# Patient Record
Sex: FEMALE | Race: WHITE | NOT HISPANIC OR LATINO | Employment: OTHER | URBAN - METROPOLITAN AREA
[De-identification: names, ages, dates, MRNs, and addresses within clinical notes are randomized per-mention and may not be internally consistent; named-entity substitution may affect disease eponyms.]

---

## 2017-01-17 ENCOUNTER — ALLSCRIPTS OFFICE VISIT (OUTPATIENT)
Dept: OTHER | Facility: OTHER | Age: 57
End: 2017-01-17

## 2017-01-17 ENCOUNTER — LAB REQUISITION (OUTPATIENT)
Dept: LAB | Facility: HOSPITAL | Age: 57
End: 2017-01-17
Payer: COMMERCIAL

## 2017-01-17 DIAGNOSIS — Z01.419 ENCOUNTER FOR GYNECOLOGICAL EXAMINATION WITHOUT ABNORMAL FINDING: ICD-10-CM

## 2017-01-17 DIAGNOSIS — Z12.31 ENCOUNTER FOR SCREENING MAMMOGRAM FOR MALIGNANT NEOPLASM OF BREAST: ICD-10-CM

## 2017-01-17 PROCEDURE — G0145 SCR C/V CYTO,THINLAYER,RESCR: HCPCS | Performed by: PHYSICIAN ASSISTANT

## 2017-01-17 PROCEDURE — 87624 HPV HI-RISK TYP POOLED RSLT: CPT | Performed by: PHYSICIAN ASSISTANT

## 2017-01-19 ENCOUNTER — GENERIC CONVERSION - ENCOUNTER (OUTPATIENT)
Dept: OTHER | Facility: OTHER | Age: 57
End: 2017-01-19

## 2017-01-20 LAB — HPV RRNA GENITAL QL NAA+PROBE: NORMAL

## 2017-01-23 LAB
LAB AP GYN PRIMARY INTERPRETATION: NORMAL
Lab: NORMAL

## 2017-01-25 ENCOUNTER — APPOINTMENT (OUTPATIENT)
Dept: LAB | Facility: MEDICAL CENTER | Age: 57
End: 2017-01-25
Payer: COMMERCIAL

## 2017-01-25 ENCOUNTER — GENERIC CONVERSION - ENCOUNTER (OUTPATIENT)
Dept: OTHER | Facility: OTHER | Age: 57
End: 2017-01-25

## 2017-01-25 DIAGNOSIS — I10 ESSENTIAL (PRIMARY) HYPERTENSION: ICD-10-CM

## 2017-01-25 DIAGNOSIS — M81.0 AGE-RELATED OSTEOPOROSIS WITHOUT CURRENT PATHOLOGICAL FRACTURE: ICD-10-CM

## 2017-01-25 DIAGNOSIS — G40.409 OTHER GENERALIZED EPILEPSY AND EPILEPTIC SYNDROMES, NOT INTRACTABLE, WITHOUT STATUS EPILEPTICUS (HCC): ICD-10-CM

## 2017-01-25 LAB
25(OH)D3 SERPL-MCNC: 37.6 NG/ML (ref 30–100)
ALBUMIN SERPL BCP-MCNC: 3.4 G/DL (ref 3.5–5)
ALP SERPL-CCNC: 101 U/L (ref 46–116)
ALT SERPL W P-5'-P-CCNC: 23 U/L (ref 12–78)
ANION GAP SERPL CALCULATED.3IONS-SCNC: 6 MMOL/L (ref 4–13)
AST SERPL W P-5'-P-CCNC: 16 U/L (ref 5–45)
BASOPHILS # BLD AUTO: 0.02 THOUSANDS/ΜL (ref 0–0.1)
BASOPHILS NFR BLD AUTO: 0 % (ref 0–1)
BILIRUB SERPL-MCNC: 0.22 MG/DL (ref 0.2–1)
BUN SERPL-MCNC: 18 MG/DL (ref 5–25)
CALCIUM SERPL-MCNC: 8.5 MG/DL (ref 8.3–10.1)
CHLORIDE SERPL-SCNC: 103 MMOL/L (ref 100–108)
CHOLEST SERPL-MCNC: 142 MG/DL (ref 50–200)
CO2 SERPL-SCNC: 30 MMOL/L (ref 21–32)
CREAT SERPL-MCNC: 0.54 MG/DL (ref 0.6–1.3)
EOSINOPHIL # BLD AUTO: 0.3 THOUSAND/ΜL (ref 0–0.61)
EOSINOPHIL NFR BLD AUTO: 4 % (ref 0–6)
ERYTHROCYTE [DISTWIDTH] IN BLOOD BY AUTOMATED COUNT: 13.4 % (ref 11.6–15.1)
GFR SERPL CREATININE-BSD FRML MDRD: >60 ML/MIN/1.73SQ M
GLUCOSE SERPL-MCNC: 87 MG/DL (ref 65–140)
HCT VFR BLD AUTO: 42.7 % (ref 34.8–46.1)
HDLC SERPL-MCNC: 61 MG/DL (ref 40–60)
HGB BLD-MCNC: 13.8 G/DL (ref 11.5–15.4)
LDLC SERPL CALC-MCNC: 70 MG/DL (ref 0–100)
LYMPHOCYTES # BLD AUTO: 1.72 THOUSANDS/ΜL (ref 0.6–4.47)
LYMPHOCYTES NFR BLD AUTO: 20 % (ref 14–44)
MCH RBC QN AUTO: 31.8 PG (ref 26.8–34.3)
MCHC RBC AUTO-ENTMCNC: 32.3 G/DL (ref 31.4–37.4)
MCV RBC AUTO: 98 FL (ref 82–98)
MONOCYTES # BLD AUTO: 0.73 THOUSAND/ΜL (ref 0.17–1.22)
MONOCYTES NFR BLD AUTO: 9 % (ref 4–12)
NEUTROPHILS # BLD AUTO: 5.76 THOUSANDS/ΜL (ref 1.85–7.62)
NEUTS SEG NFR BLD AUTO: 67 % (ref 43–75)
NRBC BLD AUTO-RTO: 0 /100 WBCS
PHENOBARB SERPL-MCNC: 23.5 UG/ML (ref 15–40)
PLATELET # BLD AUTO: 234 THOUSANDS/UL (ref 149–390)
PMV BLD AUTO: 10.9 FL (ref 8.9–12.7)
POTASSIUM SERPL-SCNC: 4.2 MMOL/L (ref 3.5–5.3)
PROT SERPL-MCNC: 7.5 G/DL (ref 6.4–8.2)
RBC # BLD AUTO: 4.34 MILLION/UL (ref 3.81–5.12)
SODIUM SERPL-SCNC: 139 MMOL/L (ref 136–145)
TRIGL SERPL-MCNC: 54 MG/DL
TSH SERPL DL<=0.05 MIU/L-ACNC: 2 UIU/ML (ref 0.36–3.74)
WBC # BLD AUTO: 8.55 THOUSAND/UL (ref 4.31–10.16)

## 2017-01-25 PROCEDURE — 80061 LIPID PANEL: CPT

## 2017-01-25 PROCEDURE — 82306 VITAMIN D 25 HYDROXY: CPT

## 2017-01-25 PROCEDURE — 84443 ASSAY THYROID STIM HORMONE: CPT

## 2017-01-25 PROCEDURE — 80053 COMPREHEN METABOLIC PANEL: CPT

## 2017-01-25 PROCEDURE — 80184 ASSAY OF PHENOBARBITAL: CPT

## 2017-01-25 PROCEDURE — 85025 COMPLETE CBC W/AUTO DIFF WBC: CPT

## 2017-01-25 PROCEDURE — 36415 COLL VENOUS BLD VENIPUNCTURE: CPT

## 2017-02-07 ENCOUNTER — HOSPITAL ENCOUNTER (OUTPATIENT)
Dept: RADIOLOGY | Age: 57
Discharge: HOME | End: 2017-02-07
Payer: COMMERCIAL

## 2017-02-07 DIAGNOSIS — M81.0 AGE-RELATED OSTEOPOROSIS WITHOUT CURRENT PATHOLOGICAL FRACTURE: ICD-10-CM

## 2017-02-07 DIAGNOSIS — Z12.31 ENCOUNTER FOR SCREENING MAMMOGRAM FOR MALIGNANT NEOPLASM OF BREAST: ICD-10-CM

## 2017-02-07 PROCEDURE — G0202 SCR MAMMO BI INCL CAD: HCPCS

## 2017-02-16 ENCOUNTER — ALLSCRIPTS OFFICE VISIT (OUTPATIENT)
Dept: OTHER | Facility: OTHER | Age: 57
End: 2017-02-16

## 2017-04-12 ENCOUNTER — ALLSCRIPTS OFFICE VISIT (OUTPATIENT)
Dept: OTHER | Facility: OTHER | Age: 57
End: 2017-04-12

## 2017-04-12 DIAGNOSIS — I10 ESSENTIAL (PRIMARY) HYPERTENSION: ICD-10-CM

## 2017-04-14 ENCOUNTER — APPOINTMENT (OUTPATIENT)
Dept: LAB | Facility: MEDICAL CENTER | Age: 57
End: 2017-04-14
Payer: COMMERCIAL

## 2017-04-14 DIAGNOSIS — I10 ESSENTIAL (PRIMARY) HYPERTENSION: ICD-10-CM

## 2017-04-14 LAB
ALBUMIN SERPL BCP-MCNC: 3.5 G/DL (ref 3.5–5)
ALP SERPL-CCNC: 98 U/L (ref 46–116)
ALT SERPL W P-5'-P-CCNC: 18 U/L (ref 12–78)
ANION GAP SERPL CALCULATED.3IONS-SCNC: 7 MMOL/L (ref 4–13)
AST SERPL W P-5'-P-CCNC: 12 U/L (ref 5–45)
BASOPHILS # BLD AUTO: 0.05 THOUSANDS/ΜL (ref 0–0.1)
BASOPHILS NFR BLD AUTO: 1 % (ref 0–1)
BILIRUB SERPL-MCNC: 0.32 MG/DL (ref 0.2–1)
BUN SERPL-MCNC: 16 MG/DL (ref 5–25)
CALCIUM SERPL-MCNC: 9.1 MG/DL (ref 8.3–10.1)
CHLORIDE SERPL-SCNC: 102 MMOL/L (ref 100–108)
CO2 SERPL-SCNC: 31 MMOL/L (ref 21–32)
CREAT SERPL-MCNC: 0.54 MG/DL (ref 0.6–1.3)
EOSINOPHIL # BLD AUTO: 0.18 THOUSAND/ΜL (ref 0–0.61)
EOSINOPHIL NFR BLD AUTO: 2 % (ref 0–6)
ERYTHROCYTE [DISTWIDTH] IN BLOOD BY AUTOMATED COUNT: 13.4 % (ref 11.6–15.1)
GFR SERPL CREATININE-BSD FRML MDRD: >60 ML/MIN/1.73SQ M
GLUCOSE P FAST SERPL-MCNC: 84 MG/DL (ref 65–99)
HCT VFR BLD AUTO: 43.2 % (ref 34.8–46.1)
HGB BLD-MCNC: 14.2 G/DL (ref 11.5–15.4)
LYMPHOCYTES # BLD AUTO: 1.95 THOUSANDS/ΜL (ref 0.6–4.47)
LYMPHOCYTES NFR BLD AUTO: 19 % (ref 14–44)
MCH RBC QN AUTO: 31.9 PG (ref 26.8–34.3)
MCHC RBC AUTO-ENTMCNC: 32.9 G/DL (ref 31.4–37.4)
MCV RBC AUTO: 97 FL (ref 82–98)
MONOCYTES # BLD AUTO: 0.86 THOUSAND/ΜL (ref 0.17–1.22)
MONOCYTES NFR BLD AUTO: 9 % (ref 4–12)
NEUTROPHILS # BLD AUTO: 7.04 THOUSANDS/ΜL (ref 1.85–7.62)
NEUTS SEG NFR BLD AUTO: 69 % (ref 43–75)
NRBC BLD AUTO-RTO: 0 /100 WBCS
PLATELET # BLD AUTO: 250 THOUSANDS/UL (ref 149–390)
PMV BLD AUTO: 10.6 FL (ref 8.9–12.7)
POTASSIUM SERPL-SCNC: 4.1 MMOL/L (ref 3.5–5.3)
PROT SERPL-MCNC: 7.5 G/DL (ref 6.4–8.2)
RBC # BLD AUTO: 4.45 MILLION/UL (ref 3.81–5.12)
SODIUM SERPL-SCNC: 140 MMOL/L (ref 136–145)
WBC # BLD AUTO: 10.1 THOUSAND/UL (ref 4.31–10.16)

## 2017-04-14 PROCEDURE — 36415 COLL VENOUS BLD VENIPUNCTURE: CPT

## 2017-04-14 PROCEDURE — 85025 COMPLETE CBC W/AUTO DIFF WBC: CPT

## 2017-04-14 PROCEDURE — 80053 COMPREHEN METABOLIC PANEL: CPT

## 2017-05-04 ENCOUNTER — ALLSCRIPTS OFFICE VISIT (OUTPATIENT)
Dept: OTHER | Facility: OTHER | Age: 57
End: 2017-05-04

## 2017-06-26 ENCOUNTER — ALLSCRIPTS OFFICE VISIT (OUTPATIENT)
Dept: OTHER | Facility: OTHER | Age: 57
End: 2017-06-26

## 2017-06-26 ENCOUNTER — APPOINTMENT (OUTPATIENT)
Dept: LAB | Facility: CLINIC | Age: 57
End: 2017-06-26
Payer: COMMERCIAL

## 2017-06-26 ENCOUNTER — HOSPITAL ENCOUNTER (OUTPATIENT)
Dept: RADIOLOGY | Facility: HOSPITAL | Age: 57
Discharge: HOME | End: 2017-06-26
Payer: COMMERCIAL

## 2017-06-26 ENCOUNTER — TRANSCRIBE ORDERS (OUTPATIENT)
Dept: ADMINISTRATIVE | Facility: HOSPITAL | Age: 57
End: 2017-06-26

## 2017-06-26 DIAGNOSIS — R05.9 COUGH: ICD-10-CM

## 2017-06-26 DIAGNOSIS — R05.9 COUGH: Primary | ICD-10-CM

## 2017-06-26 LAB
BASOPHILS # BLD AUTO: 0.05 THOUSANDS/ΜL (ref 0–0.1)
BASOPHILS NFR BLD AUTO: 0 % (ref 0–1)
EOSINOPHIL # BLD AUTO: 0.42 THOUSAND/ΜL (ref 0–0.61)
EOSINOPHIL NFR BLD AUTO: 3 % (ref 0–6)
ERYTHROCYTE [DISTWIDTH] IN BLOOD BY AUTOMATED COUNT: 13.2 % (ref 11.6–15.1)
HCT VFR BLD AUTO: 41.4 % (ref 34.8–46.1)
HGB BLD-MCNC: 13.8 G/DL (ref 11.5–15.4)
LYMPHOCYTES # BLD AUTO: 2.28 THOUSANDS/ΜL (ref 0.6–4.47)
LYMPHOCYTES NFR BLD AUTO: 15 % (ref 14–44)
MCH RBC QN AUTO: 32 PG (ref 26.8–34.3)
MCHC RBC AUTO-ENTMCNC: 33.3 G/DL (ref 31.4–37.4)
MCV RBC AUTO: 96 FL (ref 82–98)
MONOCYTES # BLD AUTO: 1.71 THOUSAND/ΜL (ref 0.17–1.22)
MONOCYTES NFR BLD AUTO: 11 % (ref 4–12)
NEUTROPHILS # BLD AUTO: 10.68 THOUSANDS/ΜL (ref 1.85–7.62)
NEUTS SEG NFR BLD AUTO: 71 % (ref 43–75)
PLATELET # BLD AUTO: 281 THOUSANDS/UL (ref 149–390)
PMV BLD AUTO: 10.1 FL (ref 8.9–12.7)
RBC # BLD AUTO: 4.31 MILLION/UL (ref 3.81–5.12)
WBC # BLD AUTO: 15.14 THOUSAND/UL (ref 4.31–10.16)

## 2017-06-26 PROCEDURE — 36415 COLL VENOUS BLD VENIPUNCTURE: CPT

## 2017-06-26 PROCEDURE — 85025 COMPLETE CBC W/AUTO DIFF WBC: CPT

## 2017-06-26 PROCEDURE — 71020 HB CHEST X-RAY 2VW FRONTAL&LATL: CPT

## 2017-06-27 ENCOUNTER — GENERIC CONVERSION - ENCOUNTER (OUTPATIENT)
Dept: OTHER | Facility: OTHER | Age: 57
End: 2017-06-27

## 2017-07-24 ENCOUNTER — ALLSCRIPTS OFFICE VISIT (OUTPATIENT)
Dept: OTHER | Facility: OTHER | Age: 57
End: 2017-07-24

## 2017-08-03 ENCOUNTER — ALLSCRIPTS OFFICE VISIT (OUTPATIENT)
Dept: OTHER | Facility: OTHER | Age: 57
End: 2017-08-03

## 2017-09-05 DIAGNOSIS — G40.409 OTHER GENERALIZED EPILEPSY AND EPILEPTIC SYNDROMES, NOT INTRACTABLE, WITHOUT STATUS EPILEPTICUS (HCC): ICD-10-CM

## 2017-09-12 ENCOUNTER — GENERIC CONVERSION - ENCOUNTER (OUTPATIENT)
Dept: OTHER | Facility: OTHER | Age: 57
End: 2017-09-12

## 2017-11-13 ENCOUNTER — GENERIC CONVERSION - ENCOUNTER (OUTPATIENT)
Dept: OTHER | Facility: OTHER | Age: 57
End: 2017-11-13

## 2017-11-13 DIAGNOSIS — I10 ESSENTIAL (PRIMARY) HYPERTENSION: ICD-10-CM

## 2017-11-13 DIAGNOSIS — E78.5 HYPERLIPIDEMIA: ICD-10-CM

## 2017-12-22 ENCOUNTER — OFFICE VISIT (OUTPATIENT)
Dept: URGENT CARE | Age: 57
End: 2017-12-22
Payer: COMMERCIAL

## 2017-12-22 PROCEDURE — 99203 OFFICE O/P NEW LOW 30 MIN: CPT | Performed by: FAMILY MEDICINE

## 2017-12-22 PROCEDURE — G0463 HOSPITAL OUTPT CLINIC VISIT: HCPCS | Performed by: FAMILY MEDICINE

## 2017-12-23 ENCOUNTER — GENERIC CONVERSION - ENCOUNTER (OUTPATIENT)
Dept: OTHER | Facility: OTHER | Age: 57
End: 2017-12-23

## 2017-12-23 NOTE — PROGRESS NOTES
Assessment   1  Acute upper respiratory infection (465 9) (J06 9)    Plan   Acute upper respiratory infection    · Amoxicillin-Pot Clavulanate 875-125 MG Oral Tablet (Augmentin); TAKE 1 TABLET    TWICE DAILY UNTIL FINISHED    Discussion/Summary   Discussion Summary:    Augmentin twice a day until finished (please take probiotics)  medication as needed  or Advil/ Motrin as needed  follow-up with family physician as needed  go to the hospital emergency department if worse  Medication Side Effects Reviewed: Possible side effects of new medications were reviewed with the patient/guardian today  Understands and agrees with treatment plan: The treatment plan was reviewed with the patient/guardian  The patient/guardian understands and agrees with the treatment plan    Counseling Documentation With Imm: The patient, patient's caretaker was counseled regarding  Follow Up Instructions: Follow Up with your Primary Care Provider in 7-10 days  If your symptoms worsen, go to the nearest [place] [address] Emergency Department  Chief Complaint   Chief Complaint Free Text Note Form: nurse with patient reports staff at pt  place of residence have come down with same sx within the past week, pt  has fever(101 7)and cough, fatigue  started yesterday tylenol at 1800  History of Present Illness   HPI: Patient here with nurse - congestion and cough; nurse states patient has been in contact with several ill people with similar complaint    Hospital Based Practices Required Assessment:      Abuse And Domestic Violence Screen       Yes, the patient is safe at home  -- The patient states no one is hurting them  Depression And Suicide Screen  No, the patient has not had thoughts of hurting themself  No, the patient has not felt depressed in the past 7 days  Review of Systems   Focused-Female:      Constitutional: as noted in HPI       ENT: nasal discharge, but-- as noted in HPI        Cardiovascular: no complaints of slow or fast heart rate, no chest pain, no palpitations, no leg claudication or lower extremity edema  Respiratory: cough, but-- as noted in HPI  Breasts: no complaints of breast pain, breast lump or nipple discharge  Gastrointestinal: no complaints of abdominal pain, no constipation, no nausea or diarrhea, no vomiting, no bloody stools  Genitourinary: no complaints of dysuria, no incontinence, no pelvic pain, no dysmenorrhea, no vaginal discharge or abnormal vaginal bleeding  Musculoskeletal: no complaints of arthralgia, no myalgia, no joint swelling or stiffness, no limb pain or swelling  Integumentary: no complaints of skin rash or lesion, no itching or dry skin, no skin wounds  Neurological: no complaints of headache, no confusion, no numbness or tingling, no dizziness or fainting  ROS Reviewed:    ROS reviewed  Active Problems   1  Allergic rhinitis (477 9) (J30 9)   2  Benign essential hypertension (401 1) (I10)   3  Cough (786 2) (R05)   4  Encounter for routine gynecological examination (V72 31) (Z01 419)   5  Encounter for screening mammogram for malignant neoplasm of breast (V76 12)     (Z12 31)   6  Esophageal reflux (530 81) (K21 9)   7  Hyperlipidemia (272 4) (E78 5)   8  Insomnia (780 52) (G47 00)   9  Medicare annual wellness visit, initial (V70 0) (Z00 00)   10  Need for hepatitis B vaccination (V05 3) (Z23)   11  Need for influenza vaccination (V04 81) (Z23)   12  Need for prophylactic vaccination and inoculation against influenza (V04 81) (Z23)   13  Osteoporosis (733 00) (M81 0)   14  Post-menopausal bleeding (627 1) (N95 0)   15  PPD screening test (V74 1) (Z11 1)   16  Skin disorder (709 9) (L98 9)   17  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)   18  Tonic-clonic epileptic seizures (345 10) (G40 409)   19  Visit for pre-operative examination (V72 84) (Z01 818)   20  Visit for screening mammogram (V76 12) (Z12 31)   21   Well adult health check (V70 0) (Z00 00)   22  History of Wheelchair dependent (V46 3) (Z99 3)    Past Medical History   1  History of Abnormal blood chemistry (790 6) (R79 9)   2  History of Allergic conjunctivitis of both eyes (372 14) (H10 13)   3  History of Anxiety (300 00) (F41 9)   4  History of Bowel incontinence (787 60) (R15 9)   5  History of Colonoscopy (Fiberoptic) Screening   6  History of Generalized convulsive seizure (780 39) (R56 9)   7  History of abdominal pain (V13 89) (Z87 898)   8  History of cerebral palsy (V12 49) (Z86 69)   9  History of constipation (V12 79) (Z87 19)   10  History of fatigue (V13 89) (Z87 898)   11  History of hyperlipidemia (V12 29) (Z86 39)   12  History of hypertension (V12 59) (Z86 79)   13  History of mental retardation (V11 8) (Z86 59)   14  History of upper respiratory infection (V12 09) (Z87 09)   15  History of urinary incontinence (V13 09) (Z87 898)   16  History of Impacted cerumen, unspecified laterality (380 4) (H61 20)   17  History of Mild pain (780 96) (R52)   18  History of Multiple Blisters (919 2)   19  Need for prophylactic vaccination and inoculation against influenza (V04 81) (Z23)   20  History of Osteoporosis (733 00) (M81 0)   21  History of Skin irritation (709 9) (R23 8)   22  History of Skin rash (782 1) (R21)   23  History of 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)   24  History of Visit For: Pre-procedural Exam Prior To Dental Procedure (V72 83)   25  History of Vitamin D deficiency (268 9) (E55 9)  Active Problems And Past Medical History Reviewed: The active problems and past medical history were reviewed and updated today  Family History   Mother    1  Family history of Bone Marrow Lymphoma   2  Family history of Coronary Artery Disease (V17 49)   3  Family history of Epilepsy And Recurrent Seizures (V17 2)   4  Family history of Mitral Stenosis  Father    5  Family history of Prostate Cancer (V16 42)   6   Family history of Skin Cancer (V16 8)  Maternal Grandmother    7  Family history of Heart Disease (V17 49)   8  Family history of Kidney Cancer (V16 51)  Paternal Grandmother    5  Family history of Heart Disease (V17 49)  Maternal Grandfather    10  Family history of Heart Disease (V17 49)  Maternal Aunt    6  Family history of Breast Cancer (V16 3)  Family History Reviewed: The family history was reviewed and updated today  Social History    · Caffeine use (V49 89) (F15 90)   · Denied: History of Drug Use   · Never A Smoker   · Never Drank Alcohol   · History of Wheelchair dependent (V46 3) (Z99 3)  Social History Reviewed: The social history was reviewed and updated today  The social history was reviewed and is unchanged  Surgical History   1  History of Eye Surgery Results Strabismus Left Eye   2  History of Incision And Drainage Of Skin Abscess Back  Surgical History Reviewed: The surgical history was reviewed and updated today  Current Meds    1  Baclofen 10 MG Oral Tablet; Take 1 tablet twice daily  Requested for: 03Aug2017; Last     Rx:03Aug2017 Ordered   2  Benzonatate 100 MG Oral Capsule; TAKE 1 CAPSULE EVERY 8 HOURS DAILY AS     NEEDED; Therapy: (Recorded:07Mar2016) to Recorded   3  Calcium Citrate 200 MG Oral Tablet; Bid 3 tabs; Therapy: (Recorded:08May2013) to Recorded   4  Debrox 6 5 % Otic Solution; 5 drops in affected ear bid x 4 days prn; Therapy: (Recorded:08May2013) to Recorded   5  Famotidine 20 MG Oral Tablet; TAKE 1 TABLET DAILY AT BEDTIME; Therapy: 02Aug2013 to (Evaluate:30Mar2014); Last Rx:02Aug2013 Ordered   6  Fluticasone Propionate 50 MCG/ACT Nasal Suspension; Therapy: (Nimesh Lee) to Recorded   7  Loratadine 10 MG Oral Tablet; take 1 tablet daily as needed; Therapy: (Recorded:08May2013) to Recorded   8  Losartan Potassium-HCTZ 50-12 5 MG Oral Tablet; TAKE ONE TABLET PO AT 6AM     DAILY;      Therapy: 48YCE6293 to (Evaluate:19May2013)  Requested for: 75ARN0460 Recorded   9  Lotrisone 1-0 05 % External Cream; APPLY TWICE TO AFFECTED AREA AS NEEDED; Therapy: (Recorded:04May2017) to Recorded   10  Metoprolol Succinate  MG Oral Tablet Extended Release 24 Hour; TAKE ONE      TABLET PO AT 6AM DAILY; Therapy: 61XAD9444 to (Brayan Crenshaw)  Requested for: 12Apr2017 Recorded   11  MiraLax Oral Packet; MIX 1 PACKET IN 8 OUNCES OF LIQUID AND DRINK ONCE DAILY; Therapy: (Nathan Howard) to Recorded   12  MiraLax Oral Packet; takes one tsp by mouth prn q3 days for constipation; Therapy: (Recorded:11Aug2016) to Recorded   13  Mucinex 600 MG Oral Tablet Extended Release 12 Hour; TAKE 1 TABLET EVERY 12      HOURS AS NEEDED FOR CONGESTION; Therapy: 62FHJ4305 to (Evaluate:18May2017); Last LL:05FUD3745 Ordered   14  Pataday 0 2 % Ophthalmic Solution; INSTILL 1 DROP  INTO AFFECTED EYE(S) ONCE      DAILY       AS NEEDED FOR EYE ALLERGY RELIEF; Therapy: 41JZG1117 to (Last SP:71UWC3923) Ordered   15  PHENobarbital 32 4 MG Oral Tablet; TAKE 3 TABLETS BY MOUTH EVERY EVENING      (SEIZURES); Therapy: 89ONX6421 to (Evaluate:30Jan2018)  Requested for: 72Dgk8696; Last      Rx:48Uft9009 Ordered   16  Povidone-Iodine 10 % External Ointment; apply to affected area BID PRN; Therapy: (Recorded:07Mar2016) to Recorded   17  Simvastatin 20 MG Oral Tablet; TAKE ONE TAB PO AT 4PM DAILY; Therapy: 44NBH0865 to (Brayan Crenshaw)  Requested for: 12Apr2017 Recorded   18  Tylenol 8 Hour 650 MG Oral Tablet Extended Release; 1 tab q4h temp greater than 100 4      and mild pain; Therapy: (Recorded:07Mar2016) to Recorded   19  Vitamin D 1000 UNIT CAPS; take 1 capsule daily; Therapy: 98BDC9110 to (Evaluate:74Qzw8203); Last Rx:94Trj6172 Ordered   20  Vitamins A & D External Ointment; Apply as needed to Buttocks; Therapy: (Recorded:07Mar2016) to Recorded   21  Zolpidem Tartrate 5 MG Oral Tablet; take 0 5 tablet bedtime;       Therapy: 34INI6886 to (Evaluate:12Apr2018)  Requested for: 76PGV9428; Last      Rx:13Nov2017 Ordered  Medication List Reviewed: The medication list was reviewed and updated today  Allergies   1  No Known Drug Allergies  2  No Known Environmental Allergies    Vitals   Signs   Recorded: 87Xli6702 06:17PM   Temperature: 100 7 F  Heart Rate: 130  Respiration: 24  Systolic: 443  Diastolic: 91  Height: 5 ft   Weight: 167 lb   BMI Calculated: 32 62  BSA Calculated: 1 73  O2 Saturation: 95  Pain Scale: 2    Physical Exam        Constitutional patient appears ill  Ears, Nose, Mouth, and Throat      External inspection of ears and nose: Normal        Otoscopic examination: Tympanic membranes translucent with normal light reflex  Canals patent without erythema  -- nasal congestion  -- injection of the oropharynx; moist mucosa  Pulmonary      Respiratory effort: No increased work of breathing or signs of respiratory distress  Auscultation of lungs: Clear to auscultation  Cardiovascular      Auscultation of heart: Normal rate and rhythm, normal S1 and S2, without murmurs  Lymphatic      Palpation of lymph nodes in neck: No lymphadenopathy  Skin good color and turgor  Future Appointments      Date/Time Provider Specialty Site   07/27/2018 10:00 AM GOME[facility]   [facility] [place] [facility]     Signatures    Electronically signed by : Evelyn Morel DO; Dec 22 2017  6:34PM EST                       (Author)

## 2018-01-02 ENCOUNTER — GENERIC CONVERSION - ENCOUNTER (OUTPATIENT)
Dept: OTHER | Facility: OTHER | Age: 58
End: 2018-01-02

## 2018-01-11 NOTE — RESULT NOTES
Verified Results  (1) PHENOBARBITAL [id number] 08:53AM Sadi [place] Order Number: [id number]     Test Name Result Flag Reference   PHENOBARBITAL 23 5 ug/mL  15 0-40 0   - Patient Instructions: This is a fasting blood test  Water, black tea or black coffee only after 9:00pm the night before test Drink 2 glasses of water the morning of test - Patient Instructions: This bloodwork is non-fasting  Please drink two glasses of   water morning of bloodwork

## 2018-01-11 NOTE — PROGRESS NOTES
Assessment    1  Encounter for preventive health examination (V70 0) (Z00 00)    Discussion/Summary  health maintenance visit healthy adult female Currently, she eats a healthy diet and has an inadequate exercise regimen  Breast cancer screening: mammogram is current and mammogram is needed every year  Osteoporosis screening: bone mineral density testing is current  Health Maintenance- Up to date with labs, mammogram, female reproductive health  Forms completed for [facility]  No medications needed at this time  Will have patient brought back in 3 months for routine  Possible side effects of new medications were reviewed with the patient/guardian today  The patient, patient's caretaker was counseled regarding instructions for management, patient and family education, impressions, importance of compliance with treatment  Self Referrals: No      Chief Complaint  Annual exam      History of Present Illness  HM, Adult Female: The patient is being seen for a health maintenance evaluation  General Health: The patient's health since the last visit is described as good  She has regular dental visits  Immunizations status: up to date  Lifestyle:  She does not exercise regularly  She does not use tobacco  She denies alcohol use  She denies drug use  Screening:   HPI: 60 yo F with history of CP and profound intellectual disability brought to the office by one of her care providers in order to have annual visit  She has been in good health since her last visit  She was last seen August 11 of this year and had no acute concerns  BP has been well controlled with medications  She follows with the gyn clinic in [place] and her last appt was within the past year  DEXA scan shows persistent osteoporosis despite bisphosphonate therapy  She will be on drug holiday then start Prolia in 2017  She goes to dentist every 6 months and has her teeth brushed twice a day        Review of Systems    Constitutional: No fever, no chills, feels well, no tiredness, no recent weight gain or weight loss  Eyes: No complaints of eye pain, no red eyes, no eyesight problems, no discharge, no dry eyes, no itching of eyes  ENT: no complaints of earache, no loss of hearing, no nose bleeds, no nasal discharge, no sore throat, no hoarseness  Cardiovascular: No complaints of slow heart rate, no fast heart rate, no chest pain, no palpitations, no leg claudication, no lower extremity edema  Respiratory: No complaints of shortness of breath, no wheezing, no cough, no SOB on exertion, no orthopnea, no PND  Gastrointestinal: No complaints of abdominal pain, no constipation, no nausea or vomiting, no diarrhea, no bloody stools  Genitourinary: No complaints of dysuria, no incontinence, no pelvic pain, no dysmenorrhea, no vaginal discharge or bleeding  Musculoskeletal: No complaints of arthralgias, no myalgias, no joint swelling or stiffness, no limb pain or swelling  Integumentary: No complaints of skin rash or lesions, no itching, no skin wounds, no breast pain or lump  Neurological: No complaints of headache, no confusion, no convulsions, no numbness, no dizziness or fainting, no tingling, no limb weakness, no difficulty walking  Psychiatric: Not suicidal, no sleep disturbance, no anxiety or depression, no change in personality, no emotional problems  Endocrine: No complaints of proptosis, no hot flashes, no muscle weakness, no deepening of the voice, no feelings of weakness  Hematologic/Lymphatic: No complaints of swollen glands, no swollen glands in the neck, does not bleed easily, does not bruise easily  ROS reviewed  Active Problems    1  Acute upper respiratory infection (465 9) (J06 9)   2  Allergic rhinitis (477 9) (J30 9)   3  Benign essential hypertension (401 1) (I10)   4  Encounter for routine gynecological examination (V72 31) (Z01 419)   5   Encounter for screening mammogram for malignant neoplasm of breast (V76 12)   (Z12 31)   6  Esophageal reflux (530 81) (K21 9)   7  Febrile (780 60) (R50 9)   8  Hyperlipidemia (272 4) (E78 5)   9  Insomnia (780 52) (G47 00)   10  Mild pain (780 96) (R52)   11  Need for hepatitis B vaccination (V05 3) (Z23)   12  Need for prophylactic vaccination and inoculation against influenza (V04 81) (Z23)   13  Osteoporosis (733 00) (M81 0)   14  Post-menopausal bleeding (627 1) (N95 0)   15  PPD screening test (V74 1) (Z11 1)   16  Skin irritation (709 9) (R23 8)   17  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)   18  Tonic-clonic epileptic seizures (345 10) (G40 409)   19  Visit for pre-operative examination (V72 84) (Z01 818)   20  Well adult health check (V70 0) (Z00 00)   21   History of Wheelchair dependent (V46 3) (Z99 3)    Past Medical History    · History of Abnormal blood chemistry (790 6) (R79 9)   · History of Allergic conjunctivitis of both eyes (372 14) (H10 13)   · History of Anxiety (300 00) (F41 9)   · History of Bowel incontinence (787 60) (R15 9)   · History of Colonoscopy (Fiberoptic) Screening   · History of Cough (786 2) (R05)   · History of Generalized convulsive seizure (780 39) (R56 9)   · History of abdominal pain (V13 89) (U68 108)   · History of cerebral palsy (V12 49) (Z86 69)   · History of constipation (V12 79) (Z87 19)   · History of fatigue (V13 89) (Y42 013)   · History of hyperlipidemia (V12 29) (Z86 39)   · History of hypertension (V12 59) (Z86 79)   · History of mental retardation (V11 8) (Z86 59)   · History of upper respiratory infection (V12 09) (Z87 09)   · History of urinary incontinence (V13 09) (S57 682)   · History of Impacted cerumen, unspecified laterality (380 4) (H61 20)   · History of Multiple Blisters (919 2)   · Need for prophylactic vaccination and inoculation against influenza (V04 81) (Z23)   · History of Osteoporosis (733 00) (M81 0)   · History of Skin rash (782 1) (R21)   · History of 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)   · History of Visit For: Pre-procedural Exam Prior To Dental Procedure (V72 83)   · History of Vitamin D deficiency (268 9) (E55 9)    Surgical History    · History of Eye Surgery Results Strabismus Left Eye   · History of Incision And Drainage Of Skin Abscess Back    Family History  Mother    · Family history of Bone Marrow Lymphoma   · Family history of Coronary Artery Disease (V17 49)   · Family history of Epilepsy And Recurrent Seizures (V17 2)   · Family history of Mitral Stenosis  Father    · Family history of Prostate Cancer (V16 42)   · Family history of Skin Cancer (V16 8)  Maternal Grandmother    · Family history of Heart Disease (V17 49)   · Family history of Kidney Cancer (V16 51)  Paternal Grandmother    · Family history of Heart Disease (V17 49)  Maternal Grandfather    · Family history of Heart Disease (V17 49)  Maternal Aunt    · Family history of Breast Cancer (V16 3)    Social History    · Caffeine use (V49 89) (F15 90)   · Denied: History of Drug Use   · Never A Smoker   · Never Drank Alcohol   · History of Wheelchair dependent (V46 3) (Z99 3)    Current Meds   1  Alendronate Sodium 70 MG Oral Tablet; ONE TABLET PO WEEKLY ON TUESDAY; Therapy: 61PUX6597 to Recorded   2  Baclofen 10 MG Oral Tablet; Take 1 tablet twice daily  Requested for: 03Aug2016; Last   Rx:03Aug2016 Ordered   3  Benzonatate 100 MG Oral Capsule; TAKE 1 CAPSULE EVERY 8 HOURS DAILY AS   NEEDED; Therapy: (Recorded:07Mar2016) to Recorded   4  Calcium Citrate 200 MG Oral Tablet; Bid 3 tabs; Therapy: (Recorded:08May2013) to Recorded   5  Debrox 6 5 % Otic Solution; 5 drops in affected ear bid x 4 days prn; Therapy: (Recorded:08May2013) to Recorded   6  Famotidine 20 MG Oral Tablet; TAKE 1 TABLET DAILY AT BEDTIME; Therapy: 02Aug2013 to (Evaluate:30Mar2014); Last Rx:02Aug2013 Ordered   7  Fluticasone Propionate 50 MCG/ACT Nasal Suspension; USE 1 SPRAY IN EACH   NOSTRIL TWICE DAILY;    Therapy: 96OLX0443 to ([facility])  Requested for: 82XTF6229 Recorded   8  Loratadine 10 MG Oral Tablet; take 1 tablet daily as needed; Therapy: (Recorded:08May2013) to Recorded   9  Losartan Potassium-HCTZ 50-12 5 MG Oral Tablet; TAKE ONE TABLET PO AT 6AM   DAILY; Therapy: 98KDZ5120 to (Evaluate:19May2013)  Requested for: 14OAM7097 Recorded   10  Metoprolol Succinate  MG Oral Tablet Extended Release 24 Hour; TAKE ONE    TABLET PO AT 6AM DAILY; Therapy: 84ZMK6517 to ([facility])  Requested for: 50YNJ5652 Recorded   11  MiraLax Oral Packet; 17gms 1 cap in 4-8oz glass of fluid po daily; Therapy: (Recorded:07Mar2016) to Recorded   12  MiraLax Oral Packet; takes one tsp by mouth prn q3 days for constipation; Therapy: (Recorded:11Aug2016) to Recorded   13  Pataday 0 2 % Ophthalmic Solution; INSTILL 1 DROP  INTO AFFECTED EYE(S) ONCE    DAILY     AS NEEDED FOR EYE ALLERGY RELIEF; Therapy: 04QRT4633 to (Last JY:79WIK0924) Ordered   14  PHENobarbital 32 4 MG Oral Tablet; TAKE THREE TABLETS BY MOUTH 4PM DAILY; Therapy: 45GQW7126 to (Evaluate:30Jan2017); Last Rx:38Rhf1430 Ordered   15  Povidone-Iodine 10 % External Ointment; apply to affected area BID PRN; Therapy: (Recorded:07Mar2016) to Recorded   16  Robitussin -10 MG/5ML Oral Syrup; TAKE 1 TEASPOONFUL EVERY 4 HOURS    AS NEEDED; Therapy: (Recorded:08May2013) to Recorded   17  Simvastatin 20 MG Oral Tablet; TAKE ONE TAB PO AT 4PM DAILY; Therapy: 55AUE6243 to ([facility])  Requested for: 04XJO8523 Recorded   18  Tylenol 8 Hour 650 MG Oral Tablet Extended Release; 1 tab q4h temp greater than 100 4    and mild pain; Therapy: (Recorded:07Mar2016) to Recorded   19  Vitamin D 1000 UNIT CAPS; take 1 capsule daily; Therapy: 73WLI2025 to (Evaluate:08Feb2014); Last Rx:15Imb9930 Ordered   20  Vitamins A & D External Ointment; Apply as needed to Buttocks; Therapy: (Recorded:07Mar2016) to Recorded   21   Zolpidem Tartrate 5 MG Oral Tablet; take 1/2 tab PO daily; Therapy: 07WCG3760 to (Evaluate:01Jun2013)  Requested for: 49KRI3536 Recorded    Allergies    1  No Known Drug Allergies    2  No Known Environmental Allergies    Vitals   Recorded: 43UGE3483 12:37XM   Systolic 397   Diastolic 80   Heart Rate 74   Respiration 14   Temperature 97 7 F   Weight Unobtainable Yes   Height Unobtainable Yes     Physical Exam    Constitutional   General appearance: No acute distress, well appearing and well nourished  Head and Face   Head and face: Normal     Palpation of the face and sinuses: No sinus tenderness  Eyes   Conjunctiva and lids: No swelling, erythema or discharge  Pupils and irises: Equal, round, reactive to light  Ophthalmoscopic examination: Normal fundi and optic discs  Ears, Nose, Mouth, and Throat   External inspection of ears and nose: Normal     Otoscopic examination: Tympanic membranes translucent with normal light reflex  Canals patent without erythema  Lips, teeth, and gums: Normal, good dentition  Oropharynx: Normal with no erythema, edema, exudate or lesions  Neck   Neck: Supple, symmetric, trachea midline, no masses  Thyroid: Normal, no thyromegaly  Pulmonary   Respiratory effort: No increased work of breathing or signs of respiratory distress  Auscultation of lungs: Clear to auscultation  Cardiovascular   Auscultation of heart: Normal rate and rhythm, normal S1 and S2, no murmurs  Examination of extremities for edema and/or varicosities: Normal     Abdomen   Abdomen: Non-tender, no masses  Lymphatic   Palpation of lymph nodes in neck: No lymphadenopathy  Musculoskeletal   Gait and station: Normal     Digits and nails: Normal without clubbing or cyanosis  Joints, bones, and muscles: Normal     Range of motion: Normal     Stability: Normal     Muscle strength/tone: Normal     Skin   Skin and subcutaneous tissue: Normal without rashes or lesions      Palpation of skin and subcutaneous tissue: Normal turgor  Neurologic   Cranial nerves: Cranial nerves II-XII intact  Cortical function: Normal mental status  Reflexes: 2+ and symmetric  Sensation: No sensory loss  Coordination: Normal finger to nose and heel to shin  Psychiatric   Judgment and insight: Normal     Orientation to person, place, and time: Normal     Recent and remote memory: Intact  Mood and affect: Normal        Attending Note  Attending Note: Attending Note: I discussed the case with the Resident and reviewed the Resident's note and I agree with the Resident management plan as it was presented to me  Level of Participation: I was present in clinic, but did not examine the patient  Diagnosis and Plan: Health maintenance: doing well, labs, screenings up to date  I agree with the Resident's note  Signatures   Electronically signed by :  Saurabh Carrington DO; Sep 15 2016  3:12PM EST                       (Author)    Electronically signed by : GOMEZ Flower ; Sep 16 2016 12:53PM EST                       (Author)

## 2018-01-11 NOTE — RESULT NOTES
Verified Results  (1) CBC/PLT/DIFF [id number] 11:46AM Anna Aquino     Test Name Result Flag Reference   WBC COUNT 15 14 Thousand/uL H 4 31-10 16   RBC COUNT 4 31 Million/uL  3 81-5 12   HEMOGLOBIN 13 8 g/dL  11 5-15 4   HEMATOCRIT 41 4 %  34 8-46  1   MCV 96 fL  82-98   MCH 32 0 pg  26 8-34 3   MCHC 33 3 g/dL  31 4-37 4   RDW 13 2 %  11 6-15 1   MPV 10 1 fL  8 9-12 7   PLATELET COUNT 260 Thousands/uL  149-390   NEUTROPHILS RELATIVE PERCENT 71 %  43-75   LYMPHOCYTES RELATIVE PERCENT 15 %  14-44   MONOCYTES RELATIVE PERCENT 11 %  4-12   EOSINOPHILS RELATIVE PERCENT 3 %  0-6   BASOPHILS RELATIVE PERCENT 0 %  0-1   NEUTROPHILS ABSOLUTE COUNT 10 68 Thousands/? ??L H 1 85-7 62   LYMPHOCYTES ABSOLUTE COUNT 2 28 Thousands/? ??L  0 60-4 47   MONOCYTES ABSOLUTE COUNT 1 71 Thousand/? ??L H 0 17-1 22   EOSINOPHILS ABSOLUTE COUNT 0 42 Thousand/? ??L  0 00-0 61   BASOPHILS ABSOLUTE COUNT 0 05 Thousands/? ??L  0 00-0 10   This bloodwork is non-fasting  Please drink two glasses of water morning of  bloodwork

## 2018-01-12 VITALS
DIASTOLIC BLOOD PRESSURE: 70 MMHG | RESPIRATION RATE: 14 BRPM | HEART RATE: 80 BPM | SYSTOLIC BLOOD PRESSURE: 116 MMHG | TEMPERATURE: 98.6 F

## 2018-01-13 VITALS
DIASTOLIC BLOOD PRESSURE: 70 MMHG | RESPIRATION RATE: 18 BRPM | SYSTOLIC BLOOD PRESSURE: 118 MMHG | HEART RATE: 82 BPM | TEMPERATURE: 97.6 F | HEIGHT: 60 IN

## 2018-01-13 VITALS — HEART RATE: 84 BPM | SYSTOLIC BLOOD PRESSURE: 136 MMHG | DIASTOLIC BLOOD PRESSURE: 76 MMHG

## 2018-01-14 VITALS
BODY MASS INDEX: 32.98 KG/M2 | DIASTOLIC BLOOD PRESSURE: 78 MMHG | TEMPERATURE: 98.7 F | WEIGHT: 168 LBS | HEIGHT: 60 IN | RESPIRATION RATE: 12 BRPM | HEART RATE: 68 BPM | SYSTOLIC BLOOD PRESSURE: 128 MMHG

## 2018-01-14 VITALS
TEMPERATURE: 98.4 F | HEART RATE: 74 BPM | SYSTOLIC BLOOD PRESSURE: 118 MMHG | RESPIRATION RATE: 16 BRPM | DIASTOLIC BLOOD PRESSURE: 76 MMHG

## 2018-01-14 VITALS — BODY MASS INDEX: 32.98 KG/M2 | WEIGHT: 168 LBS | HEIGHT: 60 IN

## 2018-01-14 VITALS
RESPIRATION RATE: 16 BRPM | HEART RATE: 68 BPM | SYSTOLIC BLOOD PRESSURE: 132 MMHG | DIASTOLIC BLOOD PRESSURE: 78 MMHG | TEMPERATURE: 96.7 F

## 2018-01-22 VITALS
SYSTOLIC BLOOD PRESSURE: 118 MMHG | HEART RATE: 72 BPM | DIASTOLIC BLOOD PRESSURE: 70 MMHG | RESPIRATION RATE: 14 BRPM | TEMPERATURE: 96.9 F

## 2018-01-22 VITALS
SYSTOLIC BLOOD PRESSURE: 122 MMHG | RESPIRATION RATE: 12 BRPM | DIASTOLIC BLOOD PRESSURE: 78 MMHG | TEMPERATURE: 98.9 F | HEART RATE: 68 BPM

## 2018-01-23 VITALS
WEIGHT: 167 LBS | SYSTOLIC BLOOD PRESSURE: 162 MMHG | TEMPERATURE: 100.7 F | BODY MASS INDEX: 32.79 KG/M2 | HEIGHT: 60 IN | HEART RATE: 130 BPM | OXYGEN SATURATION: 95 % | RESPIRATION RATE: 24 BRPM | DIASTOLIC BLOOD PRESSURE: 91 MMHG

## 2018-01-23 NOTE — MISCELLANEOUS
Message  Message Free Text Note Form: Bevtoft from [facility] called on behalf of the patient  Reports a 1 day history of fevers and cough  went to urgent care yesterday and was prescribe Augmentin 875 q12h for presumed URI  She is also getting Tylenol for fevers and Claritin for congestion  Earlier today was found to have another elevates temp at 3pm and was given Tylenol but has now max-ed the dose for Tylenol  Most recent temp recorded at 5 pm was 104 1 in 1 ear and 103 8 in the other  Patient is also tachycardic in the 100's-110's with a BP of 130/86 and 95% on RA  Per caregiver, pt is not in any distress but is concerned about her elevated temp  Appetite has decreased but is drinking fluids  Discussion/Summary  Discussion Summary:   Instructed to give patient Ibuprofen 600 mg q6h as needed and if fever persist, go to the ED  Also monitor closely given history of seizures  SHould they have any further concerns or questions they may call back anytime but i strongly encouraged them to go to the ED should they feel uncomfortable with managing acute symptoms  Caregiver verbalized understanding and agreed with plan        Signatures   Electronically signed by : GOMEZ Chisholm ; Dec 23 2017  9:47PM EST                       (Author)

## 2018-01-24 ENCOUNTER — APPOINTMENT (OUTPATIENT)
Dept: LAB | Facility: CLINIC | Age: 58
End: 2018-01-24
Payer: COMMERCIAL

## 2018-01-24 VITALS
HEART RATE: 76 BPM | BODY MASS INDEX: 32.79 KG/M2 | WEIGHT: 167 LBS | HEIGHT: 60 IN | DIASTOLIC BLOOD PRESSURE: 70 MMHG | RESPIRATION RATE: 18 BRPM | TEMPERATURE: 97.1 F | SYSTOLIC BLOOD PRESSURE: 110 MMHG

## 2018-01-24 DIAGNOSIS — I10 ESSENTIAL (PRIMARY) HYPERTENSION: ICD-10-CM

## 2018-01-24 DIAGNOSIS — G40.409 OTHER GENERALIZED EPILEPSY AND EPILEPTIC SYNDROMES, NOT INTRACTABLE, WITHOUT STATUS EPILEPTICUS (HCC): ICD-10-CM

## 2018-01-24 DIAGNOSIS — E78.5 HYPERLIPIDEMIA: ICD-10-CM

## 2018-01-24 LAB
ALBUMIN SERPL BCP-MCNC: 3.3 G/DL (ref 3.5–5)
ALP SERPL-CCNC: 90 U/L (ref 46–116)
ALT SERPL W P-5'-P-CCNC: 20 U/L (ref 12–78)
ANION GAP SERPL CALCULATED.3IONS-SCNC: 9 MMOL/L (ref 4–13)
AST SERPL W P-5'-P-CCNC: 14 U/L (ref 5–45)
BASOPHILS # BLD AUTO: 0.05 THOUSANDS/ΜL (ref 0–0.1)
BASOPHILS NFR BLD AUTO: 1 % (ref 0–1)
BILIRUB SERPL-MCNC: 0.43 MG/DL (ref 0.2–1)
BUN SERPL-MCNC: 17 MG/DL (ref 5–25)
CALCIUM SERPL-MCNC: 8.9 MG/DL (ref 8.3–10.1)
CHLORIDE SERPL-SCNC: 104 MMOL/L (ref 100–108)
CHOLEST SERPL-MCNC: 155 MG/DL (ref 50–200)
CO2 SERPL-SCNC: 26 MMOL/L (ref 21–32)
CREAT SERPL-MCNC: 0.49 MG/DL (ref 0.6–1.3)
EOSINOPHIL # BLD AUTO: 0.26 THOUSAND/ΜL (ref 0–0.61)
EOSINOPHIL NFR BLD AUTO: 3 % (ref 0–6)
ERYTHROCYTE [DISTWIDTH] IN BLOOD BY AUTOMATED COUNT: 13.8 % (ref 11.6–15.1)
GFR SERPL CREATININE-BSD FRML MDRD: 108 ML/MIN/1.73SQ M
GLUCOSE P FAST SERPL-MCNC: 87 MG/DL (ref 65–99)
HCT VFR BLD AUTO: 42.1 % (ref 34.8–46.1)
HDLC SERPL-MCNC: 56 MG/DL (ref 40–60)
HGB BLD-MCNC: 13.9 G/DL (ref 11.5–15.4)
LDLC SERPL CALC-MCNC: 88 MG/DL (ref 0–100)
LYMPHOCYTES # BLD AUTO: 1.68 THOUSANDS/ΜL (ref 0.6–4.47)
LYMPHOCYTES NFR BLD AUTO: 20 % (ref 14–44)
MCH RBC QN AUTO: 32.3 PG (ref 26.8–34.3)
MCHC RBC AUTO-ENTMCNC: 33 G/DL (ref 31.4–37.4)
MCV RBC AUTO: 98 FL (ref 82–98)
MONOCYTES # BLD AUTO: 0.67 THOUSAND/ΜL (ref 0.17–1.22)
MONOCYTES NFR BLD AUTO: 8 % (ref 4–12)
NEUTROPHILS # BLD AUTO: 5.79 THOUSANDS/ΜL (ref 1.85–7.62)
NEUTS SEG NFR BLD AUTO: 68 % (ref 43–75)
NRBC BLD AUTO-RTO: 0 /100 WBCS
PHENOBARB SERPL-MCNC: 22.5 UG/ML (ref 15–40)
PLATELET # BLD AUTO: 244 THOUSANDS/UL (ref 149–390)
PMV BLD AUTO: 10.8 FL (ref 8.9–12.7)
POTASSIUM SERPL-SCNC: 3.7 MMOL/L (ref 3.5–5.3)
PROT SERPL-MCNC: 7.3 G/DL (ref 6.4–8.2)
RBC # BLD AUTO: 4.31 MILLION/UL (ref 3.81–5.12)
SODIUM SERPL-SCNC: 139 MMOL/L (ref 136–145)
TRIGL SERPL-MCNC: 54 MG/DL
TSH SERPL DL<=0.05 MIU/L-ACNC: 2.61 UIU/ML (ref 0.36–3.74)
WBC # BLD AUTO: 8.47 THOUSAND/UL (ref 4.31–10.16)

## 2018-01-24 PROCEDURE — 80184 ASSAY OF PHENOBARBITAL: CPT

## 2018-01-24 PROCEDURE — 85025 COMPLETE CBC W/AUTO DIFF WBC: CPT

## 2018-01-24 PROCEDURE — 36415 COLL VENOUS BLD VENIPUNCTURE: CPT

## 2018-01-24 PROCEDURE — 84443 ASSAY THYROID STIM HORMONE: CPT

## 2018-01-24 PROCEDURE — 80061 LIPID PANEL: CPT

## 2018-01-24 PROCEDURE — 80053 COMPREHEN METABOLIC PANEL: CPT

## 2018-01-31 ENCOUNTER — OFFICE VISIT (OUTPATIENT)
Dept: OBGYN CLINIC | Facility: HOSPITAL | Age: 58
End: 2018-01-31
Payer: COMMERCIAL

## 2018-01-31 VITALS
SYSTOLIC BLOOD PRESSURE: 131 MMHG | WEIGHT: 165 LBS | HEIGHT: 60 IN | HEART RATE: 101 BPM | BODY MASS INDEX: 32.39 KG/M2 | DIASTOLIC BLOOD PRESSURE: 88 MMHG

## 2018-01-31 DIAGNOSIS — Z01.419 WOMEN'S ANNUAL ROUTINE GYNECOLOGICAL EXAMINATION: Primary | ICD-10-CM

## 2018-01-31 DIAGNOSIS — G80.9 CEREBRAL PALSY, UNSPECIFIED TYPE (HCC): ICD-10-CM

## 2018-01-31 DIAGNOSIS — Z99.3 WHEELCHAIR BOUND: ICD-10-CM

## 2018-01-31 PROCEDURE — 99396 PREV VISIT EST AGE 40-64: CPT | Performed by: OBSTETRICS & GYNECOLOGY

## 2018-01-31 RX ORDER — BENZONATATE 100 MG/1
CAPSULE ORAL
COMMUNITY
End: 2018-10-17 | Stop reason: SDUPTHER

## 2018-01-31 RX ORDER — LOSARTAN POTASSIUM AND HYDROCHLOROTHIAZIDE 12.5; 5 MG/1; MG/1
TABLET ORAL
COMMUNITY
Start: 2011-11-29 | End: 2018-10-17 | Stop reason: SDUPTHER

## 2018-01-31 RX ORDER — CLOTRIMAZOLE AND BETAMETHASONE DIPROPIONATE 10; .64 MG/G; MG/G
CREAM TOPICAL
COMMUNITY
End: 2018-10-17 | Stop reason: SDUPTHER

## 2018-01-31 RX ORDER — PETROLATUM,WHITE/LANOLIN
OINTMENT (GRAM) TOPICAL
COMMUNITY
End: 2021-11-29

## 2018-01-31 RX ORDER — GUAIFENESIN 600 MG
1 TABLET, EXTENDED RELEASE 12 HR ORAL EVERY 12 HOURS PRN
COMMUNITY
Start: 2017-05-04 | End: 2021-06-01

## 2018-01-31 RX ORDER — FLUTICASONE PROPIONATE 50 MCG
1 SPRAY, SUSPENSION (ML) NASAL 2 TIMES DAILY
COMMUNITY
Start: 2012-01-18 | End: 2019-03-19 | Stop reason: SDUPTHER

## 2018-01-31 RX ORDER — SENNOSIDES 8.6 MG
CAPSULE ORAL EVERY 6 HOURS
COMMUNITY
End: 2018-10-17 | Stop reason: SDUPTHER

## 2018-01-31 RX ORDER — METOPROLOL SUCCINATE 100 MG/1
TABLET, EXTENDED RELEASE ORAL
COMMUNITY
Start: 2011-12-24 | End: 2018-10-17 | Stop reason: SDUPTHER

## 2018-01-31 RX ORDER — PHENOBARBITAL 32.4 MG/1
TABLET ORAL
COMMUNITY
Start: 2013-05-08 | End: 2018-02-08 | Stop reason: SDUPTHER

## 2018-01-31 RX ORDER — IBUPROFEN 600 MG/1
TABLET ORAL EVERY 6 HOURS PRN
COMMUNITY
Start: 2017-12-23

## 2018-01-31 RX ORDER — SIMVASTATIN 20 MG
TABLET ORAL
COMMUNITY
Start: 2011-12-09 | End: 2018-10-17 | Stop reason: SDUPTHER

## 2018-01-31 RX ORDER — FAMOTIDINE 20 MG/1
1 TABLET, FILM COATED ORAL
COMMUNITY
Start: 2013-08-02 | End: 2018-10-17 | Stop reason: SDUPTHER

## 2018-01-31 RX ORDER — BACLOFEN 10 MG/1
1 TABLET ORAL 2 TIMES DAILY
COMMUNITY
End: 2018-08-24 | Stop reason: SDUPTHER

## 2018-01-31 RX ORDER — ZOLPIDEM TARTRATE 5 MG/1
0.5 TABLET ORAL
COMMUNITY
Start: 2012-02-08 | End: 2018-02-05 | Stop reason: SDUPTHER

## 2018-01-31 RX ORDER — LORATADINE 10 MG/1
1 TABLET ORAL DAILY
COMMUNITY
End: 2018-09-10 | Stop reason: SDUPTHER

## 2018-01-31 RX ORDER — POVIDONE-IODINE 10 MG/G
OINTMENT TOPICAL 2 TIMES DAILY PRN
COMMUNITY

## 2018-01-31 RX ORDER — LORAZEPAM 0.5 MG/1
TABLET ORAL
COMMUNITY
Start: 2012-11-14 | End: 2018-12-03 | Stop reason: SDUPTHER

## 2018-01-31 RX ORDER — POLYETHYLENE GLYCOL 3350 17 G/17G
17 POWDER, FOR SOLUTION ORAL AS NEEDED
COMMUNITY

## 2018-01-31 RX ORDER — OLOPATADINE HYDROCHLORIDE 2 MG/ML
SOLUTION/ DROPS OPHTHALMIC
COMMUNITY
Start: 2014-06-06 | End: 2018-10-17 | Stop reason: SDUPTHER

## 2018-01-31 NOTE — PROGRESS NOTES
Subjective      Nneka Miramontes is a 61 y o  female who presents for annual well woman exam  She is post-menopausal   GYN:   No vaginal discharge, labial erythema or lesions, dyspareunia  Contraception: Patient is not sexually active and is assisted by at home nurses for ADL's  Patient is not sexually active  No previous gynecologic surgeries  OB:    female  :   No dysuria, urinary frequency or urgency  No hematuria, flank pain   RN states pt has chronic fecal and urinary incontinence due to [facility] cerebral palsy and they assist her daily with using the restroom  Breast:   No breast mass, skin changes, dimpling, reddening, nipple retraction  No breast discharge  Patient does not have a family history of breast, endometrial, or ovarian ca  General:   Diet: regular   Exercise: patient is wheelchair bound   Work: n/a   ETOH use: n/a   Tobacco use: n/a   Recreational drug use: n/a    Screening:   Cervical cancer: last pap smear was in January of last year ()  Results were negative for HPV and intraepithelial lesions  Breast cancer: last mammogram was in 17  Results were BIRADS-2  Patient has a repeat mammogram this year 18  Colon cancer: last colonoscopy in   Results were negative and WNL  STD screening: not performed, patient has never been sexually active  DEXA scan:   RESULTS:     LUMBAR SPINE:  L1-L4:    BMD 0 929 gm/cm2    T-score -1 1    Z-score 0 1       LEFT TOTAL HIP:    BMD 0 6-0 gm/cm2      T-score -2 6    Z-score -1 9       LEFT FEMORAL NECK:    BMD 0 448 gm/cm2    T-score -3 6    Z-score -2 5     Review of Systems  Pertinent items are noted in HPI  Objective      There were no vitals taken for this visit      General:   alert, slowed mentation, accompanied by personal nurses and appears stated age   Heart: regular rate and rhythm, S1, S2 normal, no murmur, click, rub or gallop   Lungs: clear to auscultation bilaterally   Abdomen: soft, non-tender, without masses or organomegaly, without guarding and without rebound   Vulva: normal   Vagina: normal mucosa   Cervix: difficult to visualize secondary to patient's behavior during exam, but otherwise no gross blood or active lesions in vaginal canal noted and no external abnormalities visible   Uterus: normal size, mobile, non-tender   Adnexa: normal adnexa and no mass, fullness, tenderness        Assessment     1  Well woman exam today   - Unremarkable findings, no breast lumps or tenderness noted, vaginal exam WNL, see above  2  Up to date on screening measures   - Mammogram, PAP smear, colonoscopy, DEXA scan all up to date and WNL     Plan   1  Overall patient is doing well and up to date on her preventative care screenings  2   Continue current care and return to the [facility] in 1 year or sooner PRN for routine women's screening      D/w Dr Jenna Villatoro, DO  PGY-1 OB/GYN   1/31/2018 12:33 PM

## 2018-01-31 NOTE — PROGRESS NOTES
I have reviewed the notes, assessments, and/or procedures performed by Ab Figueroa, I concur with her/his documentation of Matthew Hyman

## 2018-02-05 ENCOUNTER — OFFICE VISIT (OUTPATIENT)
Dept: FAMILY MEDICINE CLINIC | Facility: CLINIC | Age: 58
End: 2018-02-05
Payer: COMMERCIAL

## 2018-02-05 VITALS
TEMPERATURE: 96.2 F | RESPIRATION RATE: 12 BRPM | SYSTOLIC BLOOD PRESSURE: 114 MMHG | HEART RATE: 60 BPM | DIASTOLIC BLOOD PRESSURE: 68 MMHG

## 2018-02-05 DIAGNOSIS — F51.01 PRIMARY INSOMNIA: ICD-10-CM

## 2018-02-05 DIAGNOSIS — Z12.31 SCREENING MAMMOGRAM, ENCOUNTER FOR: ICD-10-CM

## 2018-02-05 DIAGNOSIS — E78.5 HYPERLIPIDEMIA, UNSPECIFIED HYPERLIPIDEMIA TYPE: ICD-10-CM

## 2018-02-05 DIAGNOSIS — I10 BENIGN ESSENTIAL HYPERTENSION: Primary | ICD-10-CM

## 2018-02-05 DIAGNOSIS — M81.0 AGE-RELATED OSTEOPOROSIS WITHOUT CURRENT PATHOLOGICAL FRACTURE: ICD-10-CM

## 2018-02-05 DIAGNOSIS — G40.409 TONIC-CLONIC EPILEPTIC SEIZURES (HCC): ICD-10-CM

## 2018-02-05 PROCEDURE — 99214 OFFICE O/P EST MOD 30 MIN: CPT | Performed by: FAMILY MEDICINE

## 2018-02-05 RX ORDER — ZOLPIDEM TARTRATE 5 MG/1
2.5 TABLET ORAL
Qty: 30 TABLET | Refills: 0 | Status: SHIPPED | OUTPATIENT
Start: 2018-02-05 | End: 2018-04-23 | Stop reason: SDUPTHER

## 2018-02-05 NOTE — PROGRESS NOTES
Assessment/Plan:         Problem List Items Addressed This Visit        Cardiovascular and Mediastinum    Benign essential hypertension - Primary     At goal   Recent BMP shows no nephropathy  Cont current regimen  Nervous and Auditory    Tonic-clonic epileptic seizures ([address] )     Followed closely by neurology, seizure-free  Cont regimen and followup  Musculoskeletal and Integument    Osteoporosis     Order Prolia now due to previous failed response to bisphosphanates  Other    Hyperlipidemia     Lipid panel excellent this month  Tolerating statin  Cont current regimen  RTC 3 months    Subjective:      Patient ID: Bereket Mccoy is a 61 y o  female  Hypertension   Pertinent negatives include no chest pain, headaches, neck pain, palpitations or shortness of breath  54-year-old female with cerebral palsy, epilepsy, hypertension, hyperlipidemia presents in follow-up  Had GYN exam last week  UTD with podiatry and dentistry  The following portions of the patient's history were reviewed and updated as appropriate: allergies, current medications, past family history, past medical history, past social history, past surgical history and problem list     Review of Systems   Constitutional: Negative for activity change, chills, fatigue and fever  HENT: Negative for congestion, sinus pain, sinus pressure and sore throat  Respiratory: Negative for cough, shortness of breath and wheezing  Cardiovascular: Negative for chest pain, palpitations and leg swelling  Gastrointestinal: Negative for abdominal pain, diarrhea, nausea and vomiting  Genitourinary: Negative for dysuria, frequency and urgency  Musculoskeletal: Negative for arthralgias, myalgias, neck pain and neck stiffness  Skin: Negative for rash  Neurological: Negative for light-headedness and headaches           Objective:  Vitals:    02/05/18 0918   BP: 114/68   Pulse: 60   Resp: 12 Temp: (!) 96 2 °F (35 7 °C)        Physical Exam   Constitutional: She appears well-developed and well-nourished  No distress  HENT:   Head: Normocephalic and atraumatic  Eyes: Pupils are equal, round, and reactive to light  Neck: Normal range of motion  Neck supple  Cardiovascular: Normal rate, regular rhythm and normal heart sounds  Exam reveals no gallop and no friction rub  No murmur heard  Pulmonary/Chest: Effort normal and breath sounds normal  No respiratory distress  She has no wheezes  She has no rales  Abdominal: Soft  She exhibits no distension  Musculoskeletal: Normal range of motion  Neurological: She is alert  Baseline nonverbal   Wheelchair bound  Psychiatric: She has a normal mood and affect

## 2018-02-08 ENCOUNTER — HOSPITAL ENCOUNTER (OUTPATIENT)
Dept: RADIOLOGY | Age: 58
Discharge: HOME | End: 2018-02-08
Payer: COMMERCIAL

## 2018-02-08 DIAGNOSIS — G40.409 TONIC-CLONIC EPILEPTIC SEIZURES (HCC): Primary | ICD-10-CM

## 2018-02-08 DIAGNOSIS — Z12.31 SCREENING MAMMOGRAM, ENCOUNTER FOR: ICD-10-CM

## 2018-02-08 PROCEDURE — 77067 SCR MAMMO BI INCL CAD: CPT

## 2018-02-08 RX ORDER — PHENOBARBITAL 32.4 MG/1
TABLET ORAL
Qty: 90 TABLET | Refills: 5 | OUTPATIENT
Start: 2018-02-08 | End: 2018-08-09 | Stop reason: SDUPTHER

## 2018-04-20 NOTE — PROGRESS NOTES
Assessment/Plan:       Problem List Items Addressed This Visit        Cardiovascular and Mediastinum    Benign essential hypertension - Primary     At goal   Cont regimen  Nervous and Auditory    Tonic-clonic epileptic seizures ([address] )     Doing well, seizure free  Cont neuro followup  Musculoskeletal and Integument    Osteoporosis     Will reach out to our team re: Prolia  Relevant Orders    DXA bone density spine hip and pelvis       Other    Hyperlipidemia     Tolerating statin  Cont simvastatin  Insomnia    Relevant Medications    zolpidem (AMBIEN) 5 mg tablet    Encounter for hepatitis C screening test for low risk patient    Relevant Orders    Hepatitis C antibody    Encounter for screening for HIV    Relevant Orders    HIV 1/2 AG-AB combo          HM: Screening ordered for HIV, Hep C    RTC 3 months    Subjective:      Patient ID: Love Patricia is a 61 y o  female  Hypertension   Pertinent negatives include no chest pain, headaches, neck pain, palpitations or shortness of breath  13-year-old female with cerebral palsy, epilepsy, hypertension, hyperlipidemia presents in follow-up  UTD with podiatry and dentistry  Saw ophtho 3/9/18  Had GYN exam prior to last visit here  Mammogram negative in Feb         The following portions of the patient's history were reviewed and updated as appropriate: allergies, current medications, past family history, past medical history, past social history, past surgical history and problem list     Review of Systems   Constitutional: Negative for activity change, chills, fatigue and fever  HENT: Negative for congestion, sinus pain, sinus pressure and sore throat  Respiratory: Negative for cough, shortness of breath and wheezing  Cardiovascular: Negative for chest pain, palpitations and leg swelling  Gastrointestinal: Negative for abdominal pain, diarrhea, nausea and vomiting     Genitourinary: Negative for dysuria, frequency and urgency  Musculoskeletal: Negative for arthralgias, myalgias, neck pain and neck stiffness  Skin: Negative for rash  Neurological: Negative for light-headedness and headaches  Objective:  Vitals:    04/23/18 0846   BP: 112/70   Pulse: 62   Resp: 12   Temp: (!) 96 8 °F (36 °C)        Physical Exam   Constitutional: She appears well-developed and well-nourished  No distress  HENT:   Head: Normocephalic and atraumatic  Eyes: Pupils are equal, round, and reactive to light  Neck: Normal range of motion  Neck supple  Cardiovascular: Normal rate, regular rhythm and normal heart sounds  Exam reveals no gallop and no friction rub  No murmur heard  Pulmonary/Chest: Effort normal and breath sounds normal  No respiratory distress  She has no wheezes  She has no rales  Abdominal: Soft  She exhibits no distension  Musculoskeletal: Normal range of motion  Neurological: She is alert  Baseline nonverbal   Wheelchair bound  Psychiatric: She has a normal mood and affect

## 2018-04-23 ENCOUNTER — TELEPHONE (OUTPATIENT)
Dept: FAMILY MEDICINE CLINIC | Facility: CLINIC | Age: 58
End: 2018-04-23

## 2018-04-23 ENCOUNTER — OFFICE VISIT (OUTPATIENT)
Dept: FAMILY MEDICINE CLINIC | Facility: CLINIC | Age: 58
End: 2018-04-23
Payer: COMMERCIAL

## 2018-04-23 VITALS
WEIGHT: 164.5 LBS | SYSTOLIC BLOOD PRESSURE: 112 MMHG | BODY MASS INDEX: 32.3 KG/M2 | TEMPERATURE: 96.8 F | HEART RATE: 62 BPM | HEIGHT: 60 IN | RESPIRATION RATE: 12 BRPM | DIASTOLIC BLOOD PRESSURE: 70 MMHG

## 2018-04-23 DIAGNOSIS — G40.409 TONIC-CLONIC EPILEPTIC SEIZURES (HCC): ICD-10-CM

## 2018-04-23 DIAGNOSIS — F51.01 PRIMARY INSOMNIA: ICD-10-CM

## 2018-04-23 DIAGNOSIS — M81.0 AGE-RELATED OSTEOPOROSIS WITHOUT CURRENT PATHOLOGICAL FRACTURE: ICD-10-CM

## 2018-04-23 DIAGNOSIS — Z11.4 ENCOUNTER FOR SCREENING FOR HIV: ICD-10-CM

## 2018-04-23 DIAGNOSIS — Z11.59 ENCOUNTER FOR HEPATITIS C SCREENING TEST FOR LOW RISK PATIENT: ICD-10-CM

## 2018-04-23 DIAGNOSIS — I10 BENIGN ESSENTIAL HYPERTENSION: Primary | ICD-10-CM

## 2018-04-23 DIAGNOSIS — E78.5 HYPERLIPIDEMIA, UNSPECIFIED HYPERLIPIDEMIA TYPE: ICD-10-CM

## 2018-04-23 PROCEDURE — 99214 OFFICE O/P EST MOD 30 MIN: CPT | Performed by: FAMILY MEDICINE

## 2018-04-23 RX ORDER — ZOLPIDEM TARTRATE 5 MG/1
2.5 TABLET ORAL DAILY
Qty: 30 TABLET | Refills: 4 | Status: SHIPPED | OUTPATIENT
Start: 2018-04-23 | End: 2018-07-23 | Stop reason: SDUPTHER

## 2018-04-23 NOTE — TELEPHONE ENCOUNTER
I spoke with caregiver Skylar Qureshi today, who asked if we can start prior auth process for Prolia  Can we please do this?

## 2018-05-23 ENCOUNTER — OFFICE VISIT (OUTPATIENT)
Dept: FAMILY MEDICINE CLINIC | Facility: CLINIC | Age: 58
End: 2018-05-23
Payer: COMMERCIAL

## 2018-05-23 VITALS
TEMPERATURE: 99.3 F | HEART RATE: 82 BPM | WEIGHT: 165 LBS | BODY MASS INDEX: 32.39 KG/M2 | DIASTOLIC BLOOD PRESSURE: 70 MMHG | RESPIRATION RATE: 16 BRPM | SYSTOLIC BLOOD PRESSURE: 124 MMHG | HEIGHT: 60 IN

## 2018-05-23 DIAGNOSIS — Z00.00 MEDICARE ANNUAL WELLNESS VISIT, SUBSEQUENT: Primary | ICD-10-CM

## 2018-05-23 PROCEDURE — 99213 OFFICE O/P EST LOW 20 MIN: CPT | Performed by: FAMILY MEDICINE

## 2018-05-23 PROCEDURE — 3725F SCREEN DEPRESSION PERFORMED: CPT | Performed by: FAMILY MEDICINE

## 2018-05-23 NOTE — PROGRESS NOTES
Ray Jacinto is a 61 y o  female who presents today for:  [address]:  Reports compliance with all medications  Denies any adverse effects  Reports safe living environment  No acute complaints  Other Assessments Addressed Today:  REVIEW OF RECOMMENDATIONS:  1) AAA screening: None applicable  2) Cardiovascular screenin  Lab Results   Component Value Date    CHOL 155 2018    CHOL 142 2017    CHOL 171 2016     Lab Results   Component Value Date    HDL 56 2018    HDL 61 (H) 2017    HDL 63 2016     Lab Results   Component Value Date    LDLCALC 88 2018    LDLCALC 70 2017    LDLCALC 80 2014     Lab Results   Component Value Date    TRIG 54 2018    TRIG 54 2017    TRIG 55 2016     3) Diabetes screening: No diabetes based on fasting glucose of 87   4) Colorectal cancer screening: Last colonoscopy  2012 (no abnormalities)  5) Breast cancer screening: q1-2year: Last mammogram 2018  6) Cervical cancer screening: Last PAP 2017, No abnormalities, negative for HPV  7) Glaucoma screening: Opthalmology visits 2018, does not wear glasses or contacts  8) DEXA Scan: Last done 2016, next scheduled for 2018   9) Depression screening: Patient only able to answer basic questions regarding enjoying certain activities, generally happy and playful  No concerns per caregiver    10) Fall risk screening: Wheelchair bound, congenital (CP)  11) Cognitive screening: Patient has an established diagnosis of Severe Intellectual Disability  12) HIV screening/HepC: Ordered on 2018, scheduled to be obtain per caregiver  13) Immunizations: Pneumovax received 2012, All ther immunizations UTD, Next Pneumovax due at age 75  13) Advanced directive:  Patient does not have advanced directive since living in intermediate care facility, decisions to be made per intermediate care facility guidelines  POA is father  15) Urinary and Bowel Incontinence Screen:  Patient had established diagnoses of both urinary and bowel incontinence  Unchanged from baseline today  No acute concerns  A subsequent annual wellness visit is recommend in [place]  Provider Screening     Preventative Screening/Counseling:   Cardiovascular Screening/Counseling:   (Labs Q5 years, EKG optional one-time)         Diabetes Screening/Counseling:   (2 tests/year if Pre-Diabetes or 1 test/year if no Diabetes)         Colorectal Cancer Screening/Counseling:   (FOBT Q1 yr; Flex Sig Q4 yrs or Q10 yrs after Screening Colonoscopy; Screening Colonoscpy Q2 yrs High Risk or Q10 yrs Low Risk; Barium Enema Q2 yrs High Risk or Q4 yrs Low Risk)         Prostate Cancer Screening/Counseling:   (Annual)          Breast Cancer Screening/Counseling:   (Baseline Age 28 - 43; Annual Age 36+)         Cervical Cancer Screening/Counseling:   (Annual for High Risk or Childbearing Age with Abnormal Pap in Last 3 yrs; Every 2 all others)         Osteoporosis Screening/Counseling:   (Every 2 Yrs if at risk or more if medically necessary)         AAA Screening/Counseling:   (Once per Lifetime with risk factors)    Family History of AAA:  No Age over 72 (males only):  No Tobacco use (males only):  No         Glaucoma Screening/Counseling:   (Annual)         HIV Screening/Counseling:   (Voluntary; Once annually for high risk OR 3 times for Pregnancy at diagnosis of IUP; 3rd trimester; and at Labor         Hepatitis C Screening:             Immunizations:        Other Preventative Couseling (Non-Medicare Wellness Visit Required):       Referrals (Non-Medicare Wellness Visit Required):       Medical Equipment/Suppliers:           Immunizations:  Immunization History   Administered Date(s) Administered    Hep B, adult 02/24/2015, 04/09/2015, 09/11/2015    Influenza Quadrivalent Preservative Free 3 years and older IM 10/31/2014, 11/06/2015    Influenza Quadrivalent, 6-35 Months IM 11/08/2016    Influenza TIV (IM) 11/14/2012, 11/18/2013    Tdap 12/06/2011    Tuberculin Skin Test-PPD Intradermal 02/13/2013, 12/02/2014, 08/11/2016     AWV Clinical Screen  AWV Clinical     ISAR:   Previous hospitalizations?: Yes (reason: Jan 2012 for seizures)       Once in a Lifetime Medicare Screening:   AAA screening performed? (if performed, please add date to Health Maintenance):  No       Medicare Screening Tests and Risk Assessment:   AAA Risk Assessment     Tobacco use (males only):  No   Age over 72 (males only):  No Family history of AAA:  No   Osteoporosis Risk Assessment     Female:  Yes   :  Yes :  No   Age over 48:  Yes Low body weight (<127lbs):  No   Tobacco use:  No Alcohol use:  No   Low calcium diet:  Yes PMHX of fractures:  No   FHX of fractures:  No    HIV Risk Assessment: HIV test ordered    None indicated:  Yes        Drug and Alcohol Use:   Tobacco use    Cigarettes:  never smoker    Tobacco use duration    Tobacco Cessation Readiness    Alcohol use    Alcohol use:  never    Alcohol Treatment Readiness   Illicit Drug Use    Drug use:  never        Diet & Exercise:   Diet   What is your diet?:  Regular, Low Saturated Fat, No Added Salt, Low Cholesterol   How many servings a day of the following:   Fruits and Vegetables:  3-4 Meat:  3-4   Whole Grains:  4    Exercise    Do you currently exercise?:  unable to exercise       Cognitive Impairment Screening:   Depression screening preformed:  No    Cognitive Impairment Screening    Do you have difficulty learning or retaining new information?:  Yes Do you have difficulty handling new tasks?:  Yes   Do you have difficulty with reasoning?:  Yes Do you have difficulty with spatial ability and orientation?:  Yes   Do you have difficulty with language?:  Yes Do you have difficulty with behavior?:  No       Functional Ability/Level of Safety:   Hearing    Hearing difficulties:  No    Hearing aid: No    Hearing Impairment Assessment    Hearing status:  No impairment   Current Activities    Help needed with the folllowing:    Using the phone:  Yes Transportation:  Yes   Shopping:  Yes Preparing Meals:  Yes   Doing Housework:  Yes Doing Laundry:  Yes   Managing Medications:  Yes Managing Money:  Yes   ADL    Fall Risk   Have you fallen in the last 12 months?:  No Are you unsteady on your feet?:  No   Injury History    Urinary Incontinence:  Yes       Home Safety:   Do you feel unsteady when walking?:  No    Home Safety Risk Factors    Uneven floors:  No   Stairs or handrail saftey risk:  No Loose rugs:  No   Household clutter:  No Poor household lighting:  No   No grab bars in bathroom:  Yes        Advanced Directives:   Advanced Directives    Living Will:  No Durable POA for healthcare:   Yes   Advanced directive:  No    Patient's End of Life Decisions        Urinary Incontinence:   Do you have urinary incontinence?:  Yes        Glaucoma:           Depression Screen:  PHQ-9 Depression Screening    PHQ-9:    Frequency of the following problems over the past two weeks:  See comments on PHQ9 above     Little interest or pleasure in doing things:  0 - not at all  Feeling down, depressed, or hopeless:  0 - not at all  PHQ-2 Score:  0         Histories Reviewed and Updated 5/23/2018:  Patient's Medications   New Prescriptions    No medications on file   Previous Medications    ACETAMINOPHEN (TYLENOL 8 HOUR) 650 MG CR TABLET    Take by mouth every 6 (six) hours    BACLOFEN 10 MG TABLET    Take 1 tablet by mouth 2 (two) times a day    BENZONATATE (TESSALON PERLES) 100 MG CAPSULE    Take by mouth      CALCIUM CITRATE 200 MG TABS    Take by mouth    CARBAMIDE PEROXIDE (DEBROX) 6 5 % OTIC SOLUTION    Administer into ears    CHOLECALCIFEROL (VITAMIN D3) 1,000 UNITS TABLET    Take 1 capsule by mouth daily    CLOTRIMAZOLE-BETAMETHASONE (LOTRISONE) 1-0 05 % CREAM    Apply topically    FAMOTIDINE (PEPCID) 20 MG TABLET    Take 1 tablet by mouth    FLUTICASONE (FLONASE) 50 MCG/ACT NASAL SPRAY    1 spray into each nostril 2 (two) times a day    GUAIFENESIN (MUCINEX) 600 MG 12 HR TABLET    Take 1 tablet by mouth every 12 (twelve) hours as needed    IBUPROFEN (MOTRIN) 600 MG TABLET    Take by mouth every 6 (six) hours    LORATADINE (CLARITIN) 10 MG TABLET    Take 1 tablet by mouth daily as needed    LORAZEPAM (ATIVAN) 0 5 MG TABLET    Take by mouth    LOSARTAN-HYDROCHLOROTHIAZIDE (HYZAAR) 50-12 5 MG PER TABLET    Take by mouth      METOPROLOL SUCCINATE (TOPROL-XL) 100 MG 24 HR TABLET    Take by mouth    OLOPATADINE HCL (PATADAY) 0 2 % OPTH DROPS    Apply to eye    PHENOBARBITAL 32 4 MG TABLET    Take 3 tablets by mouth in the evening    POLYETHYLENE GLYCOL (MIRALAX) POWDER    Take by mouth    POLYETHYLENE GLYCOL 3350 (MIRALAX PO)    Take 1 packet by mouth daily    POVIDONE-IODINE (BETADINE) 10 % OINTMENT    Apply topically 2 (two) times a day as needed    SIMVASTATIN (ZOCOR) 20 MG TABLET    Take by mouth    VITAMINS A & D (VITAMIN A & D) OINTMENT    Apply topically    ZOLPIDEM (AMBIEN) 5 MG TABLET    Take 0 5 tablets (2 5 mg total) by mouth daily   Modified Medications    No medications on file   Discontinued Medications    No medications on file     No Known Allergies  Past Medical History:   Diagnosis Date    Abnormal blood chemistry     Last Assessed: [id number]    Anxiety     Last Assessed: [id number]    Bowel incontinence     Constipation     Last Assessed: [id number]    Generalized convulsive seizure ([facility] [address] )     Hyperlipidemia     Hypertension     Mental retardation     Osteoporosis     Spastic quadriplegic cerebral palsy (HCC)     Urinary incontinence     Vitamin D deficiency     Last Assessed: [id number]     Social History     Social History    Marital status: Single     Spouse name: N/A    Number of children: N/A    Years of education: N/A     Occupational History    Not on file       Social History Main Topics    Smoking status: Never Smoker    Smokeless tobacco: Never Used    Alcohol use No    Drug use: No    Sexual activity: No     Other Topics Concern    Not on file     Social History Narrative    Caffeine use    Wheelchair dependent         Family History   Problem Relation Age of Onset    Lymphoma Mother      Bone Marrow    Coronary artery disease Mother     Seizures Mother      Epilepsy and recurrent seizures    Other Mother      Mitral Stenosis    Prostate cancer Father     Skin cancer Father     Heart disease Maternal Grandmother     Kidney cancer Maternal Grandmother     Heart disease Maternal Grandfather     Heart disease Paternal Grandmother     Breast cancer Maternal Aunt        SUBJECTIVE:  No acute concerns today

## 2018-07-11 ENCOUNTER — TELEPHONE (OUTPATIENT)
Dept: FAMILY MEDICINE CLINIC | Facility: CLINIC | Age: 58
End: 2018-07-11

## 2018-07-12 ENCOUNTER — OFFICE VISIT (OUTPATIENT)
Dept: FAMILY MEDICINE CLINIC | Facility: CLINIC | Age: 58
End: 2018-07-12
Payer: COMMERCIAL

## 2018-07-12 ENCOUNTER — TELEPHONE (OUTPATIENT)
Dept: FAMILY MEDICINE CLINIC | Facility: CLINIC | Age: 58
End: 2018-07-12

## 2018-07-12 VITALS
DIASTOLIC BLOOD PRESSURE: 80 MMHG | SYSTOLIC BLOOD PRESSURE: 112 MMHG | RESPIRATION RATE: 16 BRPM | TEMPERATURE: 98.2 F | HEART RATE: 80 BPM

## 2018-07-12 DIAGNOSIS — J06.9 VIRAL UPPER RESPIRATORY TRACT INFECTION: ICD-10-CM

## 2018-07-12 DIAGNOSIS — R05.9 COUGH: Primary | ICD-10-CM

## 2018-07-12 PROCEDURE — 99214 OFFICE O/P EST MOD 30 MIN: CPT | Performed by: NURSE PRACTITIONER

## 2018-07-12 RX ORDER — AZITHROMYCIN 250 MG/1
TABLET, FILM COATED ORAL
Qty: 6 TABLET | Refills: 0 | Status: SHIPPED | OUTPATIENT
Start: 2018-07-12 | End: 2018-07-16

## 2018-07-12 RX ORDER — IPRATROPIUM BROMIDE 42 UG/1
2 SPRAY, METERED NASAL 3 TIMES DAILY PRN
Qty: 15 ML | Refills: 0 | Status: SHIPPED | OUTPATIENT
Start: 2018-07-12

## 2018-07-12 NOTE — ASSESSMENT & PLAN NOTE
Add atrovent tid prn  Observe closely  Call our office and Get CXR in 2 days if fever and cough persist, sooner if worsening  Rx for zpak- if CXR done and pneumonia suspected will have RX

## 2018-07-12 NOTE — PROGRESS NOTES
Cristofer Felix 1960 female MRN: [id number]    Family Medicine Acute Visit    ASSESSMENT/PLAN  Problem List Items Addressed This Visit     Viral upper respiratory tract infection     Add atrovent tid prn  Observe closely  Call our office and Get CXR in 2 days if fever and cough persist, sooner if worsening  Rx for zpak- if CXR done and pneumonia suspected will have RX           Other Visit Diagnoses     Cough    -  Primary    Relevant Medications    ipratropium (ATROVENT) 0 06 % nasal spray    azithromycin (ZITHROMAX) 250 mg tablet    Other Relevant Orders    XR chest pa & lateral            Follow up prn and as scheduled    Future Appointments  Date Time Provider Dori Barajas   7/20/2018 9:00 AM BE DEXA SHA SLN 1 BE SLN Dexa BE [facility]   7/23/2018 8:50 AM Mauro Strauss MD S BE  [facility]   7/27/2018 10:00 AM Edil Lauren MD NEURO [facility]   2/12/2019 2:00 PM BE MAMMO SLN 2 BE SLN Mammo BE [place]          SUBJECTIVE  CC: Cough (COUGH STARTED MONDAY ) and Fever      HPI:  Cristofer Felix is a 61 y o  female here with care giver who presents for above with clarification that symptoms began Tuesday night - 2 days ago  T max 102  Responds to tylenol  This am no fever and feeling a little better  Tx tylenol, tessalon, claritin with symptom control  Cough is associated with post nasal drip  Caregiver brought in d/t possible exposure to pneumonia, and other residents and staff sick with a mix of viral/allergy conditions and one was diagnosed with pneumonia  Judith Fernando is eating and drinking, behaving at baseline except is mildly fatigued  Denies HA, sinus pressure, CP, SOB, wheeze  Review of Systems   Constitutional: Negative for appetite change and chills  Fatigue: mild  Fever: today no fever and no tylenol  HENT: Positive for postnasal drip and rhinorrhea  Negative for hearing loss, sinus pain, sinus pressure, sore throat and trouble swallowing  Eyes: Negative for discharge  Respiratory: Positive for cough  Negative for chest tightness, shortness of breath and wheezing  Cardiovascular: Negative for chest pain and leg swelling  Gastrointestinal: Negative for abdominal pain, diarrhea, nausea and vomiting  Musculoskeletal: Negative for arthralgias and myalgias  Skin: Negative for rash  Neurological: Negative for headaches         Historical Information   The patient history was reviewed as follows:  Past Medical History:   Diagnosis Date    Abnormal blood chemistry     Last Assessed: [id number]    Anxiety     Last Assessed: [id number]    Bowel incontinence     Constipation     Last Assessed: [id number]    Generalized convulsive seizure ([address] )     Hyperlipidemia     Hypertension     Mental retardation     Osteoporosis     Spastic quadriplegic cerebral palsy (HCC)     Urinary incontinence     Vitamin D deficiency     Last Assessed: [id number]         Past Surgical History:   Procedure Laterality Date    ABSCESS DRAINAGE      Incision and Drainage of skin abscess back    COLONOSCOPY      Fiberoptic; Last Assessed: [id number]    EYE SURGERY Left     Strabismus Left Eye     Family History   Problem Relation Age of Onset    Lymphoma Mother         Bone Marrow    Coronary artery disease Mother     Seizures Mother         Epilepsy and recurrent seizures    Other Mother         Mitral Stenosis    Prostate cancer Father     Skin cancer Father     Heart disease Maternal Grandmother     Kidney cancer Maternal Grandmother     Heart disease Maternal Grandfather     Heart disease Paternal Grandmother     Breast cancer Maternal Aunt       Social History   History   Alcohol Use No     History   Drug Use No     History   Smoking Status    Never Smoker   Smokeless Tobacco    Never Used       Medications:     Current Outpatient Prescriptions:     acetaminophen (TYLENOL 8 HOUR) 650 mg CR tablet, Take by mouth every 6 (six) hours, Disp: , Rfl:     baclofen 10 mg tablet, Take 1 tablet by mouth 2 (two) times a day, Disp: , Rfl:     benzonatate (TESSALON PERLES) 100 mg capsule, Take by mouth  , Disp: , Rfl:     Calcium Citrate 200 MG TABS, Take by mouth 3 TABS TWICE A DAY , Disp: , Rfl:     carbamide peroxide (DEBROX) 6 5 % otic solution, Administer into ears, Disp: , Rfl:     cholecalciferol (VITAMIN D3) 1,000 units tablet, Take 1 capsule by mouth daily, Disp: , Rfl:     clotrimazole-betamethasone (LOTRISONE) 1-0 05 % cream, Apply topically, Disp: , Rfl:     famotidine (PEPCID) 20 mg tablet, Take 1 tablet by mouth, Disp: , Rfl:     fluticasone (FLONASE) 50 mcg/act nasal spray, 1 spray into each nostril 2 (two) times a day, Disp: , Rfl:     guaiFENesin (MUCINEX) 600 mg 12 hr tablet, Take 1 tablet by mouth every 12 (twelve) hours as needed, Disp: , Rfl:     ibuprofen (MOTRIN) 600 mg tablet, Take by mouth every 6 (six) hours, Disp: , Rfl:     loratadine (CLARITIN) 10 mg tablet, Take 1 tablet by mouth daily as needed, Disp: , Rfl:     LORazepam (ATIVAN) 0 5 mg tablet, Take by mouth, Disp: , Rfl:     losartan-hydrochlorothiazide (HYZAAR) 50-12 5 mg per tablet, Take by mouth  , Disp: , Rfl:     metoprolol succinate (TOPROL-XL) 100 mg 24 hr tablet, Take by mouth, Disp: , Rfl:     olopatadine HCl (PATADAY) 0 2 % opth drops, Apply to eye, Disp: , Rfl:     PHENobarbital 32 4 mg tablet, Take 3 tablets by mouth in the evening, Disp: 90 tablet, Rfl: 5    polyethylene glycol (MIRALAX) powder, Take by mouth, Disp: , Rfl:     Polyethylene Glycol 3350 (MIRALAX PO), Take 1 packet by mouth daily, Disp: , Rfl:     povidone-iodine (BETADINE) 10 % ointment, Apply topically 2 (two) times a day as needed, Disp: , Rfl:     simvastatin (ZOCOR) 20 mg tablet, Take by mouth, Disp: , Rfl:     Vitamins A & D (VITAMIN A & D) ointment, Apply topically, Disp: , Rfl:     zolpidem (AMBIEN) 5 mg tablet, Take 0 5 tablets (2 5 mg total) by mouth daily, Disp: 30 tablet, Rfl: 4    No Known Allergies    OBJECTIVE  Vitals:   Vitals:    07/12/18 0902   BP: 112/80   Pulse: 80   Resp: 16   Temp: 98 2 °F (36 8 °C)         Physical Exam   Constitutional: She appears well-developed and well-nourished  No distress  HENT:   Head: Normocephalic and atraumatic  Mouth/Throat: Oropharynx is clear and moist    Eyes: Conjunctivae are normal  No scleral icterus  Neck: Neck supple  No JVD present  No thyromegaly present  Cardiovascular: Normal rate, regular rhythm, normal heart sounds and intact distal pulses  Pulmonary/Chest: Effort normal and breath sounds normal  No respiratory distress  Abdominal: Soft  Musculoskeletal: She exhibits no edema  Lymphadenopathy:     She has no cervical adenopathy  Neurological: She is alert  Skin: Skin is warm and dry     Psychiatric:   Pleasant and cooperative              Nereida Light  Family Medicine   7/12/2018

## 2018-07-12 NOTE — PATIENT INSTRUCTIONS
Upper Respiratory Infection   AMBULATORY CARE:   An upper respiratory infection  is also called a common cold  It can affect your nose, throat, ears, and sinuses  Common signs and symptoms include the following:  Cold symptoms are usually worst for the first 3 to 5 days  You may have any of the following:  · Runny or stuffy nose    · Sneezing and coughing    · Sore throat or hoarseness    · Red, watery, and sore eyes    · Fatigue     · Chills and fever    · Headache, body aches, or sore muscles  Seek care immediately if:   · You have chest pain or trouble breathing  Contact your healthcare provider if:   · You have a fever over 102ºF (39°C)  · Your sore throat gets worse or you see white or yellow spots in your throat  · Your symptoms get worse after 3 to 5 days or your cold is not better in 14 days  · You have a rash anywhere on your skin  · You have large, tender lumps in your neck  · You have thick, green or yellow drainage from your nose  · You cough up thick yellow, green, or bloody mucus  · You have vomiting for more than 24 hours and cannot keep fluids down  · You have a bad earache  · You have questions or concerns about your condition or care  Treatment for a cold: There is no cure for the common cold  Colds are caused by viruses and do not get better with antibiotics  Most people get better in 7 to 14 days  You may continue to cough for 2 to 3 weeks  The following may help decrease your symptoms:  · Decongestants  help reduce nasal congestion and help you breathe more easily  If you take decongestant pills, they may make you feel restless or not able to sleep  Do not use decongestant sprays for more than a few days  · Cough suppressants  help reduce coughing  Ask your healthcare provider which type of cough medicine is best for you  · NSAIDs , such as ibuprofen, help decrease swelling, pain, and fever   NSAIDs can cause stomach bleeding or kidney problems in certain people  If you take blood thinner medicine, always ask your healthcare provider if NSAIDs are safe for you  Always read the medicine label and follow directions  · Acetaminophen  decreases pain and fever  It is available without a doctor's order  Ask how much to take and how often to take it  Follow directions  Read the labels of all other medicines you are using to see if they also contain acetaminophen, or ask your doctor or pharmacist  Acetaminophen can cause liver damage if not taken correctly  Do not use more than 4 grams (4,000 milligrams) total of acetaminophen in one day  Manage your cold:   · Rest as much as possible  Slowly start to do more each day  · Drink more liquids as directed  Liquids will help thin and loosen mucus so you can cough it up  Liquids will also help prevent dehydration  Liquids that help prevent dehydration include water, fruit juice, and broth  Do not drink liquids that contain caffeine  Caffeine can increase your risk for dehydration  Ask your healthcare provider how much liquid to drink each day  · Soothe a sore throat  Gargle with warm salt water  This helps your sore throat feel better  Make salt water by dissolving ¼ teaspoon salt in 1 cup warm water  You may also suck on hard candy or throat lozenges  You may use a sore throat spray  · Use a humidifier or vaporizer  Use a cool mist humidifier or a vaporizer to increase air moisture in your home  This may make it easier for you to breathe and help decrease your cough  · Use saline nasal drops as directed  These help relieve congestion  · Apply petroleum-based jelly around the outside of your nostrils  This can decrease irritation from blowing your nose  · Do not smoke  Nicotine and other chemicals in cigarettes and cigars can make your symptoms worse  They can also cause infections such as bronchitis or pneumonia   Ask your healthcare provider for information if you currently smoke and need help to quit  E-cigarettes or smokeless tobacco still contain nicotine  Talk to your healthcare provider before you use these products  Prevent spreading your cold to others:   · Try to stay away from other people during the first 2 to 3 days of your cold when it is more easily spread  · Do not share food or drinks  · Do not share hand towels with household members  · Wash your hands often, especially after you blow your nose  Turn away from other people and cover your mouth and nose with a tissue when you sneeze or cough  Follow up with your healthcare provider as directed:  Write down your questions so you remember to ask them during your visits  © 2017 [facility] Information is for End User's use only and may not be sold, redistributed or otherwise used for commercial purposes  All illustrations and images included in CareNotes® are the copyrighted property of [employer]  or [employer]  The above information is an  only  It is not intended as medical advice for individual conditions or treatments  Talk to your doctor, nurse or pharmacist before following any medical regimen to see if it is safe and effective for you

## 2018-07-20 ENCOUNTER — TELEPHONE (OUTPATIENT)
Dept: NEUROLOGY | Facility: CLINIC | Age: 58
End: 2018-07-20

## 2018-07-20 ENCOUNTER — HOSPITAL ENCOUNTER (OUTPATIENT)
Dept: RADIOLOGY | Age: 58
Discharge: HOME | End: 2018-07-20
Payer: COMMERCIAL

## 2018-07-20 DIAGNOSIS — M81.0 AGE-RELATED OSTEOPOROSIS WITHOUT CURRENT PATHOLOGICAL FRACTURE: ICD-10-CM

## 2018-07-20 PROCEDURE — 77080 DXA BONE DENSITY AXIAL: CPT

## 2018-07-22 NOTE — ASSESSMENT & PLAN NOTE
T-score and risk of fracture have worsened  Will begin prior auth process for Prolia, as patient is a poor bisphosphonate candidate due to posture and level of understanding re: very specific administration instructions

## 2018-07-23 ENCOUNTER — OFFICE VISIT (OUTPATIENT)
Dept: FAMILY MEDICINE CLINIC | Facility: CLINIC | Age: 58
End: 2018-07-23
Payer: COMMERCIAL

## 2018-07-23 VITALS
HEIGHT: 60 IN | WEIGHT: 165.5 LBS | TEMPERATURE: 96.5 F | SYSTOLIC BLOOD PRESSURE: 106 MMHG | BODY MASS INDEX: 32.49 KG/M2 | RESPIRATION RATE: 12 BRPM | HEART RATE: 84 BPM | DIASTOLIC BLOOD PRESSURE: 70 MMHG

## 2018-07-23 DIAGNOSIS — Z11.1 PPD SCREENING TEST: ICD-10-CM

## 2018-07-23 DIAGNOSIS — M81.0 AGE-RELATED OSTEOPOROSIS WITHOUT CURRENT PATHOLOGICAL FRACTURE: ICD-10-CM

## 2018-07-23 DIAGNOSIS — F51.01 PRIMARY INSOMNIA: ICD-10-CM

## 2018-07-23 DIAGNOSIS — Z00.00 HEALTHCARE MAINTENANCE: ICD-10-CM

## 2018-07-23 DIAGNOSIS — G40.409 TONIC-CLONIC EPILEPTIC SEIZURES (HCC): ICD-10-CM

## 2018-07-23 DIAGNOSIS — I10 BENIGN ESSENTIAL HYPERTENSION: Primary | ICD-10-CM

## 2018-07-23 PROCEDURE — 86580 TB INTRADERMAL TEST: CPT

## 2018-07-23 PROCEDURE — 99214 OFFICE O/P EST MOD 30 MIN: CPT | Performed by: FAMILY MEDICINE

## 2018-07-23 RX ORDER — ZOLPIDEM TARTRATE 5 MG/1
2.5 TABLET ORAL DAILY
Qty: 30 TABLET | Refills: 0 | Status: SHIPPED | OUTPATIENT
Start: 2018-07-23 | End: 2018-09-10 | Stop reason: SDUPTHER

## 2018-07-25 LAB
INDURATION: 0 MM
TB SKIN TEST: NEGATIVE

## 2018-07-26 ENCOUNTER — TELEPHONE (OUTPATIENT)
Dept: FAMILY MEDICINE CLINIC | Facility: CLINIC | Age: 58
End: 2018-07-26

## 2018-07-31 ENCOUNTER — TELEPHONE (OUTPATIENT)
Dept: FAMILY MEDICINE CLINIC | Facility: CLINIC | Age: 58
End: 2018-07-31

## 2018-07-31 DIAGNOSIS — M81.0 OSTEOPOROSIS, UNSPECIFIED OSTEOPOROSIS TYPE, UNSPECIFIED PATHOLOGICAL FRACTURE PRESENCE: Primary | ICD-10-CM

## 2018-07-31 NOTE — TELEPHONE ENCOUNTER
[facility], Requesting a written script for PROLIA mailed to home address,  [address] [facility],  [address] [place] [address]  Or fax to [phone]

## 2018-08-06 DIAGNOSIS — G40.409 TONIC-CLONIC EPILEPTIC SEIZURES (HCC): ICD-10-CM

## 2018-08-06 RX ORDER — PHENOBARBITAL 32.4 MG/1
TABLET ORAL
Qty: 84 TABLET | Refills: 4 | Status: CANCELLED | OUTPATIENT
Start: 2018-08-06

## 2018-08-09 DIAGNOSIS — G40.409 TONIC-CLONIC EPILEPTIC SEIZURES (HCC): ICD-10-CM

## 2018-08-09 RX ORDER — PHENOBARBITAL 32.4 MG/1
TABLET ORAL
Qty: 90 TABLET | Refills: 5 | Status: SHIPPED | OUTPATIENT
Start: 2018-08-09 | End: 2018-08-09 | Stop reason: SDUPTHER

## 2018-08-09 RX ORDER — PHENOBARBITAL 32.4 MG/1
TABLET ORAL
Qty: 90 TABLET | Refills: 5 | Status: SHIPPED | OUTPATIENT
Start: 2018-08-09 | End: 2018-08-24 | Stop reason: SDUPTHER

## 2018-08-09 NOTE — TELEPHONE ENCOUNTER
Dawn requesting phenobarb refill asap, they only have enough for today, they were not alerted that they were out  She does apologize for short notice   Requesting 1year supply since they only come annually now  If agreeable please sign off  Please fax to: [phone]

## 2018-08-09 NOTE — TELEPHONE ENCOUNTER
I am unable to locate printed rx, can you please sign off again and this will have to go electronically to [facility]

## 2018-08-09 NOTE — TELEPHONE ENCOUNTER
Phenobarbital is a controlled substance so I can only provide 5 refills,but they can call again for refills in 6 months

## 2018-08-24 ENCOUNTER — OFFICE VISIT (OUTPATIENT)
Dept: NEUROLOGY | Facility: CLINIC | Age: 58
End: 2018-08-24
Payer: COMMERCIAL

## 2018-08-24 VITALS
DIASTOLIC BLOOD PRESSURE: 82 MMHG | SYSTOLIC BLOOD PRESSURE: 142 MMHG | WEIGHT: 166 LBS | BODY MASS INDEX: 32.59 KG/M2 | HEIGHT: 60 IN

## 2018-08-24 DIAGNOSIS — G40.409 TONIC-CLONIC EPILEPTIC SEIZURES (HCC): Primary | ICD-10-CM

## 2018-08-24 DIAGNOSIS — G80.0 SPASTIC QUADRIPLEGIC CEREBRAL PALSY (HCC): ICD-10-CM

## 2018-08-24 PROCEDURE — 99213 OFFICE O/P EST LOW 20 MIN: CPT | Performed by: NURSE PRACTITIONER

## 2018-08-24 RX ORDER — BACLOFEN 10 MG/1
10 TABLET ORAL 2 TIMES DAILY
Qty: 60 TABLET | Refills: 11 | Status: SHIPPED | OUTPATIENT
Start: 2018-08-24 | End: 2018-08-24 | Stop reason: SDUPTHER

## 2018-08-24 RX ORDER — PHENOBARBITAL 32.4 MG/1
TABLET ORAL
Qty: 90 TABLET | Refills: 5 | Status: SHIPPED | OUTPATIENT
Start: 2018-08-24 | End: 2018-10-17 | Stop reason: SDUPTHER

## 2018-08-24 RX ORDER — BACLOFEN 10 MG/1
10 TABLET ORAL 2 TIMES DAILY
Qty: 60 TABLET | Refills: 11 | Status: SHIPPED | OUTPATIENT
Start: 2018-08-24 | End: 2019-07-25 | Stop reason: SDUPTHER

## 2018-08-24 NOTE — ASSESSMENT & PLAN NOTE
Stable- no seizures since last visit  Tolerating phenobarbital well  Last level 22 5  Will repeat level in January  Osteoporosis monitored and managed by PCP  Encouraged staff to call with any new or suspected seizures

## 2018-08-24 NOTE — PATIENT INSTRUCTIONS
1  Continue on phenobarbital 32 4mg 3 caps at bedtime  2  Get phenobarbital level checked in January  3  Call with any new or suspected seizures

## 2018-08-24 NOTE — PROGRESS NOTES
Patient ID: Rosa Maria Shahid is a 60 y o  female  Assessment/Plan:    Tonic-clonic epileptic seizures ([facility] [address] )  Stable- no seizures since last visit  Tolerating phenobarbital well  Last level 22 5  Will repeat level in January  Osteoporosis monitored and managed by PCP  Encouraged staff to call with any new or suspected seizures  Spastic quadriplegic cerebral palsy ([facility] [place] [address] )  Supportive care  Continue with baclofen and PROM for spasticity  Subjective: Annual followup of 74-year-old female with spastic quadriplegic cerebral palsy, developmental delay, and history of seizures  She is accompanied by her nurse today  She has been taking phenobarbital for her seizures for many years, with her PCP monitoring and managing her osteoporosis  Previous attempts to taper off phenobarbital had resulted in generalized tonic clonic status epilepticus  HISTORY: No seizures since last visit  No seizures since 2012  Osteoporosis managed by PCP: Patient to be started on Prolia, switched from fosamax  Dexa scan done 7/20/18  Pt eating and sleeping well  No new medical problems  No behavioral issues  Her caregiver denies any history of myoclonus, staring spells, automatisms, unexplained hyperkinetic behaviors, olfactory / gustatory hallucinations, epigastric rising events, kia vu events, visual hallucinations, unexplained nocturnal enuresis, or nocturnal tongue biting  She continues to go to a day program, which she enjoys  AED: Phenobarbital 32 4 mg 3 tabs qhs   Level on 1/24/18 = 22 5         The following portions of the patient's history were reviewed and updated as appropriate: past family history, past social history and past surgical history  Objective:    Blood pressure 142/82, height 5' (1 524 m), weight 75 3 kg (166 lb), last menstrual period 02/28/2010  Physical Exam   Constitutional: She appears well-nourished  Eyes: EOM are normal  Pupils are equal, round, and reactive to light     Psychiatric: Pleasant and calm during my exam   Vitals reviewed  Neurological Exam    Mental Status  The patient is alert  She follows one step commands with prompting  Can answer simple questions with one word responses     Cranial Nerves    CN II: The patient's visual acuity and visual fields are normal   CN III, IV, VI: The patient's pupils are equally round and reactive to light and ocular movements are normal   CN V: The patient has normal facial sensation  CN VII:  The patient has symmetric facial movement  CN VIII:  The patient's hearing is normal   CN IX, X: The patient has symmetric palate movement and normal gag reflex  CN XI: The patient's shoulder shrug strength is normal   CN XII: The patient's tongue is midline without atrophy or fasciculations  Motor    Diffuse spasticity, primarily of flexors in the upper extremities, R>L, and extensors and abductors in the lower extremities  Gait and Coordination    Seen and examined in power wheelchair  LE paralysis at baseline         ROS:    Review of Systems   Constitutional: Negative  HENT: Negative  Eyes: Negative  Respiratory: Negative  Cardiovascular: Negative  Gastrointestinal: Negative  Endocrine: Negative  Genitourinary: Negative  Musculoskeletal: Negative  Skin: Negative  Allergic/Immunologic: Positive for environmental allergies  Neurological: Negative  Hematological: Negative  Psychiatric/Behavioral: Negative

## 2018-09-07 NOTE — PROGRESS NOTES
Family Medicine Follow-Up Office Visit  Marcheta Councilman 60 y o  female   MRN: [id number] : 1960  ENCOUNTER: 9/10/2018 10:29 AM    Assessment and Plan   Benign essential hypertension  BP very well controlled at goal   Cont regimen  Tonic-clonic epileptic seizures ([address] )  Remains seizure free  Follows closely with neurology (input appreciated)  Cont regimen, monitor closely  Osteoporosis  Prolia approved - BMP ordered  Healthcare maintenance  Up to date  Physical form completed  RTC 3 mo    Chief Complaint     Chief Complaint   Patient presents with    Hypertension       History of Present Illness   Marcheta Councilman is a 60y o -year-old female who presents today for HTN, osteoporosis, epilepsy  Had recent dental work (assessing partial plate integrity)  Having some daily sinus symptoms  Needs ambien refills  Needs BMP prior to prolia  Review of Systems   Review of Systems   Constitutional: Negative for activity change, chills, fatigue and fever  HENT: Negative for congestion, sinus pain, sinus pressure and sore throat  Respiratory: Negative for cough, shortness of breath and wheezing  Cardiovascular: Negative for chest pain, palpitations and leg swelling  Gastrointestinal: Negative for abdominal pain, diarrhea, nausea and vomiting  Genitourinary: Negative for decreased urine volume, dysuria, frequency and urgency  Musculoskeletal: Negative for arthralgias, myalgias, neck pain and neck stiffness  Skin: Negative for rash  Neurological: Negative for dizziness, light-headedness, numbness and headaches         Active Problem List     Patient Active Problem List   Diagnosis    Women's annual routine gynecological examination    Wheelchair bound    Cerebral palsy ([address] )    Benign essential hypertension    Esophageal reflux    Hyperlipidemia    Insomnia    Osteoporosis    Spastic quadriplegic cerebral palsy ([address] )    Tonic-clonic epileptic seizures ([address] )  Encounter for hepatitis C screening test for low risk patient   Danika Paulino maintenance    Viral upper respiratory tract infection       Past Medical History, Past Surgical History, Family History, and Social History were reviewed and updated today as appropriate  Objective   /70   Pulse 74   Temp 98 3 °F (36 8 °C)   Resp 18   Wt 75 8 kg (167 lb)   LMP 02/28/2010 (Approximate)   BMI 32 61 kg/m²     Physical Exam   Constitutional: She is oriented to person, place, and time  She appears well-developed and well-nourished  No distress  HENT:   Head: Normocephalic and atraumatic  Eyes: Pupils are equal, round, and reactive to light  Neck: Normal range of motion  Neck supple  Cardiovascular: Normal rate, regular rhythm and normal heart sounds  Exam reveals no gallop and no friction rub  No murmur heard  Pulmonary/Chest: Effort normal and breath sounds normal  No respiratory distress  She has no wheezes  She has no rales  Abdominal: Soft  She exhibits no distension  Musculoskeletal: Normal range of motion  Neurological: She is alert and oriented to person, place, and time  Baseline nonverbal   Wheelchair bound  Psychiatric: She has a normal mood and affect   Her behavior is normal  Thought content normal      Diabetic Foot Exam    Pertinent Laboratory/Diagnostic Studies:  Lab Results   Component Value Date    GLUCOSE 96 01/08/2014    BUN 17 01/24/2018    CREATININE 0 49 (L) 01/24/2018    CALCIUM 8 9 01/24/2018     01/24/2018    K 3 7 01/24/2018    CO2 26 01/24/2018     01/24/2018     Lab Results   Component Value Date    ALT 20 01/24/2018    AST 14 01/24/2018    ALKPHOS 90 01/24/2018    BILITOT 0 4 01/06/2016       Lab Results   Component Value Date    WBC 8 47 01/24/2018    HGB 13 9 01/24/2018    HCT 42 1 01/24/2018    MCV 98 01/24/2018     01/24/2018       No results found for: TSH    Lab Results   Component Value Date    CHOL 171 01/06/2016     Lab Results Component Value Date    TRIG 54 01/24/2018     Lab Results   Component Value Date    HDL 56 01/24/2018     Lab Results   Component Value Date    LDLCALC 88 01/24/2018     No results found for: HGBA1C    Results for orders placed or performed in visit on 07/23/18   TB Skin Test   Result Value Ref Range    TB Skin Test Negative     Induration 0 mm       Orders Placed This Encounter   Procedures    Basic metabolic panel         Current Medications     Current Outpatient Prescriptions   Medication Sig Dispense Refill    acetaminophen (TYLENOL 8 HOUR) 650 mg CR tablet Take by mouth every 6 (six) hours      baclofen 10 mg tablet Take 1 tablet (10 mg total) by mouth 2 (two) times a day 60 tablet 11    benzonatate (TESSALON PERLES) 100 mg capsule Take by mouth        Calcium Citrate 200 MG TABS Take by mouth 3 TABS TWICE A DAY       carbamide peroxide (DEBROX) 6 5 % otic solution Administer into ears      cholecalciferol (VITAMIN D3) 1,000 units tablet Take 1 capsule by mouth daily      clotrimazole-betamethasone (LOTRISONE) 1-0 05 % cream Apply topically      denosumab (PROLIA) 60 mg/mL Inject 1 mL (60 mg total) under the skin once for 1 dose 1 mL 0    famotidine (PEPCID) 20 mg tablet Take 1 tablet by mouth      fluticasone (FLONASE) 50 mcg/act nasal spray 1 spray into each nostril 2 (two) times a day      guaiFENesin (MUCINEX) 600 mg 12 hr tablet Take 1 tablet by mouth every 12 (twelve) hours as needed      ibuprofen (MOTRIN) 600 mg tablet Take by mouth every 6 (six) hours      ipratropium (ATROVENT) 0 06 % nasal spray 2 sprays into each nostril 3 (three) times a day as needed for rhinitis 15 mL 0    loratadine (CLARITIN) 10 mg tablet Take 1 tablet (10 mg total) by mouth daily 30 tablet 2    LORazepam (ATIVAN) 0 5 mg tablet Take by mouth      losartan-hydrochlorothiazide (HYZAAR) 50-12 5 mg per tablet Take by mouth        metoprolol succinate (TOPROL-XL) 100 mg 24 hr tablet Take by mouth      olopatadine HCl (PATADAY) 0 2 % opth drops Apply to eye      PHENobarbital 32 4 mg tablet Take 3 tablets by mouth in the evening 90 tablet 5    polyethylene glycol (MIRALAX) powder Take by mouth      Polyethylene Glycol 3350 (MIRALAX PO) Take 1 packet by mouth daily      povidone-iodine (BETADINE) 10 % ointment Apply topically 2 (two) times a day as needed      simvastatin (ZOCOR) 20 mg tablet Take by mouth      Vitamins A & D (VITAMIN A & D) ointment Apply topically      zolpidem (AMBIEN) 5 mg tablet Take 0 5 tablets (2 5 mg total) by mouth daily 30 tablet 5     No current facility-administered medications for this visit  ALLERGIES:  Allergies   Allergen Reactions    Other      Seasonal Allergies       Health Maintenance     Health Maintenance   Topic Date Due    INFLUENZA VACCINE  09/01/2018    Depression Screening PHQ  05/23/2019    PAP SMEAR  01/17/2020    MAMMOGRAM  02/08/2020    DTaP,Tdap,and Td Vaccines (2 - Td) 12/06/2021    CRC Screening: Colonoscopy  04/18/2022     Immunization History   Administered Date(s) Administered    Hep B, adult 02/24/2015, 04/09/2015, 09/11/2015    Influenza Quadrivalent Preservative Free 3 years and older IM 10/31/2014, 11/06/2015    Influenza Quadrivalent, 6-35 Months IM 11/08/2016    Influenza TIV (IM) 11/14/2012, 11/18/2013    Tdap 12/06/2011    Tuberculin Skin Test-PPD Intradermal 02/13/2013, 12/02/2014, 08/11/2016, 07/23/2018         Aaron Rose MD   [address]  9/10/2018  10:29 AM    Parts of this note were dictated using [employer] dictation software and may have sounds-like errors due to variation in pronunciation

## 2018-09-10 ENCOUNTER — OFFICE VISIT (OUTPATIENT)
Dept: FAMILY MEDICINE CLINIC | Facility: CLINIC | Age: 58
End: 2018-09-10
Payer: COMMERCIAL

## 2018-09-10 VITALS
RESPIRATION RATE: 18 BRPM | BODY MASS INDEX: 32.61 KG/M2 | TEMPERATURE: 98.3 F | SYSTOLIC BLOOD PRESSURE: 138 MMHG | WEIGHT: 167 LBS | DIASTOLIC BLOOD PRESSURE: 70 MMHG | HEART RATE: 74 BPM

## 2018-09-10 DIAGNOSIS — I10 BENIGN ESSENTIAL HYPERTENSION: Primary | ICD-10-CM

## 2018-09-10 DIAGNOSIS — J30.9 ALLERGIC RHINITIS, UNSPECIFIED SEASONALITY, UNSPECIFIED TRIGGER: ICD-10-CM

## 2018-09-10 DIAGNOSIS — Z00.00 HEALTHCARE MAINTENANCE: ICD-10-CM

## 2018-09-10 DIAGNOSIS — M81.0 AGE-RELATED OSTEOPOROSIS WITHOUT CURRENT PATHOLOGICAL FRACTURE: ICD-10-CM

## 2018-09-10 DIAGNOSIS — F51.01 PRIMARY INSOMNIA: ICD-10-CM

## 2018-09-10 DIAGNOSIS — G40.409 TONIC-CLONIC EPILEPTIC SEIZURES (HCC): ICD-10-CM

## 2018-09-10 PROCEDURE — 99214 OFFICE O/P EST MOD 30 MIN: CPT | Performed by: FAMILY MEDICINE

## 2018-09-10 PROCEDURE — 1036F TOBACCO NON-USER: CPT | Performed by: FAMILY MEDICINE

## 2018-09-10 RX ORDER — ZOLPIDEM TARTRATE 5 MG/1
2.5 TABLET ORAL DAILY
Qty: 30 TABLET | Refills: 5 | Status: SHIPPED | OUTPATIENT
Start: 2018-09-10 | End: 2019-02-25 | Stop reason: SDUPTHER

## 2018-09-10 RX ORDER — LORATADINE 10 MG/1
10 TABLET ORAL DAILY
Qty: 30 TABLET | Refills: 2 | Status: SHIPPED | OUTPATIENT
Start: 2018-09-10 | End: 2018-11-23 | Stop reason: SDUPTHER

## 2018-09-10 NOTE — ASSESSMENT & PLAN NOTE
Remains seizure free  Follows closely with neurology (input appreciated)  Cont regimen, monitor closely

## 2018-09-19 ENCOUNTER — APPOINTMENT (OUTPATIENT)
Dept: LAB | Facility: MEDICAL CENTER | Age: 58
End: 2018-09-19
Payer: COMMERCIAL

## 2018-09-19 DIAGNOSIS — M81.0 AGE-RELATED OSTEOPOROSIS WITHOUT CURRENT PATHOLOGICAL FRACTURE: ICD-10-CM

## 2018-09-19 LAB
ANION GAP SERPL CALCULATED.3IONS-SCNC: 7 MMOL/L (ref 4–13)
BUN SERPL-MCNC: 18 MG/DL (ref 5–25)
CALCIUM SERPL-MCNC: 9.1 MG/DL (ref 8.3–10.1)
CHLORIDE SERPL-SCNC: 104 MMOL/L (ref 100–108)
CO2 SERPL-SCNC: 30 MMOL/L (ref 21–32)
CREAT SERPL-MCNC: 0.57 MG/DL (ref 0.6–1.3)
GFR SERPL CREATININE-BSD FRML MDRD: 102 ML/MIN/1.73SQ M
GLUCOSE P FAST SERPL-MCNC: 90 MG/DL (ref 65–99)
POTASSIUM SERPL-SCNC: 4.1 MMOL/L (ref 3.5–5.3)
SODIUM SERPL-SCNC: 141 MMOL/L (ref 136–145)

## 2018-09-19 PROCEDURE — 80048 BASIC METABOLIC PNL TOTAL CA: CPT

## 2018-09-19 PROCEDURE — 36415 COLL VENOUS BLD VENIPUNCTURE: CPT

## 2018-10-17 DIAGNOSIS — H10.13 ALLERGIC CONJUNCTIVITIS OF BOTH EYES: ICD-10-CM

## 2018-10-17 DIAGNOSIS — B37.9 CANDIDIASIS: ICD-10-CM

## 2018-10-17 DIAGNOSIS — R05.9 COUGH: ICD-10-CM

## 2018-10-17 DIAGNOSIS — K21.9 GASTROESOPHAGEAL REFLUX DISEASE WITHOUT ESOPHAGITIS: Primary | ICD-10-CM

## 2018-10-17 DIAGNOSIS — I10 BENIGN ESSENTIAL HYPERTENSION: ICD-10-CM

## 2018-10-17 DIAGNOSIS — M81.0 AGE-RELATED OSTEOPOROSIS WITHOUT CURRENT PATHOLOGICAL FRACTURE: ICD-10-CM

## 2018-10-17 DIAGNOSIS — F51.01 PRIMARY INSOMNIA: ICD-10-CM

## 2018-10-17 DIAGNOSIS — G40.409 TONIC-CLONIC EPILEPTIC SEIZURES (HCC): ICD-10-CM

## 2018-10-17 DIAGNOSIS — H61.23 BILATERAL IMPACTED CERUMEN: ICD-10-CM

## 2018-10-17 DIAGNOSIS — E78.5 HYPERLIPIDEMIA, UNSPECIFIED HYPERLIPIDEMIA TYPE: ICD-10-CM

## 2018-10-18 RX ORDER — METOPROLOL SUCCINATE 100 MG/1
TABLET, EXTENDED RELEASE ORAL
Refills: 0
Start: 2018-10-18

## 2018-10-18 RX ORDER — PHENOBARBITAL 32.4 MG/1
TABLET ORAL
Qty: 90 TABLET | Refills: 0
Start: 2018-10-18 | End: 2019-06-06 | Stop reason: SDUPTHER

## 2018-10-18 RX ORDER — SIMVASTATIN 20 MG
TABLET ORAL
Refills: 0
Start: 2018-10-18

## 2018-10-18 RX ORDER — BENZONATATE 100 MG/1
CAPSULE ORAL
Qty: 20 CAPSULE | Refills: 0
Start: 2018-10-18

## 2018-10-18 RX ORDER — CLOTRIMAZOLE AND BETAMETHASONE DIPROPIONATE 10; .64 MG/G; MG/G
CREAM TOPICAL
Qty: 30 G | Refills: 0
Start: 2018-10-18 | End: 2021-08-09

## 2018-10-18 RX ORDER — FAMOTIDINE 20 MG/1
20 TABLET, FILM COATED ORAL DAILY
Refills: 0
Start: 2018-10-18

## 2018-10-18 RX ORDER — SENNOSIDES 8.6 MG
CAPSULE ORAL
Qty: 30 TABLET | Refills: 0
Start: 2018-10-18 | End: 2021-06-15

## 2018-10-18 RX ORDER — LOSARTAN POTASSIUM AND HYDROCHLOROTHIAZIDE 12.5; 5 MG/1; MG/1
TABLET ORAL
Refills: 0
Start: 2018-10-18 | End: 2019-10-30 | Stop reason: CLARIF

## 2018-10-18 RX ORDER — OLOPATADINE HYDROCHLORIDE 2 MG/ML
SOLUTION/ DROPS OPHTHALMIC
Refills: 0
Start: 2018-10-18

## 2018-11-01 ENCOUNTER — IMMUNIZATION (OUTPATIENT)
Dept: FAMILY MEDICINE CLINIC | Facility: CLINIC | Age: 58
End: 2018-11-01
Payer: COMMERCIAL

## 2018-11-01 DIAGNOSIS — Z23 ENCOUNTER FOR IMMUNIZATION: ICD-10-CM

## 2018-11-01 PROCEDURE — G0008 ADMIN INFLUENZA VIRUS VAC: HCPCS

## 2018-11-01 PROCEDURE — 90682 RIV4 VACC RECOMBINANT DNA IM: CPT

## 2018-11-06 ENCOUNTER — CLINICAL SUPPORT (OUTPATIENT)
Dept: FAMILY MEDICINE CLINIC | Facility: CLINIC | Age: 58
End: 2018-11-06
Payer: COMMERCIAL

## 2018-11-06 DIAGNOSIS — M81.0 OSTEOPOROSIS, UNSPECIFIED OSTEOPOROSIS TYPE, UNSPECIFIED PATHOLOGICAL FRACTURE PRESENCE: Primary | ICD-10-CM

## 2018-11-06 PROCEDURE — 96372 THER/PROPH/DIAG INJ SC/IM: CPT | Performed by: FAMILY MEDICINE

## 2018-11-23 DIAGNOSIS — J30.9 ALLERGIC RHINITIS, UNSPECIFIED SEASONALITY, UNSPECIFIED TRIGGER: ICD-10-CM

## 2018-11-24 RX ORDER — LORATADINE 10 MG/1
TABLET ORAL
Qty: 28 TABLET | Refills: 10 | Status: SHIPPED | OUTPATIENT
Start: 2018-11-24

## 2018-11-29 NOTE — PROGRESS NOTES
Family Medicine Follow-Up Office Visit  Ricky Lynne 60 y o  female   MRN: [id number] : 1960  ENCOUNTER: 12/3/2018 9:15 AM    Assessment and Plan   Benign essential hypertension  At goal, cont regimen  Annual labs ordered  Osteoporosis  Received first dose of Prolia  Cont q6mo administration  Tonic-clonic epileptic seizures ([address] )  Seizure free  Following with neuro  Cont regimen as ordered  Encounter for hepatitis C screening test for low risk patient  Screening ordered  Hyperlipidemia  Stable on regimen, check lipids at beginning of year  Chief Complaint     Chief Complaint   Patient presents with    Follow-up     HTN       History of Present Illness   Ricky Lynne is a 60y o -year-old female who presents today for BP followup  Doing very well, no concerns from [employer], her excellent caregiver  Requesting labs for annual bloodwork  Review of Systems   Review of Systems   Constitutional: Negative for activity change, chills, fatigue and fever  HENT: Negative for congestion, sinus pain, sinus pressure and sore throat  Respiratory: Negative for cough, shortness of breath and wheezing  Cardiovascular: Negative for chest pain, palpitations and leg swelling  Gastrointestinal: Negative for abdominal pain, diarrhea, nausea and vomiting  Genitourinary: Negative for dysuria, frequency and urgency  Musculoskeletal: Negative for arthralgias, myalgias, neck pain and neck stiffness  Skin: Negative for rash  Neurological: Negative for light-headedness and headaches         Active Problem List     Patient Active Problem List   Diagnosis    Women's annual routine gynecological examination    Wheelchair bound    Cerebral palsy ([address] )    Benign essential hypertension    Esophageal reflux    Hyperlipidemia    Insomnia    Osteoporosis    Spastic quadriplegic cerebral palsy ([address] )    Tonic-clonic epileptic seizures ([facility] [address] )    Encounter for hepatitis C screening test for low risk patient    Healthcare maintenance    Viral upper respiratory tract infection       Past Medical History, Past Surgical History, Family History, and Social History were reviewed and updated today as appropriate  Objective   /70   Pulse 78   Temp 98 9 °F (37 2 °C)   Resp 16   Wt 75 8 kg (167 lb)   LMP 02/28/2010 (Approximate)   BMI 32 61 kg/m²     Physical Exam   Constitutional: She appears well-developed and well-nourished  No distress  HENT:   Head: Normocephalic and atraumatic  Eyes: Pupils are equal, round, and reactive to light  Neck: Normal range of motion  Neck supple  Cardiovascular: Normal rate, regular rhythm and normal heart sounds  Exam reveals no gallop and no friction rub  No murmur heard  Pulmonary/Chest: Effort normal and breath sounds normal  No respiratory distress  She has no wheezes  She has no rales  Abdominal: Soft  She exhibits no distension  Musculoskeletal: Normal range of motion  Neurological: She is alert  Baseline nonverbal   Wheelchair bound  Psychiatric: She has a normal mood and affect       Diabetic Foot Exam    Pertinent Laboratory/Diagnostic Studies:  Lab Results   Component Value Date    GLUCOSE 96 01/08/2014    BUN 18 09/19/2018    CREATININE 0 57 (L) 09/19/2018    CALCIUM 9 1 09/19/2018     01/06/2016    K 4 1 09/19/2018    CO2 30 09/19/2018     09/19/2018     Lab Results   Component Value Date    ALT 20 01/24/2018    AST 14 01/24/2018    ALKPHOS 90 01/24/2018    BILITOT 0 4 01/06/2016       Lab Results   Component Value Date    WBC 8 47 01/24/2018    HGB 13 9 01/24/2018    HCT 42 1 01/24/2018    MCV 98 01/24/2018     01/24/2018       No results found for: TSH    Lab Results   Component Value Date    CHOL 171 01/06/2016     Lab Results   Component Value Date    TRIG 54 01/24/2018     Lab Results   Component Value Date    HDL 56 01/24/2018     Lab Results   Component Value Date    LDLCALC 88 01/24/2018     No results found for: HGBA1C    Results for orders placed or performed in visit on 02/15/90   Basic metabolic panel   Result Value Ref Range    Sodium 141 136 - 145 mmol/L    Potassium 4 1 3 5 - 5 3 mmol/L    Chloride 104 100 - 108 mmol/L    CO2 30 21 - 32 mmol/L    ANION GAP 7 4 - 13 mmol/L    BUN 18 5 - 25 mg/dL    Creatinine 0 57 (L) 0 60 - 1 30 mg/dL    Glucose, Fasting 90 65 - 99 mg/dL    Calcium 9 1 8 3 - 10 1 mg/dL    eGFR 102 ml/min/1 73sq m       Orders Placed This Encounter   Procedures    Hepatitis C antibody    Lipid Panel with Direct LDL reflex    Comprehensive metabolic panel    CBC and differential    TSH, 3rd generation with Free T4 reflex    Vitamin D 25 hydroxy         Current Medications     Current Outpatient Prescriptions   Medication Sig Dispense Refill    acetaminophen (TYLENOL 8 HOUR) 650 mg CR tablet Take 1 tablet by mouth every 6 hours as needed for mild pain or temp greater than 100 4 30 tablet 0    baclofen 10 mg tablet Take 1 tablet (10 mg total) by mouth 2 (two) times a day 60 tablet 11    benzonatate (TESSALON PERLES) 100 mg capsule Take 1 capsule by mouth every 8 hours as needed for dry cough 20 capsule 0    Calcium Citrate 200 MG TABS Take by mouth 3 TABS TWICE A DAY       carbamide peroxide (DEBROX) 6 5 % otic solution Instill 5 drops in affected ear twice daily for 4 days as needed for ear wax 15 mL 0    cholecalciferol (VITAMIN D3) 1,000 units tablet Take 1 capsule by mouth daily      clotrimazole-betamethasone (LOTRISONE) 1-0 05 % cream Apply twice daily as needed for skin irritation from diaper 30 g 0    denosumab (PROLIA) 60 mg/mL Inject 1 mL (60 mg total) under the skin once for 1 dose 1 mL 0    famotidine (PEPCID) 20 mg tablet Take 1 tablet (20 mg total) by mouth daily at bedtime  0    fluticasone (FLONASE) 50 mcg/act nasal spray 1 spray into each nostril 2 (two) times a day      guaiFENesin (MUCINEX) 600 mg 12 hr tablet Take 1 tablet by mouth every 12 (twelve) hours as needed      ibuprofen (MOTRIN) 600 mg tablet Take by mouth every 6 (six) hours      ipratropium (ATROVENT) 0 06 % nasal spray 2 sprays into each nostril 3 (three) times a day as needed for rhinitis 15 mL 0    loratadine (CLARITIN) 10 mg tablet TAKE 1 TABLET BY MOUTH DAILY (ALLERGIC RHINITIS) 28 tablet 10    LORazepam (ATIVAN) 0 5 mg tablet Take 1 tablet (0 5 mg total) by mouth once for 1 dose May repeat in 30 min  2 tablet 0    losartan-hydrochlorothiazide (HYZAAR) 50-12 5 mg per tablet Take 1 tablet by mouth daily  0    metoprolol succinate (TOPROL-XL) 100 mg 24 hr tablet Take 1 tablet by mouth daily  0    olopatadine HCl (PATADAY) 0 2 % opth drops Instill 1 drop into affected eye(s) daily PRN irritation  0    PHENobarbital 32 4 mg tablet Take 3 tablets by mouth at 4 PM daily 90 tablet 0    polyethylene glycol (MIRALAX) powder Take by mouth      Polyethylene Glycol 3350 (MIRALAX PO) Take 1 packet by mouth daily      povidone-iodine (BETADINE) 10 % ointment Apply topically 2 (two) times a day as needed      simvastatin (ZOCOR) 20 mg tablet Take 1 tablet by mouth daily  0    Vitamins A & D (VITAMIN A & D) ointment Apply topically      zolpidem (AMBIEN) 5 mg tablet Take 0 5 tablets (2 5 mg total) by mouth daily 30 tablet 5     No current facility-administered medications for this visit          ALLERGIES:  Allergies   Allergen Reactions    Other      Seasonal Allergies       Health Maintenance     Health Maintenance   Topic Date Due    Hepatitis C Screening  1960    Depression Screening PHQ  05/23/2019    PAP SMEAR  01/17/2020    MAMMOGRAM  02/08/2020    DTaP,Tdap,and Td Vaccines (2 - Td) 12/06/2021    CRC Screening: Colonoscopy  04/18/2022    INFLUENZA VACCINE  Completed     Immunization History   Administered Date(s) Administered    Hep B, adult 02/24/2015, 04/09/2015, 09/11/2015    Influenza Quadrivalent Preservative Free 3 years and older IM 10/31/2014, 11/06/2015    Influenza Quadrivalent, 6-35 Months IM 11/08/2016    Influenza TIV (IM) 11/14/2012, 11/18/2013    Influenza, injectable, quadrivalent, preservative free 0 5 mL 11/01/2018    Tdap 12/06/2011    Tuberculin Skin Test-PPD Intradermal 02/13/2013, 12/02/2014, 08/11/2016, 07/23/2018         Jeannine Farnsworth MD   [address]  12/3/2018  9:15 AM    Parts of this note were dictated using [employer] dictation software and may have sounds-like errors due to variation in pronunciation

## 2018-12-03 ENCOUNTER — OFFICE VISIT (OUTPATIENT)
Dept: FAMILY MEDICINE CLINIC | Facility: CLINIC | Age: 58
End: 2018-12-03
Payer: COMMERCIAL

## 2018-12-03 VITALS
BODY MASS INDEX: 32.61 KG/M2 | HEART RATE: 78 BPM | RESPIRATION RATE: 16 BRPM | TEMPERATURE: 98.9 F | DIASTOLIC BLOOD PRESSURE: 70 MMHG | WEIGHT: 167 LBS | SYSTOLIC BLOOD PRESSURE: 126 MMHG

## 2018-12-03 DIAGNOSIS — Z12.39 BREAST CANCER SCREENING: Primary | ICD-10-CM

## 2018-12-03 DIAGNOSIS — M81.0 AGE-RELATED OSTEOPOROSIS WITHOUT CURRENT PATHOLOGICAL FRACTURE: ICD-10-CM

## 2018-12-03 DIAGNOSIS — G40.409 TONIC-CLONIC EPILEPTIC SEIZURES (HCC): ICD-10-CM

## 2018-12-03 DIAGNOSIS — Z11.59 ENCOUNTER FOR HEPATITIS C SCREENING TEST FOR LOW RISK PATIENT: ICD-10-CM

## 2018-12-03 DIAGNOSIS — E78.5 HYPERLIPIDEMIA, UNSPECIFIED HYPERLIPIDEMIA TYPE: ICD-10-CM

## 2018-12-03 DIAGNOSIS — I10 BENIGN ESSENTIAL HYPERTENSION: Primary | ICD-10-CM

## 2018-12-03 PROCEDURE — 99214 OFFICE O/P EST MOD 30 MIN: CPT | Performed by: FAMILY MEDICINE

## 2018-12-03 PROCEDURE — 3078F DIAST BP <80 MM HG: CPT | Performed by: FAMILY MEDICINE

## 2018-12-03 PROCEDURE — 3074F SYST BP LT 130 MM HG: CPT | Performed by: FAMILY MEDICINE

## 2018-12-03 RX ORDER — LORAZEPAM 0.5 MG/1
0.5 TABLET ORAL ONCE
Qty: 2 TABLET | Refills: 0 | Status: SHIPPED | OUTPATIENT
Start: 2018-12-03 | End: 2019-11-11 | Stop reason: SDUPTHER

## 2018-12-03 RX ORDER — LORAZEPAM 0.5 MG/1
0.5 TABLET ORAL ONCE
Qty: 2 TABLET | Refills: 0 | Status: SHIPPED | OUTPATIENT
Start: 2018-12-03 | End: 2018-12-03 | Stop reason: SDUPTHER

## 2018-12-21 ENCOUNTER — TELEPHONE (OUTPATIENT)
Dept: FAMILY MEDICINE CLINIC | Facility: CLINIC | Age: 58
End: 2018-12-21

## 2018-12-21 DIAGNOSIS — M81.0 OSTEOPOROSIS, UNSPECIFIED OSTEOPOROSIS TYPE, UNSPECIFIED PATHOLOGICAL FRACTURE PRESENCE: Primary | ICD-10-CM

## 2018-12-21 NOTE — TELEPHONE ENCOUNTER
Signed on your behalf meenakshi olivier did not get her post injection bmp this was her first injection

## 2019-01-25 ENCOUNTER — APPOINTMENT (OUTPATIENT)
Dept: LAB | Facility: CLINIC | Age: 59
End: 2019-01-25
Payer: COMMERCIAL

## 2019-01-25 DIAGNOSIS — M81.0 OSTEOPOROSIS, UNSPECIFIED OSTEOPOROSIS TYPE, UNSPECIFIED PATHOLOGICAL FRACTURE PRESENCE: ICD-10-CM

## 2019-01-25 DIAGNOSIS — M81.0 AGE-RELATED OSTEOPOROSIS WITHOUT CURRENT PATHOLOGICAL FRACTURE: ICD-10-CM

## 2019-01-25 DIAGNOSIS — G40.409 TONIC-CLONIC EPILEPTIC SEIZURES (HCC): ICD-10-CM

## 2019-01-25 DIAGNOSIS — I10 BENIGN ESSENTIAL HYPERTENSION: ICD-10-CM

## 2019-01-25 DIAGNOSIS — Z11.59 ENCOUNTER FOR HEPATITIS C SCREENING TEST FOR LOW RISK PATIENT: ICD-10-CM

## 2019-01-25 DIAGNOSIS — E78.5 HYPERLIPIDEMIA, UNSPECIFIED HYPERLIPIDEMIA TYPE: ICD-10-CM

## 2019-01-25 DIAGNOSIS — Z11.4 ENCOUNTER FOR SCREENING FOR HIV: ICD-10-CM

## 2019-01-25 LAB
25(OH)D3 SERPL-MCNC: 44.9 NG/ML (ref 30–100)
ALBUMIN SERPL BCP-MCNC: 3.4 G/DL (ref 3.5–5)
ALP SERPL-CCNC: 93 U/L (ref 46–116)
ALT SERPL W P-5'-P-CCNC: 19 U/L (ref 12–78)
ANION GAP SERPL CALCULATED.3IONS-SCNC: 9 MMOL/L (ref 4–13)
AST SERPL W P-5'-P-CCNC: 15 U/L (ref 5–45)
BASOPHILS # BLD AUTO: 0.04 THOUSANDS/ΜL (ref 0–0.1)
BASOPHILS NFR BLD AUTO: 1 % (ref 0–1)
BILIRUB SERPL-MCNC: 0.3 MG/DL (ref 0.2–1)
BUN SERPL-MCNC: 18 MG/DL (ref 5–25)
CALCIUM SERPL-MCNC: 9 MG/DL (ref 8.3–10.1)
CHLORIDE SERPL-SCNC: 102 MMOL/L (ref 100–108)
CHOLEST SERPL-MCNC: 164 MG/DL (ref 50–200)
CO2 SERPL-SCNC: 28 MMOL/L (ref 21–32)
CREAT SERPL-MCNC: 0.5 MG/DL (ref 0.6–1.3)
EOSINOPHIL # BLD AUTO: 0.18 THOUSAND/ΜL (ref 0–0.61)
EOSINOPHIL NFR BLD AUTO: 2 % (ref 0–6)
ERYTHROCYTE [DISTWIDTH] IN BLOOD BY AUTOMATED COUNT: 12.9 % (ref 11.6–15.1)
GFR SERPL CREATININE-BSD FRML MDRD: 106 ML/MIN/1.73SQ M
GLUCOSE P FAST SERPL-MCNC: 85 MG/DL (ref 65–99)
HCT VFR BLD AUTO: 42.2 % (ref 34.8–46.1)
HCV AB SER QL: NORMAL
HDLC SERPL-MCNC: 64 MG/DL (ref 40–60)
HGB BLD-MCNC: 13.8 G/DL (ref 11.5–15.4)
IMM GRANULOCYTES # BLD AUTO: 0.02 THOUSAND/UL (ref 0–0.2)
IMM GRANULOCYTES NFR BLD AUTO: 0 % (ref 0–2)
LDLC SERPL CALC-MCNC: 87 MG/DL (ref 0–100)
LYMPHOCYTES # BLD AUTO: 1.55 THOUSANDS/ΜL (ref 0.6–4.47)
LYMPHOCYTES NFR BLD AUTO: 19 % (ref 14–44)
MCH RBC QN AUTO: 32.4 PG (ref 26.8–34.3)
MCHC RBC AUTO-ENTMCNC: 32.7 G/DL (ref 31.4–37.4)
MCV RBC AUTO: 99 FL (ref 82–98)
MONOCYTES # BLD AUTO: 0.65 THOUSAND/ΜL (ref 0.17–1.22)
MONOCYTES NFR BLD AUTO: 8 % (ref 4–12)
NEUTROPHILS # BLD AUTO: 5.73 THOUSANDS/ΜL (ref 1.85–7.62)
NEUTS SEG NFR BLD AUTO: 70 % (ref 43–75)
NRBC BLD AUTO-RTO: 0 /100 WBCS
PHENOBARB SERPL-MCNC: 23.3 UG/ML (ref 15–40)
PLATELET # BLD AUTO: 209 THOUSANDS/UL (ref 149–390)
PMV BLD AUTO: 11.2 FL (ref 8.9–12.7)
POTASSIUM SERPL-SCNC: 3.9 MMOL/L (ref 3.5–5.3)
PROT SERPL-MCNC: 7.3 G/DL (ref 6.4–8.2)
RBC # BLD AUTO: 4.26 MILLION/UL (ref 3.81–5.12)
SODIUM SERPL-SCNC: 139 MMOL/L (ref 136–145)
TRIGL SERPL-MCNC: 64 MG/DL
TSH SERPL DL<=0.05 MIU/L-ACNC: 2.88 UIU/ML (ref 0.36–3.74)
WBC # BLD AUTO: 8.17 THOUSAND/UL (ref 4.31–10.16)

## 2019-01-25 PROCEDURE — 80053 COMPREHEN METABOLIC PANEL: CPT

## 2019-01-25 PROCEDURE — 87389 HIV-1 AG W/HIV-1&-2 AB AG IA: CPT

## 2019-01-25 PROCEDURE — 85025 COMPLETE CBC W/AUTO DIFF WBC: CPT

## 2019-01-25 PROCEDURE — 80184 ASSAY OF PHENOBARBITAL: CPT

## 2019-01-25 PROCEDURE — 80061 LIPID PANEL: CPT

## 2019-01-25 PROCEDURE — 36415 COLL VENOUS BLD VENIPUNCTURE: CPT

## 2019-01-25 PROCEDURE — 82306 VITAMIN D 25 HYDROXY: CPT

## 2019-01-25 PROCEDURE — 84443 ASSAY THYROID STIM HORMONE: CPT

## 2019-01-25 PROCEDURE — 86803 HEPATITIS C AB TEST: CPT

## 2019-01-27 LAB — HIV 1+2 AB+HIV1 P24 AG SERPL QL IA: NORMAL

## 2019-02-04 ENCOUNTER — OFFICE VISIT (OUTPATIENT)
Dept: OBGYN CLINIC | Facility: CLINIC | Age: 59
End: 2019-02-04

## 2019-02-04 VITALS
WEIGHT: 165 LBS | BODY MASS INDEX: 32.39 KG/M2 | DIASTOLIC BLOOD PRESSURE: 84 MMHG | HEART RATE: 92 BPM | HEIGHT: 60 IN | SYSTOLIC BLOOD PRESSURE: 159 MMHG

## 2019-02-04 DIAGNOSIS — Z01.419 WOMEN'S ANNUAL ROUTINE GYNECOLOGICAL EXAMINATION: Primary | ICD-10-CM

## 2019-02-04 PROCEDURE — G0101 CA SCREEN;PELVIC/BREAST EXAM: HCPCS | Performed by: NURSE PRACTITIONER

## 2019-02-04 NOTE — PROGRESS NOTES
Bere      Megan Mesa is a 60 y o  female with cerebral palsy who presents for annual exam with caregiver  Patient is wheelchair bound  Last Pap smear 17 resulted NILM/ HR HPV negative  Next Pap smears is due 2022  Last mammogram 18  Mammogram ordered by PCP  Next colonoscopy due   The patient has never been sexually active  The patient has never been taking hormone replacement therapy  Patient denies post-menopausal vaginal bleeding    The patient wears seatbelts: yes  The patient participates in regular exercise: yes  The patient reports that there is not domestic violence in her life  Menstrual History:  OB History      Para Term  AB Living    0 0 0 0 0 0    SAB TAB Ectopic Multiple Live Births    0 0 0 0 0         Menarche age: Unsure  Patient's last menstrual period was 2010 (approximate)  Period Cycle (Days):  (9 years )    The following portions of the patient's history were reviewed and updated as appropriate: allergies, current medications, past family history, past medical history, past social history, past surgical history and problem list     Review of Systems  Pertinent items are noted in HPI       Objective      /84   Pulse 92   Ht 5' (1 524 m)   Wt 74 8 kg (165 lb)   LMP 2010 (Approximate)   BMI 32 22 kg/m²     General:   alert and oriented, in no acute distress and cooperative   Heart: regular rate and rhythm, S1, S2 normal, no murmur, click, rub or gallop   Lungs: clear to auscultation bilaterally   Abdomen: soft, non-tender, without masses or organomegaly, nondistended and normal bowel sounds   Vulva: normal, Bartholin's, Urethra, [place]'s normal, female escutcheon   Vagina: normal mucosa, normal discharge, no palpable nodules   Cervix: no cervical motion tenderness and no lesions   Uterus: normal size, non-tender, normal shape and consistency, Difficult to assess due to cerebral palsy   Adnexa: no mass, fullness, tenderness difficult to assess due to cerebral palsy   Breast:  Nontender, no palpable masses, no nipple discharge, no skin changes bilaterally      Assessment/Plan     Diagnoses and all orders for this visit:    Women's annual routine gynecological examination  Pap smear due 1/2022  Mammogram ordered by PCP  Colonoscopy due 2022    Encouraged to continue close follow-up with PCP      RTO 1 year for annual exam or sooner as needed

## 2019-02-04 NOTE — PATIENT INSTRUCTIONS
Mammogram   GENERAL INFORMATION:   What is a mammogram?  A mammogram is an x-ray of your breasts to screen for breast cancer  Who should have a mammogram?  You should have a mammogram if you felt a lump or noticed other changes during a breast self-exam  Talk to your caregiver about when you should start having mammograms  How do I get ready for a mammogram?   · Do not use deodorant, powder, lotion, or perfume  These products may cause particles to appear on your mammogram      · Wear a 2-piece outfit  · If you are breastfeeding, express as much milk as possible before the mammogram     · Bring a list of the dates and places of your past mammograms and other breast tests or treatments  · If your breasts are tender before your monthly period, do not have a mammogram during this time  Schedule your mammogram to be done 1 week after your period ends  How is a mammogram done? A top view and a side view x-ray are usually done for each breast  Tell caregivers if you have breast implants or breast problems before you have your mammogram  You may need extra x-rays of each breast   · You will be given a hospital gown  Take off your clothes from the waist up  Wear the hospital gown so that it opens in the front  · You will sit or stand next to a small x-ray machine  The caregiver will help you place one of your breasts on the x-ray plate  Your arm and breast will be moved until your breast is in the correct position  · Your breast will be gently pressed between 2 plastic plates for a few seconds while the x-ray is taken  This may be uncomfortable  · You will be asked to hold your breath while the x-ray is taken  Another x-ray will be taken of the same breast after the position of the x-ray machine has been changed  · Your other breast will be x-rayed the same way    What happens after my mammogram?  Your breasts may feel tender for a short while after the mammogram  You may resume your regular activities  Ask your caregiver when you should receive the results of your mammogram   What are the risks of a mammogram?  You will be exposed to a small amount of radiation  Some breast cancers may not show up on mammograms  When should I contact my caregiver? Contact your caregiver if:  · You cannot make your appointment on time  · You do not receive your results when expected  · You have questions or concerns about the mammogram   CARE AGREEMENT:   You have the right to help plan your care  Learn about your health condition and how it may be treated  Discuss treatment options with your caregivers to decide what care you want to receive  You always have the right to refuse treatment  The above information is an  only  It is not intended as medical advice for individual conditions or treatments  Talk to your doctor, nurse or pharmacist before following any medical regimen to see if it is safe and effective for you  © 2014 [address] is for End User's use only and may not be sold, redistributed or otherwise used for commercial purposes  All illustrations and images included in CareNotes® are the copyrighted property of [employer]  or [employer]  Breast Self Exam for Women   WHAT YOU NEED TO KNOW:   What is a breast self-exam (BSE)? A BSE is a way to check your breasts for lumps and other changes  Regular BSEs can help you know how your breasts normally look and feel  Most breast lumps or changes are not cancer, but you should always have them checked by a healthcare provider  Your healthcare provider can also watch you do a BSE and can tell you if you are doing your BSE correctly  Why should I do a BSE? Breast cancer is the most common type of cancer in women  Even if you have mammograms, you may still want to do a BSE regularly   If you know how your breasts normally feel and look, it may help you know when to contact your healthcare provider  Mammograms can miss some cancers  You may find a lump during a BSE that did not show up on your mammogram   When should I do a BSE? Sameer your calendar to help you remember to do BSE on a regular schedule  One easy way to remember to do a BSE is to do the exam on the same day of each month  If you have periods, you may want to do your BSE 1 week after your period ends  This is the time when your breasts may be the least swollen, lumpy, or tender  You can do regular BSEs even if you are breastfeeding or have breast implants  How should I do a BSE? · Look at your breasts in a mirror  Look at the size and shape of each breast and nipple  Check for swelling, lumps, dimpling, scaly skin, or other skin changes  Look for nipple changes, such as a nipple that is painful or beginning to pull inward  Gently squeeze both nipples and check to see if fluid (that is not breast milk) comes out of them  If you find any of these or other breast changes, contact your healthcare provider  Check your breasts while you sit or  the following 3 positions:    [facility] your arms down at your sides  ¨ Raise your hands and join them behind your head  ¨ Put firm pressure with your hands on your hips  Bend slightly forward while you look at your breasts in the mirror  · Lie down and feel your breasts  When you lie down, your breast tissue spreads out evenly over your chest  This makes it easier for you to feel for lumps and anything that may not be normal for your breasts  Do a BSE on one breast at a time  ¨ Place a small pillow or towel under your left shoulder  Put your left arm behind your head  ¨ Use the 3 middle fingers of your right hand  Use your fingertip pads, on the top of your fingers  Your fingertip pad is the most sensitive part of your finger  ¨ Use small circles to feel your breast tissue  Use your fingertip pads to make dime-sized, overlapping circles on your breast and armpits  Use light, medium, and firm pressure  First, press lightly  Second, press with medium pressure to feel a little deeper into the breast  Last, use firm pressure to feel deep within your breast     ¨ Examine your entire breast area  Examine the breast area from above the breast to below the breast where you feel only ribs  Make small circles with your fingertips, starting in the middle of your armpit  Make circles going up and down the breast area  Continue toward your breast and all the way across it  Examine the area from your armpit all the way over to the middle of your chest (breastbone)  Stop at the middle of your chest     ¨ Move the pillow or towel to your right shoulder, and put your right arm behind your head  Use the 3 fingertip pads of your left hand, and repeat the above steps to do a BSE on your right breast        What else can I do to check for breast problems or cancer? Some experts suggest that women 36years of age or older should have a mammogram every year  Other experts suggest that women between the ages of 48and 76years old should have a mammogram every 2 years  Talk to your healthcare provider about when you should have a mammogram   When should I contact my healthcare provider? · You find any lumps or changes in your breasts  · You have breast pain or fluid coming from your nipples  · You have questions or concerns or concerns about your condition or care  CARE AGREEMENT:   You have the right to help plan your care  Learn about your health condition and how it may be treated  Discuss treatment options with your caregivers to decide what care you want to receive  You always have the right to refuse treatment  The above information is an  only  It is not intended as medical advice for individual conditions or treatments  Talk to your doctor, nurse or pharmacist before following any medical regimen to see if it is safe and effective for you    © 2017 [employer] [address] [place] [address] is for End User's use only and may not be sold, redistributed or otherwise used for commercial purposes  All illustrations and images included in CareNotes® are the copyrighted property of [employer] Inc  or [employer]

## 2019-02-22 PROBLEM — G80.9 CEREBRAL PALSY (HCC): Status: RESOLVED | Noted: 2018-01-31 | Resolved: 2019-02-22

## 2019-02-22 PROBLEM — J06.9 VIRAL UPPER RESPIRATORY TRACT INFECTION: Status: RESOLVED | Noted: 2018-07-12 | Resolved: 2019-02-22

## 2019-02-22 NOTE — ASSESSMENT & PLAN NOTE
Colonoscopy due 2022  Well woman exam UTD  On treatment for osteoporosis  Mammogram UTD  Lab screening UTD

## 2019-02-22 NOTE — PROGRESS NOTES
Family Medicine Follow-Up Office Visit  Anna Munson 60 y o  female   MRN: [id number] : 1960  ENCOUNTER: 2019 9:20 AM    Assessment and Plan   Healthcare maintenance  Colonoscopy due   Well woman exam UTD  On treatment for osteoporosis  Mammogram UTD  Lab screening UTD  Osteoporosis  Tolerating Prolia, continue  Benign essential hypertension  At goal, cont current regimen  Labs all within normal limits  Hyperlipidemia  Lipids at goal, cont current regimen  Acute upper respiratory infection  Likely viral based on history and exam   Cont supportive care measures  RTC 3 months    Chief Complaint     Chief Complaint   Patient presents with    Follow-up       History of Present Illness   Anna Munson is a 60y o -year-old female who presents today for followup of HTN and osteoporosis  Pt's caregiver Little Apo reports cough for the past 1 5 weeks  Accompanied by congestion, runny nose  Using atrovent intranasal and tessalon perles  Having some success  Many sick contacts  No SOB, wheeze, fever  Review of Systems   Review of Systems   Constitutional: Negative for activity change, chills, fatigue and fever  HENT: Positive for congestion, postnasal drip and rhinorrhea  Negative for sinus pressure, sinus pain and sore throat  Respiratory: Positive for cough  Negative for shortness of breath and wheezing  Cardiovascular: Negative for chest pain, palpitations and leg swelling  Gastrointestinal: Negative for abdominal pain, diarrhea, nausea and vomiting  Genitourinary: Negative for dysuria, frequency and urgency  Musculoskeletal: Negative for arthralgias, myalgias, neck pain and neck stiffness  Skin: Negative for rash  Neurological: Negative for light-headedness and headaches         Active Problem List     Patient Active Problem List   Diagnosis    Women's annual routine gynecological examination    Wheelchair bound    Benign essential hypertension    Esophageal reflux    Hyperlipidemia    Insomnia    Osteoporosis    Spastic quadriplegic cerebral palsy (HCC)    Tonic-clonic epileptic seizures ([address] )    Encounter for hepatitis C screening test for low risk patient   [address] maintenance    Breast cancer screening    Acute upper respiratory infection       Past Medical History, Past Surgical History, Family History, and Social History were reviewed and updated today as appropriate  Objective   /76 (BP Location: Left arm, Patient Position: Sitting, Cuff Size: Large)   Pulse 74   Temp 97 6 °F (36 4 °C) (Tympanic)   Resp 12   Ht 5' (1 524 m) Comment: pts caregiver gave us height from facility  Wt 71 2 kg (157 lb) Comment: pts caregiver gave us the weight from facility  LMP 02/28/2010 (Approximate)   BMI 30 66 kg/m²     Physical Exam   Constitutional: She appears well-developed and well-nourished  No distress  HENT:   Head: Normocephalic and atraumatic  Eyes: Pupils are equal, round, and reactive to light  Neck: Normal range of motion  Neck supple  Cardiovascular: Normal rate, regular rhythm and normal heart sounds  Exam reveals no gallop and no friction rub  No murmur heard  Pulmonary/Chest: Effort normal and breath sounds normal  No respiratory distress  She has no wheezes  She has no rales  Abdominal: Soft  She exhibits no distension  Musculoskeletal: Normal range of motion  Neurological: She is alert  Baseline nonverbal   Wheelchair bound  Psychiatric: She has a normal mood and affect       Diabetic Foot Exam    Pertinent Laboratory/Diagnostic Studies:  Lab Results   Component Value Date    GLUCOSE 96 01/08/2014    BUN 18 01/25/2019    CREATININE 0 50 (L) 01/25/2019    CALCIUM 9 0 01/25/2019     01/06/2016    K 3 9 01/25/2019    CO2 28 01/25/2019     01/25/2019     Lab Results   Component Value Date    ALT 19 01/25/2019    AST 15 01/25/2019    ALKPHOS 93 01/25/2019    BILITOT 0 4 01/06/2016 Lab Results   Component Value Date    WBC 8 17 01/25/2019    HGB 13 8 01/25/2019    HCT 42 2 01/25/2019    MCV 99 (H) 01/25/2019     01/25/2019       No results found for: TSH    Lab Results   Component Value Date    CHOL 171 01/06/2016     Lab Results   Component Value Date    TRIG 64 01/25/2019     Lab Results   Component Value Date    HDL 64 (H) 01/25/2019     Lab Results   Component Value Date    LDLCALC 87 01/25/2019     No results found for: HGBA1C    Results for orders placed or performed in visit on 01/25/19   Hepatitis C antibody   Result Value Ref Range    Hepatitis C Ab Non-reactive Non-reactive   HIV 1/2 AG-AB combo   Result Value Ref Range    HIV-1/HIV-2 Ab Non-Reactive Non-Reactive   Phenobarbital level   Result Value Ref Range    Phenobarbital Lvl 23 3 15 0 - 40 0 ug/mL   Lipid Panel with Direct LDL reflex   Result Value Ref Range    Cholesterol 164 50 - 200 mg/dL    Triglycerides 64 <=150 mg/dL    HDL, Direct 64 (H) 40 - 60 mg/dL    LDL Calculated 87 0 - 100 mg/dL   Comprehensive metabolic panel   Result Value Ref Range    Sodium 139 136 - 145 mmol/L    Potassium 3 9 3 5 - 5 3 mmol/L    Chloride 102 100 - 108 mmol/L    CO2 28 21 - 32 mmol/L    ANION GAP 9 4 - 13 mmol/L    BUN 18 5 - 25 mg/dL    Creatinine 0 50 (L) 0 60 - 1 30 mg/dL    Glucose, Fasting 85 65 - 99 mg/dL    Calcium 9 0 8 3 - 10 1 mg/dL    AST 15 5 - 45 U/L    ALT 19 12 - 78 U/L    Alkaline Phosphatase 93 46 - 116 U/L    Total Protein 7 3 6 4 - 8 2 g/dL    Albumin 3 4 (L) 3 5 - 5 0 g/dL    Total Bilirubin 0 30 0 20 - 1 00 mg/dL    eGFR 106 ml/min/1 73sq m   CBC and differential   Result Value Ref Range    WBC 8 17 4 31 - 10 16 Thousand/uL    RBC 4 26 3 81 - 5 12 Million/uL    Hemoglobin 13 8 11 5 - 15 4 g/dL    Hematocrit 42 2 34 8 - 46 1 %    MCV 99 (H) 82 - 98 fL    MCH 32 4 26 8 - 34 3 pg    MCHC 32 7 31 4 - 37 4 g/dL    RDW 12 9 11 6 - 15 1 %    MPV 11 2 8 9 - 12 7 fL    Platelets 210 625 - 849 Thousands/uL    nRBC 0 /100 WBCs    Neutrophils Relative 70 43 - 75 %    Immat GRANS % 0 0 - 2 %    Lymphocytes Relative 19 14 - 44 %    Monocytes Relative 8 4 - 12 %    Eosinophils Relative 2 0 - 6 %    Basophils Relative 1 0 - 1 %    Neutrophils Absolute 5 73 1 85 - 7 62 Thousands/µL    Immature Grans Absolute 0 02 0 00 - 0 20 Thousand/uL    Lymphocytes Absolute 1 55 0 60 - 4 47 Thousands/µL    Monocytes Absolute 0 65 0 17 - 1 22 Thousand/µL    Eosinophils Absolute 0 18 0 00 - 0 61 Thousand/µL    Basophils Absolute 0 04 0 00 - 0 10 Thousands/µL   TSH, 3rd generation with Free T4 reflex   Result Value Ref Range    TSH 3RD GENERATON 2 880 0 358 - 3 740 uIU/mL   Vitamin D 25 hydroxy   Result Value Ref Range    Vit D, 25-Hydroxy 44 9 30 0 - 100 0 ng/mL       No orders of the defined types were placed in this encounter          Current Medications     Current Outpatient Medications   Medication Sig Dispense Refill    acetaminophen (TYLENOL 8 HOUR) 650 mg CR tablet Take 1 tablet by mouth every 6 hours as needed for mild pain or temp greater than 100 4 30 tablet 0    baclofen 10 mg tablet Take 1 tablet (10 mg total) by mouth 2 (two) times a day 60 tablet 11    benzonatate (TESSALON PERLES) 100 mg capsule Take 1 capsule by mouth every 8 hours as needed for dry cough 20 capsule 0    Calcium Citrate 200 MG TABS Take by mouth 3 TABS TWICE A DAY       carbamide peroxide (DEBROX) 6 5 % otic solution Instill 5 drops in affected ear twice daily for 4 days as needed for ear wax 15 mL 0    cholecalciferol (VITAMIN D3) 1,000 units tablet Take 1 capsule by mouth daily      clotrimazole-betamethasone (LOTRISONE) 1-0 05 % cream Apply twice daily as needed for skin irritation from diaper 30 g 0    famotidine (PEPCID) 20 mg tablet Take 1 tablet (20 mg total) by mouth daily at bedtime  0    fluticasone (FLONASE) 50 mcg/act nasal spray 1 spray into each nostril 2 (two) times a day      guaiFENesin (MUCINEX) 600 mg 12 hr tablet Take 1 tablet by mouth every 12 (twelve) hours as needed      ibuprofen (MOTRIN) 600 mg tablet Take by mouth every 6 (six) hours      ipratropium (ATROVENT) 0 06 % nasal spray 2 sprays into each nostril 3 (three) times a day as needed for rhinitis 15 mL 0    loratadine (CLARITIN) 10 mg tablet TAKE 1 TABLET BY MOUTH DAILY (ALLERGIC RHINITIS) 28 tablet 10    losartan-hydrochlorothiazide (HYZAAR) 50-12 5 mg per tablet Take 1 tablet by mouth daily  0    metoprolol succinate (TOPROL-XL) 100 mg 24 hr tablet Take 1 tablet by mouth daily  0    olopatadine HCl (PATADAY) 0 2 % opth drops Instill 1 drop into affected eye(s) daily PRN irritation  0    PHENobarbital 32 4 mg tablet Take 3 tablets by mouth at 4 PM daily 90 tablet 0    polyethylene glycol (MIRALAX) powder Take by mouth      Polyethylene Glycol 3350 (MIRALAX PO) Take 1 packet by mouth daily      povidone-iodine (BETADINE) 10 % ointment Apply topically 2 (two) times a day as needed      simvastatin (ZOCOR) 20 mg tablet Take 1 tablet by mouth daily  0    Vitamins A & D (VITAMIN A & D) ointment Apply topically      zolpidem (AMBIEN) 5 mg tablet Take 0 5 tablets (2 5 mg total) by mouth daily 30 tablet 5    denosumab (PROLIA) 60 mg/mL Inject 1 mL (60 mg total) under the skin once for 1 dose 1 mL 0    LORazepam (ATIVAN) 0 5 mg tablet Take 1 tablet (0 5 mg total) by mouth once for 1 dose May repeat in 30 min  2 tablet 0     No current facility-administered medications for this visit          ALLERGIES:  Allergies   Allergen Reactions    Other      Seasonal Allergies       Health Maintenance     Health Maintenance   Topic Date Due    BMI: Followup Plan  07/29/1977   Brenna Diones Medicare Annual Wellness Visit (AWV)  03/04/2019 (Originally 1960)    PAP SMEAR  01/17/2020    Depression Screening PHQ  02/04/2020    BMI: Adult  02/04/2020    MAMMOGRAM  02/08/2020    DTaP,Tdap,and Td Vaccines (2 - Td) 12/06/2021    CRC Screening: Colonoscopy  04/18/2022    Hepatitis C Screening Completed    INFLUENZA VACCINE  Completed    HEPATITIS B VACCINES  Aged Out     Immunization History   Administered Date(s) Administered    Hep B, adult 02/24/2015, 04/09/2015, 09/11/2015    Influenza Quadrivalent Preservative Free 3 years and older IM 10/31/2014, 11/06/2015    Influenza Quadrivalent, 6-35 Months IM 11/08/2016    Influenza TIV (IM) 11/14/2012, 11/18/2013    Influenza, injectable, quadrivalent, preservative free 0 5 mL 11/01/2018    Tdap 12/06/2011    Tuberculin Skin Test-PPD Intradermal 02/13/2013, 12/02/2014, 08/11/2016, 07/23/2018         Terrell Cheadle, MD   [address] [place]  2/25/2019  9:20 AM    Parts of this note were dictated using [employer] dictation software and may have sounds-like errors due to variation in pronunciation

## 2019-02-25 ENCOUNTER — OFFICE VISIT (OUTPATIENT)
Dept: FAMILY MEDICINE CLINIC | Facility: CLINIC | Age: 59
End: 2019-02-25

## 2019-02-25 VITALS
TEMPERATURE: 97.6 F | HEIGHT: 60 IN | HEART RATE: 74 BPM | RESPIRATION RATE: 12 BRPM | WEIGHT: 157 LBS | BODY MASS INDEX: 30.82 KG/M2 | DIASTOLIC BLOOD PRESSURE: 76 MMHG | SYSTOLIC BLOOD PRESSURE: 124 MMHG

## 2019-02-25 DIAGNOSIS — M81.0 OSTEOPOROSIS, UNSPECIFIED OSTEOPOROSIS TYPE, UNSPECIFIED PATHOLOGICAL FRACTURE PRESENCE: ICD-10-CM

## 2019-02-25 DIAGNOSIS — M81.0 AGE-RELATED OSTEOPOROSIS WITHOUT CURRENT PATHOLOGICAL FRACTURE: ICD-10-CM

## 2019-02-25 DIAGNOSIS — F51.01 PRIMARY INSOMNIA: ICD-10-CM

## 2019-02-25 DIAGNOSIS — G80.0 SPASTIC QUADRIPLEGIC CEREBRAL PALSY (HCC): ICD-10-CM

## 2019-02-25 DIAGNOSIS — Z00.00 HEALTHCARE MAINTENANCE: ICD-10-CM

## 2019-02-25 DIAGNOSIS — E78.5 HYPERLIPIDEMIA, UNSPECIFIED HYPERLIPIDEMIA TYPE: ICD-10-CM

## 2019-02-25 DIAGNOSIS — J06.9 ACUTE UPPER RESPIRATORY INFECTION: ICD-10-CM

## 2019-02-25 DIAGNOSIS — I10 BENIGN ESSENTIAL HYPERTENSION: Primary | ICD-10-CM

## 2019-02-25 PROCEDURE — 99214 OFFICE O/P EST MOD 30 MIN: CPT | Performed by: FAMILY MEDICINE

## 2019-02-25 RX ORDER — ZOLPIDEM TARTRATE 5 MG/1
2.5 TABLET ORAL DAILY
Qty: 30 TABLET | Refills: 5 | Status: SHIPPED | OUTPATIENT
Start: 2019-02-25 | End: 2019-08-19 | Stop reason: SDUPTHER

## 2019-02-26 ENCOUNTER — HOSPITAL ENCOUNTER (OUTPATIENT)
Dept: RADIOLOGY | Age: 59
Discharge: HOME | End: 2019-02-26
Payer: COMMERCIAL

## 2019-02-26 VITALS — HEIGHT: 60 IN | BODY MASS INDEX: 30.82 KG/M2 | WEIGHT: 157 LBS

## 2019-02-26 DIAGNOSIS — Z12.39 BREAST CANCER SCREENING: ICD-10-CM

## 2019-02-26 PROCEDURE — 77067 SCR MAMMO BI INCL CAD: CPT

## 2019-03-19 DIAGNOSIS — M81.0 AGE-RELATED OSTEOPOROSIS WITHOUT CURRENT PATHOLOGICAL FRACTURE: Primary | ICD-10-CM

## 2019-03-19 DIAGNOSIS — J30.1 ALLERGIC RHINITIS DUE TO POLLEN, UNSPECIFIED SEASONALITY: Primary | ICD-10-CM

## 2019-03-20 RX ORDER — FLUTICASONE PROPIONATE 50 MCG
SPRAY, SUSPENSION (ML) NASAL
Qty: 16 G | Refills: 10 | Status: SHIPPED | OUTPATIENT
Start: 2019-03-20 | End: 2021-01-21

## 2019-03-25 ENCOUNTER — OFFICE VISIT (OUTPATIENT)
Dept: FAMILY MEDICINE CLINIC | Facility: CLINIC | Age: 59
End: 2019-03-25

## 2019-03-25 VITALS
SYSTOLIC BLOOD PRESSURE: 126 MMHG | DIASTOLIC BLOOD PRESSURE: 80 MMHG | HEART RATE: 82 BPM | RESPIRATION RATE: 18 BRPM | TEMPERATURE: 99.1 F

## 2019-03-25 DIAGNOSIS — Z01.818 PREOP EXAMINATION: Primary | ICD-10-CM

## 2019-03-25 DIAGNOSIS — K02.9 TOOTH CARIES: ICD-10-CM

## 2019-03-25 PROCEDURE — PREOP: Performed by: FAMILY MEDICINE

## 2019-03-25 NOTE — PROGRESS NOTES
[facility] PRE-OPERATIVE EXAMINATION  Fnumi Hinojosa  1960    Funmi Hinojosa is a 60 y o  female with spastic quadriplagia cerebral palsy  who is planning to undergo tooth cleaning and oral xrays under general  This is because she had aspiration pneumonia after her last tooth cleaning in 2014  Patient has not had complications with anesthesia in the past     ROS:   Chest pain: no   Shortness of breath: no  Shortness of breath with exertion: no  Orthopnea: no  Dizziness: no  Unexplained weight change: no    PMH:  CAD: no  HTN: no  CKD: no  DM: no on insulin: no  History of CVA: no     reports that she has never smoked  She has never used smokeless tobacco  She reports that she does not drink alcohol or use drugs  /80   Pulse 82   Temp 99 1 °F (37 3 °C)   Resp 18   LMP 02/28/2010 (Approximate)   Physical Exam   Constitutional: No distress  Wheelchair bound    Cardiovascular: Normal rate, regular rhythm and normal heart sounds  Pulmonary/Chest: Effort normal and breath sounds normal    Abdominal: Soft  She exhibits no distension  There is no tenderness  Neurological: She is alert  Skin: She is not diaphoretic         Revised Cardiac Risk Index (RCRI) for Pre-Operative Risk   (estimates risk of cardiac complications after noncardiac surgery)    · High-risk surgery: No 0 / Yes +1  · Intraperitoneal, intrathoracic, suprainguinal vascular  · History of ischemic heart disease: No 0 / Yes +1  · Hx of MI, (+) exercise test, current chest pain considered due to myocardial ischemia, use of nitrate therapy or ECG with pathological Q waves)  · History of CHF: No 0 / Yes +1  · Pulmonary edema, B/L rales or S3 gallop; [place], orthopnea, PND, CXR showing pulmonary vascular redistribution)  · History of cerebrovascular disease: No 0 / Yes +1  · Prior TIA or stroke  · Pre-operative treatment with insulin: No 0 / Yes +1  · Pre-operative creatinine >2 mg/dL: No 0 / Yes +1    RCRI Scoring:  · 0 points: Class I Risk, 0 4% Risk of Major Cardiac Event  · 1 point: Class II Risk, 0 9% Risk of Major Cardiac Event  · 2 points: Class III Risk, 6 6% Risk of Major Cardiac Event  · 3 points: Class IV Risk, 11% Risk of Major Cardiac Event  · 4 points: Class IV Risk, 11% Risk of Major Cardiac Event  · 5 points: Class IV Risk, 11% Risk of Major Cardiac Event  · 6 points: Class IV Risk, 11% Risk of Major Cardiac Event    Lab Results   Component Value Date    CREATININE 0 50 (L) 01/25/2019       Boone Tavares was seen today for pre-op exam     Diagnoses and all orders for this visit:    Preop examination        Recommendations:  Florencio Ruth is undergoing an elective Minimal risk surgery, tooth cleaning and oral x-rays  She is RCRI class I risk (0 points ) with 0 4% risk for major adverse cardiac event (MACE)  She may proceed with surgery as planned without further workup       Discussed with Dr Kerry Mendieta pgy1   Marcellus Gamino

## 2019-04-15 ENCOUNTER — OFFICE VISIT (OUTPATIENT)
Dept: FAMILY MEDICINE CLINIC | Facility: CLINIC | Age: 59
End: 2019-04-15

## 2019-04-15 VITALS
TEMPERATURE: 98.2 F | BODY MASS INDEX: 32.61 KG/M2 | HEART RATE: 72 BPM | SYSTOLIC BLOOD PRESSURE: 124 MMHG | RESPIRATION RATE: 16 BRPM | DIASTOLIC BLOOD PRESSURE: 72 MMHG | WEIGHT: 167 LBS

## 2019-04-15 DIAGNOSIS — I10 BENIGN ESSENTIAL HYPERTENSION: Primary | ICD-10-CM

## 2019-04-15 DIAGNOSIS — E78.5 HYPERLIPIDEMIA, UNSPECIFIED HYPERLIPIDEMIA TYPE: ICD-10-CM

## 2019-04-15 DIAGNOSIS — M81.0 AGE-RELATED OSTEOPOROSIS WITHOUT CURRENT PATHOLOGICAL FRACTURE: ICD-10-CM

## 2019-04-15 DIAGNOSIS — G40.409 TONIC-CLONIC EPILEPTIC SEIZURES (HCC): ICD-10-CM

## 2019-04-15 PROCEDURE — 99214 OFFICE O/P EST MOD 30 MIN: CPT | Performed by: FAMILY MEDICINE

## 2019-05-09 ENCOUNTER — CLINICAL SUPPORT (OUTPATIENT)
Dept: FAMILY MEDICINE CLINIC | Facility: CLINIC | Age: 59
End: 2019-05-09

## 2019-05-09 ENCOUNTER — TELEPHONE (OUTPATIENT)
Dept: FAMILY MEDICINE CLINIC | Facility: CLINIC | Age: 59
End: 2019-05-09

## 2019-05-09 DIAGNOSIS — G40.409 TONIC-CLONIC EPILEPTIC SEIZURES (HCC): ICD-10-CM

## 2019-05-09 DIAGNOSIS — M81.0 OSTEOPOROSIS, UNSPECIFIED OSTEOPOROSIS TYPE, UNSPECIFIED PATHOLOGICAL FRACTURE PRESENCE: ICD-10-CM

## 2019-05-09 DIAGNOSIS — I10 BENIGN ESSENTIAL HYPERTENSION: Primary | ICD-10-CM

## 2019-05-09 DIAGNOSIS — M81.0 AGE-RELATED OSTEOPOROSIS WITHOUT CURRENT PATHOLOGICAL FRACTURE: ICD-10-CM

## 2019-05-09 PROCEDURE — 96372 THER/PROPH/DIAG INJ SC/IM: CPT

## 2019-05-29 ENCOUNTER — APPOINTMENT (OUTPATIENT)
Dept: LAB | Age: 59
End: 2019-05-29
Payer: COMMERCIAL

## 2019-05-29 DIAGNOSIS — M81.0 OSTEOPOROSIS, UNSPECIFIED OSTEOPOROSIS TYPE, UNSPECIFIED PATHOLOGICAL FRACTURE PRESENCE: ICD-10-CM

## 2019-05-29 DIAGNOSIS — G40.409 TONIC-CLONIC EPILEPTIC SEIZURES (HCC): ICD-10-CM

## 2019-05-29 DIAGNOSIS — I10 BENIGN ESSENTIAL HYPERTENSION: ICD-10-CM

## 2019-05-29 LAB
ALBUMIN SERPL BCP-MCNC: 3.5 G/DL (ref 3.5–5)
ALP SERPL-CCNC: 104 U/L (ref 46–116)
ALT SERPL W P-5'-P-CCNC: 23 U/L (ref 12–78)
ANION GAP SERPL CALCULATED.3IONS-SCNC: 9 MMOL/L (ref 4–13)
AST SERPL W P-5'-P-CCNC: 21 U/L (ref 5–45)
BILIRUB SERPL-MCNC: 0.3 MG/DL (ref 0.2–1)
BUN SERPL-MCNC: 22 MG/DL (ref 5–25)
CALCIUM SERPL-MCNC: 8.9 MG/DL (ref 8.3–10.1)
CHLORIDE SERPL-SCNC: 104 MMOL/L (ref 100–108)
CO2 SERPL-SCNC: 28 MMOL/L (ref 21–32)
CREAT SERPL-MCNC: 0.58 MG/DL (ref 0.6–1.3)
GFR SERPL CREATININE-BSD FRML MDRD: 101 ML/MIN/1.73SQ M
GLUCOSE SERPL-MCNC: 86 MG/DL (ref 65–140)
POTASSIUM SERPL-SCNC: 4.3 MMOL/L (ref 3.5–5.3)
PROT SERPL-MCNC: 7.6 G/DL (ref 6.4–8.2)
SODIUM SERPL-SCNC: 141 MMOL/L (ref 136–145)

## 2019-05-29 PROCEDURE — 36415 COLL VENOUS BLD VENIPUNCTURE: CPT

## 2019-05-29 PROCEDURE — 80053 COMPREHEN METABOLIC PANEL: CPT

## 2019-06-03 ENCOUNTER — OFFICE VISIT (OUTPATIENT)
Dept: FAMILY MEDICINE CLINIC | Facility: CLINIC | Age: 59
End: 2019-06-03

## 2019-06-03 VITALS
DIASTOLIC BLOOD PRESSURE: 80 MMHG | SYSTOLIC BLOOD PRESSURE: 122 MMHG | RESPIRATION RATE: 16 BRPM | TEMPERATURE: 97.7 F | HEART RATE: 76 BPM

## 2019-06-03 DIAGNOSIS — N39.45 CONTINUOUS LEAKAGE OF URINE: ICD-10-CM

## 2019-06-03 DIAGNOSIS — G40.409 TONIC-CLONIC EPILEPTIC SEIZURES (HCC): ICD-10-CM

## 2019-06-03 DIAGNOSIS — Z00.00 HEALTHCARE MAINTENANCE: ICD-10-CM

## 2019-06-03 DIAGNOSIS — E78.5 HYPERLIPIDEMIA, UNSPECIFIED HYPERLIPIDEMIA TYPE: ICD-10-CM

## 2019-06-03 DIAGNOSIS — I10 BENIGN ESSENTIAL HYPERTENSION: Primary | ICD-10-CM

## 2019-06-03 DIAGNOSIS — M81.0 AGE-RELATED OSTEOPOROSIS WITHOUT CURRENT PATHOLOGICAL FRACTURE: ICD-10-CM

## 2019-06-03 PROBLEM — J06.9 ACUTE UPPER RESPIRATORY INFECTION: Status: RESOLVED | Noted: 2019-02-25 | Resolved: 2019-06-03

## 2019-06-03 PROCEDURE — 99214 OFFICE O/P EST MOD 30 MIN: CPT | Performed by: FAMILY MEDICINE

## 2019-06-04 ENCOUNTER — TELEPHONE (OUTPATIENT)
Dept: FAMILY MEDICINE CLINIC | Facility: CLINIC | Age: 59
End: 2019-06-04

## 2019-06-05 ENCOUNTER — TELEPHONE (OUTPATIENT)
Dept: FAMILY MEDICINE CLINIC | Facility: CLINIC | Age: 59
End: 2019-06-05

## 2019-06-05 NOTE — TELEPHONE ENCOUNTER
Received [employer] form for  incontinence supplies needs to be signed  Form in triage folder   Thank you

## 2019-06-06 DIAGNOSIS — G40.409 TONIC-CLONIC EPILEPTIC SEIZURES (HCC): ICD-10-CM

## 2019-06-06 RX ORDER — PHENOBARBITAL 32.4 MG/1
TABLET ORAL
Qty: 90 TABLET | Refills: 5 | Status: SHIPPED | OUTPATIENT
Start: 2019-06-06 | End: 2019-07-25 | Stop reason: SDUPTHER

## 2019-06-18 ENCOUNTER — TELEPHONE (OUTPATIENT)
Dept: FAMILY MEDICINE CLINIC | Facility: CLINIC | Age: 59
End: 2019-06-18

## 2019-07-02 ENCOUNTER — TELEPHONE (OUTPATIENT)
Dept: FAMILY MEDICINE CLINIC | Facility: CLINIC | Age: 59
End: 2019-07-02

## 2019-07-02 NOTE — TELEPHONE ENCOUNTER
[facility] [employer] [facility] received to be signed for supply order

## 2019-07-03 ENCOUNTER — TELEPHONE (OUTPATIENT)
Dept: FAMILY MEDICINE CLINIC | Facility: CLINIC | Age: 59
End: 2019-07-03

## 2019-07-19 ENCOUNTER — TELEPHONE (OUTPATIENT)
Dept: FAMILY MEDICINE CLINIC | Facility: CLINIC | Age: 59
End: 2019-07-19

## 2019-07-19 NOTE — TELEPHONE ENCOUNTER
Detailed Written order form needs to be signed and faxed back for patient's various medical supplies   Thank you, Leigh Delicd

## 2019-07-24 ENCOUNTER — TELEPHONE (OUTPATIENT)
Dept: NEUROLOGY | Facility: CLINIC | Age: 59
End: 2019-07-24

## 2019-07-24 NOTE — TELEPHONE ENCOUNTER
Spoke with pt nurse regarding 7/30/19 appt with Dr Angelo Ken  Nurse was inform pt can be reschedule for an earlier appt tomorrow 7/25/19 with Caleb Rivero for 1 pm  Nurse accepted, understood and confirmed appt

## 2019-07-25 ENCOUNTER — OFFICE VISIT (OUTPATIENT)
Dept: NEUROLOGY | Facility: CLINIC | Age: 59
End: 2019-07-25
Payer: COMMERCIAL

## 2019-07-25 VITALS
HEIGHT: 60 IN | SYSTOLIC BLOOD PRESSURE: 120 MMHG | BODY MASS INDEX: 32.61 KG/M2 | HEART RATE: 123 BPM | DIASTOLIC BLOOD PRESSURE: 80 MMHG

## 2019-07-25 DIAGNOSIS — G80.0 SPASTIC QUADRIPLEGIC CEREBRAL PALSY (HCC): ICD-10-CM

## 2019-07-25 DIAGNOSIS — G40.409 TONIC-CLONIC EPILEPTIC SEIZURES (HCC): Primary | ICD-10-CM

## 2019-07-25 PROCEDURE — 99214 OFFICE O/P EST MOD 30 MIN: CPT | Performed by: PHYSICIAN ASSISTANT

## 2019-07-25 RX ORDER — BACLOFEN 10 MG/1
10 TABLET ORAL 2 TIMES DAILY
Qty: 60 TABLET | Refills: 5 | Status: SHIPPED | OUTPATIENT
Start: 2019-07-25 | End: 2020-01-08 | Stop reason: SDUPTHER

## 2019-07-25 RX ORDER — PHENOBARBITAL 32.4 MG/1
TABLET ORAL
Qty: 90 TABLET | Refills: 5 | Status: SHIPPED | OUTPATIENT
Start: 2019-07-25 | End: 2019-09-17 | Stop reason: SDUPTHER

## 2019-07-25 RX ORDER — BACLOFEN 10 MG/1
10 TABLET ORAL 2 TIMES DAILY
Qty: 60 TABLET | Refills: 11 | Status: SHIPPED | OUTPATIENT
Start: 2019-07-25 | End: 2019-07-25 | Stop reason: SDUPTHER

## 2019-07-25 NOTE — ASSESSMENT & PLAN NOTE
Pt has not had any seizures since 2012  She will continue phenobarbital  Last level was therapeutic at 23 3  Level will be repeated in January  Twenty five minutes were spent with the patient gathering history, examining patient and formulating a treatment plan  Pt/family understood and were in agreement with the treatment plan  She will follow-up in 1 year  Pt needs to see a physician as per [facility] regulations

## 2019-07-25 NOTE — PATIENT INSTRUCTIONS
Tonic-clonic epileptic seizures ([facility] [address] )  Pt has not had any seizures since 2012  She will continue phenobarbital  Last level was therapeutic at 23 3  Level will be repeated in January  Twenty five minutes were spent with the patient gathering history, examining patient and formulating a treatment plan  Pt/family understood and were in agreement with the treatment plan  She will follow-up in 1 year  Pt needs to see a physician as per [facility] regulations  Spastic quadriplegic cerebral palsy (HCC)  Spasticity has improved  She will continue baclofen  Pt was seen personally by Dr Katherine Cruz

## 2019-07-25 NOTE — PROGRESS NOTES
Patient ID: Hugo Nash is a 60 y o  female  Assessment/Plan:    Tonic-clonic epileptic seizures ([facility] [address] )  Pt has not had any seizures since 2012  She will continue phenobarbital  Last level was therapeutic at 23 3  Level will be repeated in January  Twenty five minutes were spent with the patient gathering history, examining patient and formulating a treatment plan  Pt/family understood and were in agreement with the treatment plan  She will follow-up in 1 year  Pt needs to see a physician as per [facility] regulations  Spastic quadriplegic cerebral palsy (HCC)  Spasticity has improved  She will continue baclofen  Problem List Items Addressed This Visit        Nervous and Auditory    Spastic quadriplegic cerebral palsy (HCC)     Spasticity has improved  She will continue baclofen  Relevant Medications    baclofen 10 mg tablet    Tonic-clonic epileptic seizures ([facility] [address] ) - Primary     Pt has not had any seizures since 2012  She will continue phenobarbital  Last level was therapeutic at 23 3  Level will be repeated in January  Twenty five minutes were spent with the patient gathering history, examining patient and formulating a treatment plan  Pt/family understood and were in agreement with the treatment plan  She will follow-up in 1 year  Pt needs to see a physician as per [facility] regulations  Relevant Medications    PHENobarbital 32 4 mg tablet             Subjective:    HPI    Annual followup of 18-year-old female with spastic quadriplegic cerebral palsy, developmental delay, and history of seizures  She is accompanied by her nurse today  She has been taking phenobarbital for her seizures for many years, with her PCP monitoring and managing her osteoporosis  Previous attempts to taper off phenobarbital had resulted in generalized tonic clonic status epilepticus  HISTORY: No seizures since last visit  No seizures since 2012   Osteoporosis managed by PCP: Patient to be started on Prolia, switched from fosamax  Dexa scan done 7/20/18  Pt eating and sleeping well  No new medical problems  No behavioral issues  Her caregiver denies any history of myoclonus, staring spells, automatisms, unexplained hyperkinetic behaviors, olfactory / gustatory hallucinations, epigastric rising events, kia vu events, visual hallucinations, unexplained nocturnal enuresis, or nocturnal tongue biting  She continues to go to a day program, which she enjoys  AED: Phenobarbital 32 4 mg 3 tabs qhs   Level on 1/24/18 = 23 3     The following portions of the patient's history were reviewed and updated as appropriate: allergies, current medications, past family history, past medical history, past social history, past surgical history and problem list          Objective:    Blood pressure 120/80, pulse (!) 123, height 5' (1 524 m), last menstrual period 02/28/2010, not currently breastfeeding  Physical Exam   Eyes: Pupils are equal, round, and reactive to light  Lids are normal    Neurological: Coordination normal    Vitals reviewed  Neurological Exam  Mental Status   Oriented only to person  Moderate dysarthria present  Answers questions with one word answers  Cranial Nerves  CN II: Visual acuity is normal  Visual fields full to confrontation  CN III, IV, VI: Extraocular movements intact bilaterally  Normal lids and orbits bilaterally  Pupils equal round and reactive to light bilaterally  CN V: Facial sensation is normal   CN VII: Full and symmetric facial movement  CN VIII: Hearing is normal   CN IX, X: Palate elevates symmetrically  Normal gag reflex  CN XI: Shoulder shrug strength is normal   CN XII: Tongue midline without atrophy or fasciculations  Motor    Diffuse spasticity, primarily of flexors in the upper extremities, R>L, and extensors and abductors in the lower extremities  LE paralysis      Sensory  Sensation is intact to light touch, pinprick, vibration and proprioception in all four extremities  Coordination  Finger-to-nose, rapid alternating movements and heel-to-shin normal bilaterally without dysmetria  Gait  Power chair  Pt has LE paralysis  ROS:    Review of Systems   Constitutional: Negative  Negative for appetite change and fever  HENT: Negative  Negative for hearing loss, tinnitus, trouble swallowing and voice change  Eyes: Negative  Negative for photophobia and pain  Respiratory: Negative  Negative for shortness of breath  Cardiovascular: Negative  Negative for palpitations  Gastrointestinal: Negative  Negative for nausea and vomiting  Endocrine: Negative  Negative for cold intolerance and heat intolerance  Genitourinary: Negative  Negative for dysuria, frequency and urgency  Musculoskeletal: Negative  Negative for myalgias and neck pain  Skin: Negative  Negative for rash  Neurological: Positive for seizures (Has not had one since she has been taking her medication )  Negative for dizziness, tremors, syncope, facial asymmetry, speech difficulty, weakness, light-headedness, numbness and headaches  Hematological: Negative  Does not bruise/bleed easily  Psychiatric/Behavioral: Negative  Negative for confusion, hallucinations and sleep disturbance  Review of systems personally reviewed

## 2019-07-29 ENCOUNTER — TELEPHONE (OUTPATIENT)
Dept: FAMILY MEDICINE CLINIC | Facility: CLINIC | Age: 59
End: 2019-07-29

## 2019-08-19 ENCOUNTER — OFFICE VISIT (OUTPATIENT)
Dept: FAMILY MEDICINE CLINIC | Facility: CLINIC | Age: 59
End: 2019-08-19

## 2019-08-19 VITALS
SYSTOLIC BLOOD PRESSURE: 130 MMHG | TEMPERATURE: 99.7 F | HEART RATE: 78 BPM | RESPIRATION RATE: 20 BRPM | DIASTOLIC BLOOD PRESSURE: 80 MMHG

## 2019-08-19 DIAGNOSIS — G40.409 TONIC-CLONIC EPILEPTIC SEIZURES (HCC): ICD-10-CM

## 2019-08-19 DIAGNOSIS — I10 BENIGN ESSENTIAL HYPERTENSION: ICD-10-CM

## 2019-08-19 DIAGNOSIS — Z00.00 MEDICARE ANNUAL WELLNESS VISIT, SUBSEQUENT: Primary | ICD-10-CM

## 2019-08-19 DIAGNOSIS — K21.9 GASTROESOPHAGEAL REFLUX DISEASE WITHOUT ESOPHAGITIS: ICD-10-CM

## 2019-08-19 DIAGNOSIS — M81.0 OSTEOPOROSIS, UNSPECIFIED OSTEOPOROSIS TYPE, UNSPECIFIED PATHOLOGICAL FRACTURE PRESENCE: ICD-10-CM

## 2019-08-19 DIAGNOSIS — Z23 NEED FOR ZOSTER VACCINE: ICD-10-CM

## 2019-08-19 DIAGNOSIS — E78.5 HYPERLIPIDEMIA, UNSPECIFIED HYPERLIPIDEMIA TYPE: ICD-10-CM

## 2019-08-19 DIAGNOSIS — F51.01 PRIMARY INSOMNIA: ICD-10-CM

## 2019-08-19 PROCEDURE — G0439 PPPS, SUBSEQ VISIT: HCPCS | Performed by: FAMILY MEDICINE

## 2019-08-19 PROCEDURE — 99213 OFFICE O/P EST LOW 20 MIN: CPT | Performed by: FAMILY MEDICINE

## 2019-08-19 PROCEDURE — 1036F TOBACCO NON-USER: CPT | Performed by: FAMILY MEDICINE

## 2019-08-19 RX ORDER — ZOLPIDEM TARTRATE 5 MG/1
2.5 TABLET ORAL DAILY
Qty: 30 TABLET | Refills: 5 | Status: SHIPPED | OUTPATIENT
Start: 2019-08-19 | End: 2020-02-03 | Stop reason: SDUPTHER

## 2019-08-19 NOTE — PROGRESS NOTES
Assessment/Plan:    Benign essential hypertension  Stable  Continue toprol Xl 100mg, Hyzaar 50-12 5       {Assess/PlanSmartLinks:65343}      Subjective:      Patient ID: Dawson Crenshaw is a 60 y o  female      60year old female presents today for follow up of crhonic medical problems      {Common ambulatory SmartLinks:39984}    Review of Systems      Objective:      /80   Pulse 78   Temp 99 7 °F (37 6 °C)   Resp 20   LMP 02/28/2010 (Approximate)          Physical Exam

## 2019-08-19 NOTE — PROGRESS NOTES
Last Medicare Wellness visit information reviewed, patient interviewed, no change since last AWV  Health Risk Assessment:  Patient rates overall health as good  Patient feels that their physical health rating is Same  Eyesight was rated as Same  Hearing was rated as Same  Patient feels that their emotional and mental health rating is Same  Pain experienced by patient in the last 7 days has been None  Emotional/Mental Health:  Patient has not been feeling nervous/anxious  PHQ-9 Depression Screening:    Frequency of the following problems over the past two weeks:      1  Little interest or pleasure in doing things: 0 - not at all      2  Feeling down, depressed, or hopeless: 0 - not at all  PHQ-2 Score: 0          Broken Bones/Falls: Fall Risk Assessment:    In the past year, patient has experienced: No history of falling in past year          Bladder/Bowel:  Patient has leaked urine accidently in the last six months  Patient reports loss of bowel control  Immunizations:  Patient has had a flu vaccination within the last year  Patient has received a pneumonia shot  Patient has not received a shingles shot  Patient has received tetanus/diphtheria shot  Date of tetanus/diphtheria shot: 12/6/2011    Home Safety:  Patient does not have trouble with stairs inside or outside of their home  Patient currently reports that there are no safety hazards present in home, working smoke alarms, working carbon monoxide detectors  Preventative Screenings:   Breast cancer screening performed, 3/26/2019  colon cancer screen completed, 4/18/2012  cholesterol screen completed, 1/25/2019  glaucoma eye exam completed, 5/8/2019      Nutrition:  Current diet: No Added Salt with servings of the following:  (Additional Comments: 15oo calorie diet   Low fat )    Medications:  Patient is currently taking over-the-counter supplements  Patient is not able to manage medications      Lifestyle Choices:  Patient reports no tobacco use  Patient has not smoked or used tobacco in the past   Patient reports no alcohol use  Patient does not drive a vehicle  Patient wears seat belt  Current level of exercise of physical activity described by patient as: PROM  Activities of Daily Living:  Unable to get out of bed by his or her self, unable to dress self, unable to make own meals, unable to do own shopping, unable to bathe self, unable to do laundry/housekeeping, unable to manage own money and other related tasks    Previous Hospitalizations:  No hospitalization or ED visit in past 12 months        Advanced Directives:  Patient has decided on a power of   Patient has spoken to designated power of   Patient has not completed advanced directive  Social Support: [facility] staff   [address] [facility]   [phone]    Angela Rizo   [address] [place] [id number]  [phone]    Preventative Screening/Counseling:      Cardiovascular:      General: Screening Current      Counseling: Healthy Diet and Healthy Weight          Diabetes:      General: Screening Current      Counseling: Healthy Diet and Healthy Weight          Colorectal Cancer:      General: Screening Current          Breast Cancer:      General: Screening Current          Cervical Cancer:      General: Screening Current          Osteoporosis:      General: Screening Current      Comments: Due 7/19/2020        AAA:      General: Screening Not Indicated          Glaucoma:      General: Screening Current          HIV:      General: Screening Current          Hepatitis C:      General: Screening Current        Advanced Directives:   Patient has no living will for healthcare, has durable POA for healthcare, patient does not have an advanced directive  Information on ACP and/or AD provided  No 5 wishes given  End of life assessment reviewed with patient  Provider does not agree with end of life deisions  Immunizations:  Patient reviewed and up to date      Influenza: Influenza UTD This Year      Pneumococcal: Lifetime Vaccine Completed      Shingrix: Shingrix Vaccine Needed Today      Hepatitis B (Medium to high risk patients): Hep B Vaccine Series UTD      Zostavax: Zostavax Vaccine UTD      TD: Td Vaccine UTD      TDAP: Tdap Vaccine UTD      Other Preventative Counseling (Non-Medicare):   Fall Prevention, Nutrition Counseling, Sunscreen use, Dietary education for weight gain and Weight reduction discussed

## 2019-08-19 NOTE — PROGRESS NOTES
Assessment/Plan:    Benign essential hypertension  Stable  Continue current medical regimen:  toprol Xl 100mg, Hyzaar 50-12 5    Osteoporosis  Stable  Has prolia injections  Next injection in november    Esophageal reflux  Resolved    Tonic-clonic epileptic seizures ([facility] [address] )  Stable  Follows with Neurology  Last seen on 07/25/2019  Follow-up in January    Hyperlipidemia  Stable  Continue simvastatin 20 mg daily  Next lipid panel in January 2020    Insomnia  Stable  Refill for Ambien 2 5 mg q h s  Problem List Items Addressed This Visit        Digestive    Esophageal reflux     Resolved            Cardiovascular and Mediastinum    Benign essential hypertension     Stable  Continue current medical regimen:  toprol Xl 100mg, Hyzaar 50-12 5            Nervous and Auditory    Tonic-clonic epileptic seizures ([facility] [address] )     Stable  Follows with Neurology  Last seen on 07/25/2019  Follow-up in January            Musculoskeletal and Integument    Osteoporosis     Stable  Has prolia injections  Next injection in november            Other    Hyperlipidemia     Stable  Continue simvastatin 20 mg daily  Next lipid panel in January 2020         Insomnia     Stable  Refill for Ambien 2 5 mg q h s  Other Visit Diagnoses     Medicare annual wellness visit, subsequent    -  Primary    Need for zoster vaccine        Relevant Orders    Zoster Vaccine Recombinant IM            Subjective:      Patient ID: Dorys Rizzo is a 60 y o  female  60year old female presents today for chronic medical follow up with her care giver  Patient is doing well with no complaints  Blood pressure is well controlled on current regimen  For her osteoporosis, shes due for her next prolia injection in November and lipid panel in January of 2020  Her reflux is improved now off fosamax  Her epilepsy is stable, no seizures since 2012 on phenobarbital  She follows with neurology  She has both bladder and bowel incontinence that is stable   She has insomnia and is requesting ambien refill  Prelabs for prolia injection (CMP) needed  The following portions of the patient's history were reviewed and updated as appropriate: allergies, current medications, past family history, past medical history, past social history, past surgical history and problem list     Review of Systems   Unable to perform ROS: Psychiatric disorder         Objective:      /80   Pulse 78   Temp 99 7 °F (37 6 °C)   Resp 20   LMP 02/28/2010 (Approximate)          Physical Exam   Constitutional: She appears well-developed and well-nourished  No distress  HENT:   Head: Normocephalic and atraumatic  Eyes: Pupils are equal, round, and reactive to light  Conjunctivae are normal    strabismus   Neck: Normal range of motion  Neck supple  No JVD present  Cardiovascular: Normal rate, regular rhythm, normal heart sounds and intact distal pulses  No murmur heard  Pulmonary/Chest: Effort normal and breath sounds normal  No stridor  No respiratory distress  She has no wheezes  She has no rales  Abdominal: Soft  Bowel sounds are normal  She exhibits no distension and no mass  There is no tenderness  There is no guarding  Musculoskeletal: She exhibits no edema  Lymphadenopathy:     She has no cervical adenopathy  Neurological: She is alert  Skin: Skin is warm and dry  Capillary refill takes less than 2 seconds  She is not diaphoretic  No erythema  No pallor  Vitals reviewed

## 2019-08-19 NOTE — ASSESSMENT & PLAN NOTE
Stable  Continue current medical regimen:  toprol Xl 100mg, Hyzaar 50-12 5  Return in 3 months for her next scheduled follow-up

## 2019-09-09 ENCOUNTER — TELEPHONE (OUTPATIENT)
Dept: NEUROLOGY | Facility: CLINIC | Age: 59
End: 2019-09-09

## 2019-09-09 NOTE — TELEPHONE ENCOUNTER
[employer] called with request for [employer]  Script sent on 7/25/19 but not received by pharmacy  Verbal order given over phone as that script appears to have been "Printed"

## 2019-09-17 DIAGNOSIS — G40.409 TONIC-CLONIC EPILEPTIC SEIZURES (HCC): ICD-10-CM

## 2019-09-17 NOTE — TELEPHONE ENCOUNTER
Medication refill check list    Correct patient? yes   Correct medication name, dose, and pill size? yes   Correct provider? yes   Last and Next appt  scheduled? Yes, last date 7/25/19 & next date 7/24/20   Right pharmacy listed? yes   Correct quantity for 30 or 90 days? Yes, 90 days   Is the patient out of refills? When was it last prescribed? Yes, last date 7/25/19   Directions match what the patient says they are taking?  yes   Enough refills? (none for controlled substances, 1 year for routine medications) yes

## 2019-09-18 RX ORDER — PHENOBARBITAL 32.4 MG/1
TABLET ORAL
Qty: 90 TABLET | Refills: 5 | Status: SHIPPED | OUTPATIENT
Start: 2019-09-18 | End: 2019-11-20 | Stop reason: SDUPTHER

## 2019-10-29 ENCOUNTER — IMMUNIZATIONS (OUTPATIENT)
Dept: FAMILY MEDICINE CLINIC | Facility: CLINIC | Age: 59
End: 2019-10-29

## 2019-10-29 DIAGNOSIS — Z23 ENCOUNTER FOR IMMUNIZATION: ICD-10-CM

## 2019-10-29 PROCEDURE — 90682 RIV4 VACC RECOMBINANT DNA IM: CPT

## 2019-10-29 PROCEDURE — 90471 IMMUNIZATION ADMIN: CPT

## 2019-10-30 DIAGNOSIS — I10 BENIGN ESSENTIAL HYPERTENSION: Primary | ICD-10-CM

## 2019-10-30 RX ORDER — HYDROCHLOROTHIAZIDE 25 MG/1
12.5 TABLET ORAL DAILY
Qty: 30 TABLET | Refills: 5 | Status: SHIPPED | OUTPATIENT
Start: 2019-10-30

## 2019-10-30 RX ORDER — LOSARTAN POTASSIUM 50 MG/1
50 TABLET ORAL DAILY
Qty: 30 TABLET | Refills: 5 | Status: SHIPPED | OUTPATIENT
Start: 2019-10-30

## 2019-10-30 NOTE — TELEPHONE ENCOUNTER
Dawn  [facility] called  The losartan- HCTZ 5-mg-12 5 that Peter Hussein is currently on is back ordered  New order:  Losartan 50 mg  And  A separate order for the HCTZ 12 5 to be sent  She needs this  new order faxed also to :  [phone]

## 2019-11-05 ENCOUNTER — APPOINTMENT (OUTPATIENT)
Dept: LAB | Facility: CLINIC | Age: 59
End: 2019-11-05
Payer: COMMERCIAL

## 2019-11-05 DIAGNOSIS — M81.0 OSTEOPOROSIS, UNSPECIFIED OSTEOPOROSIS TYPE, UNSPECIFIED PATHOLOGICAL FRACTURE PRESENCE: ICD-10-CM

## 2019-11-05 LAB
ALBUMIN SERPL BCP-MCNC: 3.6 G/DL (ref 3.5–5)
ALP SERPL-CCNC: 101 U/L (ref 46–116)
ALT SERPL W P-5'-P-CCNC: 21 U/L (ref 12–78)
ANION GAP SERPL CALCULATED.3IONS-SCNC: 6 MMOL/L (ref 4–13)
AST SERPL W P-5'-P-CCNC: 16 U/L (ref 5–45)
BILIRUB SERPL-MCNC: 0.29 MG/DL (ref 0.2–1)
BUN SERPL-MCNC: 16 MG/DL (ref 5–25)
CALCIUM SERPL-MCNC: 8.8 MG/DL (ref 8.3–10.1)
CHLORIDE SERPL-SCNC: 105 MMOL/L (ref 100–108)
CO2 SERPL-SCNC: 28 MMOL/L (ref 21–32)
CREAT SERPL-MCNC: 0.53 MG/DL (ref 0.6–1.3)
GFR SERPL CREATININE-BSD FRML MDRD: 104 ML/MIN/1.73SQ M
GLUCOSE P FAST SERPL-MCNC: 87 MG/DL (ref 65–99)
POTASSIUM SERPL-SCNC: 4.2 MMOL/L (ref 3.5–5.3)
PROT SERPL-MCNC: 7.5 G/DL (ref 6.4–8.2)
SODIUM SERPL-SCNC: 139 MMOL/L (ref 136–145)

## 2019-11-05 PROCEDURE — 36415 COLL VENOUS BLD VENIPUNCTURE: CPT

## 2019-11-05 PROCEDURE — 80053 COMPREHEN METABOLIC PANEL: CPT

## 2019-11-11 ENCOUNTER — OFFICE VISIT (OUTPATIENT)
Dept: FAMILY MEDICINE CLINIC | Facility: CLINIC | Age: 59
End: 2019-11-11

## 2019-11-11 VITALS
HEART RATE: 80 BPM | DIASTOLIC BLOOD PRESSURE: 80 MMHG | SYSTOLIC BLOOD PRESSURE: 126 MMHG | TEMPERATURE: 98.5 F | RESPIRATION RATE: 18 BRPM

## 2019-11-11 DIAGNOSIS — G40.409 TONIC-CLONIC EPILEPTIC SEIZURES (HCC): ICD-10-CM

## 2019-11-11 DIAGNOSIS — M81.0 AGE-RELATED OSTEOPOROSIS WITHOUT CURRENT PATHOLOGICAL FRACTURE: ICD-10-CM

## 2019-11-11 DIAGNOSIS — E78.5 HYPERLIPIDEMIA, UNSPECIFIED HYPERLIPIDEMIA TYPE: ICD-10-CM

## 2019-11-11 DIAGNOSIS — I10 BENIGN ESSENTIAL HYPERTENSION: Primary | ICD-10-CM

## 2019-11-11 DIAGNOSIS — Z01.419 WOMEN'S ANNUAL ROUTINE GYNECOLOGICAL EXAMINATION: ICD-10-CM

## 2019-11-11 PROCEDURE — 3079F DIAST BP 80-89 MM HG: CPT | Performed by: FAMILY MEDICINE

## 2019-11-11 PROCEDURE — 3074F SYST BP LT 130 MM HG: CPT | Performed by: FAMILY MEDICINE

## 2019-11-11 PROCEDURE — 99213 OFFICE O/P EST LOW 20 MIN: CPT | Performed by: FAMILY MEDICINE

## 2019-11-11 RX ORDER — LORAZEPAM 0.5 MG/1
0.5 TABLET ORAL ONCE
Qty: 2 TABLET | Refills: 0 | Status: SHIPPED | OUTPATIENT
Start: 2019-11-11 | End: 2021-08-13

## 2019-11-11 NOTE — ASSESSMENT & PLAN NOTE
Up to date  Scheduled for routine gyn exam in 2/2020  Script for lorazepam 0 5mg given for procedural sedation

## 2019-11-11 NOTE — ASSESSMENT & PLAN NOTE
Stable  · Range over the 3 months 111-142/87-93  goal bp 140/90, pt's bp  at goal  Continue losartan 50mg, and HCTZ 12 5mg, and metoprolol succinate 100mg  Medications refilled last week, encouraged medication compliance  Advised patient on symptoms of hypotension & severe HTN  Limit salt-intake & caffeine in diet, minimize stress level  Weight reduction efforts via improved diet   Patient cannot ambulate  Continue low sodium diet  Discussed importance of treating HTN to prevent ASCVD, CVA

## 2019-11-11 NOTE — PROGRESS NOTES
Assessment/Plan:    Benign essential hypertension  Stable  · Range over the 3 months 111-142/87-93  goal bp 140/90, pt's bp  at goal  Continue losartan 50mg, and HCTZ 12 5mg, and metoprolol succinate 100mg  Medications refilled last week, encouraged medication compliance  Advised patient on symptoms of hypotension & severe HTN  Limit salt-intake & caffeine in diet, minimize stress level  Weight reduction efforts via improved diet   Patient cannot ambulate  Continue low sodium diet  Discussed importance of treating HTN to prevent ASCVD, CVA    Tonic-clonic epileptic seizures ([facility] [address] )  Stable  No seizures since 1/2012  Follows with neurology  Continue phenobarbital 31 4mg    Osteoporosis  Stable  Due for Prolia injection this week  Follow up repeat dexa scan in 7/2020    Hyperlipidemia  Stable  Script for annual lipid panel given today  contionue Simvastatin 20mg    Women's annual routine gynecological examination  Up to date  Scheduled for routine gyn exam in 2/2020  Script for lorazepam 0 5mg given for procedural sedation           Problem List Items Addressed This Visit        Cardiovascular and Mediastinum    Benign essential hypertension - Primary     Stable  · Range over the 3 months 111-142/87-93  goal bp 140/90, pt's bp  at goal  Continue losartan 50mg, and HCTZ 12 5mg, and metoprolol succinate 100mg  Medications refilled last week, encouraged medication compliance  Advised patient on symptoms of hypotension & severe HTN  Limit salt-intake & caffeine in diet, minimize stress level  Weight reduction efforts via improved diet   Patient cannot ambulate  Continue low sodium diet  Discussed importance of treating HTN to prevent ASCVD, CVA         Relevant Orders    TSH, 3rd generation with Free T4 reflex    CBC and differential       Nervous and Auditory    Tonic-clonic epileptic seizures ([facility] [address] )     Stable  No seizures since 1/2012  Follows with neurology  Continue phenobarbital 31 4mg            Musculoskeletal and Integument    Osteoporosis     Stable  Due for Prolia injection this week  Follow up repeat dexa scan in 7/2020         Relevant Orders    Comprehensive metabolic panel       Other    Women's annual routine gynecological examination     Up to date  Scheduled for routine gyn exam in 2/2020  Script for lorazepam 0 5mg given for procedural sedation  Hyperlipidemia     Stable  Script for annual lipid panel given today  contionue Simvastatin 20mg         Relevant Orders    Lipid panel            Subjective:      Patient ID: Lala Tierney is a 60 y o  female  HPI  60year old female with cerebral palsy presents today for 3 month medical follow up  She resides a group home and is here with her direct caregiver, Ian Luz  Was previously seen by podiatrist and had nails done  Was seen at the dentist 10/21 and was determined to require dental work to be done in the [address] [facility] [address] under general anesthesia  Last dental procedure was done in April 2019 and is due annually  She will be back for her Prolia shot later this week and then due for repeat DEXA in 7/2020  She is having GYN evaluation in 2/2020 at [address] [place] [address] and requesting a refill on her script for lorazepam that helps sedate patient during the procedure  Otherwise she is feeling fine  Her care giver has no immediate concerns for the patient  The following portions of the patient's history were reviewed and updated as appropriate: allergies, current medications, past family history, past medical history, past social history, past surgical history and problem list     Review of Systems   Unable to perform ROS: Other   Constitutional: Negative for chills, diaphoresis and fever  Respiratory: Negative for cough, choking and wheezing  Gastrointestinal: Negative for anal bleeding, blood in stool, constipation, diarrhea, nausea and vomiting     Genitourinary: Negative for difficulty urinating (baseline incontinent), hematuria, vaginal bleeding and vaginal discharge  Skin: Negative for rash and wound  Neurological: Negative for dizziness, seizures and light-headedness  Objective:      /80   Pulse 80   Temp 98 5 °F (36 9 °C)   Resp 18   LMP 02/28/2010 (Approximate)          Physical Exam   Constitutional: She appears well-developed and well-nourished  No distress  HENT:   Head: Normocephalic and atraumatic  Right Ear: External ear normal    Left Ear: External ear normal    Nose: Nose normal    Mouth/Throat: Oropharynx is clear and moist  No oropharyngeal exudate  Eyes: Pupils are equal, round, and reactive to light  Conjunctivae are normal  Right eye exhibits no discharge  Left eye exhibits no discharge  No scleral icterus  Neck: Normal range of motion  Neck supple  No JVD present  No tracheal deviation present  No thyromegaly present  Cardiovascular: Normal rate, regular rhythm, normal heart sounds and intact distal pulses  Exam reveals no gallop and no friction rub  No murmur heard  Pulmonary/Chest: Effort normal and breath sounds normal  No respiratory distress  She has no wheezes  She has no rales  She exhibits no tenderness  Abdominal: Soft  Bowel sounds are normal  She exhibits no distension  There is no tenderness  There is no rebound and no guarding  Musculoskeletal: Normal range of motion  She exhibits no edema  Neurological: She is alert  She exhibits abnormal muscle tone (cerebral palsy)  She displays no seizure activity  Contractures of hands and wrists  Wheelchair bound   Skin: Skin is warm and dry  She is not diaphoretic  Vitals reviewed

## 2019-11-13 ENCOUNTER — CLINICAL SUPPORT (OUTPATIENT)
Dept: FAMILY MEDICINE CLINIC | Facility: CLINIC | Age: 59
End: 2019-11-13

## 2019-11-13 DIAGNOSIS — M81.0 AGE-RELATED OSTEOPOROSIS WITHOUT CURRENT PATHOLOGICAL FRACTURE: Primary | ICD-10-CM

## 2019-11-20 DIAGNOSIS — G40.409 TONIC-CLONIC EPILEPTIC SEIZURES (HCC): ICD-10-CM

## 2019-11-20 RX ORDER — PHENOBARBITAL 32.4 MG/1
TABLET ORAL
Qty: 90 TABLET | Refills: 5 | Status: SHIPPED | OUTPATIENT
Start: 2019-11-20 | End: 2020-04-29 | Stop reason: SDUPTHER

## 2019-11-27 ENCOUNTER — APPOINTMENT (OUTPATIENT)
Dept: LAB | Facility: MEDICAL CENTER | Age: 59
End: 2019-11-27
Payer: COMMERCIAL

## 2019-11-27 DIAGNOSIS — M81.0 AGE-RELATED OSTEOPOROSIS WITHOUT CURRENT PATHOLOGICAL FRACTURE: ICD-10-CM

## 2019-11-27 LAB
ALBUMIN SERPL BCP-MCNC: 3.5 G/DL (ref 3.5–5)
ALP SERPL-CCNC: 100 U/L (ref 46–116)
ALT SERPL W P-5'-P-CCNC: 19 U/L (ref 12–78)
ANION GAP SERPL CALCULATED.3IONS-SCNC: 4 MMOL/L (ref 4–13)
AST SERPL W P-5'-P-CCNC: 12 U/L (ref 5–45)
BILIRUB SERPL-MCNC: 0.29 MG/DL (ref 0.2–1)
BUN SERPL-MCNC: 18 MG/DL (ref 5–25)
CALCIUM SERPL-MCNC: 9.1 MG/DL (ref 8.3–10.1)
CHLORIDE SERPL-SCNC: 105 MMOL/L (ref 100–108)
CO2 SERPL-SCNC: 29 MMOL/L (ref 21–32)
CREAT SERPL-MCNC: 0.53 MG/DL (ref 0.6–1.3)
GFR SERPL CREATININE-BSD FRML MDRD: 104 ML/MIN/1.73SQ M
GLUCOSE P FAST SERPL-MCNC: 87 MG/DL (ref 65–99)
POTASSIUM SERPL-SCNC: 4.2 MMOL/L (ref 3.5–5.3)
PROT SERPL-MCNC: 7.4 G/DL (ref 6.4–8.2)
SODIUM SERPL-SCNC: 138 MMOL/L (ref 136–145)

## 2019-11-27 PROCEDURE — 36415 COLL VENOUS BLD VENIPUNCTURE: CPT

## 2019-11-27 PROCEDURE — 80053 COMPREHEN METABOLIC PANEL: CPT

## 2020-01-08 DIAGNOSIS — G80.0 SPASTIC QUADRIPLEGIC CEREBRAL PALSY (HCC): ICD-10-CM

## 2020-01-08 RX ORDER — BACLOFEN 10 MG/1
10 TABLET ORAL 2 TIMES DAILY
Qty: 60 TABLET | Refills: 0 | Status: SHIPPED | OUTPATIENT
Start: 2020-01-08 | End: 2020-04-29 | Stop reason: SDUPTHER

## 2020-02-03 ENCOUNTER — OFFICE VISIT (OUTPATIENT)
Dept: FAMILY MEDICINE CLINIC | Facility: CLINIC | Age: 60
End: 2020-02-03

## 2020-02-03 VITALS
TEMPERATURE: 98.5 F | RESPIRATION RATE: 18 BRPM | DIASTOLIC BLOOD PRESSURE: 80 MMHG | HEART RATE: 82 BPM | SYSTOLIC BLOOD PRESSURE: 130 MMHG

## 2020-02-03 DIAGNOSIS — G40.409 TONIC-CLONIC EPILEPTIC SEIZURES (HCC): ICD-10-CM

## 2020-02-03 DIAGNOSIS — I10 BENIGN ESSENTIAL HYPERTENSION: Primary | ICD-10-CM

## 2020-02-03 DIAGNOSIS — M81.0 OSTEOPOROSIS, UNSPECIFIED OSTEOPOROSIS TYPE, UNSPECIFIED PATHOLOGICAL FRACTURE PRESENCE: ICD-10-CM

## 2020-02-03 DIAGNOSIS — F51.01 PRIMARY INSOMNIA: ICD-10-CM

## 2020-02-03 DIAGNOSIS — E78.5 HYPERLIPIDEMIA, UNSPECIFIED HYPERLIPIDEMIA TYPE: ICD-10-CM

## 2020-02-03 PROCEDURE — 1036F TOBACCO NON-USER: CPT | Performed by: FAMILY MEDICINE

## 2020-02-03 PROCEDURE — 99213 OFFICE O/P EST LOW 20 MIN: CPT | Performed by: FAMILY MEDICINE

## 2020-02-03 PROCEDURE — 3075F SYST BP GE 130 - 139MM HG: CPT | Performed by: FAMILY MEDICINE

## 2020-02-03 PROCEDURE — 3079F DIAST BP 80-89 MM HG: CPT | Performed by: FAMILY MEDICINE

## 2020-02-03 RX ORDER — ZOLPIDEM TARTRATE 5 MG/1
2.5 TABLET ORAL DAILY
Qty: 30 TABLET | Refills: 5 | Status: SHIPPED | OUTPATIENT
Start: 2020-02-03 | End: 2020-07-09 | Stop reason: SDUPTHER

## 2020-02-03 RX ORDER — ZOLPIDEM TARTRATE 5 MG/1
2.5 TABLET ORAL DAILY
Qty: 30 TABLET | Refills: 5 | Status: CANCELLED | OUTPATIENT
Start: 2020-02-03

## 2020-02-03 RX ORDER — ZOLPIDEM TARTRATE 5 MG/1
2.5 TABLET ORAL DAILY
Qty: 30 TABLET | Refills: 5 | Status: SHIPPED | OUTPATIENT
Start: 2020-02-03 | End: 2020-02-03 | Stop reason: SDUPTHER

## 2020-02-03 NOTE — ASSESSMENT & PLAN NOTE
Stable  No seizures since 1/17/2012  Next neurology appt  7/2020  Continue with phenobarbital  Will need phenobarb level at next neuro appt

## 2020-02-03 NOTE — PATIENT INSTRUCTIONS
DASH Eating Plan   WHAT YOU NEED TO KNOW:   The DASH (Dietary Approaches to Stop Hypertension) Eating Plan is designed to help prevent or lower high blood pressure  It can also help to lower LDL (bad) cholesterol and decrease your risk of heart disease  The plan is low in sodium, sugar, unhealthy fats, and total fat  It is high in potassium, calcium, magnesium, and fiber  These nutrients are added when you eat more fruits, vegetables, and whole grains  DISCHARGE INSTRUCTIONS:   Your sodium limit each day: Your dietitian will tell you how much sodium is safe for you to have each day  People with high blood pressure should have no more than 1,500 to 2,300 mg of sodium in a day  A teaspoon (tsp) of salt has 2,300 mg of sodium  This may seem like a difficult goal, but small changes to the foods you eat can make a big difference  Your healthcare provider or dietitian can help you create a meal plan that follows your sodium limit  How to limit sodium:   · Read food labels  Food labels can help you choose foods that are low in sodium  The amount of sodium is listed in milligrams (mg)  The % Daily Value (DV) column tells you how much of your daily needs are met by 1 serving of the food for each nutrient listed  Choose foods that have less than 5% of the DV of sodium  These foods are considered low in sodium  Foods that have 20% or more of the DV of sodium are considered high in sodium  Avoid foods that have more than 300 mg of sodium in each serving  Choose foods that say low-sodium, reduced-sodium, or no salt added on the food label  · Avoid salt  Do not salt food at the table, and add very little salt to foods during cooking  Use herbs and spices, such as onions, garlic, and salt-free seasonings to add flavor to foods  Try lemon or lime juice or vinegar to give foods a tart flavor  Use hot peppers or a small amount of hot pepper sauce to add a spicy flavor to foods  · Ask about salt substitutes    Ask your healthcare provider if you may use salt substitutes  Some salt substitutes have ingredients that can be harmful if you have certain health conditions  · Choose foods carefully at restaurants  Meals from restaurants, especially fast food restaurants, are often high in sodium  Some restaurants have nutrition information that tells you the amount of sodium in their foods  Ask to have your food prepared with less, or no salt  What you need to know about fats:   · Include healthy fats  Examples are unsaturated fats and omega-3 fatty acids  Unsaturated fats are found in soybean, canola, olive, or sunflower oil, and liquid and soft tub margarines  Omega-3 fatty acids are found in fatty fish, such as salmon, tuna, mackerel, and sardines  It is also found in flaxseed oil and ground flaxseed  · Avoid unhealthy fats  Do not eat unhealthy fats, such as saturated fats and trans fats  Saturated fats are found in foods that contain fat from animals  Examples are fatty meats, whole milk, butter, cream, and other dairy foods  It is also found in shortening, stick margarine, palm oil, and coconut oil  Trans fats are found in fried foods, crackers, chips, and baked goods made with margarine or shortening  Foods to include: With the DASH eating plan, you need to eat a certain number of servings from each food group  This will help you get enough of certain nutrients and limit others  The amount of servings you should eat depends on how many calories you need  Your dietitian can tell you how many calories you need  The number of servings listed next to the food groups below are for people who need about 2,000 calories each day    · Grains:  6 to 8 servings (3 of these servings should be whole-grain foods)    ¨ 1 slice of whole-grain bread     ¨ 1 ounce of dry cereal    ¨ ½ cup of cooked cereal, pasta, or brown rice    · Vegetables and fruits:  4 to 5 servings of fruits and 4 to 5 servings of vegetables    ¨ 1 medium fruit    ¨ ½ cup of frozen, canned (no added salt), or chopped fresh vegetables     ¨ ½ cup of fresh, frozen, dried, or canned fruit (canned in light syrup or fruit juice)    ¨ ½ cup of vegetable or fruit juice    · Dairy:  2 to 3 servings    ¨ 1 cup of nonfat (skim) or 1% milk    ¨ 1½ ounces of fat-free or low-fat cheese    ¨ 6 ounces of nonfat or low-fat yogurt    · Lean meat, poultry, and fish:  6 ounces or less    [employer] (chicken, turkey) with no skin    ¨ Fish (especially fatty fish, such as salmon, fresh tuna, or mackerel)    ¨ Lean beef and pork (loin, round, extra lean hamburger)    ¨ Egg whites and egg substitutes    · Nuts, seeds, and legumes:  4 to 5 servings each week    ¨ ½ cup of cooked beans and peas    ¨ 1½ ounces of unsalted nuts    ¨ 2 tablespoons of peanut butter or seeds    · Sweets and added sugars:  5 or less each week    ¨ 1 tablespoon of sugar, jelly, or jam    ¨ ½ cup of sorbet or gelatin    ¨ 1 cup of lemonade    · Fats:  2 to 3 servings each week    ¨ 1 teaspoon of soft margarine or vegetable oil    ¨ 1 tablespoon of mayonnaise    ¨ 2 tablespoons of salad dressing  Foods to avoid:   · Grains:      [employer], such as doughnuts, pastries, cookies, and biscuits (high in fat and sugar)    ¨ Mixes for cornbread and biscuits, packaged foods, such as bread stuffing, rice and pasta mixes, macaroni and cheese, and instant cereals (high in sodium)    · Fruits and vegetables:      ¨ Regular, canned vegetables (high in sodium)    ¨ Sauerkraut, pickled vegetables, and other foods prepared in brine (high in sodium)    ¨ Fried vegetables or vegetables in butter or high-fat sauces    ¨ Fruit in cream or butter sauce (high in fat)    · Dairy:      ¨ Whole milk, 2% milk, and cream (high in fat)    ¨ Regular cheese and processed cheese (high in fat and sodium)    · Meats and protein foods:      ¨ Smoked or cured meat, such as corned beef, brunner, ham, hot dogs, and sausage (high in fat and sodium)    ¨ Canned beans and canned meats or spreads, such as potted meats, sardines, anchovies, and imitation seafood (high in sodium)    ¨ Deli or lunch meats, such as bologna, ham, turkey, and roast beef (high in sodium)    ¨ High-fat meat (T-bone steak, regular hamburger, and ribs)    ¨ Whole eggs and egg yolks (high in fat)    · Other:      ¨ Seasonings made with salt, such as garlic salt, celery salt, onion salt, seasoned salt, meat tenderizers, and monosodium glutamate (MSG)    ¨ Miso soup and canned or dried soup mixes (high in sodium)    ¨ Regular soy sauce, barbecue sauce, teriyaki sauce, steak sauce, Worcestershire sauce, and most flavored vinegars (high in sodium)    ¨ Regular condiments, such as mustard, ketchup, and salad dressings (high in sodium)    ¨ Gravy and sauces, such as Jeff or cheese sauces (high in sodium and fat)    ¨ Drinks high in sugar, such as soda or fruit drinks    [employer] foods, such as salted chips, popcorn, pretzels, pork rinds, salted crackers, and salted nuts    ¨ Frozen foods, such as dinners, entrees, vegetables with sauces, and breaded meats (high in sodium)  Other guidelines to follow:   · Maintain a healthy weight  Your risk for heart disease is higher if you are overweight  Your healthcare provider may suggest that you lose weight if you are overweight  You can lose weight by eating fewer calories and foods that have added sugars and fat  The DASH meal plan can help you do this  Decrease calories by eating smaller portions at each meal and fewer snacks  Ask your healthcare provider for more information about how to lose weight  · Exercise regularly  Regular exercise can help you reach or maintain a healthy weight  Regular exercise can also help decrease your blood pressure and improve your cholesterol levels  Get 30 minutes or more of moderate exercise each day of the week  To lose weight, get at least 60 minutes of exercise   Talk to your healthcare provider about the best exercise program for you  · Limit alcohol  Women should limit alcohol to 1 drink a day  Men should limit alcohol to 2 drinks a day  A drink of alcohol is 12 ounces of beer, 5 ounces of wine, or 1½ ounces of liquor  © 2017 [address] Information is for End User's use only and may not be sold, redistributed or otherwise used for commercial purposes  All illustrations and images included in CareNotes® are the copyrighted property of A D A M [employer] Inc  or [employer]en [employer]  The above information is an  only  It is not intended as medical advice for individual conditions or treatments  Talk to your doctor, nurse or pharmacist before following any medical regimen to see if it is safe and effective for you

## 2020-02-03 NOTE — ASSESSMENT & PLAN NOTE
-goal bp <140/90, pt's bp is at goal  -No need for refills per patient request, encouraged medication compliance  -Advised patient on symptoms of hypotension & severe HTN  -Limit salt-intake & caffeine in diet, minimize stress level  -Weight reduction efforts via improved diet & increased exercise recommend 150 minutes a week  -DASH diet handout given via AVS  -last urine microalbumin less than 1 year ago  -Discussed importance of treating HTN to prevent ASCVD, CVA

## 2020-02-03 NOTE — PROGRESS NOTES
Assessment/Plan:    Tonic-clonic epileptic seizures ([address] )  Stable  No seizures since 1/17/2012  Next neurology appt  7/2020  Continue with phenobarbital  Will need phenobarb level at next neuro appt  Osteoporosis  Stable  Follow up Vit D  Next DEXA in 5/2020  Due for prolia 5/5/2020  Pre prolia and post prolia CMP's scripts given    Benign essential hypertension  -goal bp <140/90, pt's bp is at goal  -No need for refills per patient request, encouraged medication compliance  -Advised patient on symptoms of hypotension & severe HTN  -Limit salt-intake & caffeine in diet, minimize stress level  -Weight reduction efforts via improved diet & increased exercise recommend 150 minutes a week  -DASH diet handout given via AVS  -last urine microalbumin less than 1 year ago  -Discussed importance of treating HTN to prevent ASCVD, CVA      Hyperlipidemia  Stable  Patient is due for Lipid panel  Follow up results  Continue simvastatin 20mg daily           Problem List Items Addressed This Visit        Cardiovascular and Mediastinum    Benign essential hypertension - Primary     -goal bp <140/90, pt's bp is at goal  -No need for refills per patient request, encouraged medication compliance  -Advised patient on symptoms of hypotension & severe HTN  -Limit salt-intake & caffeine in diet, minimize stress level  -Weight reduction efforts via improved diet & increased exercise recommend 150 minutes a week  -DASH diet handout given via AVS  -last urine microalbumin less than 1 year ago  -Discussed importance of treating HTN to prevent ASCVD, CVA           Relevant Orders    Microalbumin / creatinine urine ratio       Nervous and Auditory    Spastic quadriplegic cerebral palsy ([address] )    Tonic-clonic epileptic seizures ([address] )     Stable  No seizures since 1/17/2012  Next neurology appt  7/2020  Continue with phenobarbital  Will need phenobarb level at next neuro appt              Musculoskeletal and Integument    Osteoporosis Stable  Follow up Vit D  Next DEXA in 5/2020  Due for prolia 5/5/2020  Pre prolia and post prolia CMP's scripts given         Relevant Medications    denosumab (PROLIA) 60 mg/mL (Start on 4/30/2020)    Other Relevant Orders    Vitamin D 25 hydroxy    Comprehensive metabolic panel    Comprehensive metabolic panel       Other    Hyperlipidemia     Stable  Patient is due for Lipid panel  Follow up results  Continue simvastatin 20mg daily           Insomnia            Subjective:      Patient ID: Mohamud Cherry is a 60 y o  female  HPI  60year old female with hsitory of presents today for follow up  She has had a nonproductive cough since Saturday  Shes been susing mucinex and claritan  She also complains of sore throat  No fevers  She is acting her normal self  There is no one at her living facility that are sick  She denies recent seizures and has an appointment with her neurologist in July  She needs a prolia script for 5/5  She has been maintaining a low fat low sodium diet  She is urinary incontinant but denies hematuria  The following portions of the patient's history were reviewed and updated as appropriate: allergies, current medications, past family history, past medical history, past social history, past surgical history and problem list     Review of Systems   Constitutional: Negative for activity change, chills, diaphoresis and fever  HENT: Positive for postnasal drip and sore throat  Negative for congestion, dental problem, ear pain, rhinorrhea and trouble swallowing  Respiratory: Positive for cough  Negative for chest tightness, shortness of breath and wheezing  Cardiovascular: Negative for chest pain, palpitations and leg swelling  Gastrointestinal: Negative for abdominal pain, blood in stool, constipation, diarrhea, nausea and vomiting     Genitourinary: Negative for decreased urine volume, dysuria, flank pain, frequency, hematuria, urgency, vaginal bleeding, vaginal discharge and vaginal pain  Musculoskeletal: Negative for joint swelling, neck pain and neck stiffness  Skin: Negative for pallor and rash  Neurological: Negative for dizziness, seizures, syncope, weakness, light-headedness, numbness and headaches  Psychiatric/Behavioral: Negative for agitation and confusion  Objective:      /80   Pulse 82   Temp 98 5 °F (36 9 °C)   Resp 18   LMP 02/28/2010 (Approximate)          Physical Exam   Constitutional: She appears well-developed and well-nourished  No distress  HENT:   Head: Normocephalic and atraumatic  Right Ear: External ear normal    Nose: Nose normal    Mouth/Throat: Oropharynx is clear and moist  No oropharyngeal exudate  Cerumen impaction of L ear  Cobblestoning of posterior pharynx   Eyes: Pupils are equal, round, and reactive to light  Conjunctivae are normal  Right eye exhibits no discharge  Left eye exhibits no discharge  No scleral icterus  Neck: Normal range of motion  Neck supple  No JVD present  No tracheal deviation present  No thyromegaly present  Cardiovascular: Normal rate, regular rhythm, normal heart sounds and intact distal pulses  Exam reveals no gallop and no friction rub  No murmur heard  Pulmonary/Chest: Effort normal and breath sounds normal  No respiratory distress  She has no wheezes  She has no rales  She exhibits no tenderness  Abdominal: Soft  Bowel sounds are normal  She exhibits no distension  There is no tenderness  There is no rebound and no guarding  Musculoskeletal: Normal range of motion  She exhibits no edema or tenderness  Neurological: No cranial nerve deficit  Coordination normal    Skin: Skin is warm and dry  No rash noted  She is not diaphoretic  Vitals reviewed

## 2020-02-03 NOTE — ASSESSMENT & PLAN NOTE
Stable  Follow up Vit D  Next DEXA in 5/2020  Due for prolia 5/5/2020  Pre prolia and post prolia CMP's scripts given

## 2020-02-10 ENCOUNTER — APPOINTMENT (OUTPATIENT)
Dept: LAB | Facility: CLINIC | Age: 60
End: 2020-02-10
Payer: COMMERCIAL

## 2020-02-10 DIAGNOSIS — G40.409 TONIC-CLONIC EPILEPTIC SEIZURES (HCC): ICD-10-CM

## 2020-02-10 DIAGNOSIS — I10 BENIGN ESSENTIAL HYPERTENSION: ICD-10-CM

## 2020-02-10 DIAGNOSIS — E78.5 HYPERLIPIDEMIA, UNSPECIFIED HYPERLIPIDEMIA TYPE: ICD-10-CM

## 2020-02-10 DIAGNOSIS — M81.0 OSTEOPOROSIS, UNSPECIFIED OSTEOPOROSIS TYPE, UNSPECIFIED PATHOLOGICAL FRACTURE PRESENCE: ICD-10-CM

## 2020-02-10 LAB
25(OH)D3 SERPL-MCNC: 46.8 NG/ML (ref 30–100)
ALBUMIN SERPL BCP-MCNC: 3.4 G/DL (ref 3.5–5)
ALP SERPL-CCNC: 102 U/L (ref 46–116)
ALT SERPL W P-5'-P-CCNC: 19 U/L (ref 12–78)
ANION GAP SERPL CALCULATED.3IONS-SCNC: 4 MMOL/L (ref 4–13)
AST SERPL W P-5'-P-CCNC: 15 U/L (ref 5–45)
BASOPHILS # BLD AUTO: 0.05 THOUSANDS/ΜL (ref 0–0.1)
BASOPHILS NFR BLD AUTO: 1 % (ref 0–1)
BILIRUB SERPL-MCNC: 0.44 MG/DL (ref 0.2–1)
BUN SERPL-MCNC: 22 MG/DL (ref 5–25)
CALCIUM SERPL-MCNC: 9.1 MG/DL (ref 8.3–10.1)
CHLORIDE SERPL-SCNC: 107 MMOL/L (ref 100–108)
CHOLEST SERPL-MCNC: 175 MG/DL (ref 50–200)
CO2 SERPL-SCNC: 30 MMOL/L (ref 21–32)
CREAT SERPL-MCNC: 0.58 MG/DL (ref 0.6–1.3)
EOSINOPHIL # BLD AUTO: 0.22 THOUSAND/ΜL (ref 0–0.61)
EOSINOPHIL NFR BLD AUTO: 2 % (ref 0–6)
ERYTHROCYTE [DISTWIDTH] IN BLOOD BY AUTOMATED COUNT: 12.9 % (ref 11.6–15.1)
GFR SERPL CREATININE-BSD FRML MDRD: 101 ML/MIN/1.73SQ M
GLUCOSE P FAST SERPL-MCNC: 94 MG/DL (ref 65–99)
HCT VFR BLD AUTO: 44.4 % (ref 34.8–46.1)
HDLC SERPL-MCNC: 63 MG/DL
HGB BLD-MCNC: 14.2 G/DL (ref 11.5–15.4)
IMM GRANULOCYTES # BLD AUTO: 0.03 THOUSAND/UL (ref 0–0.2)
IMM GRANULOCYTES NFR BLD AUTO: 0 % (ref 0–2)
LDLC SERPL CALC-MCNC: 102 MG/DL (ref 0–100)
LYMPHOCYTES # BLD AUTO: 1.43 THOUSANDS/ΜL (ref 0.6–4.47)
LYMPHOCYTES NFR BLD AUTO: 15 % (ref 14–44)
MCH RBC QN AUTO: 32 PG (ref 26.8–34.3)
MCHC RBC AUTO-ENTMCNC: 32 G/DL (ref 31.4–37.4)
MCV RBC AUTO: 100 FL (ref 82–98)
MONOCYTES # BLD AUTO: 0.62 THOUSAND/ΜL (ref 0.17–1.22)
MONOCYTES NFR BLD AUTO: 7 % (ref 4–12)
NEUTROPHILS # BLD AUTO: 7.2 THOUSANDS/ΜL (ref 1.85–7.62)
NEUTS SEG NFR BLD AUTO: 75 % (ref 43–75)
NONHDLC SERPL-MCNC: 112 MG/DL
NRBC BLD AUTO-RTO: 0 /100 WBCS
PHENOBARB SERPL-MCNC: 22.9 UG/ML (ref 15–40)
PLATELET # BLD AUTO: 244 THOUSANDS/UL (ref 149–390)
PMV BLD AUTO: 10.2 FL (ref 8.9–12.7)
POTASSIUM SERPL-SCNC: 4.3 MMOL/L (ref 3.5–5.3)
PROT SERPL-MCNC: 7.6 G/DL (ref 6.4–8.2)
RBC # BLD AUTO: 4.44 MILLION/UL (ref 3.81–5.12)
SODIUM SERPL-SCNC: 141 MMOL/L (ref 136–145)
TRIGL SERPL-MCNC: 49 MG/DL
TSH SERPL DL<=0.05 MIU/L-ACNC: 2.79 UIU/ML (ref 0.36–3.74)
WBC # BLD AUTO: 9.55 THOUSAND/UL (ref 4.31–10.16)

## 2020-02-10 PROCEDURE — 80184 ASSAY OF PHENOBARBITAL: CPT

## 2020-02-10 PROCEDURE — 82306 VITAMIN D 25 HYDROXY: CPT

## 2020-02-10 PROCEDURE — 80053 COMPREHEN METABOLIC PANEL: CPT

## 2020-02-10 PROCEDURE — 85025 COMPLETE CBC W/AUTO DIFF WBC: CPT

## 2020-02-10 PROCEDURE — 84443 ASSAY THYROID STIM HORMONE: CPT

## 2020-02-10 PROCEDURE — 80061 LIPID PANEL: CPT

## 2020-02-10 PROCEDURE — 36415 COLL VENOUS BLD VENIPUNCTURE: CPT

## 2020-02-12 ENCOUNTER — ANNUAL EXAM (OUTPATIENT)
Dept: OBGYN CLINIC | Facility: CLINIC | Age: 60
End: 2020-02-12

## 2020-02-12 VITALS
SYSTOLIC BLOOD PRESSURE: 160 MMHG | WEIGHT: 168 LBS | HEIGHT: 60 IN | HEART RATE: 114 BPM | DIASTOLIC BLOOD PRESSURE: 98 MMHG | BODY MASS INDEX: 32.98 KG/M2

## 2020-02-12 DIAGNOSIS — Z01.419 ENCOUNTER FOR ANNUAL ROUTINE GYNECOLOGICAL EXAMINATION: Primary | ICD-10-CM

## 2020-02-12 PROCEDURE — 3080F DIAST BP >= 90 MM HG: CPT | Performed by: OBSTETRICS & GYNECOLOGY

## 2020-02-12 PROCEDURE — 1036F TOBACCO NON-USER: CPT | Performed by: OBSTETRICS & GYNECOLOGY

## 2020-02-12 PROCEDURE — G0101 CA SCREEN;PELVIC/BREAST EXAM: HCPCS | Performed by: OBSTETRICS & GYNECOLOGY

## 2020-02-12 PROCEDURE — 3077F SYST BP >= 140 MM HG: CPT | Performed by: OBSTETRICS & GYNECOLOGY

## 2020-02-12 PROCEDURE — 3008F BODY MASS INDEX DOCD: CPT | Performed by: OBSTETRICS & GYNECOLOGY

## 2020-02-12 NOTE — PROGRESS NOTES
Subjective      Yvonne Preciado is a 60 y o  female who presents for routine annual exam  Pt is postmenopausal, LMP 2010  Pt has a history of cerebral palsy and is wheelchair bound  Last pap 2017: Negative for intraepithelial lesion or malignancy, satisfactory for evaluation  HPV negative    Last mammogram 2019: BI-RADS 1 bilaterally, recommendation for routine mammogram in 1 year for both breasts  Scheduled 2019  Current contraception: post menopausal status  History of abnormal Pap smear: no  Family history of uterine or ovarian cancer: not provided  History of abnormal mammogram: no  Family history of breast cancer: not provided    No concerns or complaints per nursing staff that is present with her today  ROS unable to be reliably obtained by patient due to cognitive difficulties  No complaints of postmenopausal bleeding  FHx: Lymphoma in mother      Menstrual History:  OB History        0    Para   0    Term   0       0    AB   0    Living   0       SAB   0    TAB   0    Ectopic   0    Multiple   0    Live Births   0                  Patient's last menstrual period was 2010 (approximate)  The following portions of the patient's history were reviewed and updated as appropriate: allergies, current medications, past family history, past medical history, past social history, past surgical history and problem list     Review of Systems  Pertinent items are noted in HPI  Objective      /98 (BP Location: Left arm, Patient Position: Sitting, Cuff Size: Standard)   Pulse (!) 114   Ht 5' (1 524 m)   Wt 76 2 kg (168 lb)   LMP 2010 (Approximate)   BMI 32 81 kg/m²     General:   alert, appears stated age and cooperative to the best of her ability  Constantly redirected by nursing staff that came with pt today  Poor dentition   Heart: regular rate and rhythm, S1, S2 normal, no murmur, click, rub or gallop   Lungs: clear to auscultation bilaterally  No appreciable wheezes, rales, or rhonchi today  Good inspiratory effort   Abdomen: soft, non-tender, without masses or organomegaly  Mildly distended  +BM in all four quadrants   Vulva: normal   Vagina: normal mucosa, normal discharge   Cervix: anteverted, no cervical motion tenderness, no lesions and nulliparous appearance   Uterus: normal size, mobile, non-tender, normal shape and consistency, retroverted   Adnexa: no mass, fullness, tenderness   Breast exam: No lesions or masses appreciated  No tenderness reported  Extremities: Contracted  Small bruise noted on interior R thigh  Assessment   58yo P0 presenting for annual exam  Cerebral palsy, wheelchair bound     Plan   Annual exam:  - Influenza vaccine provided 10/29/2019  - Consider zoster vaccination as age >56yo  - Lives in an [facility] facility  - Pap smear due 2022  - Mammogram scheduled 2/27/20  - Encouraged pt to return for annual examination, sooner as necessary or if concerns, questions arise      Pt discussed with Dr Yasmeen Henson, DO  OB/GYN, PGY4  2/12/2020, 1:54 PM

## 2020-02-27 ENCOUNTER — HOSPITAL ENCOUNTER (OUTPATIENT)
Dept: RADIOLOGY | Age: 60
Discharge: HOME | End: 2020-02-27
Payer: COMMERCIAL

## 2020-02-27 VITALS — BODY MASS INDEX: 32.98 KG/M2 | WEIGHT: 168 LBS | HEIGHT: 60 IN

## 2020-02-27 DIAGNOSIS — Z12.31 ENCOUNTER FOR SCREENING MAMMOGRAM FOR MALIGNANT NEOPLASM OF BREAST: ICD-10-CM

## 2020-02-27 PROCEDURE — 77067 SCR MAMMO BI INCL CAD: CPT

## 2020-04-27 ENCOUNTER — TELEPHONE (OUTPATIENT)
Dept: FAMILY MEDICINE CLINIC | Facility: CLINIC | Age: 60
End: 2020-04-27

## 2020-04-27 ENCOUNTER — TELEMEDICINE (OUTPATIENT)
Dept: FAMILY MEDICINE CLINIC | Facility: CLINIC | Age: 60
End: 2020-04-27

## 2020-04-27 VITALS
TEMPERATURE: 97.8 F | RESPIRATION RATE: 18 BRPM | HEART RATE: 78 BPM | WEIGHT: 167.8 LBS | HEIGHT: 60 IN | OXYGEN SATURATION: 98 % | DIASTOLIC BLOOD PRESSURE: 80 MMHG | BODY MASS INDEX: 32.94 KG/M2 | SYSTOLIC BLOOD PRESSURE: 123 MMHG

## 2020-04-27 DIAGNOSIS — I10 BENIGN ESSENTIAL HYPERTENSION: ICD-10-CM

## 2020-04-27 DIAGNOSIS — G40.409 TONIC-CLONIC EPILEPTIC SEIZURES (HCC): ICD-10-CM

## 2020-04-27 DIAGNOSIS — M81.0 OSTEOPOROSIS, UNSPECIFIED OSTEOPOROSIS TYPE, UNSPECIFIED PATHOLOGICAL FRACTURE PRESENCE: Primary | ICD-10-CM

## 2020-04-27 PROCEDURE — 3008F BODY MASS INDEX DOCD: CPT | Performed by: FAMILY MEDICINE

## 2020-04-27 PROCEDURE — 99213 OFFICE O/P EST LOW 20 MIN: CPT | Performed by: FAMILY MEDICINE

## 2020-04-27 PROCEDURE — 3008F BODY MASS INDEX DOCD: CPT | Performed by: PSYCHIATRY & NEUROLOGY

## 2020-04-29 DIAGNOSIS — G80.0 SPASTIC QUADRIPLEGIC CEREBRAL PALSY (HCC): ICD-10-CM

## 2020-04-29 DIAGNOSIS — G40.409 TONIC-CLONIC EPILEPTIC SEIZURES (HCC): ICD-10-CM

## 2020-04-29 RX ORDER — PHENOBARBITAL 32.4 MG/1
TABLET ORAL
Qty: 90 TABLET | Refills: 5 | Status: SHIPPED | OUTPATIENT
Start: 2020-04-29 | End: 2020-05-14 | Stop reason: SDUPTHER

## 2020-04-29 RX ORDER — BACLOFEN 10 MG/1
10 TABLET ORAL 2 TIMES DAILY
Qty: 60 TABLET | Refills: 6 | Status: SHIPPED | OUTPATIENT
Start: 2020-04-29 | End: 2020-10-23 | Stop reason: SDUPTHER

## 2020-05-11 ENCOUNTER — TELEPHONE (OUTPATIENT)
Dept: FAMILY MEDICINE CLINIC | Facility: CLINIC | Age: 60
End: 2020-05-11

## 2020-05-13 DIAGNOSIS — M81.0 OSTEOPOROSIS, UNSPECIFIED OSTEOPOROSIS TYPE, UNSPECIFIED PATHOLOGICAL FRACTURE PRESENCE: ICD-10-CM

## 2020-05-14 ENCOUNTER — TELEPHONE (OUTPATIENT)
Dept: FAMILY MEDICINE CLINIC | Facility: CLINIC | Age: 60
End: 2020-05-14

## 2020-05-14 ENCOUNTER — TELEMEDICINE (OUTPATIENT)
Dept: NEUROLOGY | Facility: CLINIC | Age: 60
End: 2020-05-14
Payer: COMMERCIAL

## 2020-05-14 DIAGNOSIS — F72 SEVERE INTELLECTUAL DISABILITY: ICD-10-CM

## 2020-05-14 DIAGNOSIS — G40.409 TONIC-CLONIC EPILEPTIC SEIZURES (HCC): ICD-10-CM

## 2020-05-14 DIAGNOSIS — M81.0 AGE-RELATED OSTEOPOROSIS WITHOUT CURRENT PATHOLOGICAL FRACTURE: ICD-10-CM

## 2020-05-14 DIAGNOSIS — G80.0 SPASTIC QUADRIPLEGIC CEREBRAL PALSY (HCC): Primary | ICD-10-CM

## 2020-05-14 DIAGNOSIS — I10 BENIGN ESSENTIAL HYPERTENSION: ICD-10-CM

## 2020-05-14 PROCEDURE — 99213 OFFICE O/P EST LOW 20 MIN: CPT | Performed by: PSYCHIATRY & NEUROLOGY

## 2020-05-14 RX ORDER — PHENOBARBITAL 32.4 MG/1
TABLET ORAL
Qty: 90 TABLET | Refills: 5 | Status: SHIPPED | OUTPATIENT
Start: 2020-05-14 | End: 2020-10-23 | Stop reason: SDUPTHER

## 2020-05-15 ENCOUNTER — TELEPHONE (OUTPATIENT)
Dept: FAMILY MEDICINE CLINIC | Facility: CLINIC | Age: 60
End: 2020-05-15

## 2020-05-20 ENCOUNTER — APPOINTMENT (OUTPATIENT)
Dept: LAB | Facility: MEDICAL CENTER | Age: 60
End: 2020-05-20
Payer: COMMERCIAL

## 2020-05-20 ENCOUNTER — TRANSCRIBE ORDERS (OUTPATIENT)
Dept: ADMINISTRATIVE | Facility: HOSPITAL | Age: 60
End: 2020-05-20

## 2020-05-20 DIAGNOSIS — M81.0 OSTEOPOROSIS, UNSPECIFIED OSTEOPOROSIS TYPE, UNSPECIFIED PATHOLOGICAL FRACTURE PRESENCE: Primary | ICD-10-CM

## 2020-05-20 LAB
ALBUMIN SERPL BCP-MCNC: 3.5 G/DL (ref 3.5–5)
ALP SERPL-CCNC: 101 U/L (ref 46–116)
ALT SERPL W P-5'-P-CCNC: 21 U/L (ref 12–78)
ANION GAP SERPL CALCULATED.3IONS-SCNC: 4 MMOL/L (ref 4–13)
AST SERPL W P-5'-P-CCNC: 15 U/L (ref 5–45)
BILIRUB SERPL-MCNC: 0.25 MG/DL (ref 0.2–1)
BUN SERPL-MCNC: 19 MG/DL (ref 5–25)
CALCIUM SERPL-MCNC: 8.8 MG/DL (ref 8.3–10.1)
CHLORIDE SERPL-SCNC: 103 MMOL/L (ref 100–108)
CO2 SERPL-SCNC: 30 MMOL/L (ref 21–32)
CREAT SERPL-MCNC: 0.59 MG/DL (ref 0.6–1.3)
GFR SERPL CREATININE-BSD FRML MDRD: 100 ML/MIN/1.73SQ M
GLUCOSE P FAST SERPL-MCNC: 85 MG/DL (ref 65–99)
POTASSIUM SERPL-SCNC: 3.8 MMOL/L (ref 3.5–5.3)
PROT SERPL-MCNC: 7.4 G/DL (ref 6.4–8.2)
SODIUM SERPL-SCNC: 137 MMOL/L (ref 136–145)

## 2020-05-20 PROCEDURE — 80053 COMPREHEN METABOLIC PANEL: CPT | Performed by: FAMILY MEDICINE

## 2020-05-20 PROCEDURE — 36415 COLL VENOUS BLD VENIPUNCTURE: CPT | Performed by: FAMILY MEDICINE

## 2020-06-04 ENCOUNTER — CLINICAL SUPPORT (OUTPATIENT)
Dept: FAMILY MEDICINE CLINIC | Facility: CLINIC | Age: 60
End: 2020-06-04

## 2020-06-04 DIAGNOSIS — M81.0 AGE-RELATED OSTEOPOROSIS WITHOUT CURRENT PATHOLOGICAL FRACTURE: Primary | ICD-10-CM

## 2020-06-04 PROCEDURE — 96372 THER/PROPH/DIAG INJ SC/IM: CPT

## 2020-06-17 ENCOUNTER — APPOINTMENT (OUTPATIENT)
Dept: LAB | Facility: MEDICAL CENTER | Age: 60
End: 2020-06-17
Payer: COMMERCIAL

## 2020-06-17 ENCOUNTER — TRANSCRIBE ORDERS (OUTPATIENT)
Dept: ADMINISTRATIVE | Facility: HOSPITAL | Age: 60
End: 2020-06-17

## 2020-06-17 DIAGNOSIS — M81.0 OSTEOPOROSIS, UNSPECIFIED OSTEOPOROSIS TYPE, UNSPECIFIED PATHOLOGICAL FRACTURE PRESENCE: Primary | ICD-10-CM

## 2020-06-17 LAB
ALBUMIN SERPL BCP-MCNC: 3.7 G/DL (ref 3.5–5)
ALP SERPL-CCNC: 104 U/L (ref 46–116)
ALT SERPL W P-5'-P-CCNC: 24 U/L (ref 12–78)
ANION GAP SERPL CALCULATED.3IONS-SCNC: 5 MMOL/L (ref 4–13)
AST SERPL W P-5'-P-CCNC: 14 U/L (ref 5–45)
BILIRUB SERPL-MCNC: 0.34 MG/DL (ref 0.2–1)
BUN SERPL-MCNC: 16 MG/DL (ref 5–25)
CALCIUM SERPL-MCNC: 8.8 MG/DL (ref 8.3–10.1)
CHLORIDE SERPL-SCNC: 103 MMOL/L (ref 100–108)
CO2 SERPL-SCNC: 30 MMOL/L (ref 21–32)
CREAT SERPL-MCNC: 0.6 MG/DL (ref 0.6–1.3)
GFR SERPL CREATININE-BSD FRML MDRD: 99 ML/MIN/1.73SQ M
GLUCOSE P FAST SERPL-MCNC: 86 MG/DL (ref 65–99)
POTASSIUM SERPL-SCNC: 4.2 MMOL/L (ref 3.5–5.3)
PROT SERPL-MCNC: 7.9 G/DL (ref 6.4–8.2)
SODIUM SERPL-SCNC: 138 MMOL/L (ref 136–145)

## 2020-06-17 PROCEDURE — 36415 COLL VENOUS BLD VENIPUNCTURE: CPT | Performed by: FAMILY MEDICINE

## 2020-06-17 PROCEDURE — 80053 COMPREHEN METABOLIC PANEL: CPT | Performed by: FAMILY MEDICINE

## 2020-06-24 ENCOUNTER — CLINICAL SUPPORT (OUTPATIENT)
Dept: FAMILY MEDICINE CLINIC | Facility: CLINIC | Age: 60
End: 2020-06-24

## 2020-06-24 DIAGNOSIS — Z11.1 PPD SCREENING TEST: Primary | ICD-10-CM

## 2020-06-24 PROCEDURE — 1036F TOBACCO NON-USER: CPT

## 2020-06-24 PROCEDURE — 3074F SYST BP LT 130 MM HG: CPT

## 2020-06-24 PROCEDURE — 99213 OFFICE O/P EST LOW 20 MIN: CPT

## 2020-06-24 PROCEDURE — 86580 TB INTRADERMAL TEST: CPT

## 2020-06-24 PROCEDURE — 3079F DIAST BP 80-89 MM HG: CPT

## 2020-07-09 ENCOUNTER — TELEMEDICINE (OUTPATIENT)
Dept: FAMILY MEDICINE CLINIC | Facility: CLINIC | Age: 60
End: 2020-07-09

## 2020-07-09 VITALS
DIASTOLIC BLOOD PRESSURE: 86 MMHG | OXYGEN SATURATION: 96 % | RESPIRATION RATE: 16 BRPM | HEART RATE: 82 BPM | TEMPERATURE: 98.1 F | SYSTOLIC BLOOD PRESSURE: 122 MMHG

## 2020-07-09 DIAGNOSIS — Z23 NEED FOR ZOSTER VACCINE: ICD-10-CM

## 2020-07-09 DIAGNOSIS — E78.5 HYPERLIPIDEMIA, UNSPECIFIED HYPERLIPIDEMIA TYPE: ICD-10-CM

## 2020-07-09 DIAGNOSIS — M81.0 AGE-RELATED OSTEOPOROSIS WITHOUT CURRENT PATHOLOGICAL FRACTURE: ICD-10-CM

## 2020-07-09 DIAGNOSIS — F51.01 PRIMARY INSOMNIA: ICD-10-CM

## 2020-07-09 DIAGNOSIS — G40.409 TONIC-CLONIC EPILEPTIC SEIZURES (HCC): ICD-10-CM

## 2020-07-09 DIAGNOSIS — I10 BENIGN ESSENTIAL HYPERTENSION: Primary | ICD-10-CM

## 2020-07-09 PROCEDURE — 3074F SYST BP LT 130 MM HG: CPT | Performed by: FAMILY MEDICINE

## 2020-07-09 PROCEDURE — 99214 OFFICE O/P EST MOD 30 MIN: CPT | Performed by: FAMILY MEDICINE

## 2020-07-09 PROCEDURE — 3079F DIAST BP 80-89 MM HG: CPT | Performed by: FAMILY MEDICINE

## 2020-07-09 RX ORDER — ZOLPIDEM TARTRATE 5 MG/1
2.5 TABLET ORAL DAILY
Qty: 30 TABLET | Refills: 5 | Status: SHIPPED | OUTPATIENT
Start: 2020-08-07 | End: 2020-12-28

## 2020-07-09 NOTE — ASSESSMENT & PLAN NOTE
Stable  · Up to date with lipid panel  · Lipid panel 2/2020: ldl 102 otherwise wnl  · Continue simvastatin 20mg

## 2020-07-09 NOTE — PROGRESS NOTES
Virtual Regular Visit      Assessment/Plan:    Problem List Items Addressed This Visit        Cardiovascular and Mediastinum    Benign essential hypertension - Primary     Stable  · Within goal <140/90  · Range: 126-135/78-96  · No refills needed today on losartan or hctz  · Continue low salt diet  · Unable to exercise due to quadriplegia  · Up to date for microalbumin  · Reviewed monthly BP log            Nervous and Auditory    Tonic-clonic epileptic seizures ([facility] [address] )     Stable  · Seizure free since 1/17/2012  · Tolerated phenobarbital  · Follows with neurology, no change to plan            Musculoskeletal and Integument    Osteoporosis     Stable  · s/p prolia injection 6/4/2020  · Patient due for DEXA scan but unable to complete due to COVID-19 pandemic  · I am comfortable with waiting until pandemic has resolved to have scan completed  · Neurology would like repeat DEXA in 2 years from this next one due to phenobarbital side effects              Other    Hyperlipidemia     Stable  · Up to date with lipid panel  · Lipid panel 2/2020: ldl 102 otherwise wnl  · Continue simvastatin 20mg         Insomnia               Reason for visit is   Chief Complaint   Patient presents with    Follow-up    Virtual Regular Visit        Encounter provider Raleigh Tejeda DO    Provider located at [address] [id number] [phone]      Recent Visits  No visits were found meeting these conditions  Showing recent visits within past 7 days and meeting all other requirements     Today's Visits  Date Type Provider Dept   07/09/20 Telemedicine Jamey Dunlap [address], [address] today's visits and meeting all other requirements     Future Appointments  No visits were found meeting these conditions     Showing future appointments within next 150 days and meeting all other requirements        The patient was identified by name and date of birth  Dyllan Hayes was informed that this is a telemedicine visit and that the visit is being conducted through [employer]  My office door was closed  The following individuals were in the room with me and the patient informed Osmel Snowden  She acknowledged consent and understanding of privacy and security of the video platform  The patient has agreed to participate and understands they can discontinue the visit at any time  Patient is aware this is a billable service  Subjective  Dyllan Hayes is a 60 y o  female is being seen today for 3 month follow up  She is doing well today and has no complaints  There have been no changes to her health  No hospitalizations or changes to her medications  Patient is accompanied by [place] her care giver and charge nurse of her facility  Due to government regulations, patients cannot be seen in person yet unless it is urgent  They are requesting to postpone DEXA scan until pandemic has resolved  Since last visit patient had a PPD on 6 24 which was negative  She was also seen by podiatry which was a benign visit for nail clipping  They are in need of a refill for ambien which helps with her sleep significantly   She is also due for shingrix vaccine and requires a new script because her current one expires in august HPI     Past Medical History:   Diagnosis Date    Bowel incontinence     Constipation     Last Assessed: [id number]    Generalized convulsive seizure ([address] )     Hyperlipidemia     Osteoporosis     Spastic quadriplegic cerebral palsy ([address] )     Urinary incontinence     Vitamin D deficiency     Last Assessed: [id number]       Past Surgical History:   Procedure Laterality Date    ABSCESS DRAINAGE      Incision and Drainage of skin abscess back    COLONOSCOPY      Fiberoptic; Last Assessed: [id number]    EYE SURGERY Left     Strabismus Left Eye       Current Outpatient Medications   Medication Sig Dispense Refill    acetaminophen (TYLENOL 8 HOUR) 650 mg CR tablet Take 1 tablet by mouth every 6 hours as needed for mild pain or temp greater than 100 4 30 tablet 0    baclofen 10 mg tablet Take 1 tablet (10 mg total) by mouth 2 (two) times a day 60 tablet 6    benzonatate (TESSALON PERLES) 100 mg capsule Take 1 capsule by mouth every 8 hours as needed for dry cough 20 capsule 0    Calcium Citrate 200 MG TABS Take by mouth 3 TABS TWICE A DAY       carbamide peroxide (DEBROX) 6 5 % otic solution Instill 5 drops in affected ear twice daily for 4 days as needed for ear wax 15 mL 0    cholecalciferol (VITAMIN D3) 1,000 units tablet Take 1 capsule by mouth daily      clotrimazole-betamethasone (LOTRISONE) 1-0 05 % cream Apply twice daily as needed for skin irritation from diaper 30 g 0    famotidine (PEPCID) 20 mg tablet Take 1 tablet (20 mg total) by mouth daily at bedtime  0    fluticasone (FLONASE) 50 mcg/act nasal spray USE 1 SPRAY IN EACH NOSTRIL TWICE A DAY (RHINITIS) 16 g 10    guaiFENesin (MUCINEX) 600 mg 12 hr tablet Take 1 tablet by mouth every 12 (twelve) hours as needed      hydrochlorothiazide (HYDRODIURIL) 25 mg tablet Take 0 5 tablets (12 5 mg total) by mouth daily 30 tablet 5    ibuprofen (MOTRIN) 600 mg tablet Take by mouth every 6 (six) hours      ipratropium (ATROVENT) 0 06 % nasal spray 2 sprays into each nostril 3 (three) times a day as needed for rhinitis 15 mL 0    loratadine (CLARITIN) 10 mg tablet TAKE 1 TABLET BY MOUTH DAILY (ALLERGIC RHINITIS) 28 tablet 10    losartan (COZAAR) 50 mg tablet Take 1 tablet (50 mg total) by mouth daily 30 tablet 5    metoprolol succinate (TOPROL-XL) 100 mg 24 hr tablet Take 1 tablet by mouth daily  0    olopatadine HCl (PATADAY) 0 2 % opth drops Instill 1 drop into affected eye(s) daily PRN irritation  0    PHENobarbital 32 4 mg tablet Take 3 tablets by mouth at 4 PM daily 90 tablet 5    polyethylene glycol (MIRALAX) powder Take by mouth      Polyethylene Glycol 3350 (MIRALAX PO) Take 1 packet by mouth daily      povidone-iodine (BETADINE) 10 % ointment Apply topically 2 (two) times a day as needed      simvastatin (ZOCOR) 20 mg tablet Take 1 tablet by mouth daily  0    Vitamins A & D (VITAMIN A & D) ointment Apply topically      zolpidem (AMBIEN) 5 mg tablet Take 0 5 tablets (2 5 mg total) by mouth daily 30 tablet 5    denosumab (PROLIA) 60 mg/mL Inject 1 mL (60 mg total) under the skin once for 1 dose 1 mL 0    LORazepam (ATIVAN) 0 5 mg tablet Take 1 tablet (0 5 mg total) by mouth once for 1 dose May repeat in 30 min  2 tablet 0     No current facility-administered medications for this visit  Allergies   Allergen Reactions    Other      Seasonal Allergies       Review of Systems   Constitutional: Negative for activity change, appetite change, chills, diaphoresis, fatigue and fever  Eyes: Negative for photophobia and visual disturbance  Respiratory: Negative for cough, shortness of breath and wheezing  Cardiovascular: Negative for palpitations and leg swelling  Gastrointestinal: Negative for anal bleeding, blood in stool, constipation, diarrhea, nausea and vomiting  Neurological: Negative for dizziness, seizures, syncope and light-headedness  Video Exam    Vitals:    07/09/20 1236   BP: 122/86   Pulse: 82   Resp: 16   Temp: 98 1 °F (36 7 °C)   SpO2: 96%       Physical Exam   Constitutional: She appears well-developed and well-nourished  No distress  HENT:   Head: Normocephalic and atraumatic  Pulmonary/Chest: Effort normal  No respiratory distress  Abdominal: There is no tenderness  There is no guarding  Musculoskeletal: She exhibits no edema  Neurological: She is alert  Skin: No rash noted  No erythema  Vitals reviewed  I spent 35 minutes directly with the patient during this visit      [address] [place] [address] acknowledges that she has consented to an online visit or consultation   She understands that the online visit is based solely on information provided by her, and that, in the absence of a face-to-face physical evaluation by the physician, the diagnosis she receives is both limited and provisional in terms of accuracy and completeness  This is not intended to replace a full medical face-to-face evaluation by the physician  Adriana Razo understands and accepts these terms

## 2020-07-09 NOTE — ASSESSMENT & PLAN NOTE
Stable  · s/p prolia injection 6/4/2020  · Patient due for DEXA scan but unable to complete due to COVID-19 pandemic  · I am comfortable with waiting until pandemic has resolved to have scan completed  · Neurology would like repeat DEXA in 2 years from this next one due to phenobarbital side effects

## 2020-07-09 NOTE — ASSESSMENT & PLAN NOTE
Stable  · Seizure free since 1/17/2012  · Tolerated phenobarbital  · Follows with neurology, no change to plan

## 2020-07-09 NOTE — ASSESSMENT & PLAN NOTE
Stable  · Within goal <140/90  · Range: 126-135/78-96  · No refills needed today on losartan or hctz  · Continue low salt diet  · Unable to exercise due to quadriplegia  · Up to date for microalbumin  · Reviewed monthly BP log

## 2020-07-10 ENCOUNTER — TELEPHONE (OUTPATIENT)
Dept: FAMILY MEDICINE CLINIC | Facility: CLINIC | Age: 60
End: 2020-07-10

## 2020-07-10 NOTE — TELEPHONE ENCOUNTER
Jose Milan wants to speak with Mary regarding Shingrix injection   Prescription was sent to house she wants to know if it can be administered here

## 2020-07-14 ENCOUNTER — CLINICAL SUPPORT (OUTPATIENT)
Dept: FAMILY MEDICINE CLINIC | Facility: CLINIC | Age: 60
End: 2020-07-14

## 2020-07-14 DIAGNOSIS — Z23 NEED FOR SHINGLES VACCINE: Primary | ICD-10-CM

## 2020-07-14 PROCEDURE — 90750 HZV VACC RECOMBINANT IM: CPT

## 2020-07-14 PROCEDURE — 90471 IMMUNIZATION ADMIN: CPT

## 2020-07-30 ENCOUNTER — TELEPHONE (OUTPATIENT)
Dept: FAMILY MEDICINE CLINIC | Facility: CLINIC | Age: 60
End: 2020-07-30

## 2020-07-30 DIAGNOSIS — Z11.59 SCREENING FOR VIRAL DISEASE: Primary | ICD-10-CM

## 2020-07-30 PROCEDURE — NC001 PR NO CHARGE

## 2020-07-30 NOTE — TELEPHONE ENCOUNTER
So [facility] can re open this patient needs to be re-tested for COVID at [facility]  Please call Dana Buck when in the system

## 2020-07-30 NOTE — PROGRESS NOTES
COVID-19 Virtual Visit     Assessment/Plan:    Problem List Items Addressed This Visit     None        This virtual check-in was done via {AMB CORONAVIRUS VISIT BVFQVD:76055}  Disposition:      {AMB COVID-19 DISPOSITION:04203}    {covid time spent:86006}    Encounter provider Stefania Gusman LPN    Provider located at [facility] [address] [facility] [address] [phone]    Recent Visits  No visits were found meeting these conditions  Showing recent visits within past 7 days and meeting all other requirements     Today's Visits  Date Type Provider Dept   07/30/20 Telephone Jamey Kimberleequel [place], [address] today's visits and meeting all other requirements     Future Appointments  No visits were found meeting these conditions  Showing future appointments within next 150 days and meeting all other requirements        Patient agrees to participate in a virtual check in via telephone or video visit instead of presenting to the office to address urgent/immediate medical needs  Patient is aware this is a billable service  After connecting through {Communication Method:58872}, the patient was identified by name and date of birth  Ric Menendez was informed that this was a telemedicine visit and that the exam was being conducted confidentially over secure lines  {Telemedicine confidentiality :88089} {Telemedicine participants:63592}  Ric Menendez acknowledged consent and understanding of privacy and security of the telemedicine visit  I informed the patient that I have reviewed her record in Epic and presented the opportunity for her to ask any questions regarding the visit today  The patient agreed to participate  Subjective  Ric Menendez is a 63 y o  female who is concerned about COVID-19  She reports {COVID-19 SYMPTOMS:97691:::0}   She has not experienced {COVID-19 SYMPTOMS:74956:::0} She {HAS/HAS NOT:20194} had contact with a person who is under investigation for or who is positive for COVID-19 within the last 14 days  She {HAS/HAS NOT:49945} been hospitalized recently for fever and/or lower respiratory symptoms      Past Medical History:   Diagnosis Date    Bowel incontinence     Constipation     Last Assessed: [id number]    Generalized convulsive seizure ([facility] [address] )     Hyperlipidemia     Osteoporosis     Spastic quadriplegic cerebral palsy ([facility] [address] )     Urinary incontinence     Vitamin D deficiency     Last Assessed: [id number]       Past Surgical History:   Procedure Laterality Date    ABSCESS DRAINAGE      Incision and Drainage of skin abscess back    COLONOSCOPY      Fiberoptic; Last Assessed: [id number]    EYE SURGERY Left     Strabismus Left Eye       Current Outpatient Medications   Medication Sig Dispense Refill    acetaminophen (TYLENOL 8 HOUR) 650 mg CR tablet Take 1 tablet by mouth every 6 hours as needed for mild pain or temp greater than 100 4 30 tablet 0    baclofen 10 mg tablet Take 1 tablet (10 mg total) by mouth 2 (two) times a day 60 tablet 6    benzonatate (TESSALON PERLES) 100 mg capsule Take 1 capsule by mouth every 8 hours as needed for dry cough 20 capsule 0    Calcium Citrate 200 MG TABS Take by mouth 3 TABS TWICE A DAY       carbamide peroxide (DEBROX) 6 5 % otic solution Instill 5 drops in affected ear twice daily for 4 days as needed for ear wax 15 mL 0    cholecalciferol (VITAMIN D3) 1,000 units tablet Take 1 capsule by mouth daily      clotrimazole-betamethasone (LOTRISONE) 1-0 05 % cream Apply twice daily as needed for skin irritation from diaper 30 g 0    denosumab (PROLIA) 60 mg/mL Inject 1 mL (60 mg total) under the skin once for 1 dose 1 mL 0    famotidine (PEPCID) 20 mg tablet Take 1 tablet (20 mg total) by mouth daily at bedtime  0    fluticasone (FLONASE) 50 mcg/act nasal spray USE 1 SPRAY IN EACH NOSTRIL TWICE A DAY (RHINITIS) 16 g 10    guaiFENesin (MUCINEX) 600 mg 12 hr tablet Take 1 tablet by mouth every 12 (twelve) hours as needed      hydrochlorothiazide (HYDRODIURIL) 25 mg tablet Take 0 5 tablets (12 5 mg total) by mouth daily 30 tablet 5    ibuprofen (MOTRIN) 600 mg tablet Take by mouth every 6 (six) hours      ipratropium (ATROVENT) 0 06 % nasal spray 2 sprays into each nostril 3 (three) times a day as needed for rhinitis 15 mL 0    loratadine (CLARITIN) 10 mg tablet TAKE 1 TABLET BY MOUTH DAILY (ALLERGIC RHINITIS) 28 tablet 10    LORazepam (ATIVAN) 0 5 mg tablet Take 1 tablet (0 5 mg total) by mouth once for 1 dose May repeat in 30 min  2 tablet 0    losartan (COZAAR) 50 mg tablet Take 1 tablet (50 mg total) by mouth daily 30 tablet 5    metoprolol succinate (TOPROL-XL) 100 mg 24 hr tablet Take 1 tablet by mouth daily  0    olopatadine HCl (PATADAY) 0 2 % opth drops Instill 1 drop into affected eye(s) daily PRN irritation  0    PHENobarbital 32 4 mg tablet Take 3 tablets by mouth at 4 PM daily 90 tablet 5    polyethylene glycol (MIRALAX) powder Take by mouth      Polyethylene Glycol 3350 (MIRALAX PO) Take 1 packet by mouth daily      povidone-iodine (BETADINE) 10 % ointment Apply topically 2 (two) times a day as needed      simvastatin (ZOCOR) 20 mg tablet Take 1 tablet by mouth daily  0    Vitamins A & D (VITAMIN A & D) ointment Apply topically      [START ON 8/7/2020] zolpidem (AMBIEN) 5 mg tablet Take 0 5 tablets (2 5 mg total) by mouth daily 30 tablet 5    [START ON 8/1/2020] Zoster Vac Recomb Adjuvanted (Shingrix) 50 MCG/0 5ML SUSR Inject 0 5 mL into a muscle once for 1 dose Repeat dose in 2 to 6 months 1 each 1     No current facility-administered medications for this visit  Allergies   Allergen Reactions    Other      Seasonal Allergies       Review of Systems    Video Exam    There were no vitals filed for this visit  Rocco Ledezma appears {GENERAL APPEARANCE:90411}    Physical Exam     VIRTUAL VISIT DISCLAIMER    Funmi Hinojosa acknowledges that she has consented to an online visit or consultation  She understands that the online visit is based solely on information provided by her, and that, in the absence of a face-to-face physical evaluation by the physician, the diagnosis she receives is both limited and provisional in terms of accuracy and completeness  This is not intended to replace a full medical face-to-face evaluation by the physician  Cecelia Gutierrez understands and accepts these terms

## 2020-08-12 DIAGNOSIS — Z11.59 SCREENING FOR VIRAL DISEASE: ICD-10-CM

## 2020-08-12 PROCEDURE — U0003 INFECTIOUS AGENT DETECTION BY NUCLEIC ACID (DNA OR RNA); SEVERE ACUTE RESPIRATORY SYNDROME CORONAVIRUS 2 (SARS-COV-2) (CORONAVIRUS DISEASE [COVID-19]), AMPLIFIED PROBE TECHNIQUE, MAKING USE OF HIGH THROUGHPUT TECHNOLOGIES AS DESCRIBED BY CMS-2020-01-R: HCPCS | Performed by: FAMILY MEDICINE

## 2020-08-13 LAB — SARS-COV-2 RNA SPEC QL NAA+PROBE: NOT DETECTED

## 2020-08-14 ENCOUNTER — TELEPHONE (OUTPATIENT)
Dept: FAMILY MEDICINE CLINIC | Facility: CLINIC | Age: 60
End: 2020-08-14

## 2020-09-28 RX ORDER — LOSARTAN POTASSIUM 100 MG/1
TABLET ORAL
COMMUNITY
Start: 2020-09-02 | End: 2021-11-29 | Stop reason: CLARIF

## 2020-09-28 RX ORDER — BIOTIN 1 MG
TABLET ORAL
COMMUNITY
Start: 2020-08-05 | End: 2021-11-29 | Stop reason: CLARIF

## 2020-09-29 ENCOUNTER — TELEMEDICINE (OUTPATIENT)
Dept: FAMILY MEDICINE CLINIC | Facility: CLINIC | Age: 60
End: 2020-09-29

## 2020-09-29 VITALS
HEART RATE: 72 BPM | BODY MASS INDEX: 32.46 KG/M2 | OXYGEN SATURATION: 98 % | WEIGHT: 166.2 LBS | RESPIRATION RATE: 18 BRPM | DIASTOLIC BLOOD PRESSURE: 81 MMHG | SYSTOLIC BLOOD PRESSURE: 131 MMHG | TEMPERATURE: 98.2 F

## 2020-09-29 DIAGNOSIS — E78.5 HYPERLIPIDEMIA, UNSPECIFIED HYPERLIPIDEMIA TYPE: ICD-10-CM

## 2020-09-29 DIAGNOSIS — M81.0 AGE-RELATED OSTEOPOROSIS WITHOUT CURRENT PATHOLOGICAL FRACTURE: ICD-10-CM

## 2020-09-29 DIAGNOSIS — F51.01 PRIMARY INSOMNIA: ICD-10-CM

## 2020-09-29 DIAGNOSIS — G40.409 TONIC-CLONIC EPILEPTIC SEIZURES (HCC): ICD-10-CM

## 2020-09-29 DIAGNOSIS — I10 BENIGN ESSENTIAL HYPERTENSION: Primary | ICD-10-CM

## 2020-09-29 PROCEDURE — 1036F TOBACCO NON-USER: CPT | Performed by: FAMILY MEDICINE

## 2020-09-29 PROCEDURE — 99213 OFFICE O/P EST LOW 20 MIN: CPT | Performed by: FAMILY MEDICINE

## 2020-09-29 NOTE — ASSESSMENT & PLAN NOTE
-goal bp <140/90, pt's bp 131/81 at goal  -currently on losartan and hctz  -Advised patient on symptoms of hypotension & severe HTN  -Limit salt-intake & caffeine in diet, minimize stress level  -patient currently on of well balanced diet

## 2020-09-29 NOTE — ASSESSMENT & PLAN NOTE
Stable  Status post Prolia injection 06/04/2020  Scheduled for DEXA scan on 10/06/2020 to see effect of Prolia treatment  Will continue to monitor

## 2020-10-06 ENCOUNTER — HOSPITAL ENCOUNTER (OUTPATIENT)
Dept: RADIOLOGY | Age: 60
Discharge: HOME | End: 2020-10-06
Payer: COMMERCIAL

## 2020-10-06 DIAGNOSIS — M81.0 OSTEOPOROSIS, UNSPECIFIED OSTEOPOROSIS TYPE, UNSPECIFIED PATHOLOGICAL FRACTURE PRESENCE: ICD-10-CM

## 2020-10-06 PROCEDURE — 77080 DXA BONE DENSITY AXIAL: CPT

## 2020-10-09 ENCOUNTER — IMMUNIZATIONS (OUTPATIENT)
Dept: FAMILY MEDICINE CLINIC | Facility: CLINIC | Age: 60
End: 2020-10-09

## 2020-10-09 DIAGNOSIS — Z23 ENCOUNTER FOR IMMUNIZATION: ICD-10-CM

## 2020-10-09 PROCEDURE — G0008 ADMIN INFLUENZA VIRUS VAC: HCPCS

## 2020-10-09 PROCEDURE — 90682 RIV4 VACC RECOMBINANT DNA IM: CPT

## 2020-10-23 DIAGNOSIS — G40.409 TONIC-CLONIC EPILEPTIC SEIZURES (HCC): ICD-10-CM

## 2020-10-23 DIAGNOSIS — G80.0 SPASTIC QUADRIPLEGIC CEREBRAL PALSY (HCC): ICD-10-CM

## 2020-10-26 RX ORDER — BACLOFEN 10 MG/1
10 TABLET ORAL 2 TIMES DAILY
Qty: 60 TABLET | Refills: 11 | Status: SHIPPED | OUTPATIENT
Start: 2020-10-26 | End: 2021-05-25 | Stop reason: SDUPTHER

## 2020-10-26 RX ORDER — PHENOBARBITAL 32.4 MG/1
TABLET ORAL
Qty: 90 TABLET | Refills: 5 | Status: SHIPPED | OUTPATIENT
Start: 2020-10-26 | End: 2021-04-12 | Stop reason: SDUPTHER

## 2020-11-10 ENCOUNTER — OFFICE VISIT (OUTPATIENT)
Dept: FAMILY MEDICINE CLINIC | Facility: CLINIC | Age: 60
End: 2020-11-10

## 2020-11-10 VITALS
SYSTOLIC BLOOD PRESSURE: 124 MMHG | RESPIRATION RATE: 18 BRPM | TEMPERATURE: 97.4 F | DIASTOLIC BLOOD PRESSURE: 80 MMHG | HEART RATE: 74 BPM

## 2020-11-10 DIAGNOSIS — M81.0 AGE-RELATED OSTEOPOROSIS WITHOUT CURRENT PATHOLOGICAL FRACTURE: ICD-10-CM

## 2020-11-10 DIAGNOSIS — I10 BENIGN ESSENTIAL HYPERTENSION: ICD-10-CM

## 2020-11-10 DIAGNOSIS — Z01.818 ENCOUNTER FOR PREOPERATIVE DENTAL EXAMINATION: Primary | ICD-10-CM

## 2020-11-10 PROCEDURE — 3079F DIAST BP 80-89 MM HG: CPT | Performed by: FAMILY MEDICINE

## 2020-11-10 PROCEDURE — 99213 OFFICE O/P EST LOW 20 MIN: CPT | Performed by: FAMILY MEDICINE

## 2020-11-10 PROCEDURE — 3074F SYST BP LT 130 MM HG: CPT | Performed by: FAMILY MEDICINE

## 2020-11-10 PROCEDURE — 1036F TOBACCO NON-USER: CPT | Performed by: FAMILY MEDICINE

## 2020-12-01 DIAGNOSIS — M81.0 OSTEOPOROSIS, UNSPECIFIED OSTEOPOROSIS TYPE, UNSPECIFIED PATHOLOGICAL FRACTURE PRESENCE: ICD-10-CM

## 2020-12-01 RX ORDER — DENOSUMAB 60 MG/ML
INJECTION SUBCUTANEOUS
Qty: 1 ML | Refills: 0 | Status: SHIPPED | OUTPATIENT
Start: 2020-12-01 | End: 2021-05-26 | Stop reason: SDUPTHER

## 2020-12-04 ENCOUNTER — CLINICAL SUPPORT (OUTPATIENT)
Dept: FAMILY MEDICINE CLINIC | Facility: CLINIC | Age: 60
End: 2020-12-04

## 2020-12-04 DIAGNOSIS — I10 BENIGN ESSENTIAL HYPERTENSION: ICD-10-CM

## 2020-12-04 DIAGNOSIS — M81.0 AGE-RELATED OSTEOPOROSIS WITHOUT CURRENT PATHOLOGICAL FRACTURE: Primary | ICD-10-CM

## 2020-12-04 PROCEDURE — 96372 THER/PROPH/DIAG INJ SC/IM: CPT

## 2020-12-17 ENCOUNTER — TELEMEDICINE (OUTPATIENT)
Dept: FAMILY MEDICINE CLINIC | Facility: CLINIC | Age: 60
End: 2020-12-17

## 2020-12-17 VITALS
DIASTOLIC BLOOD PRESSURE: 70 MMHG | SYSTOLIC BLOOD PRESSURE: 132 MMHG | BODY MASS INDEX: 32.81 KG/M2 | WEIGHT: 168 LBS | TEMPERATURE: 97 F | OXYGEN SATURATION: 98 % | HEART RATE: 75 BPM

## 2020-12-17 DIAGNOSIS — K59.00 CONSTIPATION, UNSPECIFIED CONSTIPATION TYPE: ICD-10-CM

## 2020-12-17 DIAGNOSIS — E78.5 HYPERLIPIDEMIA, UNSPECIFIED HYPERLIPIDEMIA TYPE: ICD-10-CM

## 2020-12-17 DIAGNOSIS — E55.9 VITAMIN D DEFICIENCY: ICD-10-CM

## 2020-12-17 DIAGNOSIS — F51.01 PRIMARY INSOMNIA: ICD-10-CM

## 2020-12-17 DIAGNOSIS — M81.0 AGE-RELATED OSTEOPOROSIS WITHOUT CURRENT PATHOLOGICAL FRACTURE: ICD-10-CM

## 2020-12-17 DIAGNOSIS — I10 BENIGN ESSENTIAL HYPERTENSION: Primary | ICD-10-CM

## 2020-12-17 DIAGNOSIS — G40.409 TONIC-CLONIC EPILEPTIC SEIZURES (HCC): ICD-10-CM

## 2020-12-17 PROCEDURE — 3075F SYST BP GE 130 - 139MM HG: CPT | Performed by: FAMILY MEDICINE

## 2020-12-17 PROCEDURE — 99213 OFFICE O/P EST LOW 20 MIN: CPT | Performed by: FAMILY MEDICINE

## 2020-12-17 PROCEDURE — 3078F DIAST BP <80 MM HG: CPT | Performed by: FAMILY MEDICINE

## 2020-12-28 ENCOUNTER — TELEPHONE (OUTPATIENT)
Dept: FAMILY MEDICINE CLINIC | Facility: CLINIC | Age: 60
End: 2020-12-28

## 2020-12-28 DIAGNOSIS — F51.01 PRIMARY INSOMNIA: ICD-10-CM

## 2020-12-28 RX ORDER — ZOLPIDEM TARTRATE 5 MG/1
2.5 TABLET ORAL DAILY
Qty: 15 TABLET | Refills: 0 | Status: SHIPPED | OUTPATIENT
Start: 2020-12-28 | End: 2021-01-27 | Stop reason: SDUPTHER

## 2020-12-29 ENCOUNTER — LAB (OUTPATIENT)
Dept: LAB | Facility: CLINIC | Age: 60
End: 2020-12-29
Payer: COMMERCIAL

## 2020-12-29 DIAGNOSIS — I10 BENIGN ESSENTIAL HYPERTENSION: ICD-10-CM

## 2020-12-29 DIAGNOSIS — E55.9 VITAMIN D DEFICIENCY: ICD-10-CM

## 2020-12-29 DIAGNOSIS — E78.5 HYPERLIPIDEMIA, UNSPECIFIED HYPERLIPIDEMIA TYPE: ICD-10-CM

## 2020-12-29 LAB
25(OH)D3 SERPL-MCNC: 40.8 NG/ML (ref 30–100)
ALBUMIN SERPL BCP-MCNC: 3.5 G/DL (ref 3.5–5)
ALP SERPL-CCNC: 96 U/L (ref 46–116)
ALT SERPL W P-5'-P-CCNC: 19 U/L (ref 12–78)
ANION GAP SERPL CALCULATED.3IONS-SCNC: 6 MMOL/L (ref 4–13)
AST SERPL W P-5'-P-CCNC: 15 U/L (ref 5–45)
BILIRUB SERPL-MCNC: 0.33 MG/DL (ref 0.2–1)
BUN SERPL-MCNC: 17 MG/DL (ref 5–25)
CALCIUM SERPL-MCNC: 9 MG/DL (ref 8.3–10.1)
CHLORIDE SERPL-SCNC: 107 MMOL/L (ref 100–108)
CHOLEST SERPL-MCNC: 169 MG/DL (ref 50–200)
CO2 SERPL-SCNC: 28 MMOL/L (ref 21–32)
CREAT SERPL-MCNC: 0.51 MG/DL (ref 0.6–1.3)
GFR SERPL CREATININE-BSD FRML MDRD: 104 ML/MIN/1.73SQ M
GLUCOSE P FAST SERPL-MCNC: 87 MG/DL (ref 65–99)
HDLC SERPL-MCNC: 60 MG/DL
LDLC SERPL CALC-MCNC: 95 MG/DL (ref 0–100)
NONHDLC SERPL-MCNC: 109 MG/DL
POTASSIUM SERPL-SCNC: 3.7 MMOL/L (ref 3.5–5.3)
PROT SERPL-MCNC: 7 G/DL (ref 6.4–8.2)
SODIUM SERPL-SCNC: 141 MMOL/L (ref 136–145)
TRIGL SERPL-MCNC: 72 MG/DL
TSH SERPL DL<=0.05 MIU/L-ACNC: 4.79 UIU/ML (ref 0.36–3.74)

## 2020-12-29 PROCEDURE — 84443 ASSAY THYROID STIM HORMONE: CPT

## 2020-12-29 PROCEDURE — 82306 VITAMIN D 25 HYDROXY: CPT

## 2020-12-29 PROCEDURE — 80061 LIPID PANEL: CPT

## 2020-12-29 PROCEDURE — 80053 COMPREHEN METABOLIC PANEL: CPT

## 2020-12-29 PROCEDURE — 36415 COLL VENOUS BLD VENIPUNCTURE: CPT

## 2020-12-31 ENCOUNTER — TELEPHONE (OUTPATIENT)
Dept: FAMILY MEDICINE CLINIC | Facility: CLINIC | Age: 60
End: 2020-12-31

## 2020-12-31 DIAGNOSIS — F51.01 PRIMARY INSOMNIA: ICD-10-CM

## 2021-01-04 DIAGNOSIS — F51.01 PRIMARY INSOMNIA: ICD-10-CM

## 2021-01-05 RX ORDER — ZOLPIDEM TARTRATE 5 MG/1
TABLET ORAL
Qty: 15 TABLET | Refills: 4 | OUTPATIENT
Start: 2021-01-05

## 2021-01-11 ENCOUNTER — TELEMEDICINE (OUTPATIENT)
Dept: FAMILY MEDICINE CLINIC | Facility: CLINIC | Age: 61
End: 2021-01-11

## 2021-01-11 VITALS
SYSTOLIC BLOOD PRESSURE: 115 MMHG | DIASTOLIC BLOOD PRESSURE: 91 MMHG | OXYGEN SATURATION: 99 % | HEART RATE: 87 BPM | TEMPERATURE: 98.3 F

## 2021-01-11 DIAGNOSIS — Z20.822 CLOSE EXPOSURE TO COVID-19 VIRUS: Primary | ICD-10-CM

## 2021-01-11 PROCEDURE — 3080F DIAST BP >= 90 MM HG: CPT | Performed by: FAMILY MEDICINE

## 2021-01-11 PROCEDURE — 3074F SYST BP LT 130 MM HG: CPT | Performed by: FAMILY MEDICINE

## 2021-01-11 PROCEDURE — 99213 OFFICE O/P EST LOW 20 MIN: CPT | Performed by: FAMILY MEDICINE

## 2021-01-11 NOTE — ASSESSMENT & PLAN NOTE
Group home patient with close exposure to individual testing positive for COVID-19 on 01/07/2021  Will place order for testing to be done 01/13/2021  Recommend patient to be quarantine at least for 7 days, recommendations 14 days since onset of exposure (1/22/21)  Monitor closely for symptoms  Continue self quarantine, frequent hand washing, wear a face mask at all public spaces  Please call our office back up for a [phone] if patient becomes symptomatic or for additional questions

## 2021-01-11 NOTE — PROGRESS NOTES
COVID-19 Virtual Visit     Assessment/Plan:    Problem List Items Addressed This Visit        Other    Close exposure to COVID-19 virus - Primary     Group home patient with close exposure to individual testing positive for COVID-19 on 01/07/2021  Will place order for testing to be done 01/13/2021  Recommend patient to be quarantine at least for 7 days, recommendations 14 days since onset of exposure (1/22/21)  Monitor closely for symptoms  Continue self quarantine, frequent hand washing, wear a face mask at all public spaces  Please call our office back up for a [phone] if patient becomes symptomatic or for additional questions         Relevant Orders    Novel Coronavirus 2019(COVID-19), Influenza A/B, RSV [facility] Collected at [facility] or [facility]         Disposition:         Adequate COVID-19 PCR test on 01/13/2021, quarantine until 01/22/2021    I have spent 15 minutes directly with the patient  Encounter provider Irma Foss MD    Provider located at [address] [id number] [phone]    Recent Visits  No visits were found meeting these conditions  Showing recent visits within past 7 days and meeting all other requirements     Today's Visits  Date Type Provider Dept   01/11/21 Telemedicine Irma Foss MD [facility] today's visits and meeting all other requirements     Future Appointments  No visits were found meeting these conditions  Showing future appointments within next 150 days and meeting all other requirements      This virtual check-in was done via Microsoft Teams and patient was informed that this is a secure, HIPAA-compliant platform  She agrees to proceed  Patient agrees to participate in a virtual check in via telephone or video visit instead of presenting to the office to address urgent/immediate medical needs  Patient is aware this is a billable service      After connecting through Televideo, the patient was identified by name and date of birth  Nelda Kaiser was informed that this was a telemedicine visit and that the exam was being conducted confidentially over secure lines  Nelda Kaiser acknowledged consent and understanding of privacy and security of the telemedicine visit  I informed the patient that I have reviewed her record in Epic and presented the opportunity for her to ask any questions regarding the visit today  The patient agreed to participate  Subjective:   Nelda Kaiser is a 63 y o  female who is concerned about COVID-19  Patient is currently asymptomatic  Patient denies fever, chills, fatigue, malaise, congestion, rhinorrhea, sore throat, anosmia, loss of taste, cough, shortness of breath, chest tightness, abdominal pain, nausea, vomiting, diarrhea, myalgias and headaches  Date of exposure: 1/7/2021    Exposure:   Contact with a person who is under investigation (PUI) for or who is positive for COVID-19 within the last 14 days?: Yes    Hospitalized recently for fever and/or lower respiratory symptoms?: No      Currently a healthcare worker that is involved in direct patient care?: No      Works in a special setting where the risk of COVID-19 transmission may be high? (this may include long-term care, correctional and [facility] facilities; homeless shelters; assisted-living facilities and group homes ): No      Resident in a special setting where the risk of COVID-19 transmission may be high? (this may include long-term care, correctional and [facility] facilities; homeless shelters; assisted-living facilities and group homes ): No      Patient has been asymptomatic since exposure, afebrile     Group home patient, exposed to care taker who was tested positive on 1/7/21    Lab Results   Component Value Date    Gayland Brittany Not Detected 08/12/2020     Past Medical History:   Diagnosis Date    Bowel incontinence     Constipation     Last Assessed: [id number]   Yany Verdin Generalized convulsive seizure ([facility] [address] [facility]     Hyperlipidemia     Osteoporosis     Spastic quadriplegic cerebral palsy ([facility] [address] )     Urinary incontinence     Vitamin D deficiency     Last Assessed: [id number]     Past Surgical History:   Procedure Laterality Date    ABSCESS DRAINAGE      Incision and Drainage of skin abscess back    COLONOSCOPY      Fiberoptic; Last Assessed: [id number]    EYE SURGERY Left     Strabismus Left Eye     Current Outpatient Medications   Medication Sig Dispense Refill    acetaminophen (TYLENOL 8 HOUR) 650 mg CR tablet Take 1 tablet by mouth every 6 hours as needed for mild pain or temp greater than 100 4 30 tablet 0    baclofen 10 mg tablet Take 1 tablet (10 mg total) by mouth 2 (two) times a day 60 tablet 11    benzonatate (TESSALON PERLES) 100 mg capsule Take 1 capsule by mouth every 8 hours as needed for dry cough 20 capsule 0    Calcium Citrate 200 MG TABS Take by mouth 3 TABS TWICE A DAY       carbamide peroxide (DEBROX) 6 5 % otic solution Instill 5 drops in affected ear twice daily for 4 days as needed for ear wax 15 mL 0    cholecalciferol (VITAMIN D3) 1,000 units tablet Take 1 capsule by mouth daily      Cholecalciferol (Vitamin D3) 25 MCG (1000 UT) CAPS       clotrimazole-betamethasone (LOTRISONE) 1-0 05 % cream Apply twice daily as needed for skin irritation from diaper 30 g 0    famotidine (PEPCID) 20 mg tablet Take 1 tablet (20 mg total) by mouth daily at bedtime  0    fluticasone (FLONASE) 50 mcg/act nasal spray USE 1 SPRAY IN EACH NOSTRIL TWICE A DAY (RHINITIS) 16 g 10    guaiFENesin (MUCINEX) 600 mg 12 hr tablet Take 1 tablet by mouth every 12 (twelve) hours as needed      hydrochlorothiazide (HYDRODIURIL) 25 mg tablet Take 0 5 tablets (12 5 mg total) by mouth daily 30 tablet 5    ibuprofen (MOTRIN) 600 mg tablet Take by mouth every 6 (six) hours      ipratropium (ATROVENT) 0 06 % nasal spray 2 sprays into each nostril 3 (three) times a day as needed for rhinitis 15 mL 0    loratadine (CLARITIN) 10 mg tablet TAKE 1 TABLET BY MOUTH DAILY (ALLERGIC RHINITIS) 28 tablet 10    LORazepam (ATIVAN) 0 5 mg tablet Take 1 tablet (0 5 mg total) by mouth once for 1 dose May repeat in 30 min  2 tablet 0    losartan (COZAAR) 100 MG tablet       losartan (COZAAR) 50 mg tablet Take 1 tablet (50 mg total) by mouth daily 30 tablet 5    metoprolol succinate (TOPROL-XL) 100 mg 24 hr tablet Take 1 tablet by mouth daily  0    olopatadine HCl (PATADAY) 0 2 % opth drops Instill 1 drop into affected eye(s) daily PRN irritation  0    PHENobarbital 32 4 mg tablet Take 3 tablets by mouth at 4 PM daily 90 tablet 5    polyethylene glycol (MIRALAX) powder Take by mouth      Polyethylene Glycol 3350 (MIRALAX PO) Take 1 packet by mouth daily      povidone-iodine (BETADINE) 10 % ointment Apply topically 2 (two) times a day as needed      Prolia 60 MG/ML INJECT 1ML (60MG) UNDER THE SKIN ONCE FOR 1 DOSE 1 mL 0    simvastatin (ZOCOR) 20 mg tablet Take 1 tablet by mouth daily  0    Vitamins A & D (VITAMIN A & D) ointment Apply topically      zolpidem (AMBIEN) 5 mg tablet Take 0 5 tablets (2 5 mg total) by mouth daily 15 tablet 0     No current facility-administered medications for this visit  Allergies   Allergen Reactions    Other      Seasonal Allergies       Review of Systems   Constitutional: Negative for chills, fatigue and fever  HENT: Negative for congestion, rhinorrhea and sore throat  Respiratory: Negative for cough, chest tightness and shortness of breath  Gastrointestinal: Negative for abdominal pain, diarrhea, nausea and vomiting  Musculoskeletal: Negative for myalgias  Neurological: Negative for headaches  Objective:    Vitals:    01/11/21 1536   BP: 115/91   Pulse: 87   Temp: 98 3 °F (36 8 °C)   SpO2: 99%         VIRTUAL VISIT DISCLAIMER    Patrick Bhatia acknowledges that she has consented to an online visit or consultation   She understands that the online visit is based solely on information provided by her, and that, in the absence of a face-to-face physical evaluation by the physician, the diagnosis she receives is both limited and provisional in terms of accuracy and completeness  This is not intended to replace a full medical face-to-face evaluation by the physician  Samson Anders understands and accepts these terms

## 2021-01-19 DIAGNOSIS — J30.1 ALLERGIC RHINITIS DUE TO POLLEN, UNSPECIFIED SEASONALITY: ICD-10-CM

## 2021-01-20 LAB — EXT SARS-COV-2: NEGATIVE

## 2021-01-21 RX ORDER — FLUTICASONE PROPIONATE 50 MCG
SPRAY, SUSPENSION (ML) NASAL
Qty: 16 G | Refills: 10 | Status: SHIPPED | OUTPATIENT
Start: 2021-01-21 | End: 2022-08-09

## 2021-01-25 ENCOUNTER — TELEPHONE (OUTPATIENT)
Dept: NEUROLOGY | Facility: CLINIC | Age: 61
End: 2021-01-25

## 2021-01-25 NOTE — TELEPHONE ENCOUNTER
Talked to [facility] and rescheduled Dr Pak Mail appt with Dr Lamin Aguila

## 2021-01-27 ENCOUNTER — TELEPHONE (OUTPATIENT)
Dept: FAMILY MEDICINE CLINIC | Facility: CLINIC | Age: 61
End: 2021-01-27

## 2021-01-27 DIAGNOSIS — F51.01 PRIMARY INSOMNIA: ICD-10-CM

## 2021-01-27 RX ORDER — ZOLPIDEM TARTRATE 5 MG/1
2.5 TABLET ORAL DAILY
Qty: 15 TABLET | Refills: 0 | Status: SHIPPED | OUTPATIENT
Start: 2021-01-27 | End: 2021-03-02 | Stop reason: SDUPTHER

## 2021-01-27 NOTE — TELEPHONE ENCOUNTER
Are you able to address as Dr Rafael Storey is on night float    Checked PDMP, last refill 11/29/21 #15

## 2021-02-27 DIAGNOSIS — F51.01 PRIMARY INSOMNIA: ICD-10-CM

## 2021-03-02 ENCOUNTER — TELEMEDICINE (OUTPATIENT)
Dept: FAMILY MEDICINE CLINIC | Facility: CLINIC | Age: 61
End: 2021-03-02

## 2021-03-02 VITALS
TEMPERATURE: 97.1 F | RESPIRATION RATE: 20 BRPM | DIASTOLIC BLOOD PRESSURE: 85 MMHG | HEART RATE: 74 BPM | SYSTOLIC BLOOD PRESSURE: 134 MMHG | OXYGEN SATURATION: 98 % | WEIGHT: 167 LBS | BODY MASS INDEX: 32.61 KG/M2

## 2021-03-02 DIAGNOSIS — I10 BENIGN ESSENTIAL HYPERTENSION: Primary | ICD-10-CM

## 2021-03-02 DIAGNOSIS — M81.0 AGE-RELATED OSTEOPOROSIS WITHOUT CURRENT PATHOLOGICAL FRACTURE: ICD-10-CM

## 2021-03-02 DIAGNOSIS — K59.00 CONSTIPATION, UNSPECIFIED CONSTIPATION TYPE: ICD-10-CM

## 2021-03-02 DIAGNOSIS — E78.5 HYPERLIPIDEMIA, UNSPECIFIED HYPERLIPIDEMIA TYPE: ICD-10-CM

## 2021-03-02 DIAGNOSIS — F51.01 PRIMARY INSOMNIA: ICD-10-CM

## 2021-03-02 DIAGNOSIS — R79.89 ELEVATED TSH: ICD-10-CM

## 2021-03-02 DIAGNOSIS — K21.9 GASTROESOPHAGEAL REFLUX DISEASE WITHOUT ESOPHAGITIS: ICD-10-CM

## 2021-03-02 DIAGNOSIS — E55.9 VITAMIN D DEFICIENCY: ICD-10-CM

## 2021-03-02 PROCEDURE — 3079F DIAST BP 80-89 MM HG: CPT | Performed by: FAMILY MEDICINE

## 2021-03-02 PROCEDURE — 3075F SYST BP GE 130 - 139MM HG: CPT | Performed by: FAMILY MEDICINE

## 2021-03-02 PROCEDURE — 99213 OFFICE O/P EST LOW 20 MIN: CPT | Performed by: FAMILY MEDICINE

## 2021-03-02 RX ORDER — ZOLPIDEM TARTRATE 5 MG/1
TABLET ORAL
Qty: 15 TABLET | Refills: 4 | OUTPATIENT
Start: 2021-03-02

## 2021-03-02 RX ORDER — ZOLPIDEM TARTRATE 5 MG/1
2.5 TABLET ORAL DAILY
Qty: 15 TABLET | Refills: 5 | Status: SHIPPED | OUTPATIENT
Start: 2021-03-02 | End: 2021-09-21 | Stop reason: SDUPTHER

## 2021-03-02 NOTE — ASSESSMENT & PLAN NOTE
Incidental  · TSH 4 79 on 12/29/2020  · Due intellectual disability, patient unable to voice concerns or symptoms  · Will order repeat TSH with reflex T4

## 2021-03-02 NOTE — ASSESSMENT & PLAN NOTE
-goal bp <140/90, pt's bp is at goal  -encouraged medication compliance  -Advised patient on symptoms of hypotension & severe HTN  -Limit salt-intake & caffeine in diet, minimize stress level   continue current regimen

## 2021-03-02 NOTE — PROGRESS NOTES
Virtual Regular Visit      Assessment/Plan:    Problem List Items Addressed This Visit        Digestive    Esophageal reflux     Stable  - controlled on pepcid 20mg qHS            Cardiovascular and Mediastinum    Benign essential hypertension - Primary     -goal bp <140/90, pt's bp is at goal  -encouraged medication compliance  -Advised patient on symptoms of hypotension & severe HTN  -Limit salt-intake & caffeine in diet, minimize stress level   continue current regimen            Musculoskeletal and Integument    Osteoporosis     Improving  - due for next prolia injection in 6/4  - Calcium last checked 9 0  - will need prelab and postlab for upcoming injection  - patient and care giver agreeable for repeat DEXA in 2 years (10/2022)         Relevant Orders    Basic metabolic panel    Basic metabolic panel       Other    Hyperlipidemia     Stable  - continue simvastatin 20mg daily           Insomnia     Patient is stable  - doing well on zolpidem  -  Script for 6 months sent to pharmacy         Relevant Medications    zolpidem (AMBIEN) 5 mg tablet    Constipation     Stable  · Regular daily bowel movements  · No blood or straining  · Continue miralax           Vitamin D deficiency    Elevated TSH     Incidental  · TSH 4 79 on 12/29/2020  · Due intellectual disability, patient unable to voice concerns or symptoms  · Will order repeat TSH with reflex T4           Relevant Orders    TSH, 3rd generation with Free T4 reflex               Reason for visit is   Chief Complaint   Patient presents with    Virtual Regular Visit        Encounter provider Jane Stewart DO    Provider located at [address] [place] [id number] [phone]      Recent Visits  No visits were found meeting these conditions     Showing recent visits within past 7 days and meeting all other requirements     Today's Visits  Date Type Provider Dept   03/02/21 Telemedicine Jamey Marija [address] today's visits and meeting all other requirements     Future Appointments  No visits were found meeting these conditions  Showing future appointments within next 150 days and meeting all other requirements        The patient was identified by name and date of birth  Alisia Dos Santos was informed that this is a telemedicine visit and that the visit is being conducted through [employer] and patient was informed that this is a secure, HIPAA-compliant platform  She agrees to proceed     My office door was closed  The patient was notified the following individuals were present in the room Marcella  She acknowledged consent and understanding of privacy and security of the video platform  The patient has agreed to participate and understands they can discontinue the visit at any time  Patient is aware this is a billable service  Subjective  lAisia Dos Santos is a 63 y o  female presents today for chronic follow up  Patient received her COVID vaccine 1/22 and 2/12/2021  Patient has been doing well and has no complaints  She has been in good spirits and acting her normal self  BP has remained 117-130's/70-80's  She is eating fine and her weight is stable  She has daily formed stool  She is drinking more fluids  Per care giver, there    There were some erythema in her right groin  She has been using Lotrisone for the past 2 weeks which cleared it up      She was also seen by dentist which was normal without cavities    She is due for mammogram but they are holding off until the pandemic lifts    Her 2nd shingles injection was postponed due to getting the covid vaccine        Past Medical History:   Diagnosis Date    Bowel incontinence     Constipation     Last Assessed: [id number]    Generalized convulsive seizure ([address] )     Hyperlipidemia     Osteoporosis     Spastic quadriplegic cerebral palsy ([address] )     Urinary incontinence     Vitamin D deficiency Last Assessed: [id number]       Past Surgical History:   Procedure Laterality Date    ABSCESS DRAINAGE      Incision and Drainage of skin abscess back    COLONOSCOPY      Fiberoptic; Last Assessed: [id number]    EYE SURGERY Left     Strabismus Left Eye       Current Outpatient Medications   Medication Sig Dispense Refill    acetaminophen (TYLENOL 8 HOUR) 650 mg CR tablet Take 1 tablet by mouth every 6 hours as needed for mild pain or temp greater than 100 4 30 tablet 0    baclofen 10 mg tablet Take 1 tablet (10 mg total) by mouth 2 (two) times a day 60 tablet 11    benzonatate (TESSALON PERLES) 100 mg capsule Take 1 capsule by mouth every 8 hours as needed for dry cough 20 capsule 0    Calcium Citrate 200 MG TABS Take by mouth 3 TABS TWICE A DAY       carbamide peroxide (DEBROX) 6 5 % otic solution Instill 5 drops in affected ear twice daily for 4 days as needed for ear wax 15 mL 0    cholecalciferol (VITAMIN D3) 1,000 units tablet Take 1 capsule by mouth daily      Cholecalciferol (Vitamin D3) 25 MCG (1000 UT) CAPS       clotrimazole-betamethasone (LOTRISONE) 1-0 05 % cream Apply twice daily as needed for skin irritation from diaper 30 g 0    famotidine (PEPCID) 20 mg tablet Take 1 tablet (20 mg total) by mouth daily at bedtime  0    fluticasone (FLONASE) 50 mcg/act nasal spray USE 1 SPRAY IN EACH NOSTRIL TWICE A DAY (RHINITIS) 16 g 10    guaiFENesin (MUCINEX) 600 mg 12 hr tablet Take 1 tablet by mouth every 12 (twelve) hours as needed      hydrochlorothiazide (HYDRODIURIL) 25 mg tablet Take 0 5 tablets (12 5 mg total) by mouth daily 30 tablet 5    ibuprofen (MOTRIN) 600 mg tablet Take by mouth every 6 (six) hours      ipratropium (ATROVENT) 0 06 % nasal spray 2 sprays into each nostril 3 (three) times a day as needed for rhinitis 15 mL 0    loratadine (CLARITIN) 10 mg tablet TAKE 1 TABLET BY MOUTH DAILY (ALLERGIC RHINITIS) 28 tablet 10    losartan (COZAAR) 100 MG tablet       losartan (COZAAR) 50 mg tablet Take 1 tablet (50 mg total) by mouth daily 30 tablet 5    metoprolol succinate (TOPROL-XL) 100 mg 24 hr tablet Take 1 tablet by mouth daily  0    olopatadine HCl (PATADAY) 0 2 % opth drops Instill 1 drop into affected eye(s) daily PRN irritation  0    PHENobarbital 32 4 mg tablet Take 3 tablets by mouth at 4 PM daily 90 tablet 5    polyethylene glycol (MIRALAX) powder Take by mouth      Polyethylene Glycol 3350 (MIRALAX PO) Take 1 packet by mouth daily      povidone-iodine (BETADINE) 10 % ointment Apply topically 2 (two) times a day as needed      Prolia 60 MG/ML INJECT 1ML (60MG) UNDER THE SKIN ONCE FOR 1 DOSE 1 mL 0    simvastatin (ZOCOR) 20 mg tablet Take 1 tablet by mouth daily  0    Vitamins A & D (VITAMIN A & D) ointment Apply topically      zolpidem (AMBIEN) 5 mg tablet Take 0 5 tablets (2 5 mg total) by mouth daily 15 tablet 5    LORazepam (ATIVAN) 0 5 mg tablet Take 1 tablet (0 5 mg total) by mouth once for 1 dose May repeat in 30 min  2 tablet 0     No current facility-administered medications for this visit  Allergies   Allergen Reactions    Other      Seasonal Allergies       Review of Systems   Constitutional: Negative for activity change, appetite change, chills, diaphoresis, fatigue and fever  HENT: Negative for ear pain, hearing loss, postnasal drip, rhinorrhea, sinus pressure, sinus pain, sneezing and sore throat  Respiratory: Negative for cough, chest tightness, shortness of breath and wheezing  Cardiovascular: Negative for chest pain, palpitations and leg swelling  Gastrointestinal: Negative for abdominal pain, blood in stool, constipation, diarrhea, nausea and vomiting  Genitourinary: Negative for dysuria, frequency, hematuria and urgency  Musculoskeletal: Negative for arthralgias and myalgias  Neurological: Negative for dizziness, syncope, weakness, light-headedness, numbness and headaches     Psychiatric/Behavioral: Negative for agitation, behavioral problems and sleep disturbance  Video Exam    Vitals:    03/02/21 1125   BP: 134/85   BP Location: Left arm   Pulse: 74   Resp: 20   Temp: (!) 97 1 °F (36 2 °C)   TempSrc: Tympanic   SpO2: 98%   Weight: 75 8 kg (167 lb)       Physical Exam  Vitals signs reviewed  Constitutional:       Appearance: She is normal weight  HENT:      Head: Normocephalic and atraumatic  Right Ear: External ear normal       Left Ear: External ear normal       Nose: Nose normal  No congestion  Mouth/Throat:      Mouth: Mucous membranes are moist       Pharynx: Oropharynx is clear  Eyes:      Conjunctiva/sclera: Conjunctivae normal       Pupils: Pupils are equal, round, and reactive to light  Cardiovascular:      Rate and Rhythm: Normal rate and regular rhythm  Pulses: Normal pulses  Heart sounds: No murmur  No gallop  Pulmonary:      Effort: Pulmonary effort is normal    Abdominal:      General: There is no distension  Palpations: Abdomen is soft  Tenderness: There is no abdominal tenderness  Musculoskeletal:         General: No swelling  Skin:     General: Skin is warm and dry  Neurological:      Mental Status: She is alert  Mental status is at baseline  *Physical exam findings per direct care giver RN  VIRTUAL VISIT DISCLAIMER    Marina Bojorquez acknowledges that she has consented to an online visit or consultation  She understands that the online visit is based solely on information provided by her, and that, in the absence of a face-to-face physical evaluation by the physician, the diagnosis she receives is both limited and provisional in terms of accuracy and completeness  This is not intended to replace a full medical face-to-face evaluation by the physician  Marina Bojorquez understands and accepts these terms

## 2021-03-02 NOTE — ASSESSMENT & PLAN NOTE
Improving  - due for next prolia injection in 6/4  - Calcium last checked 9 0  - will need prelab and postlab for upcoming injection  - patient and care giver agreeable for repeat DEXA in 2 years (10/2022)

## 2021-03-04 ENCOUNTER — TELEPHONE (OUTPATIENT)
Dept: LAB | Facility: HOSPITAL | Age: 61
End: 2021-03-04

## 2021-03-11 ENCOUNTER — APPOINTMENT (OUTPATIENT)
Dept: LAB | Facility: HOSPITAL | Age: 61
End: 2021-03-11
Payer: COMMERCIAL

## 2021-03-11 DIAGNOSIS — R79.89 ELEVATED TSH: ICD-10-CM

## 2021-03-11 LAB — TSH SERPL DL<=0.05 MIU/L-ACNC: 1.61 UIU/ML (ref 0.36–3.74)

## 2021-03-11 PROCEDURE — 36415 COLL VENOUS BLD VENIPUNCTURE: CPT

## 2021-03-11 PROCEDURE — 84443 ASSAY THYROID STIM HORMONE: CPT

## 2021-03-16 ENCOUNTER — CLINICAL SUPPORT (OUTPATIENT)
Dept: FAMILY MEDICINE CLINIC | Facility: CLINIC | Age: 61
End: 2021-03-16

## 2021-03-16 DIAGNOSIS — Z23 ENCOUNTER FOR IMMUNIZATION: Primary | ICD-10-CM

## 2021-04-12 DIAGNOSIS — G40.409 TONIC-CLONIC EPILEPTIC SEIZURES (HCC): ICD-10-CM

## 2021-04-12 RX ORDER — PHENOBARBITAL 32.4 MG/1
TABLET ORAL
Qty: 90 TABLET | Refills: 0 | Status: SHIPPED | OUTPATIENT
Start: 2021-04-12 | End: 2021-04-14 | Stop reason: SDUPTHER

## 2021-04-14 RX ORDER — PHENOBARBITAL 32.4 MG/1
TABLET ORAL
Qty: 90 TABLET | Refills: 5 | Status: SHIPPED | OUTPATIENT
Start: 2021-04-14 | End: 2021-05-25 | Stop reason: SDUPTHER

## 2021-04-14 NOTE — TELEPHONE ENCOUNTER
Patients group home calling for refills to be added to phenobarb script  Please sign off  I have reviewed and confirmed nurses' notes for patient's medications, allergies, medical history, and surgical history.

## 2021-04-29 ENCOUNTER — TRANSCRIBE ORDERS (OUTPATIENT)
Dept: ADMINISTRATIVE | Facility: HOSPITAL | Age: 61
End: 2021-04-29

## 2021-04-29 DIAGNOSIS — Z12.31 ENCOUNTER FOR SCREENING MAMMOGRAM FOR MALIGNANT NEOPLASM OF BREAST: Primary | ICD-10-CM

## 2021-05-18 ENCOUNTER — TELEMEDICINE (OUTPATIENT)
Dept: FAMILY MEDICINE CLINIC | Facility: CLINIC | Age: 61
End: 2021-05-18

## 2021-05-18 VITALS
HEART RATE: 84 BPM | DIASTOLIC BLOOD PRESSURE: 77 MMHG | RESPIRATION RATE: 16 BRPM | SYSTOLIC BLOOD PRESSURE: 112 MMHG | TEMPERATURE: 97.7 F | OXYGEN SATURATION: 97 %

## 2021-05-18 DIAGNOSIS — I10 BENIGN ESSENTIAL HYPERTENSION: Primary | ICD-10-CM

## 2021-05-18 DIAGNOSIS — F51.01 PRIMARY INSOMNIA: ICD-10-CM

## 2021-05-18 DIAGNOSIS — K59.00 CONSTIPATION, UNSPECIFIED CONSTIPATION TYPE: ICD-10-CM

## 2021-05-18 DIAGNOSIS — M81.0 AGE-RELATED OSTEOPOROSIS WITHOUT CURRENT PATHOLOGICAL FRACTURE: ICD-10-CM

## 2021-05-18 DIAGNOSIS — G40.409 TONIC-CLONIC EPILEPTIC SEIZURES (HCC): ICD-10-CM

## 2021-05-18 PROCEDURE — 3078F DIAST BP <80 MM HG: CPT | Performed by: FAMILY MEDICINE

## 2021-05-18 PROCEDURE — 3074F SYST BP LT 130 MM HG: CPT | Performed by: FAMILY MEDICINE

## 2021-05-18 PROCEDURE — 99213 OFFICE O/P EST LOW 20 MIN: CPT | Performed by: FAMILY MEDICINE

## 2021-05-18 NOTE — ASSESSMENT & PLAN NOTE
Stable  · No seizures since 1/17/2021  · Continue regular neurology follow up  · Continue phenobarbital

## 2021-05-18 NOTE — ASSESSMENT & PLAN NOTE
Stable  · bp today 112/77  · At goal <140/90  · Continue current regimen  · Limit salt intake  · Monitor for symptoms of hypotension

## 2021-05-19 ENCOUNTER — TELEPHONE (OUTPATIENT)
Dept: LAB | Facility: HOSPITAL | Age: 61
End: 2021-05-19

## 2021-05-21 DIAGNOSIS — J30.1 ALLERGIC RHINITIS DUE TO POLLEN, UNSPECIFIED SEASONALITY: Primary | ICD-10-CM

## 2021-05-25 ENCOUNTER — TELEPHONE (OUTPATIENT)
Dept: FAMILY MEDICINE CLINIC | Facility: CLINIC | Age: 61
End: 2021-05-25

## 2021-05-25 ENCOUNTER — OFFICE VISIT (OUTPATIENT)
Dept: NEUROLOGY | Facility: CLINIC | Age: 61
End: 2021-05-25
Payer: COMMERCIAL

## 2021-05-25 VITALS
HEART RATE: 81 BPM | BODY MASS INDEX: 32.91 KG/M2 | WEIGHT: 168.5 LBS | SYSTOLIC BLOOD PRESSURE: 139 MMHG | DIASTOLIC BLOOD PRESSURE: 93 MMHG

## 2021-05-25 DIAGNOSIS — M81.0 AGE-RELATED OSTEOPOROSIS WITHOUT CURRENT PATHOLOGICAL FRACTURE: Primary | ICD-10-CM

## 2021-05-25 DIAGNOSIS — G40.409 TONIC-CLONIC EPILEPTIC SEIZURES (HCC): ICD-10-CM

## 2021-05-25 DIAGNOSIS — G80.0 SPASTIC QUADRIPLEGIC CEREBRAL PALSY (HCC): ICD-10-CM

## 2021-05-25 PROCEDURE — 99214 OFFICE O/P EST MOD 30 MIN: CPT | Performed by: STUDENT IN AN ORGANIZED HEALTH CARE EDUCATION/TRAINING PROGRAM

## 2021-05-25 RX ORDER — PHENOBARBITAL 32.4 MG/1
TABLET ORAL
Qty: 90 TABLET | Refills: 5 | Status: SHIPPED | OUTPATIENT
Start: 2021-05-25 | End: 2021-11-12 | Stop reason: SDUPTHER

## 2021-05-25 RX ORDER — BACLOFEN 10 MG/1
10 TABLET ORAL 2 TIMES DAILY
Qty: 60 TABLET | Refills: 11 | Status: SHIPPED | OUTPATIENT
Start: 2021-05-25 | End: 2022-06-16 | Stop reason: SDUPTHER

## 2021-05-25 NOTE — TELEPHONE ENCOUNTER
Needs new script for prolia  It  early May and it needs prior auth   Patient has an appointment for prolia injection

## 2021-05-25 NOTE — PATIENT INSTRUCTIONS
Continue with current Phenobarbital dosing  Medications refilled for Phenobarbital and Baclofen  Follow up in 1 year or sooner if needed  DEXA scan in next year as planned

## 2021-05-25 NOTE — PROGRESS NOTES
Review of Systems   Constitutional: Negative  Negative for appetite change and fever  HENT: Negative  Negative for hearing loss, tinnitus, trouble swallowing and voice change  Eyes: Negative  Negative for photophobia and pain  Respiratory: Negative  Negative for shortness of breath  Cardiovascular: Negative  Negative for palpitations  Gastrointestinal: Negative  Negative for nausea and vomiting  Endocrine: Negative  Negative for cold intolerance  Genitourinary: Negative  Negative for dysuria, frequency and urgency  Musculoskeletal: Negative  Negative for myalgias and neck pain  Skin: Negative  Negative for rash  Neurological: Negative  Negative for dizziness, tremors, seizures, syncope, facial asymmetry, speech difficulty, weakness, light-headedness, numbness and headaches  Hematological: Negative  Does not bruise/bleed easily  Psychiatric/Behavioral: Negative  Negative for confusion, hallucinations and sleep disturbance  All other systems reviewed and are negative

## 2021-05-25 NOTE — PROGRESS NOTES
[facility] -   Follow up appointment    Impression/Plan    Ms Breana Medina is a 63 y o , female with PMH of tonic-clonic seizures, 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, HTN, HLD, osteoporosis presenting for her regularly scheduled follow up visit here at [place] [address] [facility] [address]  Her neurological exam is comparable to previous visits  She remains seizure free on phenobarbital monotherapy  Attempts in the past to wean off of phenobarbital lead to breakthrough seizures  Therefore we will continue with her current AED regimen  Plan outlined below:    #Symptomatic epilepsy  - Continue with Phenobarbital 97 2 mg qhs  - No need to check levels as last several levels have been unchanged  #Osteoporosis  - DEXA scan as scheduled in 2022    - Continue with prolia    - Follow up in 1 year or sooner if needed    We discussed the pathophysiology of epilepsy/seizure and seizure safety/precautions including driving restrictions following seizures  We discussed factors that can lower seizure threshold and the side effects of antiepileptic medications  Diagnoses and all orders for this visit:    Age-related osteoporosis without current pathological fracture    Tonic-clonic epileptic seizures ([facility] [address] )  -     Phenobarbital level; Future  -     PHENobarbital 32 4 mg tablet; Take 3 tablets by mouth at 4 PM daily    Spastic quadriplegic cerebral palsy (HCC)  -     baclofen 10 mg tablet; Take 1 tablet (10 mg total) by mouth 2 (two) times a day        Bere Mccoy is a 63 y o  left handed female with a PMH of tonic-clonic epileptic seizures, 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, HTN, HLD, osteoporosis presenting to the [place] [address] [facility] for her regularly scheduled yearly follow up  For review: The patient was last seen virtually via telemedicine on 5/14/2020   Plan at that time was to continue phenobarbital 32 4 3 tabs qhs  DEXA was recommended every two years to f/u on risk of osteoporosis associated w/ phenobarb consumption  Attempts in the past to wean off of phenobarbital lead to breakthrough seizures  Interval history:    Since last seen, the patients caregiver reports no significant events  The patient is tolerating medications appropriately, w/ no deterioration in mental status  There are no concerns for side effects or non-adherence  She continues to take prolia for her osteoporosis  AEDs: Phenobarbital 32 4 3 tabs qhs daily  Last phenobarbital level on 2/10/20: 22 9     History Reviewed: The following were reviewed and updated as appropriate: allergies, current medications, past family history, past medical history, past social history, past surgical history and problem list    ROS:  Constitutional: Negative  Negative for appetite change and fever  HENT: Negative  Negative for hearing loss, tinnitus, trouble swallowing and voice change  Eyes: Negative  Negative for photophobia and pain  Respiratory: Negative  Negative for shortness of breath  Cardiovascular: Negative  Negative for palpitations  Gastrointestinal: Negative  Negative for nausea and vomiting  Endocrine: Negative  Negative for cold intolerance  Genitourinary: Negative  Negative for dysuria, frequency and urgency  Musculoskeletal: Negative  Negative for myalgias and neck pain  Skin: Negative  Negative for rash  Neurological: Negative  Negative for dizziness, tremors, seizures, syncope, facial asymmetry, speech difficulty, weakness, light-headedness, numbness and headaches  Hematological: Negative  Does not bruise/bleed easily  Psychiatric/Behavioral: Negative  Negative for confusion, hallucinations and sleep disturbance  All other systems reviewed and are negative          Objective    /93 (BP Location: Left arm, Patient Position: Sitting, Cuff Size: Adult)   Pulse 81   Wt 76 4 kg (168 lb 8 oz)   LMP 02/28/2010 (Approximate)   BMI 32 91 kg/m²      General Exam  General: no acute distress  HEENT: mucous membranes moist, anicteric sclera  Neck: at baseline ROM  Extremities: no clubbing, cyanosis or edema  Muscles contracted as is consistent with pt's baseline status  Skin: no rash on visible skin  Neurological Exam  Mental Status: awake, alert, and fully oriented to person  Attention intact  Patient has significant intellectual disability and hence fund of knowledge is not appropriate for age  Language: fluency, and comprehension normal        Cranial Nerves: Pupils equal and reactive to light  Visual fields full to confrontation  Facial strength full and symmetric  Hearing intact to voice  Speech dysarthric  Motor: Diffuse spasticity, primarily of flexors in the upper extremities, R>L, and extensors and abductors in the lower extremities  LE paralysis  Sensory: Sensation intact to light touch distally in all extremities  Coordination: Deferred due to spasticity    Station and gait: Deferred    Reflexes: Reflexes abnormal due to significant spacticity      Momo Cruz MD   [facility]  [facility] [address] [facility]

## 2021-05-26 ENCOUNTER — APPOINTMENT (OUTPATIENT)
Dept: LAB | Facility: MEDICAL CENTER | Age: 61
End: 2021-05-26
Payer: COMMERCIAL

## 2021-05-26 DIAGNOSIS — M81.0 OSTEOPOROSIS, UNSPECIFIED OSTEOPOROSIS TYPE, UNSPECIFIED PATHOLOGICAL FRACTURE PRESENCE: ICD-10-CM

## 2021-05-26 DIAGNOSIS — M81.0 AGE-RELATED OSTEOPOROSIS WITHOUT CURRENT PATHOLOGICAL FRACTURE: ICD-10-CM

## 2021-05-26 LAB
ANION GAP SERPL CALCULATED.3IONS-SCNC: 3 MMOL/L (ref 4–13)
BUN SERPL-MCNC: 15 MG/DL (ref 5–25)
CALCIUM SERPL-MCNC: 8.9 MG/DL (ref 8.3–10.1)
CHLORIDE SERPL-SCNC: 107 MMOL/L (ref 100–108)
CO2 SERPL-SCNC: 29 MMOL/L (ref 21–32)
CREAT SERPL-MCNC: 0.42 MG/DL (ref 0.6–1.3)
GFR SERPL CREATININE-BSD FRML MDRD: 111 ML/MIN/1.73SQ M
GLUCOSE P FAST SERPL-MCNC: 87 MG/DL (ref 65–99)
POTASSIUM SERPL-SCNC: 3.8 MMOL/L (ref 3.5–5.3)
SODIUM SERPL-SCNC: 139 MMOL/L (ref 136–145)

## 2021-05-26 PROCEDURE — 80048 BASIC METABOLIC PNL TOTAL CA: CPT

## 2021-05-26 NOTE — TELEPHONE ENCOUNTER
Prior Auth submitted to [facility] [employer]  Rx entered for sign off if you approve

## 2021-05-26 NOTE — TELEPHONE ENCOUNTER
Jane Saucedo from [place] is inquiring in regards to patient how she is doing taking  Prolia 60 MG/ML [[id number]    See if improvement or stabelization  Jane Saucedo can be reached at  [phone]

## 2021-06-01 ENCOUNTER — TELEPHONE (OUTPATIENT)
Dept: LAB | Facility: HOSPITAL | Age: 61
End: 2021-06-01

## 2021-06-01 RX ORDER — DENOSUMAB 60 MG/ML
60 INJECTION SUBCUTANEOUS ONCE
Qty: 1 ML | Refills: 0 | Status: SHIPPED | OUTPATIENT
Start: 2021-06-01 | End: 2021-11-29 | Stop reason: SDUPTHER

## 2021-06-01 RX ORDER — GUAIFENESIN 600 MG/1
TABLET, EXTENDED RELEASE ORAL
Qty: 5 TABLET | Refills: 11 | Status: SHIPPED | OUTPATIENT
Start: 2021-06-01

## 2021-06-04 ENCOUNTER — CLINICAL SUPPORT (OUTPATIENT)
Dept: FAMILY MEDICINE CLINIC | Facility: CLINIC | Age: 61
End: 2021-06-04

## 2021-06-04 DIAGNOSIS — M81.0 AGE-RELATED OSTEOPOROSIS WITHOUT CURRENT PATHOLOGICAL FRACTURE: ICD-10-CM

## 2021-06-04 DIAGNOSIS — G40.409 TONIC-CLONIC EPILEPTIC SEIZURES (HCC): Primary | ICD-10-CM

## 2021-06-04 PROCEDURE — 96372 THER/PROPH/DIAG INJ SC/IM: CPT

## 2021-06-10 DIAGNOSIS — R05.9 COUGH: ICD-10-CM

## 2021-06-15 RX ORDER — ACETAMINOPHEN 650 MG/1
TABLET, EXTENDED RELEASE ORAL
Qty: 8 TABLET | Refills: 11 | Status: SHIPPED | OUTPATIENT
Start: 2021-06-15

## 2021-06-17 ENCOUNTER — APPOINTMENT (OUTPATIENT)
Dept: LAB | Facility: HOSPITAL | Age: 61
End: 2021-06-17
Payer: COMMERCIAL

## 2021-06-17 DIAGNOSIS — M81.0 AGE-RELATED OSTEOPOROSIS WITHOUT CURRENT PATHOLOGICAL FRACTURE: ICD-10-CM

## 2021-06-17 DIAGNOSIS — G40.409 TONIC-CLONIC EPILEPTIC SEIZURES (HCC): ICD-10-CM

## 2021-06-17 LAB
ANION GAP SERPL CALCULATED.3IONS-SCNC: 6 MMOL/L (ref 4–13)
BUN SERPL-MCNC: 14 MG/DL (ref 5–25)
CALCIUM SERPL-MCNC: 8.8 MG/DL (ref 8.3–10.1)
CHLORIDE SERPL-SCNC: 105 MMOL/L (ref 100–108)
CO2 SERPL-SCNC: 29 MMOL/L (ref 21–32)
CREAT SERPL-MCNC: 0.44 MG/DL (ref 0.6–1.3)
GFR SERPL CREATININE-BSD FRML MDRD: 109 ML/MIN/1.73SQ M
GLUCOSE SERPL-MCNC: 70 MG/DL (ref 65–140)
PHENOBARB SERPL-MCNC: 21.7 UG/ML (ref 15–40)
POTASSIUM SERPL-SCNC: 3.5 MMOL/L (ref 3.5–5.3)
SODIUM SERPL-SCNC: 140 MMOL/L (ref 136–145)

## 2021-06-17 PROCEDURE — 80048 BASIC METABOLIC PNL TOTAL CA: CPT

## 2021-06-17 PROCEDURE — 36415 COLL VENOUS BLD VENIPUNCTURE: CPT

## 2021-06-17 PROCEDURE — 80184 ASSAY OF PHENOBARBITAL: CPT

## 2021-07-02 ENCOUNTER — OFFICE VISIT (OUTPATIENT)
Dept: FAMILY MEDICINE CLINIC | Facility: CLINIC | Age: 61
End: 2021-07-02

## 2021-07-02 VITALS
TEMPERATURE: 97.1 F | OXYGEN SATURATION: 97 % | HEIGHT: 60 IN | WEIGHT: 169 LBS | SYSTOLIC BLOOD PRESSURE: 130 MMHG | BODY MASS INDEX: 33.18 KG/M2 | RESPIRATION RATE: 18 BRPM | HEART RATE: 67 BPM | DIASTOLIC BLOOD PRESSURE: 82 MMHG

## 2021-07-02 DIAGNOSIS — I10 BENIGN ESSENTIAL HYPERTENSION: ICD-10-CM

## 2021-07-02 DIAGNOSIS — K59.00 CONSTIPATION, UNSPECIFIED CONSTIPATION TYPE: ICD-10-CM

## 2021-07-02 DIAGNOSIS — G40.409 TONIC-CLONIC EPILEPTIC SEIZURES (HCC): ICD-10-CM

## 2021-07-02 DIAGNOSIS — G80.0 SPASTIC QUADRIPLEGIC CEREBRAL PALSY (HCC): ICD-10-CM

## 2021-07-02 DIAGNOSIS — Z23 ENCOUNTER FOR IMMUNIZATION: Primary | ICD-10-CM

## 2021-07-02 PROCEDURE — G0439 PPPS, SUBSEQ VISIT: HCPCS | Performed by: FAMILY MEDICINE

## 2021-07-02 PROCEDURE — 3079F DIAST BP 80-89 MM HG: CPT | Performed by: FAMILY MEDICINE

## 2021-07-02 PROCEDURE — 3008F BODY MASS INDEX DOCD: CPT | Performed by: FAMILY MEDICINE

## 2021-07-02 PROCEDURE — 90471 IMMUNIZATION ADMIN: CPT | Performed by: FAMILY MEDICINE

## 2021-07-02 PROCEDURE — 1036F TOBACCO NON-USER: CPT | Performed by: FAMILY MEDICINE

## 2021-07-02 PROCEDURE — 90715 TDAP VACCINE 7 YRS/> IM: CPT | Performed by: FAMILY MEDICINE

## 2021-07-02 PROCEDURE — 3075F SYST BP GE 130 - 139MM HG: CPT | Performed by: FAMILY MEDICINE

## 2021-07-02 PROCEDURE — 3008F BODY MASS INDEX DOCD: CPT | Performed by: STUDENT IN AN ORGANIZED HEALTH CARE EDUCATION/TRAINING PROGRAM

## 2021-07-02 NOTE — PATIENT INSTRUCTIONS
Medicare Preventive Visit Patient Instructions  Thank you for completing your Welcome to Medicare Visit or Medicare Annual Wellness Visit today  Your next wellness visit will be due in one year (7/3/2022)  The screening/preventive services that you may require over the next 5-10 years are detailed below  Some tests may not apply to you based off risk factors and/or age  Screening tests ordered at today's visit but not completed yet may show as past due  Also, please note that scanned in results may not display below  Preventive Screenings:  Service Recommendations Previous Testing/Comments   Colorectal Cancer Screening  * Colonoscopy    * Fecal Occult Blood Test (FOBT)/Fecal Immunochemical Test (FIT)  * Fecal DNA/Cologuard Test  * Flexible Sigmoidoscopy Age: 54-65 years old   Colonoscopy: every 10 years (may be performed more frequently if at higher risk)  OR  FOBT/FIT: every 1 year  OR  Cologuard: every 3 years  OR  Sigmoidoscopy: every 5 years  Screening may be recommended earlier than age 48 if at higher risk for colorectal cancer  Also, an individualized decision between you and your healthcare provider will decide whether screening between the ages of 74-80 would be appropriate  Colonoscopy: 04/18/2012  FOBT/FIT: Not on file  Cologuard: Not on file  Sigmoidoscopy: Not on file          Breast Cancer Screening Age: 36 years old  Frequency: every 1-2 years  Not required if history of left and right mastectomy Mammogram: 02/27/2020        Cervical Cancer Screening Between the ages of 21-29, pap smear recommended once every 3 years  Between the ages of 33-67, can perform pap smear with HPV co-testing every 5 years     Recommendations may differ for women with a history of total hysterectomy, cervical cancer, or abnormal pap smears in past  Pap Smear: 02/12/2020        Hepatitis C Screening Once for adults born between 1945 and 1965  More frequently in patients at high risk for Hepatitis C Hep C Antibody: 01/25/2019        Diabetes Screening 1-2 times per year if you're at risk for diabetes or have pre-diabetes Fasting glucose: 87 mg/dL   A1C: No results in last 5 years        Cholesterol Screening Once every 5 years if you don't have a lipid disorder  May order more often based on risk factors  Lipid panel: 12/29/2020          Other Preventive Screenings Covered by Medicare:  1  Abdominal Aortic Aneurysm (AAA) Screening: covered once if your at risk  You're considered to be at risk if you have a family history of AAA  2  Lung Cancer Screening: covers low dose CT scan once per year if you meet all of the following conditions: (1) Age 50-69; (2) No signs or symptoms of lung cancer; (3) Current smoker or have quit smoking within the last 15 years; (4) You have a tobacco smoking history of at least 30 pack years (packs per day multiplied by number of years you smoked); (5) You get a written order from a healthcare provider  3  Glaucoma Screening: covered annually if you're considered high risk: (1) You have diabetes OR (2) Family history of glaucoma OR (3)  aged 48 and older OR (3)  American aged 72 and older  3  Osteoporosis Screening: covered every 2 years if you meet one of the following conditions: (1) You're estrogen deficient and at risk for osteoporosis based off medical history and other findings; (2) Have a vertebral abnormality; (3) On glucocorticoid therapy for more than 3 months; (4) Have primary hyperparathyroidism; (5) On osteoporosis medications and need to assess response to drug therapy  · Last bone density test (DXA Scan): 10/06/2020   5  HIV Screening: covered annually if you're between the age of 15-65  Also covered annually if you are younger than 13 and older than 72 with risk factors for HIV infection  For pregnant patients, it is covered up to 3 times per pregnancy      Immunizations:  Immunization Recommendations   Influenza Vaccine Annual influenza vaccination during flu season is recommended for all persons aged >= 6 months who do not have contraindications   Pneumococcal Vaccine (Prevnar and Pneumovax)  * Prevnar = PCV13  * Pneumovax = PPSV23   Adults 25-60 years old: 1-3 doses may be recommended based on certain risk factors  Adults 72 years old: Prevnar (PCV13) vaccine recommended followed by Pneumovax (PPSV23) vaccine  If already received PPSV23 since turning 65, then PCV13 recommended at least one year after PPSV23 dose  Hepatitis B Vaccine 3 dose series if at intermediate or high risk (ex: diabetes, end stage renal disease, liver disease)   Tetanus (Td) Vaccine - COST NOT COVERED BY MEDICARE PART B Following completion of primary series, a booster dose should be given every 10 years to maintain immunity against tetanus  Td may also be given as tetanus wound prophylaxis  Tdap Vaccine - COST NOT COVERED BY MEDICARE PART B Recommended at least once for all adults  For pregnant patients, recommended with each pregnancy  Shingles Vaccine (Shingrix) - COST NOT COVERED BY MEDICARE PART B  2 shot series recommended in those aged 48 and above     Health Maintenance Due:      Topic Date Due    Cervical Cancer Screening  01/17/2022    MAMMOGRAM  02/27/2022    Colorectal Cancer Screening  04/18/2022    HIV Screening  Completed    Hepatitis C Screening  Completed     Immunizations Due:      Topic Date Due    COVID-19 Vaccine (1) Never done    Influenza Vaccine (1) 09/01/2021     Advance Directives   What are advance directives? Advance directives are legal documents that state your wishes and plans for medical care  These plans are made ahead of time in case you lose your ability to make decisions for yourself  Advance directives can apply to any medical decision, such as the treatments you want, and if you want to donate organs  What are the types of advance directives? There are many types of advance directives, and each state has rules about how to use them   You may choose a combination of any of the following:  · Living will: This is a written record of the treatment you want  You can also choose which treatments you do not want, which to limit, and which to stop at a certain time  This includes surgery, medicine, IV fluid, and tube feedings  · Durable power of  for healthcare [place] [facility]): This is a written record that states who you want to make healthcare choices for you when you are unable to make them for yourself  This person, called a proxy, is usually a family member or a friend  You may choose more than 1 proxy  · Do not resuscitate (DNR) order:  A DNR order is used in case your heart stops beating or you stop breathing  It is a request not to have certain forms of treatment, such as CPR  A DNR order may be included in other types of advance directives  · Medical directive: This covers the care that you want if you are in a coma, near death, or unable to make decisions for yourself  You can list the treatments you want for each condition  Treatment may include pain medicine, surgery, blood transfusions, dialysis, IV or tube feedings, and a ventilator (breathing machine)  · Values history: This document has questions about your views, beliefs, and how you feel and think about life  This information can help others choose the care that you would choose  Why are advance directives important? An advance directive helps you control your care  Although spoken wishes may be used, it is better to have your wishes written down  Spoken wishes can be misunderstood, or not followed  Treatments may be given even if you do not want them  An advance directive may make it easier for your family to make difficult choices about your care  © Copyright [employer] 2018 Information is for End User's use only and may not be sold, redistributed or otherwise used for commercial purposes   All illustrations and images included in CareNotes® are the copyrighted property of A D A M , Inc  or [facility] Preventive Visit Patient Instructions  Thank you for completing your Welcome to Medicare Visit or Medicare Annual Wellness Visit today  Your next wellness visit will be due in one year (7/3/2022)  The screening/preventive services that you may require over the next 5-10 years are detailed below  Some tests may not apply to you based off risk factors and/or age  Screening tests ordered at today's visit but not completed yet may show as past due  Also, please note that scanned in results may not display below  Preventive Screenings:  Service Recommendations Previous Testing/Comments   Colorectal Cancer Screening  * Colonoscopy    * Fecal Occult Blood Test (FOBT)/Fecal Immunochemical Test (FIT)  * Fecal DNA/Cologuard Test  * Flexible Sigmoidoscopy Age: 54-65 years old   Colonoscopy: every 10 years (may be performed more frequently if at higher risk)  OR  FOBT/FIT: every 1 year  OR  Cologuard: every 3 years  OR  Sigmoidoscopy: every 5 years  Screening may be recommended earlier than age 48 if at higher risk for colorectal cancer  Also, an individualized decision between you and your healthcare provider will decide whether screening between the ages of 74-80 would be appropriate  Colonoscopy: 04/18/2012  FOBT/FIT: Not on file  Cologuard: Not on file  Sigmoidoscopy: Not on file    Screening Current     Breast Cancer Screening Age: 36 years old  Frequency: every 1-2 years  Not required if history of left and right mastectomy Mammogram: 02/27/2020    Screening Current   Cervical Cancer Screening Between the ages of 21-29, pap smear recommended once every 3 years  Between the ages of 33-67, can perform pap smear with HPV co-testing every 5 years     Recommendations may differ for women with a history of total hysterectomy, cervical cancer, or abnormal pap smears in past  Pap Smear: 02/12/2020    Screening Current   Hepatitis C Screening Once for adults born between [facility]  More frequently in patients at high risk for Hepatitis C Hep C Antibody: 01/25/2019    Screening Current   Diabetes Screening 1-2 times per year if you're at risk for diabetes or have pre-diabetes Fasting glucose: 87 mg/dL   A1C: No results in last 5 years    Screening Current   Cholesterol Screening Once every 5 years if you don't have a lipid disorder  May order more often based on risk factors  Lipid panel: 12/29/2020    Screening Not Indicated  History Lipid Disorder     Other Preventive Screenings Covered by Medicare:  6  Abdominal Aortic Aneurysm (AAA) Screening: covered once if your at risk  You're considered to be at risk if you have a family history of AAA  7  Lung Cancer Screening: covers low dose CT scan once per year if you meet all of the following conditions: (1) Age 50-69; (2) No signs or symptoms of lung cancer; (3) Current smoker or have quit smoking within the last 15 years; (4) You have a tobacco smoking history of at least 30 pack years (packs per day multiplied by number of years you smoked); (5) You get a written order from a healthcare provider  8  Glaucoma Screening: covered annually if you're considered high risk: (1) You have diabetes OR (2) Family history of glaucoma OR (3)  aged 48 and older OR (3)  American aged 72 and older  5  Osteoporosis Screening: covered every 2 years if you meet one of the following conditions: (1) You're estrogen deficient and at risk for osteoporosis based off medical history and other findings; (2) Have a vertebral abnormality; (3) On glucocorticoid therapy for more than 3 months; (4) Have primary hyperparathyroidism; (5) On osteoporosis medications and need to assess response to drug therapy  · Last bone density test (DXA Scan): 10/06/2020  10  HIV Screening: covered annually if you're between the age of 12-76   Also covered annually if you are younger than 13 and older than 72 with risk factors for HIV infection  For pregnant patients, it is covered up to 3 times per pregnancy  Immunizations:  Immunization Recommendations   Influenza Vaccine Annual influenza vaccination during flu season is recommended for all persons aged >= 6 months who do not have contraindications   Pneumococcal Vaccine (Prevnar and Pneumovax)  * Prevnar = PCV13  * Pneumovax = PPSV23   Adults 25-60 years old: 1-3 doses may be recommended based on certain risk factors  Adults 72 years old: Prevnar (PCV13) vaccine recommended followed by Pneumovax (PPSV23) vaccine  If already received PPSV23 since turning 65, then PCV13 recommended at least one year after PPSV23 dose  Hepatitis B Vaccine 3 dose series if at intermediate or high risk (ex: diabetes, end stage renal disease, liver disease)   Tetanus (Td) Vaccine - COST NOT COVERED BY MEDICARE PART B Following completion of primary series, a booster dose should be given every 10 years to maintain immunity against tetanus  Td may also be given as tetanus wound prophylaxis  Tdap Vaccine - COST NOT COVERED BY MEDICARE PART B Recommended at least once for all adults  For pregnant patients, recommended with each pregnancy  Shingles Vaccine (Shingrix) - COST NOT COVERED BY MEDICARE PART B  2 shot series recommended in those aged 48 and above     Health Maintenance Due:      Topic Date Due    Cervical Cancer Screening  01/17/2022    MAMMOGRAM  02/27/2022    Colorectal Cancer Screening  04/18/2022    HIV Screening  Completed    Hepatitis C Screening  Completed     Immunizations Due:      Topic Date Due    COVID-19 Vaccine (1) Never done    Influenza Vaccine (1) 09/01/2021     Advance Directives   What are advance directives? Advance directives are legal documents that state your wishes and plans for medical care  These plans are made ahead of time in case you lose your ability to make decisions for yourself   Advance directives can apply to any medical decision, such as the treatments you want, and if you want to donate organs  What are the types of advance directives? There are many types of advance directives, and each state has rules about how to use them  You may choose a combination of any of the following:  · Living will: This is a written record of the treatment you want  You can also choose which treatments you do not want, which to limit, and which to stop at a certain time  This includes surgery, medicine, IV fluid, and tube feedings  · Durable power of  for healthcare [place] [facility]): This is a written record that states who you want to make healthcare choices for you when you are unable to make them for yourself  This person, called a proxy, is usually a family member or a friend  You may choose more than 1 proxy  · Do not resuscitate (DNR) order:  A DNR order is used in case your heart stops beating or you stop breathing  It is a request not to have certain forms of treatment, such as CPR  A DNR order may be included in other types of advance directives  · Medical directive: This covers the care that you want if you are in a coma, near death, or unable to make decisions for yourself  You can list the treatments you want for each condition  Treatment may include pain medicine, surgery, blood transfusions, dialysis, IV or tube feedings, and a ventilator (breathing machine)  · Values history: This document has questions about your views, beliefs, and how you feel and think about life  This information can help others choose the care that you would choose  Why are advance directives important? An advance directive helps you control your care  Although spoken wishes may be used, it is better to have your wishes written down  Spoken wishes can be misunderstood, or not followed  Treatments may be given even if you do not want them  An advance directive may make it easier for your family to make difficult choices about your care        © Copyright [employer] 2018 Information is for End User's use only and may not be sold, redistributed or otherwise used for commercial purposes   All illustrations and images included in CareNotes® are the copyrighted property of A D A M , Inc  or Raymond Herbert

## 2021-07-02 NOTE — ASSESSMENT & PLAN NOTE
Stable   continue baclofen   needs a new  We willchair evaluation and treat order at [facility]   script given to the caretaker   will follow-up as needed

## 2021-07-02 NOTE — PROGRESS NOTES
Assessment/Plan:    Benign essential hypertension   Stable   blood pressure today 130/82   at goal   continue current regimen   follow-up as needed    Tonic-clonic epileptic seizures ([facility] [address] )   Stable   no seizures since 01/17/2021   continue current regimen   reports medication compliance    Constipation   Stable   regular bowel movements   continue  daily MiraLax     Spastic quadriplegic cerebral palsy (HCC)   Stable   continue baclofen   needs a new  We willchair evaluation and treat order at [facility]   script given to the caretaker   will follow-up as needed       Diagnoses and all orders for this visit:    Encounter for immunization  -     TDAP VACCINE GREATER THAN OR EQUAL TO 8YO IM    Spastic quadriplegic cerebral palsy ([address] )    Benign essential hypertension    Tonic-clonic epileptic seizures ([facility] [address] )    Constipation, unspecified constipation type        Subjective:      Patient ID: Bethel Kirkpatrick is a 63 y o  female  Patient is here for follow-up of chronic medical conditions and as well as Medicare annual wellness exam   Patient is accompanied by her caretaker who provided the history  Patient has no recent complaints of headaches, shortness of breath, chest pain, changes in bowel or bladder habits  Patient needs a new wheelchair evaluation and treat order, script given per request   Patient is compliant with her medications  Blood pressures have  remained at goal   Having regular bowel movements  The following portions of the patient's history were reviewed and updated as appropriate:   She  has a past medical history of Bowel incontinence, Constipation, Generalized convulsive seizure ([address] ), Hyperlipidemia, Osteoporosis, Spastic quadriplegic cerebral palsy ([address] ), Urinary incontinence, and Vitamin D deficiency    She   Patient Active Problem List    Diagnosis Date Noted    Elevated TSH 03/02/2021    Close exposure to COVID-19 virus 01/11/2021    Encounter for preoperative dental examination 11/10/2020    Continuous leakage of urine 06/03/2019    Breast cancer screening 12/03/2018    Encounter for hepatitis C screening test for low risk patient 04/23/2018    Healthcare maintenance 04/23/2018    Women's annual routine gynecological examination 01/31/2018    Wheelchair bound 01/31/2018    Esophageal reflux 05/19/2014    Spastic quadriplegic cerebral palsy ([facility] [address] ) 04/24/2014    Insomnia 05/08/2013    Benign essential hypertension 11/14/2012    Hyperlipidemia 11/14/2012    Osteoporosis 11/14/2012    Tonic-clonic epileptic seizures ([facility] [address] ) 11/14/2012    Vitamin D deficiency 05/15/2012    Bowel incontinence 12/20/2011    Constipation 12/01/2011    Severe intellectual disability 07[birth year]29/1961     She  has a past surgical history that includes Colonoscopy; Eye surgery (Left); and Abscess drainage  Her family history includes Breast cancer (age of onset: 79) in her maternal aunt; Coronary artery disease in her mother; Heart disease in her maternal grandfather, maternal grandmother, and paternal grandmother; Kidney cancer in her maternal grandmother; Lymphoma in her mother; Other in her mother; Prostate cancer (age of onset: 76) in her father; Seizures in her mother; Skin cancer in her father  She  reports that she has never smoked  She has never used smokeless tobacco  She reports that she does not drink alcohol and does not use drugs    Current Outpatient Medications   Medication Sig Dispense Refill    baclofen 10 mg tablet Take 1 tablet (10 mg total) by mouth 2 (two) times a day 60 tablet 11    benzonatate (TESSALON PERLES) 100 mg capsule Take 1 capsule by mouth every 8 hours as needed for dry cough 20 capsule 0    Calcium Citrate 200 MG TABS Take by mouth 3 TABS TWICE A DAY       carbamide peroxide (DEBROX) 6 5 % otic solution Instill 5 drops in affected ear twice daily for 4 days as needed for ear wax 15 mL 0    cholecalciferol (VITAMIN D3) 1,000 units tablet Take 1 capsule by mouth daily  Cholecalciferol (Vitamin D3) 25 MCG (1000 UT) CAPS       clotrimazole-betamethasone (LOTRISONE) 1-0 05 % cream Apply twice daily as needed for skin irritation from diaper 30 g 0    denosumab (Prolia) 60 mg/mL Inject 1 mL (60 mg total) under the skin once for 1 dose 1 mL 0    famotidine (PEPCID) 20 mg tablet Take 1 tablet (20 mg total) by mouth daily at bedtime  0    fluticasone (FLONASE) 50 mcg/act nasal spray USE 1 SPRAY IN EACH NOSTRIL TWICE A DAY (RHINITIS) 16 g 10    GNP 8 Hour Arthritis Relief 650 MG CR tablet TAKE 1 TABLET BY MOUTH EVERY 6 HOURS AS NEEDED FOR MILD PAIN OR TEMPERATURE  >100 4 8 tablet 11    hydrochlorothiazide (HYDRODIURIL) 25 mg tablet Take 0 5 tablets (12 5 mg total) by mouth daily 30 tablet 5    ibuprofen (MOTRIN) 600 mg tablet Take by mouth every 6 (six) hours as needed for fever Fever greater than 101 4F      ipratropium (ATROVENT) 0 06 % nasal spray 2 sprays into each nostril 3 (three) times a day as needed for rhinitis 15 mL 0    loratadine (CLARITIN) 10 mg tablet TAKE 1 TABLET BY MOUTH DAILY (ALLERGIC RHINITIS) 28 tablet 10    LORazepam (ATIVAN) 0 5 mg tablet Take 1 tablet (0 5 mg total) by mouth once for 1 dose May repeat in 30 min   2 tablet 0    losartan (COZAAR) 100 MG tablet       losartan (COZAAR) 50 mg tablet Take 1 tablet (50 mg total) by mouth daily 30 tablet 5    metoprolol succinate (TOPROL-XL) 100 mg 24 hr tablet Take 1 tablet by mouth daily  0    Mucus Relief 600 MG 12 hr tablet TAKE 1 TABLET BY MOUTH EVERY 12 HOURS AS NEEDED FOR CONGESTION **DO NOT CRUSH** 5 tablet 11    olopatadine HCl (PATADAY) 0 2 % opth drops Instill 1 drop into affected eye(s) daily PRN irritation  0    PHENobarbital 32 4 mg tablet Take 3 tablets by mouth at 4 PM daily 90 tablet 5    polyethylene glycol (MIRALAX) powder Take 17 g by mouth as needed for constipation One tsp In 4oz of fluid if no BM for 3 days      Polyethylene Glycol 3350 (MIRALAX PO) Take 1 packet by mouth daily In 8 ounces of fluid      povidone-iodine (BETADINE) 10 % ointment Apply topically 2 (two) times a day as needed      simvastatin (ZOCOR) 20 mg tablet Take 1 tablet by mouth daily  0    Vitamins A & D (VITAMIN A & D) ointment Apply topically      zolpidem (AMBIEN) 5 mg tablet Take 0 5 tablets (2 5 mg total) by mouth daily 15 tablet 5     No current facility-administered medications for this visit       Current Outpatient Medications on File Prior to Visit   Medication Sig    baclofen 10 mg tablet Take 1 tablet (10 mg total) by mouth 2 (two) times a day    benzonatate (TESSALON PERLES) 100 mg capsule Take 1 capsule by mouth every 8 hours as needed for dry cough    Calcium Citrate 200 MG TABS Take by mouth 3 TABS TWICE A DAY     carbamide peroxide (DEBROX) 6 5 % otic solution Instill 5 drops in affected ear twice daily for 4 days as needed for ear wax    cholecalciferol (VITAMIN D3) 1,000 units tablet Take 1 capsule by mouth daily    Cholecalciferol (Vitamin D3) 25 MCG (1000 UT) CAPS     clotrimazole-betamethasone (LOTRISONE) 1-0 05 % cream Apply twice daily as needed for skin irritation from diaper    denosumab (Prolia) 60 mg/mL Inject 1 mL (60 mg total) under the skin once for 1 dose    famotidine (PEPCID) 20 mg tablet Take 1 tablet (20 mg total) by mouth daily at bedtime    fluticasone (FLONASE) 50 mcg/act nasal spray USE 1 SPRAY IN EACH NOSTRIL TWICE A DAY (RHINITIS)    GNP 8 Hour Arthritis Relief 650 MG CR tablet TAKE 1 TABLET BY MOUTH EVERY 6 HOURS AS NEEDED FOR MILD PAIN OR TEMPERATURE  >100 4    hydrochlorothiazide (HYDRODIURIL) 25 mg tablet Take 0 5 tablets (12 5 mg total) by mouth daily    ibuprofen (MOTRIN) 600 mg tablet Take by mouth every 6 (six) hours as needed for fever Fever greater than 101 4F    ipratropium (ATROVENT) 0 06 % nasal spray 2 sprays into each nostril 3 (three) times a day as needed for rhinitis    loratadine (CLARITIN) 10 mg tablet TAKE 1 TABLET BY MOUTH DAILY (ALLERGIC RHINITIS)    LORazepam (ATIVAN) 0 5 mg tablet Take 1 tablet (0 5 mg total) by mouth once for 1 dose May repeat in 30 min   losartan (COZAAR) 100 MG tablet     losartan (COZAAR) 50 mg tablet Take 1 tablet (50 mg total) by mouth daily    metoprolol succinate (TOPROL-XL) 100 mg 24 hr tablet Take 1 tablet by mouth daily    Mucus Relief 600 MG 12 hr tablet TAKE 1 TABLET BY MOUTH EVERY 12 HOURS AS NEEDED FOR CONGESTION **DO NOT CRUSH**    olopatadine HCl (PATADAY) 0 2 % opth drops Instill 1 drop into affected eye(s) daily PRN irritation    PHENobarbital 32 4 mg tablet Take 3 tablets by mouth at 4 PM daily    polyethylene glycol (MIRALAX) powder Take 17 g by mouth as needed for constipation One tsp In 4oz of fluid if no BM for 3 days    Polyethylene Glycol 3350 (MIRALAX PO) Take 1 packet by mouth daily In 8 ounces of fluid    povidone-iodine (BETADINE) 10 % ointment Apply topically 2 (two) times a day as needed    simvastatin (ZOCOR) 20 mg tablet Take 1 tablet by mouth daily    Vitamins A & D (VITAMIN A & D) ointment Apply topically    zolpidem (AMBIEN) 5 mg tablet Take 0 5 tablets (2 5 mg total) by mouth daily     No current facility-administered medications on file prior to visit  She is allergic to other       Review of Systems   Constitutional: Negative for chills and fever  HENT: Negative for ear pain and sore throat  Eyes: Negative for pain and visual disturbance  Respiratory: Negative for cough and shortness of breath  Cardiovascular: Negative for chest pain and palpitations  Gastrointestinal: Negative for abdominal pain and vomiting  Genitourinary: Negative for dysuria and hematuria  Musculoskeletal: Negative for arthralgias and back pain  Skin: Negative for color change and rash  Neurological: Negative for seizures and syncope  Psychiatric/Behavioral:        Id at baseline   All other systems reviewed and are negative          Objective:      /82 (BP Location: Left arm, Patient Position: Sitting, Cuff Size: Standard)   Pulse 67   Temp (!) 97 1 °F (36 2 °C) (Tympanic)   Resp 18   Ht 5' (1 524 m)   Wt 76 7 kg (169 lb)   LMP 02/28/2010 (Approximate)   SpO2 97%   BMI 33 01 kg/m²          Physical Exam  Vitals reviewed  Exam conducted with a chaperone present  Constitutional:       General: She is not in acute distress  Appearance: She is not ill-appearing  HENT:      Head: Normocephalic and atraumatic  Right Ear: Tympanic membrane, ear canal and external ear normal  There is no impacted cerumen  Left Ear: Tympanic membrane, ear canal and external ear normal  There is no impacted cerumen  Mouth/Throat:      Mouth: Mucous membranes are moist    Eyes:      Conjunctiva/sclera: Conjunctivae normal    Cardiovascular:      Rate and Rhythm: Normal rate and regular rhythm  Pulses: Normal pulses  Heart sounds: Normal heart sounds  Pulmonary:      Effort: Pulmonary effort is normal       Breath sounds: Normal breath sounds  Abdominal:      General: Bowel sounds are normal  There is no distension  Palpations: Abdomen is soft  Tenderness: There is no abdominal tenderness  Musculoskeletal:         General: Deformity (  At baseline) present  No swelling, tenderness or signs of injury  Cervical back: Normal range of motion  Right lower leg: No edema  Left lower leg: No edema  Skin:     General: Skin is warm and dry  Capillary Refill: Capillary refill takes less than 2 seconds     Neurological:      Comments: Spastic  quadriplegia at baseline   Psychiatric:      Comments: Alert and oriented to time place and person today

## 2021-07-02 NOTE — PROGRESS NOTES
Assessment and Plan:     Problem List Items Addressed This Visit        Cardiovascular and Mediastinum    Benign essential hypertension      Stable   blood pressure today 130/82   at goal   continue current regimen   follow-up as needed            Nervous and Auditory    Spastic quadriplegic cerebral palsy (HCC)      Stable   continue baclofen   needs a new  We willchair evaluation and treat order at [facility]   script given to the caretaker   will follow-up as needed         Tonic-clonic epileptic seizures ([facility] )      Stable   no seizures since 01/17/2021   continue current regimen   reports medication compliance            Other    Constipation      Stable   regular bowel movements   continue  daily MiraLax            Other Visit Diagnoses     Encounter for immunization    -  Primary    Relevant Orders    TDAP VACCINE GREATER THAN OR EQUAL TO 8YO IM (Completed)        BMI Counseling: Body mass index is 33 01 kg/m²  The BMI is above normal  Nutrition recommendations include encouraging healthy choices of fruits and vegetables and consuming healthier snacks  Exercise recommendations include strength training exercises  No pharmacotherapy was ordered  Preventive health issues were discussed with patient, and age appropriate screening tests were ordered as noted in patient's After Visit Summary  Personalized health advice and appropriate referrals for health education or preventive services given if needed, as noted in patient's After Visit Summary       History of Present Illness:     Patient presents for Medicare Annual Wellness visit    Patient Care Team:  Maria Alcantar MD as PCP - General (Family Medicine)     Problem List:     Patient Active Problem List   Diagnosis    Women's annual routine gynecological examination    Wheelchair bound    Benign essential hypertension    Esophageal reflux    Hyperlipidemia    Insomnia    Osteoporosis    Spastic quadriplegic cerebral palsy ([facility] )    [facility]    Encounter for hepatitis C screening test for low risk patient   [facility] maintenance    Breast cancer screening    Continuous leakage of urine    Bowel incontinence    Constipation    Severe intellectual disability    Vitamin D deficiency    Encounter for preoperative dental examination    Close exposure to COVID-19 virus    Elevated TSH      Past Medical and Surgical History:     Past Medical History:   Diagnosis Date    Bowel incontinence     Constipation     Last Assessed: [id number]    Generalized convulsive seizure ([facility] )     Hyperlipidemia     Osteoporosis     Spastic quadriplegic cerebral palsy ([facility] )     Urinary incontinence     Vitamin D deficiency     Last Assessed: [id number]     Past Surgical History:   Procedure Laterality Date    ABSCESS DRAINAGE      Incision and Drainage of skin abscess back    COLONOSCOPY      Fiberoptic; Last Assessed: [id number]    EYE SURGERY Left     Strabismus Left Eye      Family History:     Family History   Problem Relation Age of Onset    Lymphoma Mother         Bone Marrow    Coronary artery disease Mother     Seizures Mother         Epilepsy and recurrent seizures    Other Mother         Mitral Stenosis    Prostate cancer Father 76    Skin cancer Father     Heart disease Maternal Grandmother     Kidney cancer Maternal Grandmother     Heart disease Maternal Grandfather     Heart disease Paternal Grandmother     Breast cancer Maternal Aunt 79      Social History:     Social History     Socioeconomic History    Marital status: Single     Spouse name: None    Number of children: None    Years of education: None    Highest education level: High school graduate   Occupational History    Occupation: none   Tobacco Use    Smoking status: Never Smoker    Smokeless tobacco: Never Used   Vaping Use    Vaping Use: Never used   Substance and Sexual Activity    Alcohol use: No    Drug use: No    Sexual activity: Never   Other Topics Concern    None   Social History Narrative    Caffeine use    Wheelchair dependent     Social Determinants of Health     Financial Resource Strain:     Difficulty of Paying Living Expenses:    Food Insecurity:     Worried About Running Out of Food in the Last Year:     [address] in the Last Year:    Transportation Needs:     Lack of Transportation (Medical):      Lack of Transportation (Non-Medical):    Physical Activity:     Days of Exercise per Week:     Minutes of Exercise per Session:    Stress:     Feeling of Stress :    Social Connections:     Frequency of Communication with Friends and Family:     Frequency of Social Gatherings with Friends and Family:     Attends Congregational Services:     Active Member of Clubs or Organizations:     Attends Club or Organization Meetings:     Marital Status:    Intimate Partner Violence:     Fear of Current or Ex-Partner:     Emotionally Abused:     Physically Abused:     Sexually Abused:       Medications and Allergies:     Current Outpatient Medications   Medication Sig Dispense Refill    baclofen 10 mg tablet Take 1 tablet (10 mg total) by mouth 2 (two) times a day 60 tablet 11    benzonatate (TESSALON PERLES) 100 mg capsule Take 1 capsule by mouth every 8 hours as needed for dry cough 20 capsule 0    Calcium Citrate 200 MG TABS Take by mouth 3 TABS TWICE A DAY       carbamide peroxide (DEBROX) 6 5 % otic solution Instill 5 drops in affected ear twice daily for 4 days as needed for ear wax 15 mL 0    cholecalciferol (VITAMIN D3) 1,000 units tablet Take 1 capsule by mouth daily      Cholecalciferol (Vitamin D3) 25 MCG (1000 UT) CAPS       clotrimazole-betamethasone (LOTRISONE) 1-0 05 % cream Apply twice daily as needed for skin irritation from diaper 30 g 0    denosumab (Prolia) 60 mg/mL Inject 1 mL (60 mg total) under the skin once for 1 dose 1 mL 0    famotidine (PEPCID) 20 mg tablet Take 1 tablet (20 mg total) by mouth daily at bedtime  0    fluticasone (FLONASE) 50 mcg/act nasal spray USE 1 SPRAY IN EACH NOSTRIL TWICE A DAY (RHINITIS) 16 g 10    GNP 8 Hour Arthritis Relief 650 MG CR tablet TAKE 1 TABLET BY MOUTH EVERY 6 HOURS AS NEEDED FOR MILD PAIN OR TEMPERATURE  >100 4 8 tablet 11    hydrochlorothiazide (HYDRODIURIL) 25 mg tablet Take 0 5 tablets (12 5 mg total) by mouth daily 30 tablet 5    ibuprofen (MOTRIN) 600 mg tablet Take by mouth every 6 (six) hours as needed for fever Fever greater than 101 4F      ipratropium (ATROVENT) 0 06 % nasal spray 2 sprays into each nostril 3 (three) times a day as needed for rhinitis 15 mL 0    loratadine (CLARITIN) 10 mg tablet TAKE 1 TABLET BY MOUTH DAILY (ALLERGIC RHINITIS) 28 tablet 10    LORazepam (ATIVAN) 0 5 mg tablet Take 1 tablet (0 5 mg total) by mouth once for 1 dose May repeat in 30 min   2 tablet 0    losartan (COZAAR) 100 MG tablet       losartan (COZAAR) 50 mg tablet Take 1 tablet (50 mg total) by mouth daily 30 tablet 5    metoprolol succinate (TOPROL-XL) 100 mg 24 hr tablet Take 1 tablet by mouth daily  0    Mucus Relief 600 MG 12 hr tablet TAKE 1 TABLET BY MOUTH EVERY 12 HOURS AS NEEDED FOR CONGESTION **DO NOT CRUSH** 5 tablet 11    olopatadine HCl (PATADAY) 0 2 % opth drops Instill 1 drop into affected eye(s) daily PRN irritation  0    PHENobarbital 32 4 mg tablet Take 3 tablets by mouth at 4 PM daily 90 tablet 5    polyethylene glycol (MIRALAX) powder Take 17 g by mouth as needed for constipation One tsp In 4oz of fluid if no BM for 3 days      Polyethylene Glycol 3350 (MIRALAX PO) Take 1 packet by mouth daily In 8 ounces of fluid      povidone-iodine (BETADINE) 10 % ointment Apply topically 2 (two) times a day as needed      simvastatin (ZOCOR) 20 mg tablet Take 1 tablet by mouth daily  0    Vitamins A & D (VITAMIN A & D) ointment Apply topically      zolpidem (AMBIEN) 5 mg tablet Take 0 5 tablets (2 5 mg total) by mouth daily 15 tablet 5 No current facility-administered medications for this visit  Allergies   Allergen Reactions    Other      Seasonal Allergies      Immunizations:     Immunization History   Administered Date(s) Administered    Hep B, adult 02/24/2015, 04/09/2015, 09/11/2015    Influenza Quadrivalent Preservative Free 3 years and older IM 10/31/2014, 11/06/2015    Influenza Quadrivalent, 6-35 Months IM 11/08/2016    Influenza, injectable, quadrivalent, preservative free 0 5 mL 11/01/2018    Influenza, recombinant, quadrivalent,injectable, preservative free 10/29/2019, 10/09/2020    Influenza, seasonal, injectable 11/14/2012, 11/18/2013    Tdap 12/06/2011, 07/02/2021    Tuberculin Skin Test-PPD Intradermal 02/13/2013, 12/02/2014, 08/11/2016, 07/23/2018, 06/24/2020    Zoster Vaccine Recombinant 07/14/2020, 03/16/2021      Health Maintenance:         Topic Date Due    Cervical Cancer Screening  01/17/2022    MAMMOGRAM  02/27/2022    Colorectal Cancer Screening  04/18/2022    HIV Screening  Completed    Hepatitis C Screening  Completed         Topic Date Due    COVID-19 Vaccine (1) Never done    Influenza Vaccine (1) 09/01/2021      Medicare Health Risk Assessment:     /82 (BP Location: Left arm, Patient Position: Sitting, Cuff Size: Standard)   Pulse 67   Temp (!) 97 1 °F (36 2 °C) (Tympanic)   Resp 18   Ht 5' (1 524 m)   Wt 76 7 kg (169 lb)   LMP 02/28/2010 (Approximate)   SpO2 97%   BMI 33 01 kg/m²      Reji Sweeney is here for her Subsequent Wellness visit  Last Medicare Wellness visit information reviewed, patient interviewed and updates made to the record today  Health Risk Assessment:   Patient rates overall health as good  Patient feels that their physical health rating is same  Patient is satisfied with their life  Eyesight was rated as same  Hearing was rated as same  Patient feels that their emotional and mental health rating is same  Patients states they are never, rarely angry   Patient states they are never, rarely unusually tired/fatigued  Pain experienced in the last 7 days has been none  Patient states that she has experienced no weight loss or gain in last 6 months  Fall Risk Screening: In the past year, patient has experienced: no history of falling in past year      Urinary Incontinence Screening:   Patient has leaked urine accidently in the last six months  Home Safety:  Patient has trouble with stairs inside or outside of their home  Patient has working smoke alarms and has working carbon monoxide detector  Home safety hazards include: none  Nutrition:   Current diet is No Added Salt and Low Saturated Fat  Medications:   Patient is currently taking over-the-counter supplements  OTC medications include: see medication list  Patient is not able to manage medications  Activities of Daily Living (ADLs)/Instrumental Activities of Daily Living (IADLs):   Walk and transfer into and out of bed and chair?: No  Dress and groom yourself?: No    Bathe or shower yourself?: No    Feed yourself?  Yes  Do your laundry/housekeeping?: No  Manage your money, pay your bills and track your expenses?: No  Make your own meals?: No    Do your own shopping?: No    Previous Hospitalizations:   Any hospitalizations or ED visits within the last 12 months?: No      Advance Care Planning:   Living will: No      PREVENTIVE SCREENINGS      Cardiovascular Screening:    General: Screening Not Indicated and History Lipid Disorder      Diabetes Screening:     General: Screening Current      Colorectal Cancer Screening:     General: Screening Current      Breast Cancer Screening:     General: Screening Current      Cervical Cancer Screening:    General: Screening Current      Osteoporosis Screening:    General: Screening Not Indicated and History Osteoporosis      Lung Cancer Screening:     General: Screening Not Indicated      Hepatitis C Screening:    General: Screening Current    Screening, Brief Intervention, and Referral to Treatment (SBIRT)    Screening  Typical number of drinks in a day: 0  Typical number of drinks in a week: 0  Interpretation: Low risk drinking behavior  Single Item Drug Screening:  How often have you used an illegal drug (including marijuana) or a prescription medication for non-medical reasons in the past year? never    Single Item Drug Screen Score: 0  Interpretation: Negative screen for possible drug use disorder    Review of Systems   Constitutional: Negative for chills and fever  HENT: Negative for ear pain and sore throat  Eyes: Negative for pain and visual disturbance  Respiratory: Negative for cough and shortness of breath  Cardiovascular: Negative for chest pain and palpitations  Gastrointestinal: Negative for abdominal pain and vomiting  Genitourinary: Negative for dysuria and hematuria  Musculoskeletal: Negative for arthralgias and back pain  Skin: Negative for color change and rash  Neurological: Negative for seizures and syncope  Psychiatric/Behavioral:        ID at [facility]     All other systems reviewed and are negative  Physical Exam  Vitals reviewed  Exam conducted with a chaperone present  Constitutional:       General: She is not in acute distress  Appearance: She is not ill-appearing  HENT:      Right Ear: Tympanic membrane, ear canal and external ear normal  There is no impacted cerumen  Left Ear: Tympanic membrane, ear canal and external ear normal  There is no impacted cerumen  Mouth/Throat:      Mouth: Mucous membranes are moist    Eyes:      Conjunctiva/sclera: Conjunctivae normal    Cardiovascular:      Rate and Rhythm: Normal rate and regular rhythm  Pulses: Normal pulses  Heart sounds: Normal heart sounds  Pulmonary:      Effort: Pulmonary effort is normal  No respiratory distress  Breath sounds: Normal breath sounds  Abdominal:      General: Bowel sounds are normal  There is no distension  Palpations: Abdomen is soft  Tenderness: There is no abdominal tenderness  Musculoskeletal:         General: Deformity (at baseline) present  Skin:     General: Skin is warm and dry  Capillary Refill: Capillary refill takes less than 2 seconds  Neurological:      Mental Status: She is oriented to person, place, and time  Comments: Spastic quadriplegia     Psychiatric:      Comments:  At Buck Nuñez MD

## 2021-08-06 DIAGNOSIS — B37.9 CANDIDIASIS: ICD-10-CM

## 2021-08-09 RX ORDER — CLOTRIMAZOLE AND BETAMETHASONE DIPROPIONATE 10; .64 MG/G; MG/G
CREAM TOPICAL
Qty: 45 G | Refills: 11 | Status: SHIPPED | OUTPATIENT
Start: 2021-08-09

## 2021-08-12 ENCOUNTER — TELEPHONE (OUTPATIENT)
Dept: FAMILY MEDICINE CLINIC | Facility: CLINIC | Age: 61
End: 2021-08-12

## 2021-08-12 DIAGNOSIS — F72 SEVERE INTELLECTUAL DISABILITY: Primary | ICD-10-CM

## 2021-08-12 NOTE — TELEPHONE ENCOUNTER
Dawn from [facility] is calling regarding medicine for patient to take before 8/16 GYN appointment  Patient needs to take   LORazepam (ATIVAN) 0 5 mg tablet     1 tablet 30 minutes before appointment  [facility]

## 2021-08-13 RX ORDER — LORAZEPAM 0.5 MG/1
0.5 TABLET ORAL ONCE
Qty: 1 TABLET | Refills: 0 | Status: SHIPPED | OUTPATIENT
Start: 2021-08-13 | End: 2022-05-26

## 2021-08-13 NOTE — TELEPHONE ENCOUNTER
Received request for once time constantino of Ativan 0 5mg for upcoming GYN appointment  Pt has history of Spastic quadriplegic cerebral palsy and has required Ativan in past prior to GYN appointment  Will pend the order for Attending to Approve

## 2021-08-16 ENCOUNTER — ANNUAL EXAM (OUTPATIENT)
Dept: OBGYN CLINIC | Facility: CLINIC | Age: 61
End: 2021-08-16

## 2021-08-16 VITALS
HEIGHT: 60 IN | HEART RATE: 89 BPM | DIASTOLIC BLOOD PRESSURE: 90 MMHG | BODY MASS INDEX: 32.98 KG/M2 | SYSTOLIC BLOOD PRESSURE: 137 MMHG | WEIGHT: 168 LBS

## 2021-08-16 DIAGNOSIS — Z99.3 WHEELCHAIR BOUND: ICD-10-CM

## 2021-08-16 DIAGNOSIS — G80.0 SPASTIC QUADRIPLEGIC CEREBRAL PALSY (HCC): Primary | ICD-10-CM

## 2021-08-16 DIAGNOSIS — Z01.419 WOMEN'S ANNUAL ROUTINE GYNECOLOGICAL EXAMINATION: ICD-10-CM

## 2021-08-16 PROCEDURE — 99213 OFFICE O/P EST LOW 20 MIN: CPT | Performed by: OBSTETRICS & GYNECOLOGY

## 2021-08-16 PROCEDURE — 0503F POSTPARTUM CARE VISIT: CPT | Performed by: OBSTETRICS & GYNECOLOGY

## 2021-08-16 PROCEDURE — 3008F BODY MASS INDEX DOCD: CPT | Performed by: FAMILY MEDICINE

## 2021-08-16 RX ORDER — SENNOSIDES 8.6 MG
650 CAPSULE ORAL EVERY 8 HOURS PRN
COMMUNITY

## 2021-08-16 RX ORDER — ACETAMINOPHEN 325 MG/1
650 TABLET ORAL EVERY 6 HOURS PRN
COMMUNITY
End: 2021-11-29 | Stop reason: CLARIF

## 2021-08-16 RX ORDER — PSEUDOEPHEDRINE HCL 30 MG
TABLET ORAL
COMMUNITY
Start: 2021-08-04 | End: 2021-11-29 | Stop reason: CLARIF

## 2021-08-16 NOTE — PROGRESS NOTES
Assessment&Plan:  62yo postmenopausal female with normal gyn and breast exams today  She is wheelchair bound (spastic quadriplegic from cerebral palsy) and was examined in the presence of her caretaker Gunjan  - Pap with HPV was not done today; next co-test due around/after January 2022  - Mammogram: scheduled for Dec 3, 2021; continue yearly mammograms  - Colonoscopy: next due April 2022  - DEXA: last scan Dec 2020 consistent with osteoporosis; on Calcium & vitamin D supplements and Prolia (Denosumab) - management by primary care providers    RTO in 1 year for well woman exam     Discussed with Dr Gustavo Blackburn    Germaine Steele is a 57 y o  female who presents for annual well woman exam  She is wheelchair bound (spactic quadriplegic from CP) and cannot provide insight into her care/health  She was accompanied by her caretaker Cindy Samson today who disclosed that she is in her usual state of health  She has not had any postmenopausal bleeding or abnormal vaginal discharge  No breast changes noted by caretakers  Up to date on pap smear/HPV, colonoscopy, mammogram and DEXA  Review of Systems  Review of systems not obtained due to patient factors  Objective    /90   Pulse 89   Ht 5' (1 524 m)   Wt 76 2 kg (168 lb)   LMP 02/28/2010 (Approximate)   BMI 32 81 kg/m²     General:   alert and wheelchair bound   Breasts: breasts appear normal, no suspicious masses, no skin or nipple changes or axillary nodes   Abdomen: soft, non-tender, without masses or organomegaly   Vulva: normal   Vagina: normal mucosa   Cervix: no cervical motion tenderness   Uterus: normal size   Adnexa: no mass, fullness, tenderness       Kirti Zarco MD  PGY-II, OBGYN  8/16/2021, 10:20 AM

## 2021-09-03 ENCOUNTER — VBI (OUTPATIENT)
Dept: ADMINISTRATIVE | Facility: OTHER | Age: 61
End: 2021-09-03

## 2021-09-16 ENCOUNTER — OFFICE VISIT (OUTPATIENT)
Dept: FAMILY MEDICINE CLINIC | Facility: CLINIC | Age: 61
End: 2021-09-16

## 2021-09-16 VITALS
TEMPERATURE: 98 F | OXYGEN SATURATION: 99 % | SYSTOLIC BLOOD PRESSURE: 128 MMHG | BODY MASS INDEX: 32.81 KG/M2 | DIASTOLIC BLOOD PRESSURE: 76 MMHG | RESPIRATION RATE: 18 BRPM | HEIGHT: 60 IN | HEART RATE: 82 BPM

## 2021-09-16 DIAGNOSIS — R79.89 ELEVATED TSH: Primary | ICD-10-CM

## 2021-09-16 DIAGNOSIS — Z13.220 ENCOUNTER FOR SCREENING FOR LIPID DISORDER: ICD-10-CM

## 2021-09-16 DIAGNOSIS — I10 BENIGN ESSENTIAL HYPERTENSION: ICD-10-CM

## 2021-09-16 DIAGNOSIS — Z00.00 HEALTHCARE MAINTENANCE: ICD-10-CM

## 2021-09-16 PROCEDURE — 3074F SYST BP LT 130 MM HG: CPT | Performed by: FAMILY MEDICINE

## 2021-09-16 PROCEDURE — 99213 OFFICE O/P EST LOW 20 MIN: CPT | Performed by: FAMILY MEDICINE

## 2021-09-16 PROCEDURE — 3078F DIAST BP <80 MM HG: CPT | Performed by: FAMILY MEDICINE

## 2021-09-16 PROCEDURE — 1036F TOBACCO NON-USER: CPT | Performed by: FAMILY MEDICINE

## 2021-09-16 NOTE — PROGRESS NOTES
Assessment/Plan:    Benign essential hypertension  - Patient gets weekly blood pressure checks as per group home  Blood pressure log reviewed  Range: 115-125/79-83  - Blood pressure today: 128-76 and at goal   - Well controlled on current regimen: HCTZ 12 5mg daily, losartan 50mg daily, metoprolol 100mg daily  - No changes at this time    Elevated TSH  - Patient with elevated TSH 4 79 in December  Repeat TSH in March was 1 61  Patient is stable off medication     - Reordered TSH to be completed prior to annual physical in December  Osteoporosis  Stable  Patient gets Prolia injections  Ordered BMP     Constipation  Stable  Patient gets daily bowel movements  Continue MiraLax daily PRN    Healthcare maintenance  Patient has annual physical exam scheduled for December, 2021 ,  Ordered Labs to be completed prior to physical visit  Diagnoses and all orders for this visit:    Elevated TSH  -     TSH, 3rd generation with Free T4 reflex; Future    Benign essential hypertension  -     Basic metabolic panel; Future  -     Comprehensive metabolic panel; Future    Encounter for screening for lipid disorder  -     Lipid panel; Future    Healthcare maintenance  -     CBC and differential; Future          Subjective:      Patient ID: Chloé Weaver is a 57 y o  female  Patient Jo Pool presents to the clinic for regular follow up  She presents to the clinic with caretaker / nurse - MS Seymour  Patient lives in a group home with no acute complains or concerns  She is up to date on all appointments  She was seen by Ob/gyn for annual gyn exam on 8/16/21, seen by podiatry on 8/25/21, seen by psychology on 8/26/21, saw dentist for teeth cleaning on 9/13/21  No problems noted at any of these visits per caretaker  Patient had an exposure to [place] on 9/1/21 but subsequently tested negative on 9/6/21  She is fully vaccinated against COVID   Her BP is well controlled currently, home readings with systolic between 115-125, continue current regimen  Wax in left ear, advised to use debrox drops  The following portions of the patient's history were reviewed and updated as appropriate: allergies, current medications, past family history, past medical history, past social history, past surgical history and problem list     Review of Systems   Constitutional: Negative for activity change, appetite change, chills and fever  Respiratory: Negative for chest tightness and shortness of breath  Cardiovascular: Negative for chest pain and palpitations  Gastrointestinal: Negative for abdominal pain, blood in stool and constipation  Genitourinary: Negative for hematuria  Urinary incontinence    Skin: Negative  Objective:      /76 (BP Location: Left arm, Patient Position: Sitting, Cuff Size: Large)   Pulse 82   Temp 98 °F (36 7 °C) (Temporal)   Resp 18   Ht 5' (1 524 m)   LMP 02/28/2010 (Approximate)   SpO2 99%   BMI 32 81 kg/m²          Physical Exam  Constitutional:       General: She is not in acute distress  Appearance: Normal appearance  She is not ill-appearing  HENT:      Right Ear: Tympanic membrane, ear canal and external ear normal  There is no impacted cerumen  Left Ear: External ear normal  There is no impacted cerumen  Ears:      Comments: Left ear with wax in canal     Mouth/Throat:      Mouth: Mucous membranes are moist    Eyes:      Conjunctiva/sclera: Conjunctivae normal    Cardiovascular:      Rate and Rhythm: Normal rate and regular rhythm  Heart sounds: Normal heart sounds  Pulmonary:      Effort: Pulmonary effort is normal       Breath sounds: Normal breath sounds  Abdominal:      General: Abdomen is flat  Bowel sounds are normal  There is no distension  Tenderness: There is no abdominal tenderness  There is no guarding  Skin:     General: Skin is warm and dry     Neurological:      Mental Status: She is alert and oriented to person, place, and time  Mental status is at baseline  Comments:  At baseline: contracture of right wrist, wheelchair-bound

## 2021-09-16 NOTE — ASSESSMENT & PLAN NOTE
- Patient with elevated TSH 4 79 in December  Repeat TSH in March was 1 61  Patient is stable off medication     - Reordered TSH to be completed prior to annual physical in December

## 2021-09-16 NOTE — ASSESSMENT & PLAN NOTE
- Patient gets weekly blood pressure checks as per group home  Blood pressure log reviewed  Range: 115-125/79-83    - Blood pressure today: 128-76 and at goal   - Well controlled on current regimen: HCTZ 12 5mg daily, losartan 50mg daily, metoprolol 100mg daily  - No changes at this time

## 2021-09-16 NOTE — ASSESSMENT & PLAN NOTE
Patient has annual physical exam scheduled for December, 2021 ,  Ordered Labs to be completed prior to physical visit

## 2021-09-21 ENCOUNTER — TELEPHONE (OUTPATIENT)
Dept: FAMILY MEDICINE CLINIC | Facility: CLINIC | Age: 61
End: 2021-09-21

## 2021-09-21 DIAGNOSIS — F51.01 PRIMARY INSOMNIA: ICD-10-CM

## 2021-09-21 RX ORDER — ZOLPIDEM TARTRATE 5 MG/1
2.5 TABLET ORAL DAILY
Qty: 15 TABLET | Refills: 0 | Status: SHIPPED | OUTPATIENT
Start: 2021-09-27 | End: 2021-10-27 | Stop reason: SDUPTHER

## 2021-09-21 NOTE — TELEPHONE ENCOUNTER
Caretaker, Nirmala Vasquez, requesting refill of Zolpidem 5mg  Checked PDMP, last refill 8/27/21 #15  Please review

## 2021-10-25 ENCOUNTER — TELEPHONE (OUTPATIENT)
Dept: FAMILY MEDICINE CLINIC | Facility: CLINIC | Age: 61
End: 2021-10-25

## 2021-10-27 ENCOUNTER — TELEPHONE (OUTPATIENT)
Dept: FAMILY MEDICINE CLINIC | Facility: CLINIC | Age: 61
End: 2021-10-27

## 2021-10-27 DIAGNOSIS — F51.01 PRIMARY INSOMNIA: ICD-10-CM

## 2021-10-27 RX ORDER — ZOLPIDEM TARTRATE 5 MG/1
2.5 TABLET ORAL DAILY
Qty: 15 TABLET | Refills: 0 | Status: SHIPPED | OUTPATIENT
Start: 2021-10-27 | End: 2021-11-15 | Stop reason: SDUPTHER

## 2021-11-12 DIAGNOSIS — G40.409 TONIC-CLONIC EPILEPTIC SEIZURES (HCC): ICD-10-CM

## 2021-11-12 RX ORDER — PHENOBARBITAL 32.4 MG/1
TABLET ORAL
Qty: 90 TABLET | Refills: 2 | Status: SHIPPED | OUTPATIENT
Start: 2021-11-12 | End: 2022-02-17 | Stop reason: SDUPTHER

## 2021-11-15 DIAGNOSIS — F51.01 PRIMARY INSOMNIA: ICD-10-CM

## 2021-11-16 ENCOUNTER — IMMUNIZATIONS (OUTPATIENT)
Dept: FAMILY MEDICINE CLINIC | Facility: HOSPITAL | Age: 61
End: 2021-11-16

## 2021-11-16 DIAGNOSIS — Z23 ENCOUNTER FOR IMMUNIZATION: Primary | ICD-10-CM

## 2021-11-16 PROCEDURE — 0001A COVID-19 PFIZER VACC 0.3 ML: CPT

## 2021-11-16 PROCEDURE — 91300 COVID-19 PFIZER VACC 0.3 ML: CPT

## 2021-11-23 RX ORDER — ZOLPIDEM TARTRATE 5 MG/1
2.5 TABLET ORAL DAILY
Qty: 15 TABLET | Refills: 0 | Status: SHIPPED | OUTPATIENT
Start: 2021-11-23 | End: 2021-12-20 | Stop reason: SDUPTHER

## 2021-11-24 ENCOUNTER — APPOINTMENT (OUTPATIENT)
Dept: LAB | Facility: HOSPITAL | Age: 61
End: 2021-11-24
Payer: COMMERCIAL

## 2021-11-24 DIAGNOSIS — I10 BENIGN ESSENTIAL HYPERTENSION: ICD-10-CM

## 2021-11-24 DIAGNOSIS — R79.89 ELEVATED TSH: ICD-10-CM

## 2021-11-24 DIAGNOSIS — Z13.220 ENCOUNTER FOR SCREENING FOR LIPID DISORDER: ICD-10-CM

## 2021-11-24 DIAGNOSIS — Z00.00 HEALTHCARE MAINTENANCE: ICD-10-CM

## 2021-11-24 LAB
ALBUMIN SERPL BCP-MCNC: 3.5 G/DL (ref 3.5–5)
ALP SERPL-CCNC: 95 U/L (ref 46–116)
ALT SERPL W P-5'-P-CCNC: 23 U/L (ref 12–78)
ANION GAP SERPL CALCULATED.3IONS-SCNC: 5 MMOL/L (ref 4–13)
AST SERPL W P-5'-P-CCNC: 15 U/L (ref 5–45)
BASOPHILS # BLD AUTO: 0.05 THOUSANDS/ΜL (ref 0–0.1)
BASOPHILS NFR BLD AUTO: 1 % (ref 0–1)
BILIRUB SERPL-MCNC: 0.42 MG/DL (ref 0.2–1)
BUN SERPL-MCNC: 15 MG/DL (ref 5–25)
CALCIUM SERPL-MCNC: 9.2 MG/DL (ref 8.3–10.1)
CHLORIDE SERPL-SCNC: 105 MMOL/L (ref 100–108)
CHOLEST SERPL-MCNC: 157 MG/DL
CO2 SERPL-SCNC: 29 MMOL/L (ref 21–32)
CREAT SERPL-MCNC: 0.52 MG/DL (ref 0.6–1.3)
EOSINOPHIL # BLD AUTO: 0.14 THOUSAND/ΜL (ref 0–0.61)
EOSINOPHIL NFR BLD AUTO: 2 % (ref 0–6)
ERYTHROCYTE [DISTWIDTH] IN BLOOD BY AUTOMATED COUNT: 13.1 % (ref 11.6–15.1)
GFR SERPL CREATININE-BSD FRML MDRD: 103 ML/MIN/1.73SQ M
GLUCOSE P FAST SERPL-MCNC: 85 MG/DL (ref 65–99)
HCT VFR BLD AUTO: 45.2 % (ref 34.8–46.1)
HDLC SERPL-MCNC: 57 MG/DL
HGB BLD-MCNC: 14.5 G/DL (ref 11.5–15.4)
IMM GRANULOCYTES # BLD AUTO: 0.02 THOUSAND/UL (ref 0–0.2)
IMM GRANULOCYTES NFR BLD AUTO: 0 % (ref 0–2)
LDLC SERPL CALC-MCNC: 83 MG/DL (ref 0–100)
LYMPHOCYTES # BLD AUTO: 2 THOUSANDS/ΜL (ref 0.6–4.47)
LYMPHOCYTES NFR BLD AUTO: 24 % (ref 14–44)
MCH RBC QN AUTO: 32.3 PG (ref 26.8–34.3)
MCHC RBC AUTO-ENTMCNC: 32.1 G/DL (ref 31.4–37.4)
MCV RBC AUTO: 101 FL (ref 82–98)
MONOCYTES # BLD AUTO: 0.53 THOUSAND/ΜL (ref 0.17–1.22)
MONOCYTES NFR BLD AUTO: 6 % (ref 4–12)
NEUTROPHILS # BLD AUTO: 5.78 THOUSANDS/ΜL (ref 1.85–7.62)
NEUTS SEG NFR BLD AUTO: 67 % (ref 43–75)
NONHDLC SERPL-MCNC: 100 MG/DL
NRBC BLD AUTO-RTO: 0 /100 WBCS
PLATELET # BLD AUTO: 261 THOUSANDS/UL (ref 149–390)
PMV BLD AUTO: 10.5 FL (ref 8.9–12.7)
POTASSIUM SERPL-SCNC: 4.1 MMOL/L (ref 3.5–5.3)
PROT SERPL-MCNC: 7.8 G/DL (ref 6.4–8.2)
RBC # BLD AUTO: 4.49 MILLION/UL (ref 3.81–5.12)
SODIUM SERPL-SCNC: 139 MMOL/L (ref 136–145)
T4 FREE SERPL-MCNC: 1.11 NG/DL (ref 0.76–1.46)
TRIGL SERPL-MCNC: 83 MG/DL
TSH SERPL DL<=0.05 MIU/L-ACNC: 3.81 UIU/ML (ref 0.36–3.74)
WBC # BLD AUTO: 8.52 THOUSAND/UL (ref 4.31–10.16)

## 2021-11-24 PROCEDURE — 80053 COMPREHEN METABOLIC PANEL: CPT

## 2021-11-24 PROCEDURE — 84443 ASSAY THYROID STIM HORMONE: CPT

## 2021-11-24 PROCEDURE — 85025 COMPLETE CBC W/AUTO DIFF WBC: CPT

## 2021-11-24 PROCEDURE — 84439 ASSAY OF FREE THYROXINE: CPT

## 2021-11-24 PROCEDURE — 36415 COLL VENOUS BLD VENIPUNCTURE: CPT

## 2021-11-24 PROCEDURE — 80061 LIPID PANEL: CPT

## 2021-11-29 ENCOUNTER — OFFICE VISIT (OUTPATIENT)
Dept: FAMILY MEDICINE CLINIC | Facility: CLINIC | Age: 61
End: 2021-11-29

## 2021-11-29 VITALS
WEIGHT: 172 LBS | OXYGEN SATURATION: 98 % | TEMPERATURE: 96.8 F | SYSTOLIC BLOOD PRESSURE: 128 MMHG | RESPIRATION RATE: 18 BRPM | HEIGHT: 60 IN | DIASTOLIC BLOOD PRESSURE: 74 MMHG | BODY MASS INDEX: 33.77 KG/M2 | HEART RATE: 95 BPM

## 2021-11-29 DIAGNOSIS — F51.01 PRIMARY INSOMNIA: ICD-10-CM

## 2021-11-29 DIAGNOSIS — M81.0 OSTEOPOROSIS, UNSPECIFIED OSTEOPOROSIS TYPE, UNSPECIFIED PATHOLOGICAL FRACTURE PRESENCE: ICD-10-CM

## 2021-11-29 DIAGNOSIS — I10 BENIGN ESSENTIAL HYPERTENSION: Primary | ICD-10-CM

## 2021-11-29 DIAGNOSIS — H61.23 IMPACTED CERUMEN OF BOTH EARS: ICD-10-CM

## 2021-11-29 DIAGNOSIS — K59.00 CONSTIPATION, UNSPECIFIED CONSTIPATION TYPE: ICD-10-CM

## 2021-11-29 DIAGNOSIS — E78.5 HYPERLIPIDEMIA, UNSPECIFIED HYPERLIPIDEMIA TYPE: ICD-10-CM

## 2021-11-29 PROBLEM — M25.512 LEFT SHOULDER PAIN: Status: ACTIVE | Noted: 2021-11-29

## 2021-11-29 PROCEDURE — 99213 OFFICE O/P EST LOW 20 MIN: CPT | Performed by: FAMILY MEDICINE

## 2021-11-29 RX ORDER — DENOSUMAB 60 MG/ML
60 INJECTION SUBCUTANEOUS ONCE
Qty: 1 ML | Refills: 0 | Status: SHIPPED | OUTPATIENT
Start: 2021-11-29 | End: 2022-05-26 | Stop reason: SDUPTHER

## 2021-11-29 RX ORDER — PETROLATUM,WHITE/LANOLIN
1 OINTMENT (GRAM) TOPICAL
COMMUNITY

## 2021-11-30 ENCOUNTER — IMMUNIZATIONS (OUTPATIENT)
Dept: FAMILY MEDICINE CLINIC | Facility: CLINIC | Age: 61
End: 2021-11-30

## 2021-11-30 DIAGNOSIS — Z23 FLU VACCINE NEED: Primary | ICD-10-CM

## 2021-11-30 PROCEDURE — 90682 RIV4 VACC RECOMBINANT DNA IM: CPT

## 2021-11-30 PROCEDURE — G0008 ADMIN INFLUENZA VIRUS VAC: HCPCS

## 2021-12-03 ENCOUNTER — HOSPITAL ENCOUNTER (OUTPATIENT)
Dept: RADIOLOGY | Age: 61
Discharge: HOME | End: 2021-12-03
Payer: COMMERCIAL

## 2021-12-03 VITALS — BODY MASS INDEX: 33.77 KG/M2 | WEIGHT: 172 LBS | HEIGHT: 60 IN

## 2021-12-03 DIAGNOSIS — Z12.31 ENCOUNTER FOR SCREENING MAMMOGRAM FOR MALIGNANT NEOPLASM OF BREAST: ICD-10-CM

## 2021-12-03 PROCEDURE — 77067 SCR MAMMO BI INCL CAD: CPT

## 2021-12-03 PROCEDURE — 77063 BREAST TOMOSYNTHESIS BI: CPT

## 2021-12-06 ENCOUNTER — CLINICAL SUPPORT (OUTPATIENT)
Dept: FAMILY MEDICINE CLINIC | Facility: CLINIC | Age: 61
End: 2021-12-06

## 2021-12-06 DIAGNOSIS — M81.0 AGE-RELATED OSTEOPOROSIS WITHOUT CURRENT PATHOLOGICAL FRACTURE: Primary | ICD-10-CM

## 2021-12-06 PROCEDURE — 96372 THER/PROPH/DIAG INJ SC/IM: CPT

## 2021-12-15 DIAGNOSIS — F51.01 PRIMARY INSOMNIA: ICD-10-CM

## 2021-12-15 RX ORDER — ZOLPIDEM TARTRATE 5 MG/1
2.5 TABLET ORAL DAILY
Qty: 15 TABLET | Refills: 0 | Status: CANCELLED | OUTPATIENT
Start: 2021-12-15

## 2021-12-15 NOTE — TELEPHONE ENCOUNTER
Patient needs 5 refills 30 day supply    Gunjan from [facility] called any questions please call her [phone]

## 2021-12-20 DIAGNOSIS — F51.01 PRIMARY INSOMNIA: ICD-10-CM

## 2021-12-20 RX ORDER — ZOLPIDEM TARTRATE 5 MG/1
2.5 TABLET ORAL DAILY
Qty: 15 TABLET | Refills: 5 | Status: SHIPPED | OUTPATIENT
Start: 2021-12-24 | End: 2022-06-09 | Stop reason: SDUPTHER

## 2021-12-22 ENCOUNTER — APPOINTMENT (OUTPATIENT)
Dept: LAB | Facility: HOSPITAL | Age: 61
End: 2021-12-22
Payer: COMMERCIAL

## 2021-12-22 DIAGNOSIS — I10 BENIGN ESSENTIAL HYPERTENSION: ICD-10-CM

## 2021-12-22 LAB
ANION GAP SERPL CALCULATED.3IONS-SCNC: 4 MMOL/L (ref 4–13)
BUN SERPL-MCNC: 16 MG/DL (ref 5–25)
CALCIUM SERPL-MCNC: 9.1 MG/DL (ref 8.3–10.1)
CHLORIDE SERPL-SCNC: 102 MMOL/L (ref 100–108)
CO2 SERPL-SCNC: 30 MMOL/L (ref 21–32)
CREAT SERPL-MCNC: 0.5 MG/DL (ref 0.6–1.3)
GFR SERPL CREATININE-BSD FRML MDRD: 104 ML/MIN/1.73SQ M
GLUCOSE P FAST SERPL-MCNC: 72 MG/DL (ref 65–99)
POTASSIUM SERPL-SCNC: 3.8 MMOL/L (ref 3.5–5.3)
SODIUM SERPL-SCNC: 136 MMOL/L (ref 136–145)

## 2021-12-22 PROCEDURE — 36415 COLL VENOUS BLD VENIPUNCTURE: CPT

## 2021-12-22 PROCEDURE — 80048 BASIC METABOLIC PNL TOTAL CA: CPT

## 2022-01-04 ENCOUNTER — TELEPHONE (OUTPATIENT)
Dept: FAMILY MEDICINE CLINIC | Facility: CLINIC | Age: 62
End: 2022-01-04

## 2022-01-04 NOTE — TELEPHONE ENCOUNTER
Talat Vickers is calling there 2 Positive staff member on [facility]   State Testing Form for COVID is needed   Talat Vickers is can be reached at   [phone]  All the patients need to be tested and Talat Vickers needs to explain to [facility]

## 2022-01-04 NOTE — TELEPHONE ENCOUNTER
Spoke with Linda Falk, will be bringing paperwork to have signed by Dr Angie Carrillo for standing orders to do Covid testing as needed for patients

## 2022-01-19 ENCOUNTER — TELEPHONE (OUTPATIENT)
Dept: FAMILY MEDICINE CLINIC | Facility: CLINIC | Age: 62
End: 2022-01-19

## 2022-01-19 NOTE — TELEPHONE ENCOUNTER
Cortez Elkins from [employer] calling about orders fexed to office on Jan 3rd  Stated they never got a response back from Dr Delphine Ford  [facility]         [phone]

## 2022-02-17 DIAGNOSIS — G40.409 TONIC-CLONIC EPILEPTIC SEIZURES (HCC): ICD-10-CM

## 2022-02-18 RX ORDER — PHENOBARBITAL 32.4 MG/1
TABLET ORAL
Qty: 90 TABLET | Refills: 0 | Status: SHIPPED | OUTPATIENT
Start: 2022-02-18 | End: 2022-03-09 | Stop reason: SDUPTHER

## 2022-02-28 ENCOUNTER — OFFICE VISIT (OUTPATIENT)
Dept: FAMILY MEDICINE CLINIC | Facility: CLINIC | Age: 62
End: 2022-02-28

## 2022-02-28 VITALS
TEMPERATURE: 97.6 F | DIASTOLIC BLOOD PRESSURE: 89 MMHG | SYSTOLIC BLOOD PRESSURE: 154 MMHG | HEART RATE: 79 BPM | RESPIRATION RATE: 22 BRPM

## 2022-02-28 DIAGNOSIS — I10 BENIGN ESSENTIAL HYPERTENSION: ICD-10-CM

## 2022-02-28 DIAGNOSIS — M81.0 AGE-RELATED OSTEOPOROSIS WITHOUT CURRENT PATHOLOGICAL FRACTURE: Primary | ICD-10-CM

## 2022-02-28 DIAGNOSIS — H61.23 IMPACTED CERUMEN OF BOTH EARS: ICD-10-CM

## 2022-02-28 PROCEDURE — 99213 OFFICE O/P EST LOW 20 MIN: CPT | Performed by: FAMILY MEDICINE

## 2022-02-28 NOTE — PROGRESS NOTES
Assessment/Plan:    Benign essential hypertension  Blood pressure stable  BP Log for 3 months reviewed: SPB's: 119-144  DBP's:74-92  Continue current regimen: HCTZ 12 5mg, losartan 50mg, Metoprolol 100mg daily   Continue to monitor blood pressure as per group home schedule  Impacted cerumen of both ears  - Ear wax noticed on physical exam  Not impacted  - Continue Debrox  Drops as needed     Constipation  Stable  Has frequent bowel movement   Continue Miralax PRN     Osteoporosis  Stable on prolia injections  Next tx in June  Ordered labs BMP and TSH prior to next treatment        Diagnoses and all orders for this visit:    Age-related osteoporosis without current pathological fracture  -     Basic metabolic panel; Future  -     TSH, 3rd generation with Free T4 reflex; Future    Impacted cerumen of both ears    Benign essential hypertension          Subjective:      Patient ID: Ricky Lynne is a 57 y o  female  HPI   Patient was seen today for a follow up visit  Patient was seen in the clinic with her care giver  Dawn  Patient is otherwise doing well  Caregiver denies any complains  States that patient is doing well, is at her baseline  Blood pressure log given and reviewed  Caregiver Will like to hold off colonoscopy for now  FIT test given today in office  The following portions of the patient's history were reviewed and updated as appropriate: allergies, current medications, past family history, past medical history, past social history, past surgical history and problem list     Review of Systems   Unable to perform ROS: Patient nonverbal (ROS provided by caregiver )   Constitutional: Negative for activity change and appetite change  Gastrointestinal: Negative for constipation  Skin: Negative            Objective:      /89 (BP Location: Left arm, Patient Position: Sitting, Cuff Size: Standard)   Pulse 79   Temp 97 6 °F (36 4 °C) (Temporal)   Resp 22   LMP 02/28/2010 (Approximate)          Physical Exam  Vitals reviewed  Constitutional:       General: She is not in acute distress  Appearance: She is not ill-appearing, toxic-appearing or diaphoretic  HENT:      Head: Normocephalic and atraumatic  Ears:      Comments: Ear wax present bilaterally   Cardiovascular:      Rate and Rhythm: Normal rate and regular rhythm  Pulses: Normal pulses  Heart sounds: Normal heart sounds  Pulmonary:      Effort: Pulmonary effort is normal  No respiratory distress  Breath sounds: Normal breath sounds  No wheezing  Abdominal:      General: Bowel sounds are normal  There is no distension  Tenderness: There is no abdominal tenderness  There is no guarding  Musculoskeletal:      Right lower leg: Edema (Trace pitting edema noted ) present  Left lower leg: Edema present  Comments: Wheel chair bound  Contracted at baseline  Skin:     General: Skin is warm and dry  Neurological:      Mental Status: She is alert  Mental status is at baseline  Psychiatric:         Mood and Affect: Mood normal          Behavior: Behavior normal          Thought Content:  Thought content normal          Judgment: Judgment normal

## 2022-02-28 NOTE — ASSESSMENT & PLAN NOTE
Blood pressure stable  BP Log for 3 months reviewed: SPB's: 119-144  DBP's:74-92  Continue current regimen: HCTZ 12 5mg, losartan 50mg, Metoprolol 100mg daily   Continue to monitor blood pressure as per group home schedule

## 2022-03-09 DIAGNOSIS — G40.409 TONIC-CLONIC EPILEPTIC SEIZURES (HCC): ICD-10-CM

## 2022-03-09 RX ORDER — PHENOBARBITAL 32.4 MG/1
TABLET ORAL
Qty: 90 TABLET | Refills: 5 | Status: SHIPPED | OUTPATIENT
Start: 2022-03-09

## 2022-04-22 ENCOUNTER — TELEPHONE (OUTPATIENT)
Dept: FAMILY MEDICINE CLINIC | Facility: CLINIC | Age: 62
End: 2022-04-22

## 2022-04-22 NOTE — TELEPHONE ENCOUNTER
[address] [address]    [phone] (Z)    [phone] (f)    COVID TEST RESULTS    See attached

## 2022-05-09 ENCOUNTER — TELEPHONE (OUTPATIENT)
Dept: LAB | Facility: HOSPITAL | Age: 62
End: 2022-05-09

## 2022-05-10 NOTE — TELEPHONE ENCOUNTER
5/10 - returned call to [facility] - she had to step out said to call back after 11am

## 2022-05-18 ENCOUNTER — APPOINTMENT (OUTPATIENT)
Dept: LAB | Facility: HOSPITAL | Age: 62
End: 2022-05-18
Payer: MEDICARE

## 2022-05-18 DIAGNOSIS — M81.0 AGE-RELATED OSTEOPOROSIS WITHOUT CURRENT PATHOLOGICAL FRACTURE: ICD-10-CM

## 2022-05-18 LAB
ANION GAP SERPL CALCULATED.3IONS-SCNC: 6 MMOL/L (ref 4–13)
BUN SERPL-MCNC: 17 MG/DL (ref 5–25)
CALCIUM SERPL-MCNC: 9.2 MG/DL (ref 8.3–10.1)
CHLORIDE SERPL-SCNC: 106 MMOL/L (ref 100–108)
CO2 SERPL-SCNC: 30 MMOL/L (ref 21–32)
CREAT SERPL-MCNC: 0.58 MG/DL (ref 0.6–1.3)
GFR SERPL CREATININE-BSD FRML MDRD: 99 ML/MIN/1.73SQ M
GLUCOSE SERPL-MCNC: 71 MG/DL (ref 65–140)
POTASSIUM SERPL-SCNC: 4.2 MMOL/L (ref 3.5–5.3)
SODIUM SERPL-SCNC: 142 MMOL/L (ref 136–145)
TSH SERPL DL<=0.05 MIU/L-ACNC: 3.53 UIU/ML (ref 0.45–4.5)

## 2022-05-18 PROCEDURE — 80048 BASIC METABOLIC PNL TOTAL CA: CPT

## 2022-05-18 PROCEDURE — 84443 ASSAY THYROID STIM HORMONE: CPT

## 2022-05-18 PROCEDURE — 36415 COLL VENOUS BLD VENIPUNCTURE: CPT

## 2022-05-26 ENCOUNTER — TELEPHONE (OUTPATIENT)
Dept: FAMILY MEDICINE CLINIC | Facility: CLINIC | Age: 62
End: 2022-05-26

## 2022-05-26 ENCOUNTER — OFFICE VISIT (OUTPATIENT)
Dept: FAMILY MEDICINE CLINIC | Facility: CLINIC | Age: 62
End: 2022-05-26

## 2022-05-26 VITALS
BODY MASS INDEX: 33.59 KG/M2 | TEMPERATURE: 97.6 F | HEIGHT: 60 IN | OXYGEN SATURATION: 98 % | RESPIRATION RATE: 16 BRPM | DIASTOLIC BLOOD PRESSURE: 86 MMHG | SYSTOLIC BLOOD PRESSURE: 124 MMHG | HEART RATE: 63 BPM

## 2022-05-26 DIAGNOSIS — M81.0 AGE-RELATED OSTEOPOROSIS WITHOUT CURRENT PATHOLOGICAL FRACTURE: ICD-10-CM

## 2022-05-26 DIAGNOSIS — Z11.1 ENCOUNTER FOR PPD TEST: Primary | ICD-10-CM

## 2022-05-26 DIAGNOSIS — I10 BENIGN ESSENTIAL HYPERTENSION: ICD-10-CM

## 2022-05-26 DIAGNOSIS — Z12.31 ENCOUNTER FOR SCREENING MAMMOGRAM FOR MALIGNANT NEOPLASM OF BREAST: ICD-10-CM

## 2022-05-26 DIAGNOSIS — M81.0 OSTEOPOROSIS, UNSPECIFIED OSTEOPOROSIS TYPE, UNSPECIFIED PATHOLOGICAL FRACTURE PRESENCE: ICD-10-CM

## 2022-05-26 PROCEDURE — 99213 OFFICE O/P EST LOW 20 MIN: CPT | Performed by: FAMILY MEDICINE

## 2022-05-26 PROCEDURE — 86580 TB INTRADERMAL TEST: CPT

## 2022-05-26 RX ORDER — LOSARTAN POTASSIUM 100 MG/1
TABLET ORAL
COMMUNITY
Start: 2022-05-11

## 2022-05-26 RX ORDER — DENOSUMAB 60 MG/ML
60 INJECTION SUBCUTANEOUS ONCE
Qty: 1 ML | Refills: 0 | Status: SHIPPED | OUTPATIENT
Start: 2022-05-26 | End: 2022-06-03 | Stop reason: SDUPTHER

## 2022-05-26 NOTE — ASSESSMENT & PLAN NOTE
- Blood pressure well controlled and at goal  BP today: 124/86   - BP log from group home reviewd  SBP Range: 113-121 and DBP: 76-86  - Continue current regimen: HCTZ 12 5mg, losartan 50mg, Metoprolol 100mg daily  Consider decrease antihypertensive if BP continues to remain stable

## 2022-05-26 NOTE — PROGRESS NOTES
Assessment/Plan:    Benign essential hypertension  - Blood pressure well controlled and at goal  BP today: 124/86   - BP log from group home reviewd  SBP Range: 113-121 and DBP: 76-86  - Continue current regimen: HCTZ 12 5mg, losartan 50mg, Metoprolol 100mg daily  Consider decrease antihypertensive if BP continues to remain stable  Osteoporosis  - Patient with a hx of Osteoporosis  Patient receives prolia every 6 months  Next treatment in June  - BMP reviewed, electrolytes stable  Breast cancer screening  -Last  Mammogram done in 12/2021  No mammographic evidence of malignancy   -Will repeat mammogram in December  Order provider to caregiver  Diagnoses and all orders for this visit:    Encounter for PPD test  -     TB Skin Test    Encounter for screening mammogram for malignant neoplasm of breast  -     Mammo screening bilateral w 3d & cad; Future    Osteoporosis, unspecified osteoporosis type, unspecified pathological fracture presence  -     DXA bone density spine hip and pelvis; Future  -     denosumab (Prolia) 60 mg/mL; Inject 1 mL (60 mg total) under the skin once for 1 dose  -     Basic metabolic panel; Future    Benign essential hypertension    Age-related osteoporosis without current pathological fracture    Other orders  -     losartan (COZAAR) 100 MG tablet        Subjective:      Patient ID: Endy Julian is a 57 y o  female  Patient presents with caretaker as a follow-up for blood pressure and PPD testing  Patient has been well  Caretaker reports that the patient had a stomach bug a few weeks ago, but has healed well since then  Caretaker reports that patient has taken [place] as prescribed and the [place] has helped with patient sleep  Has had some mood changes  She otherwise has been at baseline  Denies any acute complains or concerns       The following portions of the patient's history were reviewed and updated as appropriate: allergies, current medications, past family history, past medical history, past social history, past surgical history and problem list     Review of Systems   Respiratory: Negative for cough and shortness of breath  Cardiovascular: Negative for chest pain, palpitations and leg swelling  Gastrointestinal: Negative for abdominal pain, constipation, diarrhea and nausea  Genitourinary: Negative for difficulty urinating  Neurological: Negative for dizziness, light-headedness and headaches  Psychiatric/Behavioral: Positive for agitation  Objective:      /86 (BP Location: Left arm, Patient Position: Sitting, Cuff Size: Standard)   Pulse 63   Temp 97 6 °F (36 4 °C) (Temporal)   Resp 16   Ht 5' (1 524 m)   LMP 02/28/2010 (Approximate)   SpO2 98%   BMI 33 59 kg/m²          Physical Exam  Constitutional:       General: She is awake  She is not in acute distress  Appearance: She is obese  She is not ill-appearing, toxic-appearing or diaphoretic  HENT:      Head: Normocephalic and atraumatic  Cardiovascular:      Rate and Rhythm: Normal rate and regular rhythm  Pulses: Normal pulses  Heart sounds: Normal heart sounds  No murmur heard  Pulmonary:      Effort: Pulmonary effort is normal  No respiratory distress  Breath sounds: Normal breath sounds  No wheezing  Abdominal:      General: Bowel sounds are normal       Tenderness: There is no abdominal tenderness  There is no guarding  Musculoskeletal:      Right lower leg: No edema  Left lower leg: No edema  Skin:     General: Skin is warm  Neurological:      Mental Status: She is alert  Mental status is at baseline        Gait: Gait abnormal

## 2022-05-26 NOTE — TELEPHONE ENCOUNTER
Copy of Vital Signs and Dental examination, placed in the [address] [place] [address] Street in the [address] [place] [address] for scan   Thanks

## 2022-05-26 NOTE — ASSESSMENT & PLAN NOTE
- Patient with a hx of Osteoporosis  Patient receives prolia every 6 months  Next treatment in June  - BMP reviewed, electrolytes stable

## 2022-05-26 NOTE — ASSESSMENT & PLAN NOTE
-Last  Mammogram done in 12/2021  No mammographic evidence of malignancy   -Will repeat mammogram in December  Order provider to caregiver

## 2022-06-02 ENCOUNTER — TELEPHONE (OUTPATIENT)
Dept: FAMILY MEDICINE CLINIC | Facility: CLINIC | Age: 62
End: 2022-06-02

## 2022-06-02 NOTE — TELEPHONE ENCOUNTER
Prolia was sent to pharmacy but then "ended" Sam Lamas believes old prescription was ended but accidentally sent to the pharmacy)     Needs resent asap they could not fill for her, has appt Monday for prolia shot is hoping this can still happen

## 2022-06-03 ENCOUNTER — TELEPHONE (OUTPATIENT)
Dept: FAMILY MEDICINE CLINIC | Facility: CLINIC | Age: 62
End: 2022-06-03

## 2022-06-03 DIAGNOSIS — M81.0 OSTEOPOROSIS, UNSPECIFIED OSTEOPOROSIS TYPE, UNSPECIFIED PATHOLOGICAL FRACTURE PRESENCE: ICD-10-CM

## 2022-06-03 RX ORDER — DENOSUMAB 60 MG/ML
60 INJECTION SUBCUTANEOUS ONCE
Qty: 1 ML | Refills: 0 | Status: SHIPPED | OUTPATIENT
Start: 2022-06-03 | End: 2022-06-03

## 2022-06-03 NOTE — TELEPHONE ENCOUNTER
Received (fax from patient pharmacy [facility]) requesting an prior authorization for the medication (Prolia)  Prior authorization has been started today (6/3/2022), medical support information attached and faxed over to [facility] and [place] medical assistance) at ([phone] / [phone])  Confirmation has been received and placed in the prior authorization folder at the nurse station  Will follow up in 3 business day

## 2022-06-06 NOTE — TELEPHONE ENCOUNTER
Updating Chart, still waiting from Insurance Approval or Denial  Will follow up in the next couple of days

## 2022-06-08 ENCOUNTER — TELEPHONE (OUTPATIENT)
Dept: FAMILY MEDICINE CLINIC | Facility: CLINIC | Age: 62
End: 2022-06-08

## 2022-06-08 NOTE — TELEPHONE ENCOUNTER
Need a prior Auth for [employer] number is [id number]    Please contact Pamela Gonzalez [phone] if and questions

## 2022-06-09 ENCOUNTER — TELEPHONE (OUTPATIENT)
Dept: FAMILY MEDICINE CLINIC | Facility: CLINIC | Age: 62
End: 2022-06-09

## 2022-06-09 DIAGNOSIS — F51.01 PRIMARY INSOMNIA: ICD-10-CM

## 2022-06-09 DIAGNOSIS — Z12.11 COLON CANCER SCREENING: Primary | ICD-10-CM

## 2022-06-09 NOTE — TELEPHONE ENCOUNTER
Dawn caregiver calling, believes Cologuard was supposed to be ordered at last visit with Dr Lincoln Celaya but forgot  Can this be ordered?

## 2022-06-13 NOTE — TELEPHONE ENCOUNTER
Called [facility], spoke to pharmacy representative  Insurance did not receive prior authorization  Prior authorization refaxed to [facility]

## 2022-06-13 NOTE — TELEPHONE ENCOUNTER
Please sign off on medication for patient   Sent to Dr Reed Lucio on 6/9/2022    30 tablets  5 refills

## 2022-06-14 ENCOUNTER — TELEPHONE (OUTPATIENT)
Dept: LAB | Facility: HOSPITAL | Age: 62
End: 2022-06-14

## 2022-06-14 RX ORDER — ZOLPIDEM TARTRATE 5 MG/1
2.5 TABLET ORAL DAILY
Qty: 15 TABLET | Refills: 5 | Status: SHIPPED | OUTPATIENT
Start: 2022-06-14

## 2022-06-14 NOTE — TELEPHONE ENCOUNTER
Hi Dr Tatiana Barlow, can you please sign  off Zolpidem order for pt   PDMP  checked  Last filled on 5/12  for 30 pills  Refill is appropriate     Thanks

## 2022-06-15 ENCOUNTER — TELEPHONE (OUTPATIENT)
Dept: FAMILY MEDICINE CLINIC | Facility: CLINIC | Age: 62
End: 2022-06-15

## 2022-06-15 NOTE — LETTER
June 20, 2022     Patient: Catrina Matson  YOB: 1960  Date of Visit: 6/15/2022      To Whom it May Concern:    Mayelin Jewell is under my professional care  Pt is a 57year old female with diagnosis of spastic quadriplegic cerebral palsy, tonic clonic seizures, HTN, osteoporosis  Pt has been wheelchair dependent given her diagnosis  Also following up with physical therapy for spastic quadriplegia  Pt needs wheelchair with all the features for ambulatory safety and overall functional efficiency to ensure safety  If you have any questions or concerns, please don't hesitate to call           Sincerely,          Ishan Street MD      CC: No Recipients

## 2022-06-15 NOTE — TELEPHONE ENCOUNTER
Received communication from patient's insurance that Prolia was approved  Form placed in green folder in clerical area to be scanned into patient's chart

## 2022-06-15 NOTE — TELEPHONE ENCOUNTER
Folder (To be completed by) - Yellow team/ Dr Iglesia Ash     Name of Form - [employer]/ Modification to manual wheelchair    Form to be Faxed to [phone]    Patient was made aware of the 7 business day form policy

## 2022-06-16 ENCOUNTER — OFFICE VISIT (OUTPATIENT)
Dept: NEUROLOGY | Facility: CLINIC | Age: 62
End: 2022-06-16
Payer: MEDICARE

## 2022-06-16 VITALS
BODY MASS INDEX: 32.81 KG/M2 | SYSTOLIC BLOOD PRESSURE: 145 MMHG | WEIGHT: 168 LBS | HEART RATE: 104 BPM | DIASTOLIC BLOOD PRESSURE: 86 MMHG

## 2022-06-16 DIAGNOSIS — G40.409 TONIC-CLONIC EPILEPTIC SEIZURES (HCC): Primary | ICD-10-CM

## 2022-06-16 DIAGNOSIS — G80.0 SPASTIC QUADRIPLEGIC CEREBRAL PALSY (HCC): ICD-10-CM

## 2022-06-16 PROCEDURE — 99215 OFFICE O/P EST HI 40 MIN: CPT | Performed by: NURSE PRACTITIONER

## 2022-06-16 RX ORDER — BACLOFEN 10 MG/1
10 TABLET ORAL 2 TIMES DAILY
Qty: 60 TABLET | Refills: 11 | Status: SHIPPED | OUTPATIENT
Start: 2022-06-16

## 2022-06-16 NOTE — PROGRESS NOTES
Patient ID: Tip Doe is a 57 y o  female with PMH of tonic-clonic seizures, 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, HTN, HLD, osteoporosis , who is returning to Neurology office for follow up of her seizures  Assessment/Plan:    Tonic-clonic epileptic seizures [facility])  Patient with history of seizures in the setting of significant intellectual disability  Seizure free since 2013 on phenobarbital monotherapy of 32 4 mg tablets - 3 tablets in the morning and 4 tablets at night  Most recent phenobarbital level about one year ago was 21 7  Check CBC, CMP, phenobarbital level and vitamin D  DXA scheduled for November, on prolia injections for osteoporosis  Caretaker will call the office with breakthrough seizures or concerns  Follow up in 1 year or sooner if needed    Spastic quadriplegic cerebral palsy (HCC)  Continue baclofen and supportive care  She will Return in about 1 year (around 6/16/2023) for Follow up with Dr Eleanora Cowden  Subjective:  Tip Doe is a 57 y o  female with PMH of tonic-clonic seizures, 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, HTN, HLD, osteoporosis , who is returning to Neurology office for follow up of her seizures  She was last seen in the office by Dr Eleanora Cowden on 5/25/2021  AT that time, she was seizure free on phenobarbital monotherapy  Prior attempts to weaning off of this medication lead to breakthrough seizures  She was to continue with current AED regimen  For osteoporosis she was to have her DXA as scheduled in 2022, continue with prolia  Recommended 1 year follow up  Since her last visit, she has been doing well  Denies any seizures  Continues on phenobarbital  They have been controlled since 2013  Spasticity stable on Baclofen  No changes to baseline functioning  Getting prolia tomorrow   DXA in November    Current seizure medications:  - phenobarbital 32 4 mg tablets 3 tablets by mouth at 4 pm  Other medications as per Epic  Latest Reference Range & Units 06/17/21 07:59   PHENOBARBITAL LEVEL 15 0 - 40 0 ug/mL 21 7       Prior Seizure Medications: meberol    Her history was also obtained from her caretaker, who was present at today's visit  I reviewed prior neurology notes, as documented in Epic/CareEverywhere, and summarized above  Objective:    Blood pressure 145/86, pulse 104, weight 76 2 kg (168 lb), last menstrual period 02/28/2010, not currently breastfeeding  Physical Exam  No apparent distress  Appears well nourished  Mood appropriate for situation     Neurologic Exam  Mental status- awake and alert  Significant intellectual disability  Limited speech  Cranial Nerves- PERRL, blinks to visual threat, EOMS normal, facial muscles symmetric, hearing intact to voice, dysarthria    Motor- Diffuse spasticity, lower extremity paralysis  Sensory-  Intact distally in all extremities to light touch  DTRs- diffuse hyperreflexia    Gait- wheelchair bound, deferred    Coordination- deferred given spasticity        ROS:  Review of Systems   Constitutional: Negative  Negative for appetite change and fever  HENT: Negative  Negative for hearing loss, tinnitus, trouble swallowing and voice change  Eyes: Negative  Negative for photophobia and pain  Respiratory: Negative  Negative for shortness of breath  Cardiovascular: Negative  Negative for palpitations  Gastrointestinal: Negative  Negative for nausea and vomiting  Endocrine: Negative  Negative for cold intolerance  Genitourinary: Negative  Negative for dysuria, frequency and urgency  Musculoskeletal: Negative  Negative for myalgias and neck pain  Skin: Negative  Negative for rash  Neurological: Negative  Negative for dizziness, tremors, seizures, syncope, facial asymmetry, speech difficulty, weakness, light-headedness, numbness and headaches  Hematological: Negative    Does not bruise/bleed easily  Psychiatric/Behavioral: Negative  Negative for confusion, hallucinations and sleep disturbance  All other systems reviewed and are negative  ROS obtained by MA and reviewed by myself  This note may have been created using voice recognition software  There may be unintentional errors such as grammatical errors, spelling errors, or pronoun errors

## 2022-06-16 NOTE — PATIENT INSTRUCTIONS
- continue current seizure medication - phenobarbital 32 4 mg tablets - 2 tablets by mouth at 4 pm  - Call the office with breakthrough seizures  - have blood work done with upcoming blood work  - follow up in 1 year  - Continue with baclofen 10 mg twice per day for spasticity

## 2022-06-17 ENCOUNTER — CLINICAL SUPPORT (OUTPATIENT)
Dept: FAMILY MEDICINE CLINIC | Facility: CLINIC | Age: 62
End: 2022-06-17

## 2022-06-17 DIAGNOSIS — M81.0 AGE-RELATED OSTEOPOROSIS WITHOUT CURRENT PATHOLOGICAL FRACTURE: Primary | ICD-10-CM

## 2022-06-17 PROCEDURE — 96372 THER/PROPH/DIAG INJ SC/IM: CPT

## 2022-06-20 NOTE — TELEPHONE ENCOUNTER
Form completed by Dr Audrey Patel and placed in yellow folder in clerical area  Clerical, please fax to [facility]  Thanks!     Fax #: [phone]

## 2022-06-23 LAB — COLOGUARD RESULT REPORTABLE: NEGATIVE

## 2022-06-28 NOTE — ASSESSMENT & PLAN NOTE
Patient with history of seizures in the setting of significant intellectual disability  Seizure free since 2013 on phenobarbital monotherapy of 32 4 mg tablets - 3 tablets in the morning and 4 tablets at night  Most recent phenobarbital level about one year ago was 21 7  Check CBC, CMP, phenobarbital level and vitamin D  DXA scheduled for November, on prolia injections for osteoporosis  Caretaker will call the office with breakthrough seizures or concerns   Follow up in 1 year or sooner if needed

## 2022-06-29 ENCOUNTER — TELEPHONE (OUTPATIENT)
Dept: LAB | Facility: HOSPITAL | Age: 62
End: 2022-06-29

## 2022-07-01 ENCOUNTER — OFFICE VISIT (OUTPATIENT)
Dept: FAMILY MEDICINE CLINIC | Facility: CLINIC | Age: 62
End: 2022-07-01

## 2022-07-01 VITALS
OXYGEN SATURATION: 95 % | HEART RATE: 88 BPM | BODY MASS INDEX: 32.59 KG/M2 | RESPIRATION RATE: 16 BRPM | HEIGHT: 60 IN | WEIGHT: 166 LBS | SYSTOLIC BLOOD PRESSURE: 142 MMHG | DIASTOLIC BLOOD PRESSURE: 86 MMHG | TEMPERATURE: 97.3 F

## 2022-07-01 DIAGNOSIS — I10 BENIGN ESSENTIAL HYPERTENSION: Primary | ICD-10-CM

## 2022-07-01 PROCEDURE — 99213 OFFICE O/P EST LOW 20 MIN: CPT | Performed by: FAMILY MEDICINE

## 2022-07-01 NOTE — ASSESSMENT & PLAN NOTE
Blood pressure well controlled, asymptomatic  Blood pressure: 142/86 mmHg  - patient should continue HCTZ 12 5 mg, losartan 50 mg and metoprolol 1 mg daily  - no other concerns today

## 2022-07-01 NOTE — PROGRESS NOTES
Assessment/Plan:    Benign essential hypertension   Blood pressure well controlled, asymptomatic  Blood pressure: 142/86 mmHg  - patient should continue HCTZ 12 5 mg, losartan 50 mg and metoprolol 1 mg daily  - no other concerns today  Problem List Items Addressed This Visit        Cardiovascular and Mediastinum    Benign essential hypertension - Primary      Blood pressure well controlled, asymptomatic  Blood pressure: 142/86 mmHg  - patient should continue HCTZ 12 5 mg, losartan 50 mg and metoprolol 1 mg daily  - no other concerns today  Subjective:      Patient ID: Mckay Ferro is a 57 y o  female  Hypertension  This is a chronic problem  The current episode started more than 1 year ago  The problem is unchanged  The problem is controlled  Pertinent negatives include no anxiety, blurred vision, chest pain, palpitations, peripheral edema, shortness of breath or sweats  Risk factors for coronary artery disease include dyslipidemia, post-menopausal state, obesity and sedentary lifestyle  Past treatments include beta blockers, angiotensin blockers, diuretics and lifestyle changes  The current treatment provides significant improvement  Compliance problems include exercise and psychosocial issues  There is no history of angina, kidney disease or CAD/MI  There is no history of chronic renal disease  The following portions of the patient's history were reviewed and updated as appropriate: allergies, current medications, past family history, past medical history, past social history, past surgical history and problem list     Review of Systems   Eyes: Negative for blurred vision  Respiratory: Negative for shortness of breath  Cardiovascular: Negative for chest pain and palpitations           Objective:      /86 (BP Location: Left arm, Patient Position: Sitting, Cuff Size: Large)   Pulse 88   Temp (!) 97 3 °F (36 3 °C) (Temporal)   Resp 16   Ht 5' (1 524 m)   Wt 75 3 kg (166 lb) Comment: verbal  LMP 02/28/2010 (Approximate)   SpO2 95%   BMI 32 42 kg/m²          Physical Exam  Vitals reviewed  Constitutional:       General: She is not in acute distress  Appearance: Normal appearance  She is obese  She is not ill-appearing or diaphoretic  HENT:      Head: Normocephalic and atraumatic  Nose: Nose normal  No congestion or rhinorrhea  Mouth/Throat:      Mouth: Mucous membranes are moist       Pharynx: Oropharynx is clear  No oropharyngeal exudate  Eyes:      General: No scleral icterus  Conjunctiva/sclera: Conjunctivae normal       Pupils: Pupils are equal, round, and reactive to light  Cardiovascular:      Rate and Rhythm: Normal rate and regular rhythm  Pulses: Normal pulses  Heart sounds: No murmur heard  Pulmonary:      Effort: Pulmonary effort is normal       Breath sounds: Normal breath sounds  No wheezing  Chest:      Chest wall: No tenderness  Abdominal:      General: Bowel sounds are normal       Palpations: Abdomen is soft  There is no mass  Tenderness: There is no abdominal tenderness  Musculoskeletal:         General: Deformity present  No tenderness  Cervical back: Normal range of motion  Skin:     General: Skin is warm  Capillary Refill: Capillary refill takes less than 2 seconds  Findings: No bruising or rash  Neurological:      Mental Status: She is alert and oriented to person, place, and time  Mental status is at baseline  Cranial Nerves: Cranial nerve deficit present  Motor: Weakness present        Gait: Gait abnormal    Psychiatric:         Mood and Affect: Mood normal          Behavior: Behavior normal          Yuli Gaines DO  Family Medicine Resident, PGY2  12:28 PM

## 2022-07-13 ENCOUNTER — APPOINTMENT (OUTPATIENT)
Dept: LAB | Facility: HOSPITAL | Age: 62
End: 2022-07-13
Payer: MEDICARE

## 2022-07-13 DIAGNOSIS — M81.0 OSTEOPOROSIS, UNSPECIFIED OSTEOPOROSIS TYPE, UNSPECIFIED PATHOLOGICAL FRACTURE PRESENCE: ICD-10-CM

## 2022-07-13 DIAGNOSIS — G40.409 TONIC-CLONIC EPILEPTIC SEIZURES (HCC): ICD-10-CM

## 2022-07-13 LAB
25(OH)D3 SERPL-MCNC: 55.4 NG/ML (ref 30–100)
ALBUMIN SERPL BCP-MCNC: 3.5 G/DL (ref 3.5–5)
ALP SERPL-CCNC: 92 U/L (ref 46–116)
ALT SERPL W P-5'-P-CCNC: 19 U/L (ref 12–78)
ANION GAP SERPL CALCULATED.3IONS-SCNC: 5 MMOL/L (ref 4–13)
AST SERPL W P-5'-P-CCNC: 15 U/L (ref 5–45)
BASOPHILS # BLD AUTO: 0.06 THOUSANDS/ΜL (ref 0–0.1)
BASOPHILS NFR BLD AUTO: 1 % (ref 0–1)
BILIRUB SERPL-MCNC: 0.35 MG/DL (ref 0.2–1)
BUN SERPL-MCNC: 18 MG/DL (ref 5–25)
CALCIUM SERPL-MCNC: 8.8 MG/DL (ref 8.3–10.1)
CHLORIDE SERPL-SCNC: 105 MMOL/L (ref 100–108)
CO2 SERPL-SCNC: 29 MMOL/L (ref 21–32)
CREAT SERPL-MCNC: 0.47 MG/DL (ref 0.6–1.3)
EOSINOPHIL # BLD AUTO: 0.19 THOUSAND/ΜL (ref 0–0.61)
EOSINOPHIL NFR BLD AUTO: 3 % (ref 0–6)
ERYTHROCYTE [DISTWIDTH] IN BLOOD BY AUTOMATED COUNT: 13.2 % (ref 11.6–15.1)
GFR SERPL CREATININE-BSD FRML MDRD: 106 ML/MIN/1.73SQ M
GLUCOSE SERPL-MCNC: 60 MG/DL (ref 65–140)
HCT VFR BLD AUTO: 43.2 % (ref 34.8–46.1)
HGB BLD-MCNC: 13.7 G/DL (ref 11.5–15.4)
IMM GRANULOCYTES # BLD AUTO: 0.02 THOUSAND/UL (ref 0–0.2)
IMM GRANULOCYTES NFR BLD AUTO: 0 % (ref 0–2)
LYMPHOCYTES # BLD AUTO: 1.72 THOUSANDS/ΜL (ref 0.6–4.47)
LYMPHOCYTES NFR BLD AUTO: 23 % (ref 14–44)
MCH RBC QN AUTO: 31.6 PG (ref 26.8–34.3)
MCHC RBC AUTO-ENTMCNC: 31.7 G/DL (ref 31.4–37.4)
MCV RBC AUTO: 100 FL (ref 82–98)
MONOCYTES # BLD AUTO: 0.74 THOUSAND/ΜL (ref 0.17–1.22)
MONOCYTES NFR BLD AUTO: 10 % (ref 4–12)
NEUTROPHILS # BLD AUTO: 4.9 THOUSANDS/ΜL (ref 1.85–7.62)
NEUTS SEG NFR BLD AUTO: 63 % (ref 43–75)
NRBC BLD AUTO-RTO: 0 /100 WBCS
PHENOBARB SERPL-MCNC: 24.3 UG/ML (ref 15–40)
PLATELET # BLD AUTO: 213 THOUSANDS/UL (ref 149–390)
PMV BLD AUTO: 10.6 FL (ref 8.9–12.7)
POTASSIUM SERPL-SCNC: 3.9 MMOL/L (ref 3.5–5.3)
PROT SERPL-MCNC: 7.4 G/DL (ref 6.4–8.2)
RBC # BLD AUTO: 4.34 MILLION/UL (ref 3.81–5.12)
SODIUM SERPL-SCNC: 139 MMOL/L (ref 136–145)
WBC # BLD AUTO: 7.63 THOUSAND/UL (ref 4.31–10.16)

## 2022-07-13 PROCEDURE — 82306 VITAMIN D 25 HYDROXY: CPT

## 2022-07-13 PROCEDURE — 80053 COMPREHEN METABOLIC PANEL: CPT

## 2022-07-13 PROCEDURE — 36415 COLL VENOUS BLD VENIPUNCTURE: CPT

## 2022-07-13 PROCEDURE — 85025 COMPLETE CBC W/AUTO DIFF WBC: CPT

## 2022-07-13 PROCEDURE — 80184 ASSAY OF PHENOBARBITAL: CPT

## 2022-07-15 ENCOUNTER — TELEPHONE (OUTPATIENT)
Dept: NEUROLOGY | Facility: CLINIC | Age: 62
End: 2022-07-15

## 2022-08-09 DIAGNOSIS — J30.1 ALLERGIC RHINITIS DUE TO POLLEN, UNSPECIFIED SEASONALITY: ICD-10-CM

## 2022-08-09 RX ORDER — FLUTICASONE PROPIONATE 50 MCG
SPRAY, SUSPENSION (ML) NASAL
Qty: 16 G | Refills: 11 | Status: SHIPPED | OUTPATIENT
Start: 2022-08-09

## 2022-08-16 NOTE — PROGRESS NOTES
ASSESSMENT & PLAN: Ricky Lynne is a 60 y o  [place] with normal gynecologic exam     1   Routine well woman exam done today  2   Pap and HPV:Pap with HPV was done today  Current ASCCP Guidelines reviewed  3   Mammogram scheduled for December 2022  Recommend yearly mammography  4   Cologuard as per recommendation of Gastroenterologist completed on June 22 2022, negative for malignancy  5  Skin exam: intertrigo, Nystatin cream rxn ordered   6  The patient is not sexually active  7  DEXA Scan, hx of Osteoporosis: Patient continuing to take Denusumab   7  Patient to return to office in 12 months for Annual Physical exam      All questions have been answered to her satisfaction  CC:  Annual Gynecologic Examination    HPI: Ricky Lynne is a 60 y o  [place] who presents for annual gynecologic examination  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  Patient also has severe intellectual disability  She presents today with her caregiver Renan Michael  Caregiver has the following concerns: Skin rash under panus,groin, breasts and axilla  Caregiver reports that she has been using steroid cream for the rash but that it has not worked so far  Caregiver reports that the patient has not had any vaginal bleeding  Caregiver also reports that the patient currently wears adult diapers but has not noticed any changes in urination or bowel movements  Health Maintenance:    She exercises zero days per week due to disability  She does not perform regular monthly self breast exam due to disability  Care giver reports that patient is safe in her current living environment  Care giver reports patient does follow a balanced diet      Care giver reports patient does not use tobacco  Last mammogram: December 2021  Last colonoscopy: June 2022    Past Medical History:   Diagnosis Date    Bowel incontinence     Constipation     Last Assessed: [id number]    Generalized convulsive seizure ([facility] )     Hyperlipidemia     Osteoporosis     Spastic quadriplegic cerebral palsy ([facility] )     Urinary incontinence     Vitamin D deficiency     Last Assessed: [id number]       Past Surgical History:   Procedure Laterality Date    ABSCESS DRAINAGE      Incision and Drainage of skin abscess back    COLONOSCOPY      Fiberoptic; Last Assessed: [id number]    EYE SURGERY Left     Strabismus Left Eye       Past OB/Gyn History:   Patient's last menstrual period was 2010 (approximate)  History of sexually transmitted infection No  Patient is not currently sexually active: unknown sexual history   Patient is postmenopausal     Last Pap  2017 :  no abnormalities;  HPV negative  Pap smear with co-testing done today (2022)    OB History    Para Term  AB Living   0 0 0 0 0 0   SAB IAB Ectopic Multiple Live Births   0 0 0 0 0       Family History:  Family History   Problem Relation Age of Onset    Lymphoma Mother         Bone Marrow    Coronary artery disease Mother     Seizures Mother         Epilepsy and recurrent seizures    Other Mother         Mitral Stenosis    Prostate cancer Father 76    Skin cancer Father     Heart disease Maternal Grandmother     Kidney cancer Maternal Grandmother     Heart disease Maternal Grandfather     Heart disease Paternal Grandmother     Breast cancer Maternal Aunt 79       Social History:  Social History     Socioeconomic History    Marital status: Single     Spouse name: Not on file    Number of children: Not on file    Years of education: Not on file    Highest education level: High school graduate   Occupational History    Occupation: none   Tobacco Use    Smoking status: Never Smoker    Smokeless tobacco: Never Used   Vaping Use    Vaping Use: Never used   Substance and Sexual Activity    Alcohol use: No    Drug use: No    Sexual activity: Never   Other Topics Concern    Not on file   Social History Narrative    Caffeine use    Wheelchair dependent     Social Determinants of Health     Financial Resource Strain: Low Risk     Difficulty of Paying Living Expenses: Not hard at all   Food Insecurity: No Food Insecurity    Worried About Running Out of Food in the Last Year: Never true    Lulu of Food in the Last Year: Never true   Transportation Needs: No Transportation Needs    Lack of Transportation (Medical): No    Lack of Transportation (Non-Medical): No   Physical Activity: Not on file   Stress: Not on file   Social Connections: Not on file   Intimate Partner Violence: Not on file   Housing Stability: Low Risk     Unable to Pay for Housing in the Last Year: No    Number of Places Lived in the Last Year: 1    Unstable Housing in the Last Year: No     Presently lives in an assisted living facility due to spastic cerebral palsy and severe intellectual disability  Allergies:   Allergies   Allergen Reactions    Other      Seasonal Allergies       Medications:    Current Outpatient Medications:     acetaminophen (TYLENOL) 650 mg CR tablet, Take 650 mg by mouth every 8 (eight) hours as needed, Disp: , Rfl:     baclofen 10 mg tablet, Take 1 tablet (10 mg total) by mouth 2 (two) times a day, Disp: 60 tablet, Rfl: 11    benzonatate (TESSALON PERLES) 100 mg capsule, Take 1 capsule by mouth every 8 hours as needed for dry cough, Disp: 20 capsule, Rfl: 0    Calcium Citrate 200 MG TABS, Take by mouth 3 TABS TWICE A DAY , Disp: , Rfl:     carbamide peroxide (DEBROX) 6 5 % otic solution, Instill 5 drops in affected ear twice daily for 4 days as needed for ear wax, Disp: 15 mL, Rfl: 0    cholecalciferol (VITAMIN D3) 1,000 units tablet, Take 1 capsule by mouth daily, Disp: , Rfl:     clotrimazole-betamethasone (LOTRISONE) 1-0 05 % cream, APPLY TOPICALLY TO AFFECTED AREA OF SKIN TWICE A DAY AS NEEDED FOR IRRITATION FROM DIAPER, Disp: 45 g, Rfl: 11    denosumab (Prolia) 60 mg/mL, Inject 1 mL (60 mg total) under the skin once for 1 dose, Disp: 1 mL, Rfl: 0    famotidine (PEPCID) 20 mg tablet, Take 1 tablet (20 mg total) by mouth daily at bedtime, Disp: , Rfl: 0    fluticasone (FLONASE) 50 mcg/act nasal spray, USE 1 SPRAY IN EACH NOSTRIL TWICE A DAY (RHINITIS), Disp: 16 g, Rfl: 11    GNP 8 Hour Arthritis Relief 650 MG CR tablet, TAKE 1 TABLET BY MOUTH EVERY 6 HOURS AS NEEDED FOR MILD PAIN OR TEMPERATURE  >100 4, Disp: 8 tablet, Rfl: 11    hydrochlorothiazide (HYDRODIURIL) 25 mg tablet, Take 0 5 tablets (12 5 mg total) by mouth daily, Disp: 30 tablet, Rfl: 5    ibuprofen (MOTRIN) 600 mg tablet, Take by mouth every 6 (six) hours as needed for fever Fever greater than 101 4F, Disp: , Rfl:     ipratropium (ATROVENT) 0 06 % nasal spray, 2 sprays into each nostril 3 (three) times a day as needed for rhinitis, Disp: 15 mL, Rfl: 0    loratadine (CLARITIN) 10 mg tablet, TAKE 1 TABLET BY MOUTH DAILY (ALLERGIC RHINITIS), Disp: 28 tablet, Rfl: 10    LORazepam (ATIVAN) 0 5 mg tablet, Take 1 tablet (0 5 mg total) by mouth once for 1 dose, Disp: 1 tablet, Rfl: 0    losartan (COZAAR) 100 MG tablet, , Disp: , Rfl:     losartan (COZAAR) 50 mg tablet, Take 1 tablet (50 mg total) by mouth daily, Disp: 30 tablet, Rfl: 5    metoprolol succinate (TOPROL-XL) 100 mg 24 hr tablet, Take 1 tablet by mouth daily, Disp: , Rfl: 0    Mucus Relief 600 MG 12 hr tablet, TAKE 1 TABLET BY MOUTH EVERY 12 HOURS AS NEEDED FOR CONGESTION **DO NOT CRUSH**, Disp: 5 tablet, Rfl: 11    olopatadine HCl (PATADAY) 0 2 % opth drops, Instill 1 drop into affected eye(s) daily PRN irritation, Disp: , Rfl: 0    PHENobarbital 32 4 mg tablet, Take 3 tablets by mouth at 4 PM daily, Disp: 90 tablet, Rfl: 5    polyethylene glycol (GLYCOLAX) 17 GM/SCOOP powder, Take 17 g by mouth as needed for constipation One tsp In 4oz of fluid if no BM for 3 days, Disp: , Rfl:     Polyethylene Glycol 3350 (MIRALAX PO), Take 1 packet by mouth daily In 8 ounces of fluid, Disp: , Rfl:     povidone-iodine (BETADINE) 10 % ointment, Apply topically 2 (two) times a day as needed, Disp: , Rfl:     simvastatin (ZOCOR) 20 mg tablet, Take 1 tablet by mouth daily, Disp: , Rfl: 0    Vitamins A & D (vitamin A & D) ointment, Apply 1 application topically, Disp: , Rfl:     zolpidem (AMBIEN) 5 mg tablet, Take 0 5 tablets (2 5 mg total) by mouth daily, Disp: 15 tablet, Rfl: 5    Review of Systems:  Patients caregiver denies fevers, chills, unintentional weight loss, excessive fatigue, chest pain, shortness of breath, abdominal pain, nausea, vomiting, urinary incontinence, urinary frequency, vaginal bleeding, vaginal discharge  All other systems negative unless otherwise stated  Physical Exam:  LMP 02/28/2010 (Approximate)  There is no height or weight on file to calculate BMI  Not measure today due to disability  GEN: The patient was alert female in no acute distress, with visible disability due to spastic cerebral palsy  HEENT:  EOM intact  CV:  Regular rate and rhythm, normal S1 and S2, no murmurs  RESP:  Clear to auscultation bilaterally, no wheezes, rales or rhonchi  BREAST:  Symmetric breasts with no palpable breast masses or obvious breast lesions  Erythematous skin rash noted in the inframammary fold  She has no retractions or nipple discharge  Erythematous skin rash noted in axilla bilaterally  No palpable masses in axilla  GI:  Soft, nontender, non-distended  MSK: bilateral lower extremities are nontender, no edema, no echamosis  Derm: erythamatous skin rash noted under panus and groin  : Normal appearing external female genitalia,  urethral caruncle visualized at the urethral meatus  On sterile speculum exam,  atrophic appearing vaginal epithelium, no vaginal discharge, no bleeding, grossly normal appearing cervix with minimal bleeding during insertion of pap smear brush  On bimanual exam,  uterus is smooth, mobile, nontender 6 weeks size  No fullness in the bilateral adnexa         Geovanny Abbasi MD  PGY-1  OBGYN

## 2022-08-17 ENCOUNTER — ANNUAL EXAM (OUTPATIENT)
Dept: OBGYN CLINIC | Facility: CLINIC | Age: 62
End: 2022-08-17

## 2022-08-17 VITALS
HEART RATE: 78 BPM | WEIGHT: 166 LBS | SYSTOLIC BLOOD PRESSURE: 155 MMHG | DIASTOLIC BLOOD PRESSURE: 78 MMHG | BODY MASS INDEX: 32.59 KG/M2 | HEIGHT: 60 IN

## 2022-08-17 DIAGNOSIS — Z00.00 ANNUAL PHYSICAL EXAM: ICD-10-CM

## 2022-08-17 DIAGNOSIS — L30.4 INTERTRIGO: ICD-10-CM

## 2022-08-17 PROCEDURE — G0101 CA SCREEN;PELVIC/BREAST EXAM: HCPCS | Performed by: OBSTETRICS & GYNECOLOGY

## 2022-08-17 PROCEDURE — 87624 HPV HI-RISK TYP POOLED RSLT: CPT

## 2022-08-17 PROCEDURE — 88175 CYTOPATH C/V AUTO FLUID REDO: CPT

## 2022-08-17 RX ORDER — NYSTATIN 100000 U/G
CREAM TOPICAL 2 TIMES DAILY
Qty: 30 G | Refills: 0 | Status: SHIPPED | OUTPATIENT
Start: 2022-08-17 | End: 2022-08-18 | Stop reason: ALTCHOICE

## 2022-08-18 ENCOUNTER — TELEPHONE (OUTPATIENT)
Dept: OBGYN CLINIC | Facility: CLINIC | Age: 62
End: 2022-08-18

## 2022-08-18 DIAGNOSIS — L30.4 INTERTRIGO: ICD-10-CM

## 2022-08-18 DIAGNOSIS — Z00.00 ANNUAL PHYSICAL EXAM: ICD-10-CM

## 2022-08-18 LAB
HPV HR 12 DNA CVX QL NAA+PROBE: NEGATIVE
HPV16 DNA CVX QL NAA+PROBE: NEGATIVE
HPV18 DNA CVX QL NAA+PROBE: NEGATIVE

## 2022-08-18 RX ORDER — NYSTATIN AND TRIAMCINOLONE ACETONIDE 100000; 1 [USP'U]/G; MG/G
OINTMENT TOPICAL 2 TIMES DAILY PRN
Qty: 30 G | Refills: 0 | Status: SHIPPED | OUTPATIENT
Start: 2022-08-18 | End: 2022-09-05

## 2022-08-18 RX ORDER — NYSTATIN AND TRIAMCINOLONE ACETONIDE 100000; 1 [USP'U]/G; MG/G
OINTMENT TOPICAL 2 TIMES DAILY
Qty: 30 G | Refills: 0 | Status: SHIPPED | OUTPATIENT
Start: 2022-08-18 | End: 2022-08-18

## 2022-08-18 NOTE — TELEPHONE ENCOUNTER
Good Afternoon,    The patients caregiver called our office stating the wrong medication was delivered to the patient  The patient received the Nystatin but was supposed to get the Mycolog II prescribed based on a discussion with Dr Anup Espinal  Caregiver would like to know if the Mycolog II cold be prescribed  Please advise  Thank you!

## 2022-08-23 LAB
LAB AP GYN PRIMARY INTERPRETATION: NORMAL
Lab: NORMAL

## 2022-09-01 DIAGNOSIS — G40.409 TONIC-CLONIC EPILEPTIC SEIZURES (HCC): ICD-10-CM

## 2022-09-01 RX ORDER — PHENOBARBITAL 32.4 MG/1
TABLET ORAL
Qty: 90 TABLET | Refills: 2 | Status: SHIPPED | OUTPATIENT
Start: 2022-09-01

## 2022-09-02 ENCOUNTER — TELEPHONE (OUTPATIENT)
Dept: FAMILY MEDICINE CLINIC | Facility: CLINIC | Age: 62
End: 2022-09-02

## 2022-09-02 DIAGNOSIS — Z00.00 ANNUAL PHYSICAL EXAM: ICD-10-CM

## 2022-09-02 DIAGNOSIS — L30.4 INTERTRIGO: ICD-10-CM

## 2022-09-02 NOTE — TELEPHONE ENCOUNTER
Caregiver brought in updated copy of covid-19 vaccination card, scanned into chart under this encounter  Asking if clinical can add to immunization record

## 2022-09-05 RX ORDER — NYSTATIN AND TRIAMCINOLONE ACETONIDE 100000; 1 [USP'U]/G; MG/G
OINTMENT TOPICAL
Qty: 30 G | Refills: 11 | Status: SHIPPED | OUTPATIENT
Start: 2022-09-05 | End: 2022-09-12 | Stop reason: SDUPTHER

## 2022-09-12 ENCOUNTER — OFFICE VISIT (OUTPATIENT)
Dept: FAMILY MEDICINE CLINIC | Facility: CLINIC | Age: 62
End: 2022-09-12

## 2022-09-12 VITALS
HEART RATE: 75 BPM | SYSTOLIC BLOOD PRESSURE: 146 MMHG | RESPIRATION RATE: 18 BRPM | DIASTOLIC BLOOD PRESSURE: 85 MMHG | TEMPERATURE: 98.1 F

## 2022-09-12 DIAGNOSIS — I10 BENIGN ESSENTIAL HYPERTENSION: ICD-10-CM

## 2022-09-12 DIAGNOSIS — E78.5 HYPERLIPIDEMIA, UNSPECIFIED HYPERLIPIDEMIA TYPE: ICD-10-CM

## 2022-09-12 DIAGNOSIS — Z00.00 MEDICARE ANNUAL WELLNESS VISIT, SUBSEQUENT: Primary | ICD-10-CM

## 2022-09-12 DIAGNOSIS — R79.89 ELEVATED TSH: ICD-10-CM

## 2022-09-12 DIAGNOSIS — L21.9 SEBORRHEIC DERMATITIS, UNSPECIFIED: ICD-10-CM

## 2022-09-12 DIAGNOSIS — M81.0 AGE-RELATED OSTEOPOROSIS WITHOUT CURRENT PATHOLOGICAL FRACTURE: ICD-10-CM

## 2022-09-12 DIAGNOSIS — Z00.00 ANNUAL PHYSICAL EXAM: ICD-10-CM

## 2022-09-12 DIAGNOSIS — L30.4 INTERTRIGO: ICD-10-CM

## 2022-09-12 PROCEDURE — G0439 PPPS, SUBSEQ VISIT: HCPCS | Performed by: FAMILY MEDICINE

## 2022-09-12 RX ORDER — NYSTATIN AND TRIAMCINOLONE ACETONIDE 100000; 1 [USP'U]/G; MG/G
OINTMENT TOPICAL 2 TIMES DAILY PRN
Qty: 30 G | Refills: 5 | Status: SHIPPED | OUTPATIENT
Start: 2022-09-12

## 2022-09-12 NOTE — PROGRESS NOTES
Assessment and Plan:     Problem List Items Addressed This Visit        Cardiovascular and Mediastinum    Benign essential hypertension     - Blood pressure well controlled  BP today: 146/85   - BP log from group home reviewd  SBP Range: 106-136 and DBP: 76-83  - Continue current regimen: HCTZ 12 5mg, losartan 50mg, Metoprolol 100mg daily  - Continue to monitor BP, per group home         Relevant Orders    Basic metabolic panel       Musculoskeletal and Integument    Osteoporosis       Other    Hyperlipidemia    Relevant Orders    Lipid Panel with Direct LDL reflex    Elevated TSH    Relevant Orders    TSH, 3rd generation with Free T4 reflex    Medicare annual wellness visit, subsequent - Primary     - Patient presents for medical annual wellness  - Screening for cardiovascular disease: yearly BMP and Lipid panel previously ordered and reviewed  - Screening for colorectal cancer: Negative FOBT testing on 6/2022  - Screening for cervical cancer: Up to date  Negative for intraepithelial lesion or malignancy and HPV on 8/2022  - Screening for breast cancer: No mammographic evidence of malignancy on 12/2022   - Vaccinations up to date and documented  - Counseled the patient on healthy lifestyle habits           Other Visit Diagnoses     Seborrheic dermatitis, unspecified        Relevant Medications    selenium sulfide (SELSUN) 1 %        BMI Counseling: There is no height or weight on file to calculate BMI  The BMI is above normal  Nutrition recommendations include decreasing portion sizes, encouraging healthy choices of fruits and vegetables, consuming healthier snacks and moderation in carbohydrate intake  No pharmacotherapy was ordered  Rationale for BMI follow-up plan is due to patient being overweight or obese  Depression Screening and Follow-up Plan: Patient was screened for depression during today's encounter  They screened negative with a PHQ-2 score of 0        Preventive health issues were discussed with patient, and age appropriate screening tests were ordered as noted in patient's After Visit Summary  Personalized health advice and appropriate referrals for health education or preventive services given if needed, as noted in patient's After Visit Summary  History of Present Illness:     Patient presents for a Medicare Wellness Visit    Patient presents to the clinic today with her caregiver - Ms Seymour  Who reports that patient is doing well  Denies any complains  She has been at her baseline  On physical exam, there is concerns for rash/dandruff present  Also she has some skin irritation around her axilla and groin area which improves with cream       Patient Care Team:  Yared Serrano MD as PCP - General (Family Medicine)     Review of Systems:     Review of Systems   Constitutional: Negative for activity change and appetite change  Respiratory: Negative for cough and shortness of breath  Cardiovascular: Negative for chest pain and palpitations  Gastrointestinal: Negative for abdominal pain and vomiting  Genitourinary: Negative for dysuria and hematuria  Musculoskeletal: Negative for arthralgias and back pain  Skin: Positive for color change and rash  Neurological: Negative for seizures and syncope  Psychiatric/Behavioral: Negative  All other systems reviewed and are negative         Problem List:     Patient Active Problem List   Diagnosis    Women's annual routine gynecological examination    Wheelchair bound    Benign essential hypertension    Esophageal reflux    Hyperlipidemia    Insomnia    Osteoporosis    Spastic quadriplegic cerebral palsy ([address]    Tonic-clonic epileptic seizures ([address] )    Encounter for hepatitis C screening test for low risk patient   [address] maintenance    Breast cancer screening    Continuous leakage of urine    Bowel incontinence    Constipation    Severe intellectual disability    Vitamin D deficiency    Encounter for preoperative dental examination    Close exposure to COVID-19 virus    Elevated TSH    Impacted cerumen of both ears    Medicare annual wellness visit, subsequent      Past Medical and Surgical History:     Past Medical History:   Diagnosis Date    Bowel incontinence     Constipation     Last Assessed: [id number]    Generalized convulsive seizure ([facility] [address]     Hyperlipidemia     Osteoporosis     Spastic quadriplegic cerebral palsy ([facility]     Urinary incontinence     Vitamin D deficiency     Last Assessed: [id number]     Past Surgical History:   Procedure Laterality Date    ABSCESS DRAINAGE      Incision and Drainage of skin abscess back    COLONOSCOPY      Fiberoptic; Last Assessed: [id number]    EYE SURGERY Left     Strabismus Left Eye      Family History:     Family History   Problem Relation Age of Onset    Lymphoma Mother         Bone Marrow    Coronary artery disease Mother     Seizures Mother         Epilepsy and recurrent seizures    Other Mother         Mitral Stenosis    Prostate cancer Father 76    Skin cancer Father     Heart disease Maternal Grandmother     Kidney cancer Maternal Grandmother     Heart disease Maternal Grandfather     Heart disease Paternal Grandmother     Breast cancer Maternal Aunt 79      Social History:     Social History     Socioeconomic History    Marital status: Single     Spouse name: None    Number of children: None    Years of education: None    Highest education level: High school graduate   Occupational History    Occupation: none   Tobacco Use    Smoking status: Never Smoker    Smokeless tobacco: Never Used   Vaping Use    Vaping Use: Never used   Substance and Sexual Activity    Alcohol use: No    Drug use: No    Sexual activity: Never   Other Topics Concern    None   Social History Narrative    Caffeine use    Wheelchair dependent     Social Determinants of Health     Financial Resource Strain: Low Risk     Difficulty of Paying Living Expenses: Not hard at all   Food Insecurity: No Food Insecurity    Worried About [facility] in the Last Year: Never true    Lulu of Food in the Last Year: Never true   Transportation Needs: No Transportation Needs    Lack of Transportation (Medical): No    Lack of Transportation (Non-Medical):  No   Physical Activity: Not on file   Stress: Not on file   Social Connections: Not on file   Intimate Partner Violence: Not on file   Housing Stability: Low Risk     Unable to Pay for Housing in the Last Year: No    Number of Places Lived in the Last Year: 1    Unstable Housing in the Last Year: No      Medications and Allergies:     Current Outpatient Medications   Medication Sig Dispense Refill    acetaminophen (TYLENOL) 650 mg CR tablet Take 650 mg by mouth every 8 (eight) hours as needed      baclofen 10 mg tablet Take 1 tablet (10 mg total) by mouth 2 (two) times a day 60 tablet 11    benzonatate (TESSALON PERLES) 100 mg capsule Take 1 capsule by mouth every 8 hours as needed for dry cough 20 capsule 0    Calcium Citrate 200 MG TABS Take by mouth 3 TABS TWICE A DAY       carbamide peroxide (DEBROX) 6 5 % otic solution Instill 5 drops in affected ear twice daily for 4 days as needed for ear wax 15 mL 0    cholecalciferol (VITAMIN D3) 1,000 units tablet Take 1 capsule by mouth daily      clotrimazole-betamethasone (LOTRISONE) 1-0 05 % cream APPLY TOPICALLY TO AFFECTED AREA OF SKIN TWICE A DAY AS NEEDED FOR IRRITATION FROM DIAPER 45 g 11    famotidine (PEPCID) 20 mg tablet Take 1 tablet (20 mg total) by mouth daily at bedtime  0    fluticasone (FLONASE) 50 mcg/act nasal spray USE 1 SPRAY IN EACH NOSTRIL TWICE A DAY (RHINITIS) 16 g 11    GNP 8 Hour Arthritis Relief 650 MG CR tablet TAKE 1 TABLET BY MOUTH EVERY 6 HOURS AS NEEDED FOR MILD PAIN OR TEMPERATURE  >100 4 8 tablet 11    hydrochlorothiazide (HYDRODIURIL) 25 mg tablet Take 0 5 tablets (12 5 mg total) by mouth daily 30 tablet 5    ibuprofen (MOTRIN) 600 mg tablet Take by mouth every 6 (six) hours as needed for fever Fever greater than 101 4F      ipratropium (ATROVENT) 0 06 % nasal spray 2 sprays into each nostril 3 (three) times a day as needed for rhinitis 15 mL 0    loratadine (CLARITIN) 10 mg tablet TAKE 1 TABLET BY MOUTH DAILY (ALLERGIC RHINITIS) 28 tablet 10    losartan (COZAAR) 100 MG tablet       losartan (COZAAR) 50 mg tablet Take 1 tablet (50 mg total) by mouth daily 30 tablet 5    metoprolol succinate (TOPROL-XL) 100 mg 24 hr tablet Take 1 tablet by mouth daily  0    Mucus Relief 600 MG 12 hr tablet TAKE 1 TABLET BY MOUTH EVERY 12 HOURS AS NEEDED FOR CONGESTION **DO NOT CRUSH** 5 tablet 11    olopatadine HCl (PATADAY) 0 2 % opth drops Instill 1 drop into affected eye(s) daily PRN irritation  0    PHENobarbital 32 4 mg tablet Take 3 tablets by mouth at 4 PM daily 90 tablet 2    polyethylene glycol (GLYCOLAX) 17 GM/SCOOP powder Take 17 g by mouth as needed for constipation One tsp In 4oz of fluid if no BM for 3 days      Polyethylene Glycol 3350 (MIRALAX PO) Take 1 packet by mouth daily In 8 ounces of fluid      povidone-iodine (BETADINE) 10 % ointment Apply topically 2 (two) times a day as needed      selenium sulfide (SELSUN) 1 % Apply topically 2 (two) times a week Apply twice weekly as needed when with scalp irritation   420 mL 5    simvastatin (ZOCOR) 20 mg tablet Take 1 tablet by mouth daily  0    zolpidem (AMBIEN) 5 mg tablet Take 0 5 tablets (2 5 mg total) by mouth daily 15 tablet 5    denosumab (Prolia) 60 mg/mL Inject 1 mL (60 mg total) under the skin once for 1 dose 1 mL 0    LORazepam (ATIVAN) 0 5 mg tablet Take 1 tablet (0 5 mg total) by mouth once for 1 dose 1 tablet 0    nystatin-triamcinolone (MYCOLOG-II) ointment Apply topically 2 (two) times a day as needed (As needed for rash) 30 g 5    Vitamins A & D (vitamin A & D) ointment Apply 1 application topically       No current facility-administered medications for this visit  Allergies   Allergen Reactions    Other      Seasonal Allergies      Immunizations:     Immunization History   Administered Date(s) Administered    COVID-19 PFIZER VACCINE 0 3 ML IM 01/22/2021, 02/12/2021, 11/16/2021, 08/26/2022    Hep B, adult 02/24/2015, 04/09/2015, 09/11/2015    Influenza Quadrivalent Preservative Free 3 years and older IM 10/31/2014, 11/06/2015    Influenza Quadrivalent, 6-35 Months IM 11/08/2016    Influenza, injectable, quadrivalent, preservative free 0 5 mL 11/01/2018    Influenza, recombinant, quadrivalent,injectable, preservative free 10/29/2019, 10/09/2020, 11/30/2021    Influenza, seasonal, injectable 11/14/2012, 11/18/2013    Tdap 12/06/2011, 07/02/2021    Tuberculin Skin Test-PPD Intradermal 02/13/2013, 12/02/2014, 08/11/2016, 07/23/2018, 06/24/2020, 05/26/2022    Zoster Vaccine Recombinant 07/14/2020, 03/16/2021      Health Maintenance:         Topic Date Due    Breast Cancer Screening: Mammogram  12/03/2023    Colorectal Cancer Screening  06/16/2025    Cervical Cancer Screening  08/17/2027    HIV Screening  Completed    Hepatitis C Screening  Completed         Topic Date Due    Influenza Vaccine (1) 09/01/2022      Medicare Screening Tests and Risk Assessments:     Jessica Hudson is here for her Subsequent Wellness visit  Health Risk Assessment:   Patient rates overall health as good  Patient feels that their physical health rating is same  Patient is satisfied with their life  Eyesight was rated as same  Hearing was rated as same  Patient feels that their emotional and mental health rating is same  Patients states they are never, rarely angry  Patient states they are never, rarely unusually tired/fatigued  Pain experienced in the last 7 days has been none  Patient states that she has experienced no weight loss or gain in last 6 months  Depression Screening:   PHQ-2 Score: 0      Fall Risk Screening:    In the past year, patient has experienced: no history of falling in past year      Urinary Incontinence Screening:   Patient has leaked urine accidently in the last six months  Incontinent of bladder and Bowel    Home Safety:  Patient does not have trouble with stairs inside or outside of their home  Patient has working smoke alarms and has working carbon monoxide detector  Home safety hazards include: none  Lives [facility]    Nutrition:   Current diet is Low Saturated Fat  Soft 1500 tyrone low fat STEVEN diet    Medications:   Patient is not currently taking any over-the-counter supplements  Patient is not able to manage medications  Activities of Daily Living (ADLs)/Instrumental Activities of Daily Living (IADLs):   Walk and transfer into and out of bed and chair?: No  Dress and groom yourself?: No    Bathe or shower yourself?: No    Feed yourself? No  Do your laundry/housekeeping?: No  Manage your money, pay your bills and track your expenses?: No  Make your own meals?: No    Do your own shopping?: No    ADL comments: Lives in [facility] facility, Spastic Quad    Durable Medical Equipment Suppliers  Active [employer]    Previous Hospitalizations:   Any hospitalizations or ED visits within the last 12 months?: No      Advance Care Planning:   Living will: No    Durable POA for healthcare:  Yes    Advanced directive: No      PREVENTIVE SCREENINGS      Cardiovascular Screening:    General: Screening Not Indicated and History Lipid Disorder      Diabetes Screening:     General: Screening Current      Colorectal Cancer Screening:     General: Screening Current      Breast Cancer Screening:     General: Screening Current      Cervical Cancer Screening:    General: Screening Current      Osteoporosis Screening:    General: Screening Not Indicated and History Osteoporosis      Lung Cancer Screening:     General: Screening Not Indicated      Hepatitis C Screening:    General: Screening Current    Screening, Brief Intervention, and Referral to Treatment (SBIRT)    Screening  Typical number of drinks in a day: 0  Typical number of drinks in a week: 0  Interpretation: Low risk drinking behavior  AUDIT-C Screenin) How often did you have a drink containing alcohol in the past year? never  2) How many drinks did you have on a typical day when you were drinking in the past year? 0  3) How often did you have 6 or more drinks on one occasion in the past year? never    AUDIT-C Score: 0  Interpretation: Score 0-2 (female): Negative screen for alcohol misuse    Single Item Drug Screening:  How often have you used an illegal drug (including marijuana) or a prescription medication for non-medical reasons in the past year? never    Single Item Drug Screen Score: 0  Interpretation: Negative screen for possible drug use disorder    No exam data present     Physical Exam:     /85   Pulse 75   Temp 98 1 °F (36 7 °C) (Temporal)   Resp 18   LMP 2010 (Approximate)     Physical Exam  Vitals and nursing note reviewed  Constitutional:       General: She is not in acute distress  Appearance: She is well-developed  She is not ill-appearing, toxic-appearing or diaphoretic  HENT:      Head: Normocephalic and atraumatic  Eyes:      Conjunctiva/sclera: Conjunctivae normal    Cardiovascular:      Rate and Rhythm: Normal rate and regular rhythm  Heart sounds: No murmur heard  Pulmonary:      Effort: Pulmonary effort is normal  No respiratory distress  Breath sounds: Normal breath sounds  Abdominal:      Palpations: Abdomen is soft  Tenderness: There is no abdominal tenderness  Musculoskeletal:      Cervical back: Neck supple  Skin:     General: Skin is warm and dry  Findings: Rash (erythematous rash present on axilla and groin area) present  Neurological:      Mental Status: She is alert  Mental status is at baseline  Cranial Nerves: Cranial nerve deficit present        Gait: Gait abnormal       Comments: Patient contracted at baseline             Annie Mendoza MD

## 2022-09-12 NOTE — ASSESSMENT & PLAN NOTE
- Blood pressure well controlled  BP today: 146/85   - BP log from group home reviewd  SBP Range: 106-136 and DBP: 76-83  - Continue current regimen: HCTZ 12 5mg, losartan 50mg, Metoprolol 100mg daily    - Continue to monitor BP, per group home

## 2022-09-12 NOTE — PATIENT INSTRUCTIONS
Medicare Preventive Visit Patient Instructions  Thank you for completing your Welcome to Medicare Visit or Medicare Annual Wellness Visit today  Your next wellness visit will be due in one year (9/13/2023)  The screening/preventive services that you may require over the next 5-10 years are detailed below  Some tests may not apply to you based off risk factors and/or age  Screening tests ordered at today's visit but not completed yet may show as past due  Also, please note that scanned in results may not display below  Preventive Screenings:  Service Recommendations Previous Testing/Comments   Colorectal Cancer Screening  * Colonoscopy    * Fecal Occult Blood Test (FOBT)/Fecal Immunochemical Test (FIT)  * Fecal DNA/Cologuard Test  * Flexible Sigmoidoscopy Age: 39-70 years old   Colonoscopy: every 10 years (may be performed more frequently if at higher risk)  OR  FOBT/FIT: every 1 year  OR  Cologuard: every 3 years  OR  Sigmoidoscopy: every 5 years  Screening may be recommended earlier than age 39 if at higher risk for colorectal cancer  Also, an individualized decision between you and your healthcare provider will decide whether screening between the ages of 74-80 would be appropriate  Colonoscopy: 06/16/2022  FOBT/FIT: Not on file  Cologuard: 06/16/2022  Sigmoidoscopy: Not on file          Breast Cancer Screening Age: 36 years old  Frequency: every 1-2 years  Not required if history of left and right mastectomy Mammogram: 12/03/2021        Cervical Cancer Screening Between the ages of 21-29, pap smear recommended once every 3 years  Between the ages of 33-67, can perform pap smear with HPV co-testing every 5 years     Recommendations may differ for women with a history of total hysterectomy, cervical cancer, or abnormal pap smears in past  Pap Smear: 08/17/2022        Hepatitis C Screening Once for adults born between 1945 and 1965  More frequently in patients at high risk for Hepatitis C Hep C Antibody: 01/25/2019        Diabetes Screening 1-2 times per year if you're at risk for diabetes or have pre-diabetes Fasting glucose: 72 mg/dL (12/22/2021)  A1C: No results in last 5 years (No results in last 5 years)      Cholesterol Screening Once every 5 years if you don't have a lipid disorder  May order more often based on risk factors  Lipid panel: 11/24/2021          Other Preventive Screenings Covered by Medicare:  1  Abdominal Aortic Aneurysm (AAA) Screening: covered once if your at risk  You're considered to be at risk if you have a family history of AAA  2  Lung Cancer Screening: covers low dose CT scan once per year if you meet all of the following conditions: (1) Age 50-69; (2) No signs or symptoms of lung cancer; (3) Current smoker or have quit smoking within the last 15 years; (4) You have a tobacco smoking history of at least 20 pack years (packs per day multiplied by number of years you smoked); (5) You get a written order from a healthcare provider  3  Glaucoma Screening: covered annually if you're considered high risk: (1) You have diabetes OR (2) Family history of glaucoma OR (3)  aged 48 and older OR (3)  American aged 72 and older  3  Osteoporosis Screening: covered every 2 years if you meet one of the following conditions: (1) You're estrogen deficient and at risk for osteoporosis based off medical history and other findings; (2) Have a vertebral abnormality; (3) On glucocorticoid therapy for more than 3 months; (4) Have primary hyperparathyroidism; (5) On osteoporosis medications and need to assess response to drug therapy  · Last bone density test (DXA Scan): 10/06/2020   5  HIV Screening: covered annually if you're between the age of 15-65  Also covered annually if you are younger than 13 and older than 72 with risk factors for HIV infection  For pregnant patients, it is covered up to 3 times per pregnancy      Immunizations:  Immunization Recommendations   Influenza Vaccine Annual influenza vaccination during flu season is recommended for all persons aged >= 6 months who do not have contraindications   Pneumococcal Vaccine   * Pneumococcal conjugate vaccine = PCV13 (Prevnar 13), PCV15 (Vaxneuvance), PCV20 (Prevnar 20)  * Pneumococcal polysaccharide vaccine = PPSV23 (Pneumovax) Adults 25-60 years old: 1-3 doses may be recommended based on certain risk factors  Adults 72 years old: 1-2 doses may be recommended based off what pneumonia vaccine you previously received   Hepatitis B Vaccine 3 dose series if at intermediate or high risk (ex: diabetes, end stage renal disease, liver disease)   Tetanus (Td) Vaccine - COST NOT COVERED BY MEDICARE PART B Following completion of primary series, a booster dose should be given every 10 years to maintain immunity against tetanus  Td may also be given as tetanus wound prophylaxis  Tdap Vaccine - COST NOT COVERED BY MEDICARE PART B Recommended at least once for all adults  For pregnant patients, recommended with each pregnancy  Shingles Vaccine (Shingrix) - COST NOT COVERED BY MEDICARE PART B  2 shot series recommended in those aged 48 and above     Health Maintenance Due:      Topic Date Due    Breast Cancer Screening: Mammogram  12/03/2023    Colorectal Cancer Screening  06/16/2025    Cervical Cancer Screening  08/17/2027    HIV Screening  Completed    Hepatitis C Screening  Completed     Immunizations Due:      Topic Date Due    Influenza Vaccine (1) 09/01/2022     Advance Directives   What are advance directives? Advance directives are legal documents that state your wishes and plans for medical care  These plans are made ahead of time in case you lose your ability to make decisions for yourself  Advance directives can apply to any medical decision, such as the treatments you want, and if you want to donate organs  What are the types of advance directives?   There are many types of advance directives, and each state has rules about how to use them  You may choose a combination of any of the following:  · Living will: This is a written record of the treatment you want  You can also choose which treatments you do not want, which to limit, and which to stop at a certain time  This includes surgery, medicine, IV fluid, and tube feedings  · Durable power of  for healthcare [facility]): This is a written record that states who you want to make healthcare choices for you when you are unable to make them for yourself  This person, called a proxy, is usually a family member or a friend  You may choose more than 1 proxy  · Do not resuscitate (DNR) order:  A DNR order is used in case your heart stops beating or you stop breathing  It is a request not to have certain forms of treatment, such as CPR  A DNR order may be included in other types of advance directives  · Medical directive: This covers the care that you want if you are in a coma, near death, or unable to make decisions for yourself  You can list the treatments you want for each condition  Treatment may include pain medicine, surgery, blood transfusions, dialysis, IV or tube feedings, and a ventilator (breathing machine)  · Values history: This document has questions about your views, beliefs, and how you feel and think about life  This information can help others choose the care that you would choose  Why are advance directives important? An advance directive helps you control your care  Although spoken wishes may be used, it is better to have your wishes written down  Spoken wishes can be misunderstood, or not followed  Treatments may be given even if you do not want them  An advance directive may make it easier for your family to make difficult choices about your care     Weight Management   Why it is important to manage your weight:  Being overweight increases your risk of health conditions such as heart disease, high blood pressure, type 2 diabetes, and certain types of cancer  It can also increase your risk for osteoarthritis, sleep apnea, and other respiratory problems  Aim for a slow, steady weight loss  Even a small amount of weight loss can lower your risk of health problems  How to lose weight safely:  A safe and healthy way to lose weight is to eat fewer calories and get regular exercise  You can lose up about 1 pound a week by decreasing the number of calories you eat by 500 calories each day  Healthy meal plan for weight management:  A healthy meal plan includes a variety of foods, contains fewer calories, and helps you stay healthy  A healthy meal plan includes the following:  · Eat whole-grain foods more often  A healthy meal plan should contain fiber  Fiber is the part of grains, fruits, and vegetables that is not broken down by your body  Whole-grain foods are healthy and provide extra fiber in your diet  Some examples of whole-grain foods are whole-wheat breads and pastas, oatmeal, brown rice, and bulgur  · Eat a variety of vegetables every day  Include dark, leafy greens such as spinach, kale, elena greens, and mustard greens  Eat yellow and orange vegetables such as carrots, sweet potatoes, and winter squash  · Eat a variety of fruits every day  Choose fresh or canned fruit (canned in its own juice or light syrup) instead of juice  Fruit juice has very little or no fiber  · Eat low-fat dairy foods  Drink fat-free (skim) milk or 1% milk  Eat fat-free yogurt and low-fat cottage cheese  Try low-fat cheeses such as mozzarella and other reduced-fat cheeses  · Choose meat and other protein foods that are low in fat  Choose beans or other legumes such as split peas or lentils  Choose fish, skinless poultry (chicken or turkey), or lean cuts of red meat (beef or pork)  Before you cook meat or poultry, cut off any visible fat  · Use less fat and oil  Try baking foods instead of frying them   Add less fat, such as margarine, sour cream, regular salad dressing and mayonnaise to foods  Eat fewer high-fat foods  Some examples of high-fat foods include french fries, doughnuts, ice cream, and cakes  · Eat fewer sweets  Limit foods and drinks that are high in sugar  This includes candy, cookies, regular soda, and sweetened drinks  Exercise:  Exercise at least 30 minutes per day on most days of the week  Some examples of exercise include walking, biking, dancing, and swimming  You can also fit in more physical activity by taking the stairs instead of the elevator or parking farther away from stores  Ask your healthcare provider about the best exercise plan for you  © Copyright [employer] 2018 Information is for End User's use only and may not be sold, redistributed or otherwise used for commercial purposes   All illustrations and images included in CareNotes® are the copyrighted property of A D A M , Inc  or [address] [place] [address] [employer]

## 2022-09-12 NOTE — ASSESSMENT & PLAN NOTE
- Patient presents for medical annual wellness  - Screening for cardiovascular disease: yearly BMP and Lipid panel previously ordered and reviewed  - Screening for colorectal cancer: Negative FOBT testing on 6/2022  - Screening for cervical cancer: Up to date  Negative for intraepithelial lesion or malignancy and HPV on 8/2022  - Screening for breast cancer: No mammographic evidence of malignancy on 12/2022   - Vaccinations up to date and documented     - Counseled the patient on healthy lifestyle habits

## 2022-10-12 PROBLEM — H61.23 IMPACTED CERUMEN OF BOTH EARS: Status: RESOLVED | Noted: 2021-11-29 | Resolved: 2022-10-12

## 2022-10-28 ENCOUNTER — TELEPHONE (OUTPATIENT)
Dept: FAMILY MEDICINE CLINIC | Facility: CLINIC | Age: 62
End: 2022-10-28

## 2022-10-28 NOTE — TELEPHONE ENCOUNTER
Dawn from [facility] is requesting a call back from Mary  She is requesting advice for Covid + patient  She knows patient are allowed in office visit

## 2022-10-28 NOTE — TELEPHONE ENCOUNTER
Called and spoke with Tony Glasgow, regarding Niru's recent positive COVID test  Had exposure to a COVID positive staff member on 10/22-23  Is vaccinated x4 with pfizer  Fever to 100 2F on Monday 10/24, that went up 100 3F  Vitals otherwise stable  No other symptoms at that time  COVID antigen test on 10/24 resulted negative  Low grade fever on 10/25, started with nasal congestion  Given Mucinex at that time, which she has been taking since  Fever resolved on 10/26  PCR testing on 10/26 resulted positive for COVID  Still has cough congestion, but afebrile and vitals are otherwise stable  No changes in behavior or appetite  Recommended supportive care  Return to ED precautions given

## 2022-10-28 NOTE — TELEPHONE ENCOUNTER
Are you able to call Alba Goodson? I spoke with her, patient is positive for Covid but manageable at home for treatment  She needs documentation for state that she spoke with physician  Please call her

## 2022-11-01 ENCOUNTER — OFFICE VISIT (OUTPATIENT)
Dept: FAMILY MEDICINE CLINIC | Facility: CLINIC | Age: 62
End: 2022-11-01

## 2022-11-01 VITALS
SYSTOLIC BLOOD PRESSURE: 135 MMHG | TEMPERATURE: 98.5 F | DIASTOLIC BLOOD PRESSURE: 84 MMHG | OXYGEN SATURATION: 98 % | HEART RATE: 80 BPM

## 2022-11-01 DIAGNOSIS — U07.1 COVID-19: Primary | ICD-10-CM

## 2022-11-01 NOTE — PROGRESS NOTES
Name: Rolando Ford      : 1960      MRN: [id number]  Encounter Provider: Brisa Moreno MD  Encounter Date: 2022   Encounter department: [address] [place] [address]  COVID-19  Assessment & Plan:  - Mildly symptomatic with nasal congestion  symptoms are overall improving  Patient vaccinated x4  - patient tested positive for COVID on 10/26  Had an exposure to a staff member in group home who tested positive for COVID  - vital signs stable today  Patient afebrile, oxygen saturation 98%  - Continue supportive care and encourage adequate hydration                 Subjective      Patient presents to the clinic today for follow-up of COVID  Patient tested positive on 10/26  Had exposure to a COVID positive staff member on 10/22-  Patient is present with caregiver[facility]  Patient tested positive for COVID on 10/26 and was symptomatic with Fever  T-max 100 3°   However today reports that symptoms are improving  She still has mild nasal congestion otherwise denies any fever, chills  Is vaccinated x4 with pfizer        Review of Systems   Reason unable to perform ROS: Review of systems limited patient nonverbal at baseline  Constitutional: Negative for activity change and appetite change  HENT: Positive for congestion (Nasal congestion)  Respiratory: Positive for cough  Negative for shortness of breath  Cardiovascular: Negative for chest pain and palpitations  Gastrointestinal: Negative for abdominal pain, nausea and vomiting  Skin: Negative for color change and rash  All other systems reviewed and are negative        Current Outpatient Medications on File Prior to Visit   Medication Sig   • acetaminophen (TYLENOL) 650 mg CR tablet Take 650 mg by mouth every 8 (eight) hours as needed   • baclofen 10 mg tablet Take 1 tablet (10 mg total) by mouth 2 (two) times a day   • benzonatate (TESSALON PERLES) 100 mg capsule Take 1 capsule by mouth every 8 hours as needed for dry cough   • Calcium Citrate 200 MG TABS Take by mouth 3 TABS TWICE A DAY    • carbamide peroxide (DEBROX) 6 5 % otic solution Instill 5 drops in affected ear twice daily for 4 days as needed for ear wax   • cholecalciferol (VITAMIN D3) 1,000 units tablet Take 1 capsule by mouth daily   • clotrimazole-betamethasone (LOTRISONE) 1-0 05 % cream APPLY TOPICALLY TO AFFECTED AREA OF SKIN TWICE A DAY AS NEEDED FOR IRRITATION FROM DIAPER   • denosumab (Prolia) 60 mg/mL Inject 1 mL (60 mg total) under the skin once for 1 dose   • famotidine (PEPCID) 20 mg tablet Take 1 tablet (20 mg total) by mouth daily at bedtime   • fluticasone (FLONASE) 50 mcg/act nasal spray USE 1 SPRAY IN EACH NOSTRIL TWICE A DAY (RHINITIS)   • GNP 8 Hour Arthritis Relief 650 MG CR tablet TAKE 1 TABLET BY MOUTH EVERY 6 HOURS AS NEEDED FOR MILD PAIN OR TEMPERATURE  >100 4   • hydrochlorothiazide (HYDRODIURIL) 25 mg tablet Take 0 5 tablets (12 5 mg total) by mouth daily   • ibuprofen (MOTRIN) 600 mg tablet Take by mouth every 6 (six) hours as needed for fever Fever greater than 101 4F   • ipratropium (ATROVENT) 0 06 % nasal spray 2 sprays into each nostril 3 (three) times a day as needed for rhinitis   • loratadine (CLARITIN) 10 mg tablet TAKE 1 TABLET BY MOUTH DAILY (ALLERGIC RHINITIS)   • LORazepam (ATIVAN) 0 5 mg tablet Take 1 tablet (0 5 mg total) by mouth once for 1 dose   • losartan (COZAAR) 50 mg tablet Take 1 tablet (50 mg total) by mouth daily   • metoprolol succinate (TOPROL-XL) 100 mg 24 hr tablet Take 1 tablet by mouth daily   • Mucus Relief 600 MG 12 hr tablet TAKE 1 TABLET BY MOUTH EVERY 12 HOURS AS NEEDED FOR CONGESTION **DO NOT CRUSH**   • nystatin-triamcinolone (MYCOLOG-II) ointment Apply topically 2 (two) times a day as needed (As needed for rash)   • olopatadine HCl (PATADAY) 0 2 % opth drops Instill 1 drop into affected eye(s) daily PRN irritation   • PHENobarbital 32 4 mg tablet Take 3 tablets by mouth at 4 PM daily   • polyethylene glycol (GLYCOLAX) 17 GM/SCOOP powder Take 17 g by mouth as needed for constipation One tsp In 4oz of fluid if no BM for 3 days   • Polyethylene Glycol 3350 (MIRALAX PO) Take 1 packet by mouth daily In 8 ounces of fluid   • povidone-iodine (BETADINE) 10 % ointment Apply topically 2 (two) times a day as needed   • selenium sulfide (SELSUN) 1 % Apply topically 2 (two) times a week Apply twice weekly as needed when with scalp irritation  • simvastatin (ZOCOR) 20 mg tablet Take 1 tablet by mouth daily   • Vitamins A & D (vitamin A & D) ointment Apply 1 application topically   • zolpidem (AMBIEN) 5 mg tablet Take 0 5 tablets (2 5 mg total) by mouth daily   • losartan (COZAAR) 100 MG tablet  (Patient not taking: Reported on 11/1/2022)       Objective     /84 (BP Location: Left arm, Patient Position: Sitting, Cuff Size: Standard)   Pulse 80   Temp 98 5 °F (36 9 °C) (Temporal)   LMP 02/28/2010 (Approximate)   SpO2 98%     Physical Exam  Vitals reviewed  Constitutional:       General: She is not in acute distress  Appearance: Normal appearance  She is not ill-appearing, toxic-appearing or diaphoretic  HENT:      Head: Normocephalic and atraumatic  Eyes:      General:         Right eye: No discharge  Left eye: No discharge  Conjunctiva/sclera: Conjunctivae normal    Cardiovascular:      Rate and Rhythm: Normal rate and regular rhythm  Pulses: Normal pulses  Heart sounds: Normal heart sounds  Pulmonary:      Effort: Pulmonary effort is normal  No respiratory distress  Breath sounds: Normal breath sounds  No wheezing  Abdominal:      General: Abdomen is flat  Bowel sounds are normal       Palpations: Abdomen is soft  Musculoskeletal:      Comments: Patient contracted at baseline   Skin:     General: Skin is warm and dry  Neurological:      Mental Status: She is alert  Mental status is at baseline        Gait: Gait abnormal    Psychiatric:         Mood and Affect: Mood normal          Behavior: Behavior normal        Marli Beyer MD

## 2022-11-01 NOTE — ASSESSMENT & PLAN NOTE
- Mildly symptomatic with nasal congestion  symptoms are overall improving  Patient vaccinated x4  - patient tested positive for COVID on 10/26  Had an exposure to a staff member in group home who tested positive for COVID  - vital signs stable today  Patient afebrile, oxygen saturation 98%    - Continue supportive care and encourage adequate hydration

## 2022-11-08 ENCOUNTER — HOSPITAL ENCOUNTER (OUTPATIENT)
Dept: RADIOLOGY | Age: 62
Discharge: HOME | End: 2022-11-08

## 2022-11-08 DIAGNOSIS — M81.0 OSTEOPOROSIS, UNSPECIFIED OSTEOPOROSIS TYPE, UNSPECIFIED PATHOLOGICAL FRACTURE PRESENCE: ICD-10-CM

## 2022-11-08 DIAGNOSIS — K59.00 CONSTIPATION, UNSPECIFIED CONSTIPATION TYPE: Primary | ICD-10-CM

## 2022-11-08 RX ORDER — POLYETHYLENE GLYCOL 3350 17 G/17G
POWDER, FOR SOLUTION ORAL
Qty: 510 G | Refills: 11 | Status: SHIPPED | OUTPATIENT
Start: 2022-11-08

## 2022-11-11 PROBLEM — Z00.00 MEDICARE ANNUAL WELLNESS VISIT, SUBSEQUENT: Status: RESOLVED | Noted: 2022-09-12 | Resolved: 2022-11-11

## 2022-11-17 DIAGNOSIS — G40.409 TONIC-CLONIC EPILEPTIC SEIZURES (HCC): ICD-10-CM

## 2022-11-18 RX ORDER — PHENOBARBITAL 32.4 MG/1
TABLET ORAL
Qty: 210 TABLET | Refills: 2 | Status: SHIPPED | OUTPATIENT
Start: 2022-11-18 | End: 2022-11-23 | Stop reason: SDUPTHER

## 2022-11-22 ENCOUNTER — APPOINTMENT (OUTPATIENT)
Dept: LAB | Facility: HOSPITAL | Age: 62
End: 2022-11-22

## 2022-11-22 DIAGNOSIS — I10 BENIGN ESSENTIAL HYPERTENSION: ICD-10-CM

## 2022-11-22 DIAGNOSIS — R79.89 ELEVATED TSH: ICD-10-CM

## 2022-11-22 DIAGNOSIS — E78.5 HYPERLIPIDEMIA, UNSPECIFIED HYPERLIPIDEMIA TYPE: ICD-10-CM

## 2022-11-22 LAB
ANION GAP SERPL CALCULATED.3IONS-SCNC: 4 MMOL/L (ref 4–13)
BUN SERPL-MCNC: 16 MG/DL (ref 5–25)
CALCIUM SERPL-MCNC: 9.5 MG/DL (ref 8.3–10.1)
CHLORIDE SERPL-SCNC: 104 MMOL/L (ref 96–108)
CHOLEST SERPL-MCNC: 161 MG/DL
CO2 SERPL-SCNC: 29 MMOL/L (ref 21–32)
CREAT SERPL-MCNC: 0.53 MG/DL (ref 0.6–1.3)
GFR SERPL CREATININE-BSD FRML MDRD: 101 ML/MIN/1.73SQ M
GLUCOSE SERPL-MCNC: 84 MG/DL (ref 65–140)
HDLC SERPL-MCNC: 64 MG/DL
LDLC SERPL CALC-MCNC: 82 MG/DL (ref 0–100)
POTASSIUM SERPL-SCNC: 4.2 MMOL/L (ref 3.5–5.3)
SODIUM SERPL-SCNC: 137 MMOL/L (ref 135–147)
TRIGL SERPL-MCNC: 73 MG/DL
TSH SERPL DL<=0.05 MIU/L-ACNC: 2.41 UIU/ML (ref 0.45–4.5)

## 2022-11-23 DIAGNOSIS — G40.409 TONIC-CLONIC EPILEPTIC SEIZURES (HCC): ICD-10-CM

## 2022-11-23 RX ORDER — PHENOBARBITAL 32.4 MG/1
TABLET ORAL
Qty: 210 TABLET | Refills: 2 | OUTPATIENT
Start: 2022-11-23

## 2022-11-23 RX ORDER — PHENOBARBITAL 32.4 MG/1
TABLET ORAL
Qty: 90 TABLET | Refills: 2 | Status: SHIPPED | OUTPATIENT
Start: 2022-11-23

## 2022-11-23 NOTE — TELEPHONE ENCOUNTER
Dawn, patients care taker, had called and I had called her back stating that the nurses are aware of the medication issue  I had apologized for her not receiving a call back since she has been calling for 3 days to get the patients medication issue resolved

## 2022-11-23 NOTE — TELEPHONE ENCOUNTER
Called care facility to make aware that corrected script was sent and spoke with [facility], who verbalized an understanding

## 2022-11-23 NOTE — TELEPHONE ENCOUNTER
Dawn from [facility] called re: Phenobarb order  She says the script sent to pharmacy is incorrect, "They are double dosing her " Current script reads Phenobarb 32 4 mg 3 tablets by mouth in the morning and 4 tablets by mouth at night  Alba Goodson states she only takes 3 tabs at 4 pm  Chart review supports this  She would like the script resent to pharmacy as: Phenobarbitol 32 4 mg 3 tablets by mouth at 4 pm daily  Alba Goodson reports that pt only has one dose left

## 2022-12-06 ENCOUNTER — OFFICE VISIT (OUTPATIENT)
Dept: FAMILY MEDICINE CLINIC | Facility: CLINIC | Age: 62
End: 2022-12-06

## 2022-12-06 VITALS
WEIGHT: 162 LBS | HEIGHT: 60 IN | SYSTOLIC BLOOD PRESSURE: 121 MMHG | DIASTOLIC BLOOD PRESSURE: 74 MMHG | HEART RATE: 85 BPM | BODY MASS INDEX: 31.8 KG/M2 | RESPIRATION RATE: 20 BRPM | TEMPERATURE: 97.5 F | OXYGEN SATURATION: 98 %

## 2022-12-06 DIAGNOSIS — Z23 ENCOUNTER FOR IMMUNIZATION: Primary | ICD-10-CM

## 2022-12-06 DIAGNOSIS — I10 BENIGN ESSENTIAL HYPERTENSION: ICD-10-CM

## 2022-12-06 DIAGNOSIS — M81.0 OSTEOPOROSIS, UNSPECIFIED OSTEOPOROSIS TYPE, UNSPECIFIED PATHOLOGICAL FRACTURE PRESENCE: ICD-10-CM

## 2022-12-06 RX ORDER — DENOSUMAB 60 MG/ML
60 INJECTION SUBCUTANEOUS ONCE
Qty: 1 ML | Refills: 0 | Status: SHIPPED | OUTPATIENT
Start: 2022-12-06 | End: 2022-12-06

## 2022-12-06 NOTE — ASSESSMENT & PLAN NOTE
Well controlled  BP today is 121/174  Home -126 and DBP 72-85  Home regimen includes HCTZ 12 5 mg, losartan 50 mg, and metoprolol 100 mg daily   No symptoms of hypotension reported by group home     Plan:  · Continue current BP regimen  · Continue home monitoring at group home

## 2022-12-06 NOTE — PROGRESS NOTES
Name: Maria A Barroso      : 1960      MRN: [id number]  Encounter Provider: Manuel Ricardo MD  Encounter Date: 2022   Encounter department: [address] [place] [address]     1  Encounter for immunization  -     influenza vaccine, quadrivalent, recombinant, PF, 0 5 mL, for patients 18 yr+ (FLUBLOK)    2  Osteoporosis, unspecified osteoporosis type, unspecified pathological fracture presence  Assessment & Plan:  Patient has history of osteoporosis and receives denosumab injections DXA scan on  consistent with osteoporosis  Next injection scheduled for     Orders:  -     denosumab (Prolia) 60 mg/mL; Inject 1 mL (60 mg total) under the skin once for 1 dose    3  Benign essential hypertension  Assessment & Plan:  Well controlled  BP today is 121/174  Home -126 and DBP 72-85  Home regimen includes HCTZ 12 5 mg, losartan 50 mg, and metoprolol 100 mg daily  No symptoms of hypotension reported by group home     Plan:  · Continue current BP regimen  · Continue home monitoring at group home           Bere Somers is a 60 YOF who presents for routine HTN follow up  Her BP is controlled per group home readings which are in the 110s/70-80s  Per group home nurse, she has been doing well without any complaints  She is eating and drinking well and having normal bowel movements  No symptoms of hypertension and hypotension  Review of Systems   Constitutional: Negative for appetite change and fever  HENT: Negative for ear pain and hearing loss  Respiratory: Negative for cough and shortness of breath  Cardiovascular: Negative for chest pain  Gastrointestinal: Negative for constipation, diarrhea, nausea and vomiting  Neurological: Negative for dizziness, light-headedness and headaches         Current Outpatient Medications on File Prior to Visit   Medication Sig   • baclofen 10 mg tablet Take 1 tablet (10 mg total) by mouth 2 (two) times a day • benzonatate (TESSALON PERLES) 100 mg capsule Take 1 capsule by mouth every 8 hours as needed for dry cough   • Calcium Citrate 200 MG TABS Take by mouth 3 TABS TWICE A DAY    • carbamide peroxide (DEBROX) 6 5 % otic solution Instill 5 drops in affected ear twice daily for 4 days as needed for ear wax   • cholecalciferol (VITAMIN D3) 1,000 units tablet Take 1 capsule by mouth daily   • clotrimazole-betamethasone (LOTRISONE) 1-0 05 % cream APPLY TOPICALLY TO AFFECTED AREA OF SKIN TWICE A DAY AS NEEDED FOR IRRITATION FROM DIAPER   • famotidine (PEPCID) 20 mg tablet Take 1 tablet (20 mg total) by mouth daily at bedtime   • fluticasone (FLONASE) 50 mcg/act nasal spray USE 1 SPRAY IN EACH NOSTRIL TWICE A DAY (RHINITIS)   • GNP 8 Hour Arthritis Relief 650 MG CR tablet TAKE 1 TABLET BY MOUTH EVERY 6 HOURS AS NEEDED FOR MILD PAIN OR TEMPERATURE  >100 4   • hydrochlorothiazide (HYDRODIURIL) 25 mg tablet Take 0 5 tablets (12 5 mg total) by mouth daily   • ibuprofen (MOTRIN) 600 mg tablet Take by mouth every 6 (six) hours as needed for fever Fever greater than 101 4F   • ipratropium (ATROVENT) 0 06 % nasal spray 2 sprays into each nostril 3 (three) times a day as needed for rhinitis   • loratadine (CLARITIN) 10 mg tablet TAKE 1 TABLET BY MOUTH DAILY (ALLERGIC RHINITIS)   • LORazepam (ATIVAN) 0 5 mg tablet Take 1 tablet (0 5 mg total) by mouth once for 1 dose   • losartan (COZAAR) 50 mg tablet Take 1 tablet (50 mg total) by mouth daily   • metoprolol succinate (TOPROL-XL) 100 mg 24 hr tablet Take 1 tablet by mouth daily   • Mucus Relief 600 MG 12 hr tablet TAKE 1 TABLET BY MOUTH EVERY 12 HOURS AS NEEDED FOR CONGESTION **DO NOT CRUSH**   • nystatin-triamcinolone (MYCOLOG-II) ointment Apply topically 2 (two) times a day as needed (As needed for rash)   • olopatadine HCl (PATADAY) 0 2 % opth drops Instill 1 drop into affected eye(s) daily PRN irritation   • PHENobarbital 32 4 mg tablet Take 3 tablets by mouth at 4 pm   • polyethylene glycol (GLYCOLAX) 17 GM/SCOOP powder DISSOLVE 1 CAPFUL (17GM) IN 8 OZ FLUIDS & DRINK BY MOUTH DAILY (CONSTIPATION) *HOLD FOR LOOSE STOOLS*;MIX 1 TSP 4 OZ FLUIDS & DRINK BY MOUTH EVERY 3 DAYS AS NEEDED IF NO BOWEL MOVEMENT (CONSTIPATION)   • povidone-iodine (BETADINE) 10 % ointment Apply topically 2 (two) times a day as needed   • selenium sulfide (SELSUN) 1 % Apply topically 2 (two) times a week Apply twice weekly as needed when with scalp irritation  • simvastatin (ZOCOR) 20 mg tablet Take 1 tablet by mouth daily   • Vitamins A & D (vitamin A & D) ointment Apply 1 application topically   • zolpidem (AMBIEN) 5 mg tablet Take 0 5 tablets (2 5 mg total) by mouth daily   • [DISCONTINUED] denosumab (Prolia) 60 mg/mL Inject 1 mL (60 mg total) under the skin once for 1 dose   • acetaminophen (TYLENOL) 650 mg CR tablet Take 650 mg by mouth every 8 (eight) hours as needed (Patient not taking: Reported on 12/6/2022)   • losartan (COZAAR) 100 MG tablet  (Patient not taking: Reported on 11/1/2022)   • Polyethylene Glycol 3350 (MIRALAX PO) Take 1 packet by mouth daily In 8 ounces of fluid       Objective     /74 (BP Location: Left arm, Patient Position: Sitting, Cuff Size: Standard)   Pulse 85   Temp 97 5 °F (36 4 °C) (Temporal)   Resp 20   Ht 5' (1 524 m)   Wt 73 5 kg (162 lb) Comment: verbal per patient's caregiver  LMP 02/28/2010 (Approximate)   SpO2 98%   BMI 31 64 kg/m²     Physical Exam  Constitutional:       Appearance: She is obese  HENT:      Head: Normocephalic and atraumatic  Right Ear: Tympanic membrane, ear canal and external ear normal       Left Ear: Tympanic membrane, ear canal and external ear normal    Eyes:      Extraocular Movements: Extraocular movements intact  Conjunctiva/sclera: Conjunctivae normal    Cardiovascular:      Rate and Rhythm: Normal rate and regular rhythm  Pulses: Normal pulses  Heart sounds: Normal heart sounds     Pulmonary:      Effort: Pulmonary effort is normal       Breath sounds: Normal breath sounds  Abdominal:      General: Bowel sounds are normal       Palpations: Abdomen is soft  Musculoskeletal:      Cervical back: Neck supple  Comments: Contracted at baseline   Skin:     General: Skin is warm and dry  Neurological:      Mental Status: She is alert  Mental status is at baseline     Psychiatric:         Mood and Affect: Mood normal          Behavior: Behavior normal        Indira Espinal MD

## 2022-12-06 NOTE — ASSESSMENT & PLAN NOTE
Patient has history of osteoporosis and receives denosumab injections DXA scan on 11/8 consistent with osteoporosis   Next injection scheduled for 12/19

## 2022-12-09 ENCOUNTER — VBI (OUTPATIENT)
Dept: ADMINISTRATIVE | Facility: OTHER | Age: 62
End: 2022-12-09

## 2022-12-19 ENCOUNTER — CLINICAL SUPPORT (OUTPATIENT)
Dept: FAMILY MEDICINE CLINIC | Facility: CLINIC | Age: 62
End: 2022-12-19

## 2022-12-19 DIAGNOSIS — Z23 ENCOUNTER FOR IMMUNIZATION: Primary | ICD-10-CM

## 2022-12-21 ENCOUNTER — TELEPHONE (OUTPATIENT)
Dept: LAB | Facility: HOSPITAL | Age: 62
End: 2022-12-21

## 2022-12-27 ENCOUNTER — OFFICE VISIT (OUTPATIENT)
Dept: FAMILY MEDICINE CLINIC | Facility: CLINIC | Age: 62
End: 2022-12-27

## 2022-12-27 VITALS
BODY MASS INDEX: 31.64 KG/M2 | TEMPERATURE: 96.3 F | DIASTOLIC BLOOD PRESSURE: 84 MMHG | RESPIRATION RATE: 18 BRPM | OXYGEN SATURATION: 98 % | SYSTOLIC BLOOD PRESSURE: 126 MMHG | HEIGHT: 60 IN | HEART RATE: 83 BPM

## 2022-12-27 DIAGNOSIS — B02.9 HERPES ZOSTER WITHOUT COMPLICATION: Primary | ICD-10-CM

## 2022-12-27 PROBLEM — B02.39 SHINGLES OF EYELID: Status: ACTIVE | Noted: 2022-12-27

## 2022-12-27 RX ORDER — VALACYCLOVIR HYDROCHLORIDE 1 G/1
1000 TABLET, FILM COATED ORAL 3 TIMES DAILY
Qty: 21 TABLET | Refills: 0 | Status: SHIPPED | OUTPATIENT
Start: 2022-12-27 | End: 2023-01-03

## 2022-12-27 NOTE — PATIENT INSTRUCTIONS
Shingles   AMBULATORY CARE:   Shingles  is a painful rash  Shingles is caused by the same virus that causes chickenpox (varicella-zoster)  After you get chickenpox, the virus stays in your body for several years without causing any symptoms  Shingles occurs when the virus becomes active again  The active virus travels along a nerve to your skin and causes a rash  Common signs and symptoms include the following:  Shingles often starts with pain in the back, chest, neck, or face  A rash then develops in the same area  The rash is usually found on only one side of the body  The rash may feel itchy or painful  It starts as red dots that become blisters filled with fluid  The blisters usually grow bigger, become filled with pus, and then crust over after a few days  You may also have any of the following:  Fatigue and muscle weakness    Pain when your skin is lightly touched    Headache    Fever    Eye pain when exposed to light       Call your local emergency number (911 in the [address] [facility] [address]) if:   You have trouble moving your arms, legs, or face  You become confused, or have difficulty speaking  You have a seizure  Seek care immediately if:   You have weakness in an arm or leg  You have dizziness, a severe headache, or hearing or vision loss  You have painful, red, warm skin around the blisters, or the blisters drain pus  Your neck is stiff or you have trouble moving it  Call your doctor if:   You feel weak or have a headache  You have a cough, chills, or a fever  You have abdominal pain or nausea, or you are vomiting  Your rash becomes more itchy or painful  Your rash spreads to other parts of your body  Your pain worsens and does not go away even after you take medicine  You have questions or concerns about your condition or care  Medicines: You may need any of the following:  Antiviral medicine  helps decrease symptoms and healing time   They may also decrease your risk of developing nerve pain  You will need to start taking them within 3 days of the start of symptoms to prevent nerve pain  Pain medicine  may be prescribed or suggested by your healthcare provider  You may need NSAIDs, acetaminophen, or opioid medicine depending on how much pain you are in  Do not wait until the pain is severe before you take more pain medicine  Topical anesthetics  are used to numb the skin and decrease pain  They can be a cream, gel, spray, or patch  Anticonvulsants  decrease nerve pain and may help you sleep at night  Antidepressants  may be used to decrease nerve pain  Self-care:  Keep your rash clean and dry  Cover your rash with a bandage or clothing  Do not use bandages that stick to your skin  The sticky part may irritate your skin and make your rash last longer  Prevent the spread of shingles:       Wash your hands often  Wash your hands several times each day  Wash after you use the bathroom, change a child's diaper, and before you prepare or eat food  Use soap and water every time  Rub your soapy hands together, lacing your fingers  Wash the front and back of your hands, and in between your fingers  Use the fingers of one hand to scrub under the fingernails of the other hand  Wash for at least 20 seconds  Rinse with warm, running water for several seconds  Then dry your hands with a clean towel or paper towel  Use hand  that contains alcohol if soap and water are not available  Do not touch your eyes, nose, or mouth without washing your hands first          Cover a sneeze or cough  Use a tissue that covers your mouth and nose  Throw the tissue away in a trash can right away  Use the bend of your arm if a tissue is not available  Wash your hands well with soap and water or use a hand   Stay away from others while you are sick  Avoid crowds as much as possible  Ask about vaccines you may need  Talk to your healthcare provider about your vaccine history   He or she will tell you which vaccines you need, and when to get them  Prevent shingles or another shingles outbreak:  A vaccine may be given to help prevent shingles  You can get the vaccine even if you already had shingles  The vaccine can help prevent a future outbreak  If you do get shingles again, the vaccine can keep it from becoming severe  The vaccine comes in 2 forms  Your healthcare provider will tell you which form is right for you  The decision is based on your age and any medical conditions you have  A 2-dose vaccine is usually given to adults 48 years or older  A 1-dose vaccine may be given to adults 61 years or older  Follow up with your doctor as directed:  Write down your questions so you remember to ask them during your visits  For more information:   [facility]  [address] [place] , [address]  Phone: 0- [phone] - [phone]  Phone: 7- [phone] - [phone]  Web Address: [employer] com     © Copyright [employer] 2022 Information is for End User's use only and may not be sold, redistributed or otherwise used for commercial purposes  All illustrations and images included in CareNotes® are the copyrighted property of [employer]  or [facility] Kayleen Dale   The above information is an  only  It is not intended as medical advice for individual conditions or treatments  Talk to your doctor, nurse or pharmacist before following any medical regimen to see if it is safe and effective for you

## 2022-12-27 NOTE — PROGRESS NOTES
Name: Gali Duarte      : 1960      MRN: [id number]  Encounter Provider: Sophie Saenz MD  Encounter Date: 2022   Encounter department: [address] [place] [address]  Herpes zoster without complication  Assessment & Plan:  New erythematous progressive papulovesicular lesions noted yesterday and increasing in number since then around right eye c/w herpes zoster  Given history of intellectual disability and group home resident status, will treat for shingles at this time  Start valacyclovir 1g TID x 7 days  RTC if symptoms fail to resolve after completion of therapy  Orders:  -     valACYclovir (VALTREX) 1,000 mg tablet; Take 1 tablet (1,000 mg total) by mouth 3 (three) times a day for 7 days         Subjective      21-year-old female presenting today with caretaker Melissa Pickens due to concern for new erythematous progressive papulovesicular lesions noted yesterday adjacent right eye and increasing in number since then  Melissa Pickens reports that it started with 2 little eruptions that have increased to 5 lesions since yesterday in a longitudinal pattern on the lateral aspect of right eye  No open wounds or drainage  No obvious visual disturbances or abnormal conjunctival appearance  Patient is otherwise at baseline  Melissa Pickens states that patient is due to travel to parent's house tomorrow and is wondering if it is okay for her to do so given possible shingles  Review of Systems   Constitutional: Negative for activity change, appetite change, fatigue, fever and unexpected weight change  HENT: Negative for congestion and rhinorrhea  Eyes: Negative for pain, discharge, redness and itching  Respiratory: Negative for cough, shortness of breath and wheezing  Cardiovascular: Negative for chest pain  Gastrointestinal: Negative for abdominal pain, diarrhea, nausea and vomiting  Skin: Positive for color change and rash     Neurological: Negative for weakness         Current Outpatient Medications on File Prior to Visit   Medication Sig   • acetaminophen (TYLENOL) 650 mg CR tablet Take 650 mg by mouth every 8 (eight) hours as needed   • baclofen 10 mg tablet Take 1 tablet (10 mg total) by mouth 2 (two) times a day   • benzonatate (TESSALON PERLES) 100 mg capsule Take 1 capsule by mouth every 8 hours as needed for dry cough   • Calcium Citrate 200 MG TABS Take by mouth 3 TABS TWICE A DAY    • carbamide peroxide (DEBROX) 6 5 % otic solution Instill 5 drops in affected ear twice daily for 4 days as needed for ear wax   • cholecalciferol (VITAMIN D3) 1,000 units tablet Take 1 capsule by mouth daily   • clotrimazole-betamethasone (LOTRISONE) 1-0 05 % cream APPLY TOPICALLY TO AFFECTED AREA OF SKIN TWICE A DAY AS NEEDED FOR IRRITATION FROM DIAPER   • denosumab (Prolia) 60 mg/mL Inject 1 mL (60 mg total) under the skin once for 1 dose   • famotidine (PEPCID) 20 mg tablet Take 1 tablet (20 mg total) by mouth daily at bedtime   • fluticasone (FLONASE) 50 mcg/act nasal spray USE 1 SPRAY IN EACH NOSTRIL TWICE A DAY (RHINITIS)   • GNP 8 Hour Arthritis Relief 650 MG CR tablet TAKE 1 TABLET BY MOUTH EVERY 6 HOURS AS NEEDED FOR MILD PAIN OR TEMPERATURE  >100 4   • hydrochlorothiazide (HYDRODIURIL) 25 mg tablet Take 0 5 tablets (12 5 mg total) by mouth daily   • ibuprofen (MOTRIN) 600 mg tablet Take by mouth every 6 (six) hours as needed for fever Fever greater than 101 4F   • ipratropium (ATROVENT) 0 06 % nasal spray 2 sprays into each nostril 3 (three) times a day as needed for rhinitis   • loratadine (CLARITIN) 10 mg tablet TAKE 1 TABLET BY MOUTH DAILY (ALLERGIC RHINITIS)   • LORazepam (ATIVAN) 0 5 mg tablet Take 1 tablet (0 5 mg total) by mouth once for 1 dose   • losartan (COZAAR) 100 MG tablet    • losartan (COZAAR) 50 mg tablet Take 1 tablet (50 mg total) by mouth daily   • metoprolol succinate (TOPROL-XL) 100 mg 24 hr tablet Take 1 tablet by mouth daily   • Mucus Relief 600 MG 12 hr tablet TAKE 1 TABLET BY MOUTH EVERY 12 HOURS AS NEEDED FOR CONGESTION **DO NOT CRUSH**   • nystatin-triamcinolone (MYCOLOG-II) ointment Apply topically 2 (two) times a day as needed (As needed for rash)   • olopatadine HCl (PATADAY) 0 2 % opth drops Instill 1 drop into affected eye(s) daily PRN irritation   • PHENobarbital 32 4 mg tablet Take 3 tablets by mouth at 4 pm   • polyethylene glycol (GLYCOLAX) 17 GM/SCOOP powder DISSOLVE 1 CAPFUL (17GM) IN 8 OZ FLUIDS & DRINK BY MOUTH DAILY (CONSTIPATION) *HOLD FOR LOOSE STOOLS*;MIX 1 TSP 4 OZ FLUIDS & DRINK BY MOUTH EVERY 3 DAYS AS NEEDED IF NO BOWEL MOVEMENT (CONSTIPATION)   • Polyethylene Glycol 3350 (MIRALAX PO) Take 1 packet by mouth daily In 8 ounces of fluid   • povidone-iodine (BETADINE) 10 % ointment Apply topically 2 (two) times a day as needed   • selenium sulfide (SELSUN) 1 % Apply topically 2 (two) times a week Apply twice weekly as needed when with scalp irritation  • simvastatin (ZOCOR) 20 mg tablet Take 1 tablet by mouth daily   • Vitamins A & D (vitamin A & D) ointment Apply 1 application topically   • zolpidem (AMBIEN) 5 mg tablet Take 0 5 tablets (2 5 mg total) by mouth daily       Objective     /84 (BP Location: Left arm, Patient Position: Sitting, Cuff Size: Standard)   Pulse 83   Temp (!) 96 3 °F (35 7 °C) (Temporal)   Resp 18   Ht 5' (1 524 m)   LMP 02/28/2010 (Approximate)   SpO2 98%   BMI 31 64 kg/m²     Physical Exam  Constitutional:       General: She is not in acute distress  Appearance: Normal appearance  She is obese  She is not ill-appearing, toxic-appearing or diaphoretic  HENT:      Right Ear: External ear normal       Left Ear: External ear normal       Mouth/Throat:      Mouth: Mucous membranes are moist    Eyes:      General:         Right eye: No discharge  Left eye: No discharge        Conjunctiva/sclera: Conjunctivae normal       Comments: Papulovesicular erythematous eruptions noted around eye (picture in chart), increasing in number and progressive, unclear if tender or pruritic  Cardiovascular:      Rate and Rhythm: Normal rate  Pulmonary:      Effort: Pulmonary effort is normal  No respiratory distress  Neurological:      Mental Status: Mental status is at baseline         Matthew Matute MD

## 2022-12-27 NOTE — ASSESSMENT & PLAN NOTE
New erythematous progressive papulovesicular lesions noted yesterday and increasing in number since then around right eye c/w herpes zoster  Given history of intellectual disability and group home resident status, will treat for shingles at this time  Start valacyclovir 1g TID x 7 days  RTC if symptoms fail to resolve after completion of therapy

## 2023-01-04 ENCOUNTER — APPOINTMENT (OUTPATIENT)
Dept: LAB | Facility: HOSPITAL | Age: 63
End: 2023-01-04

## 2023-01-04 DIAGNOSIS — I10 ESSENTIAL HYPERTENSION, MALIGNANT: Primary | ICD-10-CM

## 2023-01-04 LAB
ANION GAP SERPL CALCULATED.3IONS-SCNC: 5 MMOL/L (ref 4–13)
BUN SERPL-MCNC: 13 MG/DL (ref 5–25)
CALCIUM SERPL-MCNC: 9.7 MG/DL (ref 8.3–10.1)
CHLORIDE SERPL-SCNC: 105 MMOL/L (ref 96–108)
CO2 SERPL-SCNC: 31 MMOL/L (ref 21–32)
CREAT SERPL-MCNC: 0.43 MG/DL (ref 0.6–1.3)
GFR SERPL CREATININE-BSD FRML MDRD: 108 ML/MIN/1.73SQ M
GLUCOSE SERPL-MCNC: 80 MG/DL (ref 65–140)
POTASSIUM SERPL-SCNC: 3.9 MMOL/L (ref 3.5–5.3)
SODIUM SERPL-SCNC: 141 MMOL/L (ref 135–147)

## 2023-02-02 ENCOUNTER — HOSPITAL ENCOUNTER (OUTPATIENT)
Dept: RADIOLOGY | Age: 63
Discharge: HOME | End: 2023-02-02

## 2023-02-02 VITALS — BODY MASS INDEX: 33.77 KG/M2 | WEIGHT: 172 LBS | HEIGHT: 60 IN

## 2023-02-02 DIAGNOSIS — Z12.31 ENCOUNTER FOR SCREENING MAMMOGRAM FOR MALIGNANT NEOPLASM OF BREAST: ICD-10-CM

## 2023-02-16 DIAGNOSIS — G40.409 TONIC-CLONIC EPILEPTIC SEIZURES (HCC): ICD-10-CM

## 2023-02-17 RX ORDER — PHENOBARBITAL 32.4 MG/1
TABLET ORAL
Qty: 90 TABLET | Refills: 0 | Status: SHIPPED | OUTPATIENT
Start: 2023-02-17

## 2023-02-23 ENCOUNTER — TELEPHONE (OUTPATIENT)
Dept: FAMILY MEDICINE CLINIC | Facility: CLINIC | Age: 63
End: 2023-02-23

## 2023-02-23 NOTE — TELEPHONE ENCOUNTER
Folder (To be completed by) - Dr Juvencio Duran     Name of Form - [employer]cessity Underpads      Color folder (Yellow)    Form to be Faxed back to [phone]    Patient was made aware of the 7-10 business day form policy

## 2023-02-27 NOTE — TELEPHONE ENCOUNTER
Form has been completed by Dr Maria G Mckinney and placed in yellow folder in clerical area to be scanned into patient's chart and faxed

## 2023-02-28 ENCOUNTER — OFFICE VISIT (OUTPATIENT)
Dept: FAMILY MEDICINE CLINIC | Facility: CLINIC | Age: 63
End: 2023-02-28

## 2023-02-28 VITALS
TEMPERATURE: 97.9 F | OXYGEN SATURATION: 98 % | DIASTOLIC BLOOD PRESSURE: 82 MMHG | HEART RATE: 79 BPM | RESPIRATION RATE: 16 BRPM | SYSTOLIC BLOOD PRESSURE: 139 MMHG

## 2023-02-28 DIAGNOSIS — I10 BENIGN ESSENTIAL HYPERTENSION: Primary | ICD-10-CM

## 2023-02-28 NOTE — ASSESSMENT & PLAN NOTE
Controlled on HCTZ 12 5 mg, Losartan 50 mg, and Metoprolol 100 daily  Pt compliant, doing well, reporting no medication SE  BP at goal <140/80s at home and 139/82 today in office       Plan:  · Continue HCTZ, Losartan, Metoprolol  · Routine follow up in 12 weeks

## 2023-02-28 NOTE — PROGRESS NOTES
Name: Dianah Gitelman      : 1960      MRN: [id number]  Encounter Provider: Kerry Man MD  Encounter Date: 2023   Encounter department: [address] [place] [address]  Benign essential hypertension  Assessment & Plan:  Controlled on HCTZ 12 5 mg, Losartan 50 mg, and Metoprolol 100 daily  Pt compliant, doing well, reporting no medication SE  BP at goal <140/80s at home and 139/82 today in office  Plan:  · Continue HCTZ, Losartan, Metoprolol  · Routine follow up in 12 weeks       Previously on betadine 10% ointment, care giver reports no longer requiring  Discontinued orders  Subjective   Pt is a 61 yo female presenting for 3 mo f/u  Pt is currently taking HCTZ, Losartan and Metoprolol daily  Pt reports no medication SE  BP at goal  BP at home 354-410S systolic, 73-57V dialstolic    Review of Systems   Constitutional: Negative for chills and fever  HENT: Negative for ear pain and sore throat  Eyes: Negative for pain and visual disturbance  Respiratory: Negative for cough and shortness of breath  Cardiovascular: Negative for chest pain and palpitations  Gastrointestinal: Negative for abdominal pain and vomiting  Genitourinary: Negative for dysuria and hematuria  Musculoskeletal: Negative for arthralgias and back pain  Skin: Negative for color change and rash  Neurological: Negative for seizures and syncope  All other systems reviewed and are negative        Current Outpatient Medications on File Prior to Visit   Medication Sig   • acetaminophen (TYLENOL) 650 mg CR tablet Take 650 mg by mouth every 8 (eight) hours as needed   • baclofen 10 mg tablet Take 1 tablet (10 mg total) by mouth 2 (two) times a day   • benzonatate (TESSALON PERLES) 100 mg capsule Take 1 capsule by mouth every 8 hours as needed for dry cough   • Calcium Citrate 200 MG TABS Take by mouth 3 TABS TWICE A DAY    • carbamide peroxide (DEBROX) 6 5 % otic solution Instill 5 drops in affected ear twice daily for 4 days as needed for ear wax   • cholecalciferol (VITAMIN D3) 1,000 units tablet Take 1 capsule by mouth daily   • clotrimazole-betamethasone (LOTRISONE) 1-0 05 % cream APPLY TOPICALLY TO AFFECTED AREA OF SKIN TWICE A DAY AS NEEDED FOR IRRITATION FROM DIAPER   • famotidine (PEPCID) 20 mg tablet Take 1 tablet (20 mg total) by mouth daily at bedtime   • fluticasone (FLONASE) 50 mcg/act nasal spray USE 1 SPRAY IN EACH NOSTRIL TWICE A DAY (RHINITIS)   • GNP 8 Hour Arthritis Relief 650 MG CR tablet TAKE 1 TABLET BY MOUTH EVERY 6 HOURS AS NEEDED FOR MILD PAIN OR TEMPERATURE  >100 4   • hydrochlorothiazide (HYDRODIURIL) 25 mg tablet Take 0 5 tablets (12 5 mg total) by mouth daily   • ibuprofen (MOTRIN) 600 mg tablet Take by mouth every 6 (six) hours as needed for fever Fever greater than 101 4F   • ipratropium (ATROVENT) 0 06 % nasal spray 2 sprays into each nostril 3 (three) times a day as needed for rhinitis   • loratadine (CLARITIN) 10 mg tablet TAKE 1 TABLET BY MOUTH DAILY (ALLERGIC RHINITIS)   • losartan (COZAAR) 100 MG tablet    • losartan (COZAAR) 50 mg tablet Take 1 tablet (50 mg total) by mouth daily   • metoprolol succinate (TOPROL-XL) 100 mg 24 hr tablet Take 1 tablet by mouth daily   • Mucus Relief 600 MG 12 hr tablet TAKE 1 TABLET BY MOUTH EVERY 12 HOURS AS NEEDED FOR CONGESTION **DO NOT CRUSH**   • nystatin-triamcinolone (MYCOLOG-II) ointment Apply topically 2 (two) times a day as needed (As needed for rash)   • olopatadine HCl (PATADAY) 0 2 % opth drops Instill 1 drop into affected eye(s) daily PRN irritation   • PHENobarbital 32 4 mg tablet Take 3 tablets by mouth at 4 pm   • polyethylene glycol (GLYCOLAX) 17 GM/SCOOP powder DISSOLVE 1 CAPFUL (17GM) IN 8 OZ FLUIDS & DRINK BY MOUTH DAILY (CONSTIPATION) *HOLD FOR LOOSE STOOLS*;MIX 1 TSP 4 OZ FLUIDS & DRINK BY MOUTH EVERY 3 DAYS AS NEEDED IF NO BOWEL MOVEMENT (CONSTIPATION)   • Polyethylene Glycol 3350 (MIRALAX PO) Take 1 packet by mouth daily In 8 ounces of fluid   • selenium sulfide (SELSUN) 1 % Apply topically 2 (two) times a week Apply twice weekly as needed when with scalp irritation  • simvastatin (ZOCOR) 20 mg tablet Take 1 tablet by mouth daily   • Vitamins A & D (vitamin A & D) ointment Apply 1 application topically   • zolpidem (AMBIEN) 5 mg tablet Take 0 5 tablets (2 5 mg total) by mouth daily   • [DISCONTINUED] povidone-iodine (BETADINE) 10 % ointment Apply topically 2 (two) times a day as needed   • denosumab (Prolia) 60 mg/mL Inject 1 mL (60 mg total) under the skin once for 1 dose   • LORazepam (ATIVAN) 0 5 mg tablet Take 1 tablet (0 5 mg total) by mouth once for 1 dose   • valACYclovir (VALTREX) 1,000 mg tablet Take 1 tablet (1,000 mg total) by mouth 3 (three) times a day for 7 days       Objective     /82 (BP Location: Left arm, Patient Position: Sitting, Cuff Size: Standard)   Pulse 79   Temp 97 9 °F (36 6 °C) (Temporal)   Resp 16   LMP 02/28/2010 (Approximate)   SpO2 98%     Physical Exam  Constitutional:       General: She is not in acute distress  Appearance: She is not ill-appearing, toxic-appearing or diaphoretic  HENT:      Head: Normocephalic  Right Ear: External ear normal       Left Ear: External ear normal       Nose: Nose normal  No congestion or rhinorrhea  Mouth/Throat:      Mouth: Mucous membranes are moist       Pharynx: Oropharynx is clear  Eyes:      General: No scleral icterus  Right eye: No discharge  Left eye: No discharge  Extraocular Movements: Extraocular movements intact  Conjunctiva/sclera: Conjunctivae normal    Neck:      Comments: Contracted at baseline  Cardiovascular:      Rate and Rhythm: Normal rate and regular rhythm  Heart sounds: No murmur heard  No gallop  Pulmonary:      Effort: Pulmonary effort is normal       Breath sounds: Normal breath sounds     Abdominal:      General: Bowel sounds are normal  There is no distension  Palpations: Abdomen is soft  Tenderness: There is no abdominal tenderness  Musculoskeletal:      Cervical back: Neck supple  Skin:     General: Skin is warm and dry  Neurological:      Mental Status: She is oriented to person, place, and time  Mental status is at baseline     Psychiatric:         Mood and Affect: Mood normal          Behavior: Behavior normal        Alice Thomas MD

## 2023-03-15 ENCOUNTER — TELEPHONE (OUTPATIENT)
Dept: NEUROLOGY | Facility: CLINIC | Age: 63
End: 2023-03-15

## 2023-03-15 NOTE — TELEPHONE ENCOUNTER
Pt accidentally scheduled with Dr Monserrat Mendez  Spoke with caregiver and corrected error, pt is now r/s with correct provider Dr Stewart Padilla for 4/14 at [facility] location  Caregiver accepted/confirmed appt

## 2023-03-16 DIAGNOSIS — G40.409 TONIC-CLONIC EPILEPTIC SEIZURES (HCC): ICD-10-CM

## 2023-03-16 RX ORDER — PHENOBARBITAL 32.4 MG/1
TABLET ORAL
Qty: 90 TABLET | Refills: 1 | Status: SHIPPED | OUTPATIENT
Start: 2023-03-16

## 2023-04-24 ENCOUNTER — OFFICE VISIT (OUTPATIENT)
Dept: FAMILY MEDICINE CLINIC | Facility: CLINIC | Age: 63
End: 2023-04-24

## 2023-04-24 VITALS
HEART RATE: 68 BPM | DIASTOLIC BLOOD PRESSURE: 80 MMHG | RESPIRATION RATE: 20 BRPM | TEMPERATURE: 97.9 F | SYSTOLIC BLOOD PRESSURE: 160 MMHG

## 2023-04-24 DIAGNOSIS — B02.9 HERPES ZOSTER WITHOUT COMPLICATION: ICD-10-CM

## 2023-04-24 RX ORDER — GABAPENTIN 100 MG/1
100 CAPSULE ORAL 3 TIMES DAILY
Qty: 30 CAPSULE | Refills: 0 | Status: SHIPPED | OUTPATIENT
Start: 2023-04-24 | End: 2023-04-24 | Stop reason: CLARIF

## 2023-04-24 RX ORDER — VALACYCLOVIR HYDROCHLORIDE 1 G/1
1000 TABLET, FILM COATED ORAL 3 TIMES DAILY
Qty: 21 TABLET | Refills: 0 | Status: SHIPPED | OUTPATIENT
Start: 2023-04-24 | End: 2023-05-01

## 2023-04-24 NOTE — PROGRESS NOTES
Name: Krysten Springer      : 1960      MRN: [id number]  Encounter Provider: Eliane Solano DO  Encounter Date: 2023   Encounter department: [address] [place] [address]    Assessment & Plan     1  Herpes zoster without complication  Assessment & Plan:  Patient presents with a rash on her abdomen consistent with the presentation of shingles  The rash appears maculopapular with grouped herpetiform vesicles on an erythematous base  It is important to note that the distribution is atypical for shingles  Plan:  Start valacyclovir 1000 mg TID x 7 days  Follow-up if symptoms and rash do not resolve  Orders:  -     valACYclovir (VALTREX) 1,000 mg tablet; Take 1 tablet (1,000 mg total) by mouth 3 (three) times a day for 7 days         eBre Schwarz is a 60-year-old female presenting to the office with a red raised rash on her abdomen  The rash was first noticed on Saturday  The rash seems to be consistent with the pattern of shingles, with maculopapular grouped herpetiform vesicles on an erythematous base located on the abdomen of the patient  It is important to note that it is in an atypical distribution, and in a different location from her previous shingles outbreak (last was around R eye back in 2022)  Patient is also coming with another member from the group home, who also has a similar rash  The rash is not present anywhere else on the patient's body  Patient is non-verbal  Hershall John from the group home also was present, she reported that 1 week ago the patient had loose stools  On physical exam when the rash was palpated, the patient winced  Patient is compliant with all other medication  Besides the other patient that arrived, no one else in the group home was reported to have similar symptoms or rashes  Review of Systems   Unable to perform ROS: Patient nonverbal   Skin: Positive for rash         Current Outpatient Medications on File Prior to Visit Medication Sig   • acetaminophen (TYLENOL) 650 mg CR tablet Take 650 mg by mouth every 8 (eight) hours as needed   • baclofen 10 mg tablet Take 1 tablet (10 mg total) by mouth 2 (two) times a day   • benzonatate (TESSALON PERLES) 100 mg capsule Take 1 capsule by mouth every 8 hours as needed for dry cough   • Calcium Citrate 200 MG TABS Take by mouth 3 TABS TWICE A DAY    • carbamide peroxide (DEBROX) 6 5 % otic solution Instill 5 drops in affected ear twice daily for 4 days as needed for ear wax   • cholecalciferol (VITAMIN D3) 1,000 units tablet Take 1 capsule by mouth daily   • clotrimazole-betamethasone (LOTRISONE) 1-0 05 % cream APPLY TOPICALLY TO AFFECTED AREA OF SKIN TWICE A DAY AS NEEDED FOR IRRITATION FROM DIAPER   • denosumab (Prolia) 60 mg/mL Inject 1 mL (60 mg total) under the skin once for 1 dose   • famotidine (PEPCID) 20 mg tablet Take 1 tablet (20 mg total) by mouth daily at bedtime   • fluticasone (FLONASE) 50 mcg/act nasal spray USE 1 SPRAY IN EACH NOSTRIL TWICE A DAY (RHINITIS)   • GNP 8 Hour Arthritis Relief 650 MG CR tablet TAKE 1 TABLET BY MOUTH EVERY 6 HOURS AS NEEDED FOR MILD PAIN OR TEMPERATURE  >100 4   • hydrochlorothiazide (HYDRODIURIL) 25 mg tablet Take 0 5 tablets (12 5 mg total) by mouth daily   • ibuprofen (MOTRIN) 600 mg tablet Take by mouth every 6 (six) hours as needed for fever Fever greater than 101 4F   • ipratropium (ATROVENT) 0 06 % nasal spray 2 sprays into each nostril 3 (three) times a day as needed for rhinitis   • loratadine (CLARITIN) 10 mg tablet TAKE 1 TABLET BY MOUTH DAILY (ALLERGIC RHINITIS)   • LORazepam (ATIVAN) 0 5 mg tablet Take 1 tablet (0 5 mg total) by mouth once for 1 dose   • losartan (COZAAR) 100 MG tablet    • losartan (COZAAR) 50 mg tablet Take 1 tablet (50 mg total) by mouth daily   • metoprolol succinate (TOPROL-XL) 100 mg 24 hr tablet Take 1 tablet by mouth daily   • Mucus Relief 600 MG 12 hr tablet TAKE 1 TABLET BY MOUTH EVERY 12 HOURS AS NEEDED FOR CONGESTION **DO NOT CRUSH**   • nystatin-triamcinolone (MYCOLOG-II) ointment Apply topically 2 (two) times a day as needed (As needed for rash)   • olopatadine HCl (PATADAY) 0 2 % opth drops Instill 1 drop into affected eye(s) daily PRN irritation   • PHENobarbital 32 4 mg tablet Take 3 tablets by mouth at 4 pm   • polyethylene glycol (GLYCOLAX) 17 GM/SCOOP powder DISSOLVE 1 CAPFUL (17GM) IN 8 OZ FLUIDS & DRINK BY MOUTH DAILY (CONSTIPATION) *HOLD FOR LOOSE STOOLS*;MIX 1 TSP 4 OZ FLUIDS & DRINK BY MOUTH EVERY 3 DAYS AS NEEDED IF NO BOWEL MOVEMENT (CONSTIPATION)   • Polyethylene Glycol 3350 (MIRALAX PO) Take 1 packet by mouth daily In 8 ounces of fluid   • selenium sulfide (SELSUN) 1 % Apply topically 2 (two) times a week Apply twice weekly as needed when with scalp irritation  • simvastatin (ZOCOR) 20 mg tablet Take 1 tablet by mouth daily   • Vitamins A & D (vitamin A & D) ointment Apply 1 application topically   • zolpidem (AMBIEN) 5 mg tablet Take 0 5 tablets (2 5 mg total) by mouth daily   • [DISCONTINUED] valACYclovir (VALTREX) 1,000 mg tablet Take 1 tablet (1,000 mg total) by mouth 3 (three) times a day for 7 days       Objective     /80   Pulse 68   Temp 97 9 °F (36 6 °C)   Resp 20   LMP 02/28/2010 (Approximate)     Physical Exam  Constitutional:       Appearance: Normal appearance  HENT:      Head: Normocephalic and atraumatic  Right Ear: External ear normal       Left Ear: External ear normal       Nose: Nose normal    Eyes:      Conjunctiva/sclera: Conjunctivae normal    Cardiovascular:      Rate and Rhythm: Normal rate and regular rhythm  Pulses: Normal pulses  Heart sounds: Normal heart sounds  Pulmonary:      Effort: Pulmonary effort is normal       Breath sounds: Normal breath sounds  Skin:     Findings: Rash present  Comments: Shingles-like appearance, maculopapular grouped herpetiform vesicles on an erythematous base located on the abdomen of the patient  Neurological:      Mental Status: She is alert             Ul  Shaq Schafer 84, DO

## 2023-04-24 NOTE — LETTER
April 24, 2023     Patient: Eladia Estrella  YOB: 1960  Date of Visit: 4/24/2023      To Whom it May Concern:    Rodrigue Luciano is under my professional care  Ahmet was seen in my office on 4/24/2023  Ahmet can return to day program on 5/2/23 after completion of medication therapy  If you have any questions or concerns, please don't hesitate to call           Sincerely,          Manuel Ansari DO        CC: No Recipients

## 2023-04-24 NOTE — ASSESSMENT & PLAN NOTE
Patient presents with a rash on her abdomen consistent with the presentation of shingles  The rash appears maculopapular with grouped herpetiform vesicles on an erythematous base  It is important to note that the distribution is atypical for shingles  Plan:  Start valacyclovir 1000 mg TID x 7 days  Follow-up if symptoms and rash do not resolve

## 2023-05-11 DIAGNOSIS — G40.409 TONIC-CLONIC EPILEPTIC SEIZURES (HCC): ICD-10-CM

## 2023-05-12 ENCOUNTER — PATIENT MESSAGE (OUTPATIENT)
Dept: NEUROLOGY | Facility: CLINIC | Age: 63
End: 2023-05-12

## 2023-05-12 DIAGNOSIS — G40.409 TONIC-CLONIC EPILEPTIC SEIZURES (HCC): ICD-10-CM

## 2023-05-12 RX ORDER — PHENOBARBITAL 32.4 MG/1
TABLET ORAL
Qty: 90 TABLET | Refills: 2 | Status: SHIPPED | OUTPATIENT
Start: 2023-05-12 | End: 2023-05-15 | Stop reason: SDUPTHER

## 2023-05-12 RX ORDER — PHENOBARBITAL 32.4 MG/1
TABLET ORAL
Qty: 90 TABLET | Refills: 0 | Status: CANCELLED | OUTPATIENT
Start: 2023-05-12

## 2023-05-12 NOTE — TELEPHONE ENCOUNTER
TAWNY thomas [address] [facility] nurse step by step    I am trying to get a refill of phenobarbital with 5 refills  please assist   I only have 4 doses left  When the pharmacy did our cycle fill yesterday is when i found out she only had 15 pills left and she take 3 per day at 4pm    Medication Refill Request     Name PHENobarbital   Dose/Frequency 32 4 mg tablet   Quantity 90  Verified pharmacy   [x]  Verified ordering Provider   [x]  Does patient have enough for the next 3 days?  Yes [x] No []

## 2023-05-15 RX ORDER — PHENOBARBITAL 32.4 MG/1
TABLET ORAL
Qty: 90 TABLET | Refills: 5 | Status: SHIPPED | OUTPATIENT
Start: 2023-05-15

## 2023-05-15 NOTE — PATIENT COMMUNICATION
Pt's caregiver is asking for 5 refills for the Phenobarb instead of 3  Please sign off if agreeable  Thank you

## 2023-05-23 ENCOUNTER — OFFICE VISIT (OUTPATIENT)
Dept: FAMILY MEDICINE CLINIC | Facility: CLINIC | Age: 63
End: 2023-05-23

## 2023-05-23 VITALS
HEART RATE: 75 BPM | SYSTOLIC BLOOD PRESSURE: 157 MMHG | DIASTOLIC BLOOD PRESSURE: 82 MMHG | WEIGHT: 163 LBS | RESPIRATION RATE: 20 BRPM | TEMPERATURE: 97.8 F | BODY MASS INDEX: 31.83 KG/M2 | OXYGEN SATURATION: 97 %

## 2023-05-23 DIAGNOSIS — F51.01 PRIMARY INSOMNIA: ICD-10-CM

## 2023-05-23 DIAGNOSIS — M81.0 AGE-RELATED OSTEOPOROSIS WITHOUT CURRENT PATHOLOGICAL FRACTURE: ICD-10-CM

## 2023-05-23 DIAGNOSIS — I10 BENIGN ESSENTIAL HYPERTENSION: Primary | ICD-10-CM

## 2023-05-23 DIAGNOSIS — K59.00 CONSTIPATION, UNSPECIFIED CONSTIPATION TYPE: ICD-10-CM

## 2023-05-23 DIAGNOSIS — M81.0 OSTEOPOROSIS, UNSPECIFIED OSTEOPOROSIS TYPE, UNSPECIFIED PATHOLOGICAL FRACTURE PRESENCE: ICD-10-CM

## 2023-05-23 RX ORDER — ZOLPIDEM TARTRATE 5 MG/1
2.5 TABLET ORAL DAILY
Qty: 15 TABLET | Refills: 5 | Status: SHIPPED | OUTPATIENT
Start: 2023-05-23

## 2023-05-23 RX ORDER — POLYETHYLENE GLYCOL 3350 17 G/17G
POWDER, FOR SOLUTION ORAL
Qty: 255 G | Refills: 5 | Status: SHIPPED | OUTPATIENT
Start: 2023-05-23

## 2023-05-23 RX ORDER — DENOSUMAB 60 MG/ML
60 INJECTION SUBCUTANEOUS
Qty: 1 ML | Refills: 1 | Status: SHIPPED | OUTPATIENT
Start: 2023-05-23

## 2023-05-23 RX ORDER — POLYETHYLENE GLYCOL 3350 17 G/17G
POWDER, FOR SOLUTION ORAL
Qty: 255 G | Refills: 10 | Status: SHIPPED | OUTPATIENT
Start: 2023-05-23

## 2023-05-23 NOTE — ASSESSMENT & PLAN NOTE
Repeat blood pressure in office 157/82  Controlled at home  Blood pressure in office systolic 039'S  Patient is taking HCTZ 12 5 mg, Losartan 50 mg, and Metoprolol 100 daily       Plan  - Continue taking HCTZ, Losartan, Metoprolol   - Follow up in one month

## 2023-05-23 NOTE — PROGRESS NOTES
Name: Sarika Schafer      : 1960      MRN: [id number]  Encounter Provider: Roxanna Hsu MD  Encounter Date: 2023   Encounter department: [address] [place] [address]  Benign essential hypertension  Assessment & Plan:  Repeat blood pressure in office 157/82  Controlled at home  Blood pressure in office systolic 189'Z  Patient is taking HCTZ 12 5 mg, Losartan 50 mg, and Metoprolol 100 daily  Plan  - Continue taking HCTZ, Losartan, Metoprolol   - Follow up in one month       2  Constipation, unspecified constipation type  -     polyethylene glycol (GLYCOLAX) 17 GM/SCOOP powder; 1 cap dissolve in 8 ox of fluid po daily (hold for loose stools)  Constipation  -     polyethylene glycol (GLYCOLAX) 17 GM/SCOOP powder; 1 tsp in 4 oz of fluid po PRN every 3 days if no BM  Constipation  3  Age-related osteoporosis without current pathological fracture  -     Basic metabolic panel; Future  -     Basic metabolic panel; Future    4  Osteoporosis, unspecified osteoporosis type, unspecified pathological fracture presence  -     denosumab (Prolia) 60 mg/mL; Inject 1 mL (60 mg total) under the skin every 6 (six) months    5  Primary insomnia  -     zolpidem (AMBIEN) 5 mg tablet; Take 0 5 tablets (2 5 mg total) by mouth daily           Subjective      HPI   Chloé Cody is a 60year old female with a history cerebral palsy presents to the office with her nurse Lyndon Sanches for a 3 month follow up to hypertension  Patient is taking HCTZ 12 5 mg, Losartan 50 mg, and Metoprolol 100 daily  BP at home 066-559, 68-89 diastolic  Her Shingles rash resolved with Valacyclovir  Patient needs refills of Prolia, Ambien, and Miralax  Repeat blood pressure: 157 / 82    Review of Systems   Constitutional: Negative for chills and fever  HENT: Positive for congestion  Negative for sore throat  Eyes: Positive for itching  Negative for pain     Respiratory: Negative for shortness of breath  Cardiovascular: Negative for chest pain  Gastrointestinal: Positive for constipation  Negative for abdominal pain, diarrhea, nausea and vomiting  Genitourinary: Negative for dysuria and hematuria  Musculoskeletal: Negative for arthralgias and back pain  Neurological: Negative for seizures and headaches         Current Outpatient Medications on File Prior to Visit   Medication Sig   • acetaminophen (TYLENOL) 650 mg CR tablet Take 650 mg by mouth every 8 (eight) hours as needed   • baclofen 10 mg tablet Take 1 tablet (10 mg total) by mouth 2 (two) times a day   • benzonatate (TESSALON PERLES) 100 mg capsule Take 1 capsule by mouth every 8 hours as needed for dry cough   • Calcium Citrate 200 MG TABS Take by mouth 3 TABS TWICE A DAY    • carbamide peroxide (DEBROX) 6 5 % otic solution Instill 5 drops in affected ear twice daily for 4 days as needed for ear wax   • cholecalciferol (VITAMIN D3) 1,000 units tablet Take 1 capsule by mouth daily   • clotrimazole-betamethasone (LOTRISONE) 1-0 05 % cream APPLY TOPICALLY TO AFFECTED AREA OF SKIN TWICE A DAY AS NEEDED FOR IRRITATION FROM DIAPER   • famotidine (PEPCID) 20 mg tablet Take 1 tablet (20 mg total) by mouth daily at bedtime   • fluticasone (FLONASE) 50 mcg/act nasal spray USE 1 SPRAY IN EACH NOSTRIL TWICE A DAY (RHINITIS)   • GNP 8 Hour Arthritis Relief 650 MG CR tablet TAKE 1 TABLET BY MOUTH EVERY 6 HOURS AS NEEDED FOR MILD PAIN OR TEMPERATURE  >100 4   • hydrochlorothiazide (HYDRODIURIL) 25 mg tablet Take 0 5 tablets (12 5 mg total) by mouth daily   • ibuprofen (MOTRIN) 600 mg tablet Take by mouth every 6 (six) hours as needed for fever Fever greater than 101 4F   • ipratropium (ATROVENT) 0 06 % nasal spray 2 sprays into each nostril 3 (three) times a day as needed for rhinitis   • loratadine (CLARITIN) 10 mg tablet TAKE 1 TABLET BY MOUTH DAILY (ALLERGIC RHINITIS)   • losartan (COZAAR) 100 MG tablet    • losartan (COZAAR) 50 mg tablet Take 1 tablet (50 mg total) by mouth daily   • metoprolol succinate (TOPROL-XL) 100 mg 24 hr tablet Take 1 tablet by mouth daily   • Mucus Relief 600 MG 12 hr tablet TAKE 1 TABLET BY MOUTH EVERY 12 HOURS AS NEEDED FOR CONGESTION **DO NOT CRUSH**   • nystatin-triamcinolone (MYCOLOG-II) ointment Apply topically 2 (two) times a day as needed (As needed for rash)   • olopatadine HCl (PATADAY) 0 2 % opth drops Instill 1 drop into affected eye(s) daily PRN irritation   • PHENobarbital 32 4 mg tablet Take 3 tablets by mouth at 4 pm   • selenium sulfide (SELSUN) 1 % Apply topically 2 (two) times a week Apply twice weekly as needed when with scalp irritation  • simvastatin (ZOCOR) 20 mg tablet Take 1 tablet by mouth daily   • Vitamins A & D (vitamin A & D) ointment Apply 1 application topically   • [DISCONTINUED] polyethylene glycol (GLYCOLAX) 17 GM/SCOOP powder DISSOLVE 1 CAPFUL (17GM) IN 8 OZ FLUIDS & DRINK BY MOUTH DAILY (CONSTIPATION) *HOLD FOR LOOSE STOOLS*;MIX 1 TSP 4 OZ FLUIDS & DRINK BY MOUTH EVERY 3 DAYS AS NEEDED IF NO BOWEL MOVEMENT (CONSTIPATION)   • [DISCONTINUED] Polyethylene Glycol 3350 (MIRALAX PO) Take 1 packet by mouth daily In 8 ounces of fluid   • [DISCONTINUED] zolpidem (AMBIEN) 5 mg tablet Take 0 5 tablets (2 5 mg total) by mouth daily   • LORazepam (ATIVAN) 0 5 mg tablet Take 1 tablet (0 5 mg total) by mouth once for 1 dose   • valACYclovir (VALTREX) 1,000 mg tablet Take 1 tablet (1,000 mg total) by mouth 3 (three) times a day for 7 days   • [DISCONTINUED] denosumab (Prolia) 60 mg/mL Inject 1 mL (60 mg total) under the skin once for 1 dose       Objective     /82   Pulse 75   Temp 97 8 °F (36 6 °C)   Resp 20   Wt 73 9 kg (163 lb)   LMP 02/28/2010 (Approximate)   SpO2 97%   BMI 31 83 kg/m²     Physical Exam  Constitutional:       Appearance: She is obese  She is not ill-appearing, toxic-appearing or diaphoretic  Eyes:      Extraocular Movements: Extraocular movements intact  Conjunctiva/sclera: Conjunctivae normal       Pupils: Pupils are equal, round, and reactive to light  Neck:      Comments: Contracted at baseline  Cardiovascular:      Rate and Rhythm: Normal rate and regular rhythm  Pulses: Normal pulses  Heart sounds: Normal heart sounds  Pulmonary:      Effort: Pulmonary effort is normal       Breath sounds: Normal breath sounds  Skin:     General: Skin is warm  Neurological:      Mental Status: She is alert  Mental status is at baseline     Psychiatric:         Mood and Affect: Mood normal          Behavior: Behavior normal        Pete Campbell MD

## 2023-05-31 ENCOUNTER — TELEPHONE (OUTPATIENT)
Dept: LAB | Facility: HOSPITAL | Age: 63
End: 2023-05-31

## 2023-06-14 ENCOUNTER — APPOINTMENT (OUTPATIENT)
Dept: LAB | Facility: HOSPITAL | Age: 63
End: 2023-06-14
Payer: MEDICARE

## 2023-06-14 DIAGNOSIS — G40.409 TONIC-CLONIC EPILEPTIC SEIZURES (HCC): ICD-10-CM

## 2023-06-14 DIAGNOSIS — M81.0 AGE-RELATED OSTEOPOROSIS WITHOUT CURRENT PATHOLOGICAL FRACTURE: ICD-10-CM

## 2023-06-14 LAB
ANION GAP SERPL CALCULATED.3IONS-SCNC: 2 MMOL/L (ref 4–13)
BUN SERPL-MCNC: 14 MG/DL (ref 5–25)
CALCIUM SERPL-MCNC: 8.8 MG/DL (ref 8.3–10.1)
CHLORIDE SERPL-SCNC: 106 MMOL/L (ref 96–108)
CO2 SERPL-SCNC: 29 MMOL/L (ref 21–32)
CREAT SERPL-MCNC: 0.48 MG/DL (ref 0.6–1.3)
GFR SERPL CREATININE-BSD FRML MDRD: 104 ML/MIN/1.73SQ M
GLUCOSE P FAST SERPL-MCNC: 96 MG/DL (ref 65–99)
POTASSIUM SERPL-SCNC: 4 MMOL/L (ref 3.5–5.3)
SODIUM SERPL-SCNC: 137 MMOL/L (ref 135–147)

## 2023-06-14 PROCEDURE — 36415 COLL VENOUS BLD VENIPUNCTURE: CPT

## 2023-06-14 PROCEDURE — 80048 BASIC METABOLIC PNL TOTAL CA: CPT

## 2023-06-20 ENCOUNTER — CLINICAL SUPPORT (OUTPATIENT)
Dept: FAMILY MEDICINE CLINIC | Facility: CLINIC | Age: 63
End: 2023-06-20

## 2023-06-20 DIAGNOSIS — M81.0 AGE-RELATED OSTEOPOROSIS WITHOUT CURRENT PATHOLOGICAL FRACTURE: Primary | ICD-10-CM

## 2023-06-20 PROCEDURE — 96372 THER/PROPH/DIAG INJ SC/IM: CPT

## 2023-07-06 ENCOUNTER — APPOINTMENT (OUTPATIENT)
Dept: LAB | Facility: CLINIC | Age: 63
End: 2023-07-06
Payer: MEDICARE

## 2023-07-06 DIAGNOSIS — M81.0 AGE-RELATED OSTEOPOROSIS WITHOUT CURRENT PATHOLOGICAL FRACTURE: ICD-10-CM

## 2023-07-06 LAB
ANION GAP SERPL CALCULATED.3IONS-SCNC: 4 MMOL/L
BUN SERPL-MCNC: 19 MG/DL (ref 5–25)
CALCIUM SERPL-MCNC: 9.1 MG/DL (ref 8.3–10.1)
CHLORIDE SERPL-SCNC: 107 MMOL/L (ref 96–108)
CO2 SERPL-SCNC: 28 MMOL/L (ref 21–32)
CREAT SERPL-MCNC: 0.64 MG/DL (ref 0.6–1.3)
GFR SERPL CREATININE-BSD FRML MDRD: 95 ML/MIN/1.73SQ M
GLUCOSE SERPL-MCNC: 84 MG/DL (ref 65–140)
POTASSIUM SERPL-SCNC: 3.8 MMOL/L (ref 3.5–5.3)
SODIUM SERPL-SCNC: 139 MMOL/L (ref 135–147)

## 2023-07-06 PROCEDURE — 36415 COLL VENOUS BLD VENIPUNCTURE: CPT

## 2023-07-06 PROCEDURE — 80048 BASIC METABOLIC PNL TOTAL CA: CPT

## 2023-07-13 ENCOUNTER — TELEPHONE (OUTPATIENT)
Dept: FAMILY MEDICINE CLINIC | Facility: CLINIC | Age: 63
End: 2023-07-13

## 2023-07-13 NOTE — TELEPHONE ENCOUNTER
Signature required    Folder (To be completed by) -Dr. Zhou Mustafa     Name of Form - [employer] order #[id number]    Color folder [facility]    Form to be Faxed [phone]      Patient was made aware of the 7-10 business day form policy.

## 2023-08-14 ENCOUNTER — OFFICE VISIT (OUTPATIENT)
Dept: FAMILY MEDICINE CLINIC | Facility: CLINIC | Age: 63
End: 2023-08-14

## 2023-08-14 VITALS
DIASTOLIC BLOOD PRESSURE: 88 MMHG | SYSTOLIC BLOOD PRESSURE: 158 MMHG | WEIGHT: 163 LBS | HEIGHT: 60 IN | OXYGEN SATURATION: 97 % | BODY MASS INDEX: 32 KG/M2 | HEART RATE: 89 BPM | TEMPERATURE: 97.3 F

## 2023-08-14 DIAGNOSIS — I10 BENIGN ESSENTIAL HYPERTENSION: Primary | ICD-10-CM

## 2023-08-14 DIAGNOSIS — F72 SEVERE INTELLECTUAL DISABILITY: ICD-10-CM

## 2023-08-14 PROCEDURE — 99213 OFFICE O/P EST LOW 20 MIN: CPT | Performed by: FAMILY MEDICINE

## 2023-08-14 RX ORDER — LORAZEPAM 0.5 MG/1
0.5 TABLET ORAL ONCE
Qty: 1 TABLET | Refills: 0 | Status: SHIPPED | OUTPATIENT
Start: 2023-08-14 | End: 2023-08-14

## 2023-08-14 NOTE — PROGRESS NOTES
Name: Tia Tate      : 1960      MRN: [id number]  Encounter Provider: Elisha Conklin MD  Encounter Date: 2023   Encounter department: [address]     1. Benign essential hypertension  Assessment & Plan:  Patient presenting for 3 month f/u HTN. Blood pressure in office today is 155/88, Repeat BP is 126/78. Home blood pressure are well controlled on weekly logs averaging 126/86. Continues on Hydrochlorothiazide 12.5mg, Losartan 50mg, Metoprolol 100mg daily. · Continue current medications  · F/u in ~  months for annual physical       2. Severe intellectual disability  -     LORazepam (ATIVAN) 0.5 mg tablet; Take 1 tablet (0.5 mg total) by mouth once for 1 dose         Subjective      Patient presents with her caregiver Nette Chairez for regular 3 month blood pressure follow up as required by her intermediate care facility. No acute concerns today per caregiver, has been doing well. Is scheduled for a GYN examination on the , needs an order for a sedative pill for the examination. Has seen podiatrist on  for a regular follow up, otherwise no interval events since last office visit. Constipation is well controlled, patient is eating well, very rarely using her PRNs. No seizures since 2012.      Review of Systems   Unable to perform ROS: Other   Intellectual Disability    Current Outpatient Medications on File Prior to Visit   Medication Sig   • baclofen 10 mg tablet Take 1 tablet (10 mg total) by mouth 2 (two) times a day   • benzonatate (TESSALON PERLES) 100 mg capsule Take 1 capsule by mouth every 8 hours as needed for dry cough   • Calcium Citrate 200 MG TABS Take by mouth 3 TABS TWICE A DAY    • carbamide peroxide (DEBROX) 6.5 % otic solution Instill 5 drops in affected ear twice daily for 4 days as needed for ear wax   • cholecalciferol (VITAMIN D3) 1,000 units tablet Take 1 capsule by mouth daily   • clotrimazole-betamethasone (LOTRISONE) 1-0.05 % cream APPLY TOPICALLY TO AFFECTED AREA OF SKIN TWICE A DAY AS NEEDED FOR IRRITATION FROM DIAPER   • denosumab (Prolia) 60 mg/mL Inject 1 mL (60 mg total) under the skin every 6 (six) months   • famotidine (PEPCID) 20 mg tablet Take 1 tablet (20 mg total) by mouth daily at bedtime   • fluticasone (FLONASE) 50 mcg/act nasal spray USE 1 SPRAY IN EACH NOSTRIL TWICE A DAY (RHINITIS)   • GNP 8 Hour Arthritis Relief 650 MG CR tablet TAKE 1 TABLET BY MOUTH EVERY 6 HOURS AS NEEDED FOR MILD PAIN OR TEMPERATURE  >100.4   • hydrochlorothiazide (HYDRODIURIL) 25 mg tablet Take 0.5 tablets (12.5 mg total) by mouth daily   • ibuprofen (MOTRIN) 600 mg tablet Take by mouth every 6 (six) hours as needed for fever Fever greater than 101.4F   • ipratropium (ATROVENT) 0.06 % nasal spray 2 sprays into each nostril 3 (three) times a day as needed for rhinitis   • loratadine (CLARITIN) 10 mg tablet TAKE 1 TABLET BY MOUTH DAILY (ALLERGIC RHINITIS)   • losartan (COZAAR) 50 mg tablet Take 1 tablet (50 mg total) by mouth daily   • metoprolol succinate (TOPROL-XL) 100 mg 24 hr tablet Take 1 tablet by mouth daily   • Mucus Relief 600 MG 12 hr tablet TAKE 1 TABLET BY MOUTH EVERY 12 HOURS AS NEEDED FOR CONGESTION **DO NOT CRUSH**   • nystatin-triamcinolone (MYCOLOG-II) ointment Apply topically 2 (two) times a day as needed (As needed for rash)   • olopatadine HCl (PATADAY) 0.2 % opth drops Instill 1 drop into affected eye(s) daily PRN irritation   • PHENobarbital 32.4 mg tablet Take 3 tablets by mouth at 4 pm   • polyethylene glycol (GLYCOLAX) 17 GM/SCOOP powder 1 cap dissolve in 8 ox of fluid po daily (hold for loose stools). Constipation. • polyethylene glycol (GLYCOLAX) 17 GM/SCOOP powder 1 tsp in 4 oz of fluid po PRN every 3 days if no BM. Constipation. • selenium sulfide (SELSUN) 1 % Apply topically 2 (two) times a week Apply twice weekly as needed when with scalp irritation.    • simvastatin (ZOCOR) 20 mg tablet Take 1 tablet by mouth daily   • Vitamins A & D (vitamin A & D) ointment Apply 1 application topically   • zolpidem (AMBIEN) 5 mg tablet Take 0.5 tablets (2.5 mg total) by mouth daily   • acetaminophen (TYLENOL) 650 mg CR tablet Take 650 mg by mouth every 8 (eight) hours as needed (Patient not taking: Reported on 8/14/2023)   • losartan (COZAAR) 100 MG tablet  (Patient not taking: Reported on 8/14/2023)   • valACYclovir (VALTREX) 1,000 mg tablet Take 1 tablet (1,000 mg total) by mouth 3 (three) times a day for 7 days   • [DISCONTINUED] LORazepam (ATIVAN) 0.5 mg tablet Take 1 tablet (0.5 mg total) by mouth once for 1 dose       Objective     /88 (BP Location: Left arm, Patient Position: Sitting, Cuff Size: Standard)   Pulse 89   Temp (!) 97.3 °F (36.3 °C) (Temporal)   Ht 5' (1.524 m)   Wt 73.9 kg (163 lb)   LMP 02/28/2010 (Approximate)   SpO2 97%   BMI 31.83 kg/m²     Physical Exam  Constitutional:       General: She is not in acute distress. Appearance: She is obese. She is not ill-appearing. HENT:      Head: Normocephalic and atraumatic. Nose: Nose normal.   Eyes:      Conjunctiva/sclera: Conjunctivae normal.   Cardiovascular:      Rate and Rhythm: Regular rhythm. Heart sounds: Normal heart sounds. No murmur heard. Pulmonary:      Effort: No respiratory distress. Breath sounds: Normal breath sounds. Abdominal:      General: Bowel sounds are normal. There is no distension. Palpations: Abdomen is soft. Musculoskeletal:         General: Normal range of motion. Cervical back: Normal range of motion. Skin:     General: Skin is warm and dry. Neurological:      General: No focal deficit present. Mental Status: She is alert. Mental status is at baseline.    Psychiatric:         Mood and Affect: Mood normal.         Behavior: Behavior normal.       Mónica Rodriguez MD

## 2023-08-14 NOTE — ASSESSMENT & PLAN NOTE
Patient presenting for 3 month f/u HTN. Blood pressure in office today is 155/88, Repeat BP is 126/78. Home blood pressure are well controlled on weekly logs averaging 126/86. Continues on Hydrochlorothiazide 12.5mg, Losartan 50mg, Metoprolol 100mg daily.    · Continue current medications  · F/u in ~ 9/12 months for annual physical

## 2023-08-23 ENCOUNTER — ANNUAL EXAM (OUTPATIENT)
Dept: OBGYN CLINIC | Facility: CLINIC | Age: 63
End: 2023-08-23

## 2023-08-23 VITALS
HEIGHT: 60 IN | SYSTOLIC BLOOD PRESSURE: 127 MMHG | BODY MASS INDEX: 32 KG/M2 | WEIGHT: 163 LBS | HEART RATE: 90 BPM | DIASTOLIC BLOOD PRESSURE: 80 MMHG

## 2023-08-23 DIAGNOSIS — Z12.39 ENCOUNTER FOR BREAST CANCER SCREENING USING NON-MAMMOGRAM MODALITY: ICD-10-CM

## 2023-08-23 DIAGNOSIS — Z01.419 WOMEN'S ANNUAL ROUTINE GYNECOLOGICAL EXAMINATION: Primary | ICD-10-CM

## 2023-08-23 PROCEDURE — G0101 CA SCREEN;PELVIC/BREAST EXAM: HCPCS | Performed by: NURSE PRACTITIONER

## 2023-08-23 NOTE — PROGRESS NOTES
ANNUAL GYNECOLOGICAL EXAMINATION    Judith Ramirez is a 58 y.o. female who presents today for annual GYN exam. 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 She presents today with two caretakers from her long term care facility, history was given by care takers. Her last pap smear was performed  and result was NILM, HR-HPV negative. She has no history of abnormal pap smears in her past.  Her last mammogram was performed 2023 and result was BI-RADs 1. She completed a Cologuard colon cancer screening in  with negative results. She has been menopausal since around the age of 47, no issues with post menopausal bleeding. Patient's last menstrual period was 2010 (approximate).   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[address])    • Urinary incontinence    • Vitamin D deficiency     Last Assessed:      Past Surgical History:   Procedure Laterality Date   • ABSCESS DRAINAGE      Incision and Drainage of skin abscess back   • COLONOSCOPY      Fiberoptic; Last Assessed: 2013   • EYE SURGERY Left     Strabismus Left Eye     OB History        0    Para   0    Term   0       0    AB   0    Living   0       SAB   0    IAB   0    Ectopic   0    Multiple   0    Live Births   0               Social History     Tobacco Use   • Smoking status: Never   • Smokeless tobacco: Never   Vaping Use   • Vaping Use: Never used   Substance Use Topics   • Alcohol use: No   • Drug use: No     Social History     Substance and Sexual Activity   Sexual Activity Never     Cancer-related family history includes Breast cancer (age of onset: 79) in her maternal aunt; Kidney cancer in her maternal grandmother; Lymphoma in her mother; Prostate cancer (age of onset: 76) in her father; Skin cancer in her father. Current Outpatient Medications   Medication Instructions   • acetaminophen (TYLENOL) 650 mg, Every 8 hours PRN   • baclofen 10 mg, Oral, 2 times daily   • benzonatate (TESSALON PERLES) 100 mg capsule Take 1 capsule by mouth every 8 hours as needed for dry cough   • Calcium Citrate 200 MG TABS Oral, 3 TABS TWICE A DAY   • carbamide peroxide (DEBROX) 6.5 % otic solution Instill 5 drops in affected ear twice daily for 4 days as needed for ear wax   • cholecalciferol (VITAMIN D3) 1,000 units tablet 1 capsule, Oral, Daily   • clotrimazole-betamethasone (LOTRISONE) 1-0.05 % cream APPLY TOPICALLY TO AFFECTED AREA OF SKIN TWICE A DAY AS NEEDED FOR IRRITATION FROM DIAPER   • famotidine (PEPCID) 20 mg, Oral, Daily, at bedtime   • fluticasone (FLONASE) 50 mcg/act nasal spray USE 1 SPRAY IN EACH NOSTRIL TWICE A DAY (RHINITIS)   • GNP 8 Hour Arthritis Relief 650 MG CR tablet TAKE 1 TABLET BY MOUTH EVERY 6 HOURS AS NEEDED FOR MILD PAIN OR TEMPERATURE  >100.4   • hydrochlorothiazide (HYDRODIURIL) 12.5 mg, Oral, Daily   • ibuprofen (MOTRIN) 600 mg tablet Oral, Every 6 hours PRN, Fever greater than 101.4F   • ipratropium (ATROVENT) 0.06 % nasal spray 2 sprays, Nasal, 3 times daily PRN   • loratadine (CLARITIN) 10 mg tablet TAKE 1 TABLET BY MOUTH DAILY (ALLERGIC RHINITIS)   • LORazepam (ATIVAN) 0.5 mg, Oral, Once   • losartan (COZAAR) 100 MG tablet No dose, route, or frequency recorded.    • losartan (COZAAR) 50 mg, Oral, Daily   • metoprolol succinate (TOPROL-XL) 100 mg 24 hr tablet Take 1 tablet by mouth daily   • Mucus Relief 600 MG 12 hr tablet TAKE 1 TABLET BY MOUTH EVERY 12 HOURS AS NEEDED FOR CONGESTION **DO NOT CRUSH**   • nystatin-triamcinolone (MYCOLOG-II) ointment Topical, 2 times daily PRN   • olopatadine HCl (PATADAY) 0.2 % opth drops Instill 1 drop into affected eye(s) daily PRN irritation   • PHENobarbital 32.4 mg tablet Take 3 tablets by mouth at 4 pm   • polyethylene glycol (GLYCOLAX) 17 GM/SCOOP powder 1 cap dissolve in 8 ox of fluid po daily (hold for loose stools). Constipation. • polyethylene glycol (GLYCOLAX) 17 GM/SCOOP powder 1 tsp in 4 oz of fluid po PRN every 3 days if no BM. Constipation. • Prolia 60 mg, Subcutaneous, Every 6 months   • selenium sulfide (SELSUN) 1 % Topical, 2 times weekly, Apply twice weekly as needed when with scalp irritation. • simvastatin (ZOCOR) 20 mg tablet Take 1 tablet by mouth daily   • valACYclovir (VALTREX) 1,000 mg, Oral, 3 times daily   • Vitamins A & D (vitamin A & D) ointment 1 application. , Apply externally   • zolpidem (AMBIEN) 2.5 mg, Oral, Daily       Review of Systems:  Review of Systems   Constitutional: Negative. Gastrointestinal: Negative. Genitourinary: Negative. Skin: Negative. Physical Exam:  /80   Pulse 90   Ht 5' (1.524 m)   Wt 73.9 kg (163 lb)   LMP 02/28/2010 (Approximate)   BMI 31.83 kg/m²   Physical Exam  Constitutional:       General: She is not in acute distress. Genitourinary:      Bladder normal.      No lesions in the vagina. No vaginal erythema or ulceration. Right Adnexa: not tender and no mass present. Left Adnexa: not tender and no mass present. No cervical motion tenderness or lesion. Uterus is not enlarged or tender. No uterine mass detected. Breasts:     Right: No mass, nipple discharge or skin change. Left: No mass, nipple discharge or skin change. HENT:      Head: Atraumatic. Cardiovascular:      Rate and Rhythm: Normal rate and regular rhythm. Pulmonary:      Effort: Pulmonary effort is normal.   Abdominal:      General: Abdomen is flat. Palpations: Abdomen is soft. Musculoskeletal:      Cervical back: Neck supple. Neurological:      Mental Status: She is alert. Skin:     General: Skin is warm and dry. Psychiatric:         Behavior: Behavior is cooperative. Cognition and Memory: Cognition is impaired. Vitals reviewed. Assessment/Plan:   1. Normal well-woman GYN exam.  2. Cervical cancer screening: Pap smear is up to date. Reviewed ASCCP guidelines, patient may d/c pap smears at age 71 due to life long history of normal pap smears. We discussed pelvic exam and it was decided to defer exam this year due to patient being up to date on pap smear, no vaginal/pelvic/vulvar concerns, long standing history of normal pap smears and discomfort/anxiety patient experiences with pelvic exam.    3. Breast cancer screening:  Normal breast exam. Scheduled for bilateral screening mammogram.  Reviewed breast self-awareness. 4. Colon cancer screening:  Up to date. 5. Depression Screening: Patient's depression screening was assessed with a PHQ-2 score of 0. Their PHQ-9 score was 0. Clinically patient does not have depression. No treatment is required. 6. BMI Counseling: Body mass index is 31.83 kg/m². Discussed the patient's BMI with her. The BMI is above normal. No BMI follow up is appropriate, patient is quadriplegic and wheelchair bound, lives in long term care. 7. Return to office in one year for annual exam or sooner if needed. Reviewed with patient that test results are available in [employer] immediately, but that they will not necessarily be reviewed by me immediately. Explained that I will review results at my earliest opportunity and contact patient appropriately.

## 2023-09-08 ENCOUNTER — TELEPHONE (OUTPATIENT)
Dept: FAMILY MEDICINE CLINIC | Facility: CLINIC | Age: 63
End: 2023-09-08

## 2023-09-08 NOTE — TELEPHONE ENCOUNTER
Smith Mckinney (caregiver) wanted it to be listed in     her chart that she posted positive to Covid     09/03/2023      Dr Portia Melvin team

## 2023-09-13 ENCOUNTER — OFFICE VISIT (OUTPATIENT)
Dept: FAMILY MEDICINE CLINIC | Facility: CLINIC | Age: 63
End: 2023-09-13

## 2023-09-13 VITALS
HEART RATE: 78 BPM | DIASTOLIC BLOOD PRESSURE: 96 MMHG | RESPIRATION RATE: 20 BRPM | TEMPERATURE: 97.7 F | SYSTOLIC BLOOD PRESSURE: 155 MMHG

## 2023-09-13 DIAGNOSIS — E66.9 OBESITY (BMI 30.0-34.9): ICD-10-CM

## 2023-09-13 DIAGNOSIS — F72 SEVERE INTELLECTUAL DISABILITY: ICD-10-CM

## 2023-09-13 DIAGNOSIS — H61.23 IMPACTED CERUMEN OF BOTH EARS: ICD-10-CM

## 2023-09-13 DIAGNOSIS — Z00.00 MEDICARE ANNUAL WELLNESS VISIT, SUBSEQUENT: Primary | ICD-10-CM

## 2023-09-13 DIAGNOSIS — E78.5 HYPERLIPIDEMIA, UNSPECIFIED HYPERLIPIDEMIA TYPE: ICD-10-CM

## 2023-09-13 PROBLEM — H61.20 CERUMEN IMPACTION: Status: ACTIVE | Noted: 2023-09-13

## 2023-09-13 PROBLEM — E66.811 OBESITY (BMI 30.0-34.9): Status: ACTIVE | Noted: 2023-09-13

## 2023-09-13 PROCEDURE — G0439 PPPS, SUBSEQ VISIT: HCPCS | Performed by: FAMILY MEDICINE

## 2023-09-13 NOTE — PROGRESS NOTES
Assessment and Plan:     Problem List Items Addressed This Visit        Nervous and Auditory    Impacted cerumen of both ears     Plan:   -use debrox drops (caretaker states have some at home) in both ears            Other    Hyperlipidemia     Plan:   -lipid panel, CMP  -may switch simvastatin to alterative (atorvastatin, rosuvastatin); will wait on lab results  -balanced diet & exercise   -BMI Counseling: There is no height or weight on file to calculate BMI. The BMI is above normal. Nutrition recommendations include moderation in carbohydrate intake. Relevant Orders    Lipid Panel with Direct LDL reflex    Comprehensive metabolic panel    Severe intellectual disability     Pt is conversational/verbal. However, would not be able to express symptoms clearly. Plan:   -CBC, TSH           Obesity (BMI 30.0-34. 9)     Plan:   -see plan for hyperlipidemia        Other Visit Diagnoses     Medicare annual wellness visit, subsequent    -  Primary    Relevant Orders    CBC and Platelet    TSH, 3rd generation with Free T4 reflex          Depression Screening and Follow-up Plan: Patient was screened for depression during today's encounter. They screened negative with a PHQ-2 score of 0. Preventive health issues were discussed with patient, and age appropriate screening tests were ordered as noted in patient's After Visit Summary. Personalized health advice and appropriate referrals for health education or preventive services given if needed, as noted in patient's After Visit Summary. History of Present Illness:     Patient presents for a Medicare Wellness Visit. Anila Irving is a 58YO F w/PMHx of spastic quadripelgia, cerebral palsy, severe intellectual disability, epilepsy (last neuro visit 4/2023), bowel incontinence, constipation who comes in with caretaker Marin Santana for medicare annual wellness. Pt tested positive for COVID on 9/3/2023 and had a day of fevers, congestion and coughs.  However, since then have been afebrile with resolution of symptoms. Pt is verbal/conversational. Lives in an intermediate level of care facility. No new hospitalizations/ED visits or procedures since last visit per [facility]. Patient Care Team:  Adela Lopez MD as PCP - General (Family Medicine)     Review of Systems:     Review of Systems   Constitutional: Negative for chills, fatigue and fever. HENT: Negative for congestion, hearing loss, rhinorrhea and sore throat. Eyes: Negative for visual disturbance. Respiratory: Negative for cough and shortness of breath. Cardiovascular: Negative for chest pain and palpitations. Gastrointestinal: Negative for abdominal pain, blood in stool, constipation, diarrhea, nausea and vomiting. Genitourinary: Negative for dysuria, hematuria and menstrual problem. Skin: Negative for rash. Neurological: Negative for dizziness, light-headedness, numbness and headaches. Problem List:     Patient Active Problem List   Diagnosis   • Women's annual routine gynecological examination   • Wheelchair dependence   • Benign essential hypertension   • Esophageal reflux   • Hyperlipidemia   • Insomnia   • Osteoporosis   • Spastic quadriplegic cerebral palsy ([address])   • Tonic-clonic epileptic seizures ([address])   • Encounter for hepatitis C screening test for low risk patient   • Healthcare maintenance   • Breast cancer screening   • Continuous leakage of urine   • Bowel incontinence   • Constipation   • Severe intellectual disability   • Vitamin D deficiency   • Encounter for preoperative dental examination   • Close exposure to COVID-19 virus   • Elevated TSH   • Impacted cerumen of both ears   • COVID-19   • Shingles of eyelid   • Herpes zoster without complication   • Cerumen impaction   • Obesity (BMI 30.0-34. 9)      Past Medical and Surgical History:     Past Medical History:   Diagnosis Date   • Bowel incontinence    • Constipation     Last Assessed: 02Aug2013   • Generalized convulsive seizure ([address])    • Hyperlipidemia    • Osteoporosis    • Spastic quadriplegic cerebral palsy ([address])    • Urinary incontinence    • Vitamin D deficiency     Last Assessed: [id number]     Past Surgical History:   Procedure Laterality Date   • ABSCESS DRAINAGE      Incision and Drainage of skin abscess back   • COLONOSCOPY      Fiberoptic; Last Assessed: [id number]   • EYE SURGERY Left     Strabismus Left Eye      Family History:     Family History   Problem Relation Age of Onset   • Lymphoma Mother         Bone Marrow   • Coronary artery disease Mother    • Seizures Mother         Epilepsy and recurrent seizures   • Other Mother         Mitral Stenosis   • Prostate cancer Father 76   • Skin cancer Father    • No Known Problems Sister    • No Known Problems Sister    • Heart disease Maternal Grandmother    • Kidney cancer Maternal Grandmother    • Heart disease Maternal Grandfather    • Heart disease Paternal Grandmother    • Breast cancer Maternal Aunt 79      Social History:     Social History     Socioeconomic History   • Marital status: Single     Spouse name: None   • Number of children: None   • Years of education: None   • Highest education level: High school graduate   Occupational History   • Occupation: none   Tobacco Use   • Smoking status: Never   • Smokeless tobacco: Never   Vaping Use   • Vaping Use: Never used   Substance and Sexual Activity   • Alcohol use: No   • Drug use: No   • Sexual activity: Never   Other Topics Concern   • None   Social History Narrative    Caffeine use    Wheelchair dependent     Social Determinants of Health     Financial Resource Strain: Low Risk  (9/12/2023)    Overall Financial Resource Strain (CARDIA)    • Difficulty of Paying Living Expenses: Not hard at all   Food Insecurity: No Food Insecurity (9/12/2023)    Hunger Vital Sign    • Worried About Running Out of Food in the Last Year: Never true    • Ran Out of Food in the Last Year: Never true Transportation Needs: No Transportation Needs (9/12/2023)    PRAPARE - Transportation    • Lack of Transportation (Medical): No    • Lack of Transportation (Non-Medical):  No   Physical Activity: Inactive (2/12/2020)    Exercise Vital Sign    • Days of Exercise per Week: 0 days    • Minutes of Exercise per Session: 0 min   Stress: No Stress Concern Present (9/13/2023)    [address] [facility]    • Feeling of Stress : Not at all   Social Connections: Unknown (2/12/2020)    Social Connection and Isolation Panel [NHANES]    • Frequency of Communication with Friends and Family: Patient refused    • Frequency of Social Gatherings with Friends and Family: Patient refused    • Attends Protestant Services: Patient refused    • Active Member of Clubs or Organizations: Patient refused    • Attends Club or Organization Meetings: Patient refused    • Marital Status: Patient refused   Intimate Partner Violence: Not At Risk (9/13/2023)    Humiliation, Afraid, Rape, and Kick questionnaire    • Fear of Current or Ex-Partner: No    • Emotionally Abused: No    • Physically Abused: No    • Sexually Abused: No   Housing Stability: Low Risk  (9/13/2023)    Housing Stability Vital Sign    • Unable to Pay for Housing in the Last Year: No    • Number of [address] in the Last Year: 1    • Unstable Housing in the Last Year: No      Medications and Allergies:     Current Outpatient Medications   Medication Sig Dispense Refill   • baclofen 10 mg tablet Take 1 tablet (10 mg total) by mouth 2 (two) times a day 60 tablet 11   • benzonatate (TESSALON PERLES) 100 mg capsule Take 1 capsule by mouth every 8 hours as needed for dry cough 20 capsule 0   • Calcium Citrate 200 MG TABS Take by mouth 3 TABS TWICE A DAY      • carbamide peroxide (DEBROX) 6.5 % otic solution Instill 5 drops in affected ear twice daily for 4 days as needed for ear wax 15 mL 0   • cholecalciferol (VITAMIN D3) 1,000 units tablet Take 1 capsule by mouth daily     • clotrimazole-betamethasone (LOTRISONE) 1-0.05 % cream APPLY TOPICALLY TO AFFECTED AREA OF SKIN TWICE A DAY AS NEEDED FOR IRRITATION FROM DIAPER 45 g 11   • denosumab (Prolia) 60 mg/mL Inject 1 mL (60 mg total) under the skin every 6 (six) months 1 mL 1   • famotidine (PEPCID) 20 mg tablet Take 1 tablet (20 mg total) by mouth daily at bedtime  0   • fluticasone (FLONASE) 50 mcg/act nasal spray USE 1 SPRAY IN EACH NOSTRIL TWICE A DAY (RHINITIS) 16 g 11   • GNP 8 Hour Arthritis Relief 650 MG CR tablet TAKE 1 TABLET BY MOUTH EVERY 6 HOURS AS NEEDED FOR MILD PAIN OR TEMPERATURE  >100.4 8 tablet 11   • hydrochlorothiazide (HYDRODIURIL) 25 mg tablet Take 0.5 tablets (12.5 mg total) by mouth daily 30 tablet 5   • ibuprofen (MOTRIN) 600 mg tablet Take by mouth every 6 (six) hours as needed for fever Fever greater than 101.4F     • ipratropium (ATROVENT) 0.06 % nasal spray 2 sprays into each nostril 3 (three) times a day as needed for rhinitis 15 mL 0   • loratadine (CLARITIN) 10 mg tablet TAKE 1 TABLET BY MOUTH DAILY (ALLERGIC RHINITIS) 28 tablet 10   • losartan (COZAAR) 50 mg tablet Take 1 tablet (50 mg total) by mouth daily 30 tablet 5   • metoprolol succinate (TOPROL-XL) 100 mg 24 hr tablet Take 1 tablet by mouth daily  0   • Mucus Relief 600 MG 12 hr tablet TAKE 1 TABLET BY MOUTH EVERY 12 HOURS AS NEEDED FOR CONGESTION **DO NOT CRUSH** 5 tablet 11   • nystatin-triamcinolone (MYCOLOG-II) ointment Apply topically 2 (two) times a day as needed (As needed for rash) 30 g 5   • olopatadine HCl (PATADAY) 0.2 % opth drops Instill 1 drop into affected eye(s) daily PRN irritation  0   • PHENobarbital 32.4 mg tablet Take 3 tablets by mouth at 4 pm 90 tablet 5   • polyethylene glycol (GLYCOLAX) 17 GM/SCOOP powder 1 cap dissolve in 8 ox of fluid po daily (hold for loose stools). Constipation.  255 g 10   • polyethylene glycol (GLYCOLAX) 17 GM/SCOOP powder 1 tsp in 4 oz of fluid po PRN every 3 days if no BM. Constipation. 255 g 5   • selenium sulfide (SELSUN) 1 % Apply topically 2 (two) times a week Apply twice weekly as needed when with scalp irritation. 420 mL 5   • simvastatin (ZOCOR) 20 mg tablet Take 1 tablet by mouth daily  0   • Vitamins A & D (vitamin A & D) ointment Apply 1 application topically     • zolpidem (AMBIEN) 5 mg tablet Take 0.5 tablets (2.5 mg total) by mouth daily 15 tablet 5   • acetaminophen (TYLENOL) 650 mg CR tablet Take 650 mg by mouth every 8 (eight) hours as needed (Patient not taking: Reported on 8/23/2023)     • LORazepam (ATIVAN) 0.5 mg tablet Take 1 tablet (0.5 mg total) by mouth once for 1 dose 1 tablet 0   • losartan (COZAAR) 100 MG tablet  (Patient not taking: Reported on 8/14/2023)     • valACYclovir (VALTREX) 1,000 mg tablet Take 1 tablet (1,000 mg total) by mouth 3 (three) times a day for 7 days 21 tablet 0     No current facility-administered medications for this visit.      Allergies   Allergen Reactions   • Other      Seasonal Allergies      Immunizations:     Immunization History   Administered Date(s) Administered   • COVID-19 PFIZER VACCINE 0.3 ML IM 01/22/2021, 02/12/2021, 11/16/2021, 08/26/2022   • Hep B, adult 02/24/2015, 04/09/2015, 09/11/2015   • Influenza Quadrivalent Preservative Free 3 years and older IM 10/31/2014, 11/06/2015   • Influenza Quadrivalent, 6-35 Months IM 11/08/2016   • Influenza, injectable, quadrivalent, preservative free 0.5 mL 11/01/2018   • Influenza, recombinant, quadrivalent,injectable, preservative free 10/29/2019, 10/09/2020, 11/30/2021, 12/06/2022   • Influenza, seasonal, injectable 11/14/2012, 11/18/2013   • Tdap 12/06/2011, 07/02/2021   • Tuberculin Skin Test-PPD Intradermal 02/13/2013, 12/02/2014, 08/11/2016, 07/23/2018, 06/24/2020, 05/26/2022   • Zoster Vaccine Recombinant 07/14/2020, 03/16/2021      Health Maintenance:         Topic Date Due   • Breast Cancer Screening: Mammogram  02/02/2025   • Colorectal Cancer Screening  06/16/2025   • Cervical Cancer Screening  08/17/2027   • HIV Screening  Completed   • Hepatitis C Screening  Completed         Topic Date Due   • COVID-19 Vaccine (5 - Pfizer series) 10/21/2022   • Influenza Vaccine (1) 09/01/2023      Medicare Screening Tests and Risk Assessments:     Lesly Razo is here for her Subsequent Wellness visit. Historian  Patient cannot answer questions due to cognitive impairment, intelluctual disability, or expressive limitations. Information provided by: caregiver Verito Corbett). Health Risk Assessment:   Patient rates overall health as good. Patient feels that their physical health rating is same. Patient is very satisfied with their life. Eyesight was rated as same. Hearing was rated as same. Patient feels that their emotional and mental health rating is same. Patients states they are never, rarely angry. Patient states they are never, rarely unusually tired/fatigued. Pain experienced in the last 7 days has been none. Patient states that she has experienced no weight loss or gain in last 6 months. Depression Screening:   PHQ-2 Score: 0      Fall Risk Screening: In the past year, patient has experienced: no history of falling in past year      Urinary Incontinence Screening:   Patient has leaked urine accidently in the last six months. Home Safety:  Patient does not have trouble with stairs inside or outside of their home. Patient has working smoke alarms and has working carbon monoxide detector. Home safety hazards include: none. Nutrition:   Current diet is Low Saturated Fat and No Added Salt. 1500 calorie, soft,    Medications:   Patient is not currently taking any over-the-counter supplements. Patient is not able to manage medications.  Lives in [facility] facility    Activities of Daily Living (ADLs)/Instrumental Activities of Daily Living (IADLs):   Walk and transfer into and out of bed and chair?: No  Dress and groom yourself?: No    Bathe or shower yourself?: No    Feed yourself? Yes  Do your laundry/housekeeping?: No  Manage your money, pay your bills and track your expenses?: No  Make your own meals?: No    Do your own shopping?: No    ADL comments: Lives in [address] [place] [address]    Previous Hospitalizations:   Any hospitalizations or ED visits within the last 12 months?: No      Advance Care Planning:   Living will: No    Durable POA for healthcare: Yes    Advanced directive: No      PREVENTIVE SCREENINGS      Cardiovascular Screening:    General: Screening Not Indicated and History Lipid Disorder      Diabetes Screening:     General: Screening Current      Colorectal Cancer Screening:     General: Screening Current      Breast Cancer Screening:     General: Screening Current      Cervical Cancer Screening:    General: Screening Current      Osteoporosis Screening:    General: Screening Not Indicated and History Osteoporosis      Lung Cancer Screening:     General: Screening Not Indicated      Hepatitis C Screening:    General: Screening Current    Screening, Brief Intervention, and Referral to Treatment (SBIRT)    Screening  Typical number of drinks in a day: 0  Typical number of drinks in a week: 0  Interpretation: Low risk drinking behavior. AUDIT-C Screenin) How often did you have a drink containing alcohol in the past year? never  2) How many drinks did you have on a typical day when you were drinking in the past year? 0  3) How often did you have 6 or more drinks on one occasion in the past year? never    AUDIT-C Score: 0  Interpretation: Score 0-2 (female): Negative screen for alcohol misuse    Single Item Drug Screening:  How often have you used an illegal drug (including marijuana) or a prescription medication for non-medical reasons in the past year? never    Single Item Drug Screen Score: 0  Interpretation: Negative screen for possible drug use disorder    No results found.      Physical Exam:     BP 155/96   Pulse 78   Temp 97.7 °F (36.5 °C)   Resp 20   LMP 02/28/2010 (Approximate)     Physical Exam  Vitals reviewed. Constitutional:       General: She is not in acute distress. Appearance: She is well-developed. She is not ill-appearing or diaphoretic. HENT:      Head: Normocephalic and atraumatic. Right Ear: There is impacted cerumen. Left Ear: There is impacted cerumen. Nose: Nose normal. No congestion or rhinorrhea. Mouth/Throat:      Mouth: Mucous membranes are dry. Pharynx: Oropharynx is clear. No oropharyngeal exudate or posterior oropharyngeal erythema. Eyes:      General: No scleral icterus. Right eye: No discharge. Left eye: No discharge. Extraocular Movements: Extraocular movements intact. Conjunctiva/sclera: Conjunctivae normal.      Pupils: Pupils are equal, round, and reactive to light. Neck:      Vascular: No carotid bruit. Cardiovascular:      Rate and Rhythm: Normal rate and regular rhythm. Pulses: Normal pulses. Heart sounds: Normal heart sounds. No murmur heard. No friction rub. No gallop. Pulmonary:      Effort: Pulmonary effort is normal. No respiratory distress. Breath sounds: Normal breath sounds. No stridor. No wheezing, rhonchi or rales. Chest:      Chest wall: No tenderness. Abdominal:      General: Abdomen is flat. Bowel sounds are normal. There is no distension. Palpations: Abdomen is soft. There is no mass. Tenderness: There is no abdominal tenderness. There is no right CVA tenderness, left CVA tenderness, guarding or rebound. Hernia: No hernia is present. Musculoskeletal:         General: Normal range of motion. Cervical back: Normal range of motion and neck supple. No rigidity or tenderness. Right lower leg: No edema. Left lower leg: No edema. Lymphadenopathy:      Cervical: No cervical adenopathy. Skin:     General: Skin is warm and dry.       Capillary Refill: Capillary refill takes less than 2 seconds. Neurological:      Mental Status: She is alert.           Lavonne Gusman DO

## 2023-09-13 NOTE — PATIENT INSTRUCTIONS
Medicare Preventive Visit Patient Instructions  Thank you for completing your Welcome to Medicare Visit or Medicare Annual Wellness Visit today. Your next wellness visit will be due in one year (9/13/2024). The screening/preventive services that you may require over the next 5-10 years are detailed below. Some tests may not apply to you based off risk factors and/or age. Screening tests ordered at today's visit but not completed yet may show as past due. Also, please note that scanned in results may not display below. Preventive Screenings:  Service Recommendations Previous Testing/Comments   Colorectal Cancer Screening  * Colonoscopy    * Fecal Occult Blood Test (FOBT)/Fecal Immunochemical Test (FIT)  * Fecal DNA/Cologuard Test  * Flexible Sigmoidoscopy Age: 43-73 years old   Colonoscopy: every 10 years (may be performed more frequently if at higher risk)  OR  FOBT/FIT: every 1 year  OR  Cologuard: every 3 years  OR  Sigmoidoscopy: every 5 years  Screening may be recommended earlier than age 39 if at higher risk for colorectal cancer. Also, an individualized decision between you and your healthcare provider will decide whether screening between the ages of 77-80 would be appropriate. Colonoscopy: 06/16/2022  FOBT/FIT: Not on file  Cologuard: 06/16/2022  Sigmoidoscopy: Not on file          Breast Cancer Screening Age: 36 years old  Frequency: every 1-2 years  Not required if history of left and right mastectomy Mammogram: 02/02/2023        Cervical Cancer Screening Between the ages of 21-29, pap smear recommended once every 3 years. Between the ages of 32-69, can perform pap smear with HPV co-testing every 5 years.    Recommendations may differ for women with a history of total hysterectomy, cervical cancer, or abnormal pap smears in past. Pap Smear: 08/23/2023        Hepatitis C Screening Once for adults born between 1945 and 1965  More frequently in patients at high risk for Hepatitis C Hep C Antibody: 01/25/2019        Diabetes Screening 1-2 times per year if you're at risk for diabetes or have pre-diabetes Fasting glucose: 96 mg/dL (6/14/2023)  A1C: No results in last 5 years (No results in last 5 years)      Cholesterol Screening Once every 5 years if you don't have a lipid disorder. May order more often based on risk factors. Lipid panel: 11/22/2022          Other Preventive Screenings Covered by Medicare:  1. Abdominal Aortic Aneurysm (AAA) Screening: covered once if your at risk. You're considered to be at risk if you have a family history of AAA. 2. Lung Cancer Screening: covers low dose CT scan once per year if you meet all of the following conditions: (1) Age 48-67; (2) No signs or symptoms of lung cancer; (3) Current smoker or have quit smoking within the last 15 years; (4) You have a tobacco smoking history of at least 20 pack years (packs per day multiplied by number of years you smoked); (5) You get a written order from a healthcare provider. 3. Glaucoma Screening: covered annually if you're considered high risk: (1) You have diabetes OR (2) Family history of glaucoma OR (3)  aged 48 and older OR (3)  American aged 72 and older  3. Osteoporosis Screening: covered every 2 years if you meet one of the following conditions: (1) You're estrogen deficient and at risk for osteoporosis based off medical history and other findings; (2) Have a vertebral abnormality; (3) On glucocorticoid therapy for more than 3 months; (4) Have primary hyperparathyroidism; (5) On osteoporosis medications and need to assess response to drug therapy. · Last bone density test (DXA Scan): 11/08/2022.  5. HIV Screening: covered annually if you're between the age of 15-65. Also covered annually if you are younger than 13 and older than 72 with risk factors for HIV infection. For pregnant patients, it is covered up to 3 times per pregnancy.     Immunizations:  Immunization Recommendations   Influenza Vaccine Annual influenza vaccination during flu season is recommended for all persons aged >= 6 months who do not have contraindications   Pneumococcal Vaccine   * Pneumococcal conjugate vaccine = PCV13 (Prevnar 13), PCV15 (Vaxneuvance), PCV20 (Prevnar 20)  * Pneumococcal polysaccharide vaccine = PPSV23 (Pneumovax) Adults 20-63 years old: 1-3 doses may be recommended based on certain risk factors  Adults 72 years old: 1-2 doses may be recommended based off what pneumonia vaccine you previously received   Hepatitis B Vaccine 3 dose series if at intermediate or high risk (ex: diabetes, end stage renal disease, liver disease)   Tetanus (Td) Vaccine - COST NOT COVERED BY MEDICARE PART B Following completion of primary series, a booster dose should be given every 10 years to maintain immunity against tetanus. Td may also be given as tetanus wound prophylaxis. Tdap Vaccine - COST NOT COVERED BY MEDICARE PART B Recommended at least once for all adults. For pregnant patients, recommended with each pregnancy. Shingles Vaccine (Shingrix) - COST NOT COVERED BY MEDICARE PART B  2 shot series recommended in those aged 48 and above     Health Maintenance Due:      Topic Date Due   • Breast Cancer Screening: Mammogram  02/02/2025   • Colorectal Cancer Screening  06/16/2025   • Cervical Cancer Screening  08/17/2027   • HIV Screening  Completed   • Hepatitis C Screening  Completed     Immunizations Due:      Topic Date Due   • COVID-19 Vaccine (5 - Pfizer series) 10/21/2022   • Influenza Vaccine (1) 09/01/2023     Advance Directives   What are advance directives? Advance directives are legal documents that state your wishes and plans for medical care. These plans are made ahead of time in case you lose your ability to make decisions for yourself. Advance directives can apply to any medical decision, such as the treatments you want, and if you want to donate organs. What are the types of advance directives?   There are many types of advance directives, and each state has rules about how to use them. You may choose a combination of any of the following:  · Living will: This is a written record of the treatment you want. You can also choose which treatments you do not want, which to limit, and which to stop at a certain time. This includes surgery, medicine, IV fluid, and tube feedings. · Durable power of  for healthcare [facility] This is a written record that states who you want to make healthcare choices for you when you are unable to make them for yourself. This person, called a proxy, is usually a family member or a friend. You may choose more than 1 proxy. · Do not resuscitate (DNR) order:  A DNR order is used in case your heart stops beating or you stop breathing. It is a request not to have certain forms of treatment, such as CPR. A DNR order may be included in other types of advance directives. · Medical directive: This covers the care that you want if you are in a coma, near death, or unable to make decisions for yourself. You can list the treatments you want for each condition. Treatment may include pain medicine, surgery, blood transfusions, dialysis, IV or tube feedings, and a ventilator (breathing machine). · Values history: This document has questions about your views, beliefs, and how you feel and think about life. This information can help others choose the care that you would choose. Why are advance directives important? An advance directive helps you control your care. Although spoken wishes may be used, it is better to have your wishes written down. Spoken wishes can be misunderstood, or not followed. Treatments may be given even if you do not want them. An advance directive may make it easier for your family to make difficult choices about your care.    Weight Management   Why it is important to manage your weight:  Being overweight increases your risk of health conditions such as heart disease, high blood pressure, type 2 diabetes, and certain types of cancer. It can also increase your risk for osteoarthritis, sleep apnea, and other respiratory problems. Aim for a slow, steady weight loss. Even a small amount of weight loss can lower your risk of health problems. How to lose weight safely:  A safe and healthy way to lose weight is to eat fewer calories and get regular exercise. You can lose up about 1 pound a week by decreasing the number of calories you eat by 500 calories each day. Healthy meal plan for weight management:  A healthy meal plan includes a variety of foods, contains fewer calories, and helps you stay healthy. A healthy meal plan includes the following:  · Eat whole-grain foods more often. A healthy meal plan should contain fiber. Fiber is the part of grains, fruits, and vegetables that is not broken down by your body. Whole-grain foods are healthy and provide extra fiber in your diet. Some examples of whole-grain foods are whole-wheat breads and pastas, oatmeal, brown rice, and bulgur. · Eat a variety of vegetables every day. Include dark, leafy greens such as spinach, kale, elena greens, and mustard greens. Eat yellow and orange vegetables such as carrots, sweet potatoes, and winter squash. · Eat a variety of fruits every day. Choose fresh or canned fruit (canned in its own juice or light syrup) instead of juice. Fruit juice has very little or no fiber. · Eat low-fat dairy foods. Drink fat-free (skim) milk or 1% milk. Eat fat-free yogurt and low-fat cottage cheese. Try low-fat cheeses such as mozzarella and other reduced-fat cheeses. · Choose meat and other protein foods that are low in fat. Choose beans or other legumes such as split peas or lentils. Choose fish, skinless poultry (chicken or turkey), or lean cuts of red meat (beef or pork). Before you cook meat or poultry, cut off any visible fat. · Use less fat and oil. Try baking foods instead of frying them.  Add less fat, such as margarine, sour cream, regular salad dressing and mayonnaise to foods. Eat fewer high-fat foods. Some examples of high-fat foods include french fries, doughnuts, ice cream, and cakes. · Eat fewer sweets. Limit foods and drinks that are high in sugar. This includes candy, cookies, regular soda, and sweetened drinks. Exercise:  Exercise at least 30 minutes per day on most days of the week. Some examples of exercise include walking, biking, dancing, and swimming. You can also fit in more physical activity by taking the stairs instead of the elevator or parking farther away from stores. Ask your healthcare provider about the best exercise plan for you. © Copyright [employer] 2018 Information is for End User's use only and may not be sold, redistributed or otherwise used for commercial purposes. All illustrations and images included in CareNotes® are the copyrighted property of [employer]. or [facility] Preventive Visit Patient Instructions  Thank you for completing your Welcome to Medicare Visit or Medicare Annual Wellness Visit today. Your next wellness visit will be due in one year (9/13/2024). The screening/preventive services that you may require over the next 5-10 years are detailed below. Some tests may not apply to you based off risk factors and/or age. Screening tests ordered at today's visit but not completed yet may show as past due. Also, please note that scanned in results may not display below.   Preventive Screenings:  Service Recommendations Previous Testing/Comments   Colorectal Cancer Screening  * Colonoscopy    * Fecal Occult Blood Test (FOBT)/Fecal Immunochemical Test (FIT)  * Fecal DNA/Cologuard Test  * Flexible Sigmoidoscopy Age: 43-73 years old   Colonoscopy: every 10 years (may be performed more frequently if at higher risk)  OR  FOBT/FIT: every 1 year  OR  Cologuard: every 3 years  OR  Sigmoidoscopy: every 5 years  Screening may be recommended earlier than age 39 if at higher risk for colorectal cancer. Also, an individualized decision between you and your healthcare provider will decide whether screening between the ages of 77-80 would be appropriate. Colonoscopy: 06/16/2022  FOBT/FIT: Not on file  Cologuard: 06/16/2022  Sigmoidoscopy: Not on file    Screening Current     Breast Cancer Screening Age: 36 years old  Frequency: every 1-2 years  Not required if history of left and right mastectomy Mammogram: 02/02/2023    Screening Current   Cervical Cancer Screening Between the ages of 21-29, pap smear recommended once every 3 years. Between the ages of 32-69, can perform pap smear with HPV co-testing every 5 years. Recommendations may differ for women with a history of total hysterectomy, cervical cancer, or abnormal pap smears in past. Pap Smear: 08/23/2023    Screening Current   Hepatitis C Screening Once for adults born between 1945 and 1965  More frequently in patients at high risk for Hepatitis C Hep C Antibody: 01/25/2019    Screening Current   Diabetes Screening 1-2 times per year if you're at risk for diabetes or have pre-diabetes Fasting glucose: 96 mg/dL (6/14/2023)  A1C: No results in last 5 years (No results in last 5 years)  Screening Current   Cholesterol Screening Once every 5 years if you don't have a lipid disorder. May order more often based on risk factors. Lipid panel: 11/22/2022    Screening Not Indicated  History Lipid Disorder     Other Preventive Screenings Covered by Medicare:  6. Abdominal Aortic Aneurysm (AAA) Screening: covered once if your at risk. You're considered to be at risk if you have a family history of AAA.   7. Lung Cancer Screening: covers low dose CT scan once per year if you meet all of the following conditions: (1) Age 48-67; (2) No signs or symptoms of lung cancer; (3) Current smoker or have quit smoking within the last 15 years; (4) You have a tobacco smoking history of at least 20 pack years (packs per day multiplied by number of years you smoked); (5) You get a written order from a healthcare provider. 8. Glaucoma Screening: covered annually if you're considered high risk: (1) You have diabetes OR (2) Family history of glaucoma OR (3)  aged 48 and older OR (3)  American aged 72 and older  5. Osteoporosis Screening: covered every 2 years if you meet one of the following conditions: (1) You're estrogen deficient and at risk for osteoporosis based off medical history and other findings; (2) Have a vertebral abnormality; (3) On glucocorticoid therapy for more than 3 months; (4) Have primary hyperparathyroidism; (5) On osteoporosis medications and need to assess response to drug therapy. · Last bone density test (DXA Scan): 11/08/2022.  10. HIV Screening: covered annually if you're between the age of 15-65. Also covered annually if you are younger than 13 and older than 72 with risk factors for HIV infection. For pregnant patients, it is covered up to 3 times per pregnancy. Immunizations:  Immunization Recommendations   Influenza Vaccine Annual influenza vaccination during flu season is recommended for all persons aged >= 6 months who do not have contraindications   Pneumococcal Vaccine   * Pneumococcal conjugate vaccine = PCV13 (Prevnar 13), PCV15 (Vaxneuvance), PCV20 (Prevnar 20)  * Pneumococcal polysaccharide vaccine = PPSV23 (Pneumovax) Adults 20-63 years old: 1-3 doses may be recommended based on certain risk factors  Adults 72 years old: 1-2 doses may be recommended based off what pneumonia vaccine you previously received   Hepatitis B Vaccine 3 dose series if at intermediate or high risk (ex: diabetes, end stage renal disease, liver disease)   Tetanus (Td) Vaccine - COST NOT COVERED BY MEDICARE PART B Following completion of primary series, a booster dose should be given every 10 years to maintain immunity against tetanus. Td may also be given as tetanus wound prophylaxis.    Tdap Vaccine - COST NOT COVERED BY MEDICARE PART B Recommended at least once for all adults. For pregnant patients, recommended with each pregnancy. Shingles Vaccine (Shingrix) - COST NOT COVERED BY MEDICARE PART B  2 shot series recommended in those aged 48 and above     Health Maintenance Due:      Topic Date Due   • Breast Cancer Screening: Mammogram  02/02/2025   • Colorectal Cancer Screening  06/16/2025   • Cervical Cancer Screening  08/17/2027   • HIV Screening  Completed   • Hepatitis C Screening  Completed     Immunizations Due:      Topic Date Due   • COVID-19 Vaccine (5 - Pfizer series) 10/21/2022   • Influenza Vaccine (1) 09/01/2023     Advance Directives   What are advance directives? Advance directives are legal documents that state your wishes and plans for medical care. These plans are made ahead of time in case you lose your ability to make decisions for yourself. Advance directives can apply to any medical decision, such as the treatments you want, and if you want to donate organs. What are the types of advance directives? There are many types of advance directives, and each state has rules about how to use them. You may choose a combination of any of the following:  · Living will: This is a written record of the treatment you want. You can also choose which treatments you do not want, which to limit, and which to stop at a certain time. This includes surgery, medicine, IV fluid, and tube feedings. · Durable power of  for healthcare [facility]): This is a written record that states who you want to make healthcare choices for you when you are unable to make them for yourself. This person, called a proxy, is usually a family member or a friend. You may choose more than 1 proxy. · Do not resuscitate (DNR) order:  A DNR order is used in case your heart stops beating or you stop breathing. It is a request not to have certain forms of treatment, such as CPR.  A DNR order may be included in other types of advance directives. · Medical directive: This covers the care that you want if you are in a coma, near death, or unable to make decisions for yourself. You can list the treatments you want for each condition. Treatment may include pain medicine, surgery, blood transfusions, dialysis, IV or tube feedings, and a ventilator (breathing machine). · Values history: This document has questions about your views, beliefs, and how you feel and think about life. This information can help others choose the care that you would choose. Why are advance directives important? An advance directive helps you control your care. Although spoken wishes may be used, it is better to have your wishes written down. Spoken wishes can be misunderstood, or not followed. Treatments may be given even if you do not want them. An advance directive may make it easier for your family to make difficult choices about your care. Urinary Incontinence   Urinary incontinence (UI)  is when you lose control of your bladder. UI develops because your bladder cannot store or empty urine properly. The 3 most common types of UI are stress incontinence, urge incontinence, or both. Medicines:   · May be given to help strengthen your bladder control. Report any side effects of medication to your healthcare provider. Do pelvic muscle exercises often:  Your pelvic muscles help you stop urinating. Squeeze these muscles tight for 5 seconds, then relax for 5 seconds. Gradually work up to squeezing for 10 seconds. Do 3 sets of 15 repetitions a day, or as directed. This will help strengthen your pelvic muscles and improve bladder control. Train your bladder:  Go to the bathroom at set times, such as every 2 hours, even if you do not feel the urge to go. You can also try to hold your urine when you feel the urge to go. For example, hold your urine for 5 minutes when you feel the urge to go. As that becomes easier, hold your urine for 10 minutes. Self-care:   · Keep a UI record. Write down how often you leak urine and how much you leak. Make a note of what you were doing when you leaked urine. · Drink liquids as directed. You may need to limit the amount of liquid you drink to help control your urine leakage. Do not drink any liquid right before you go to bed. Limit or do not have drinks that contain caffeine or alcohol. · Prevent constipation. Eat a variety of high-fiber foods. Good examples are high-fiber cereals, beans, vegetables, and whole-grain breads. Walking is the best way to trigger your intestines to have a bowel movement. · Exercise regularly and maintain a healthy weight. Weight loss and exercise will decrease pressure on your bladder and help you control your leakage. · Use a catheter as directed  to help empty your bladder. A catheter is a tiny, plastic tube that is put into your bladder to drain your urine. · Go to behavior therapy as directed. Behavior therapy may be used to help you learn to control your urge to urinate. Weight Management   Why it is important to manage your weight:  Being overweight increases your risk of health conditions such as heart disease, high blood pressure, type 2 diabetes, and certain types of cancer. It can also increase your risk for osteoarthritis, sleep apnea, and other respiratory problems. Aim for a slow, steady weight loss. Even a small amount of weight loss can lower your risk of health problems. How to lose weight safely:  A safe and healthy way to lose weight is to eat fewer calories and get regular exercise. You can lose up about 1 pound a week by decreasing the number of calories you eat by 500 calories each day. Healthy meal plan for weight management:  A healthy meal plan includes a variety of foods, contains fewer calories, and helps you stay healthy. A healthy meal plan includes the following:  · Eat whole-grain foods more often. A healthy meal plan should contain fiber.  Fiber is the part of grains, fruits, and vegetables that is not broken down by your body. Whole-grain foods are healthy and provide extra fiber in your diet. Some examples of whole-grain foods are whole-wheat breads and pastas, oatmeal, brown rice, and bulgur. · Eat a variety of vegetables every day. Include dark, leafy greens such as spinach, kale, elena greens, and mustard greens. Eat yellow and orange vegetables such as carrots, sweet potatoes, and winter squash. · Eat a variety of fruits every day. Choose fresh or canned fruit (canned in its own juice or light syrup) instead of juice. Fruit juice has very little or no fiber. · Eat low-fat dairy foods. Drink fat-free (skim) milk or 1% milk. Eat fat-free yogurt and low-fat cottage cheese. Try low-fat cheeses such as mozzarella and other reduced-fat cheeses. · Choose meat and other protein foods that are low in fat. Choose beans or other legumes such as split peas or lentils. Choose fish, skinless poultry (chicken or turkey), or lean cuts of red meat (beef or pork). Before you cook meat or poultry, cut off any visible fat. · Use less fat and oil. Try baking foods instead of frying them. Add less fat, such as margarine, sour cream, regular salad dressing and mayonnaise to foods. Eat fewer high-fat foods. Some examples of high-fat foods include french fries, doughnuts, ice cream, and cakes. · Eat fewer sweets. Limit foods and drinks that are high in sugar. This includes candy, cookies, regular soda, and sweetened drinks. Exercise:  Exercise at least 30 minutes per day on most days of the week. Some examples of exercise include walking, biking, dancing, and swimming. You can also fit in more physical activity by taking the stairs instead of the elevator or parking farther away from stores. Ask your healthcare provider about the best exercise plan for you.       © Copyright [employer] 2018 Information is for End User's use only and may not be sold, redistributed or otherwise used for commercial purposes. All illustrations and images included in CareNotes® are the copyrighted property of [employer] or [facility] Preventive Visit Patient Instructions  Thank you for completing your Welcome to Medicare Visit or Medicare Annual Wellness Visit today. Your next wellness visit will be due in one year (9/13/2024). The screening/preventive services that you may require over the next 5-10 years are detailed below. Some tests may not apply to you based off risk factors and/or age. Screening tests ordered at today's visit but not completed yet may show as past due. Also, please note that scanned in results may not display below. Preventive Screenings:  Service Recommendations Previous Testing/Comments   Colorectal Cancer Screening  * Colonoscopy    * Fecal Occult Blood Test (FOBT)/Fecal Immunochemical Test (FIT)  * Fecal DNA/Cologuard Test  * Flexible Sigmoidoscopy Age: 43-73 years old   Colonoscopy: every 10 years (may be performed more frequently if at higher risk)  OR  FOBT/FIT: every 1 year  OR  Cologuard: every 3 years  OR  Sigmoidoscopy: every 5 years  Screening may be recommended earlier than age 39 if at higher risk for colorectal cancer. Also, an individualized decision between you and your healthcare provider will decide whether screening between the ages of 77-80 would be appropriate. Colonoscopy: 06/16/2022  FOBT/FIT: Not on file  Cologuard: 06/16/2022  Sigmoidoscopy: Not on file    Screening Current     Breast Cancer Screening Age: 36 years old  Frequency: every 1-2 years  Not required if history of left and right mastectomy Mammogram: 02/02/2023    Screening Current   Cervical Cancer Screening Between the ages of 21-29, pap smear recommended once every 3 years. Between the ages of 32-69, can perform pap smear with HPV co-testing every 5 years.    Recommendations may differ for women with a history of total hysterectomy, cervical cancer, or abnormal pap smears in past. Pap Smear: 08/23/2023    Screening Current   Hepatitis C Screening Once for adults born between 1945 and 1965  More frequently in patients at high risk for Hepatitis C Hep C Antibody: 01/25/2019    Screening Current   Diabetes Screening 1-2 times per year if you're at risk for diabetes or have pre-diabetes Fasting glucose: 96 mg/dL (6/14/2023)  A1C: No results in last 5 years (No results in last 5 years)  Screening Current   Cholesterol Screening Once every 5 years if you don't have a lipid disorder. May order more often based on risk factors. Lipid panel: 11/22/2022    Screening Not Indicated  History Lipid Disorder     Other Preventive Screenings Covered by Medicare:  11. Abdominal Aortic Aneurysm (AAA) Screening: covered once if your at risk. You're considered to be at risk if you have a family history of AAA. 12. Lung Cancer Screening: covers low dose CT scan once per year if you meet all of the following conditions: (1) Age 48-67; (2) No signs or symptoms of lung cancer; (3) Current smoker or have quit smoking within the last 15 years; (4) You have a tobacco smoking history of at least 20 pack years (packs per day multiplied by number of years you smoked); (5) You get a written order from a healthcare provider. 15. Glaucoma Screening: covered annually if you're considered high risk: (1) You have diabetes OR (2) Family history of glaucoma OR (3)  aged 48 and older OR (3)  American aged 72 and older  15. Osteoporosis Screening: covered every 2 years if you meet one of the following conditions: (1) You're estrogen deficient and at risk for osteoporosis based off medical history and other findings; (2) Have a vertebral abnormality; (3) On glucocorticoid therapy for more than 3 months; (4) Have primary hyperparathyroidism; (5) On osteoporosis medications and need to assess response to drug therapy.    · Last bone density test (DXA Scan): 11/08/2022. 15. HIV Screening: covered annually if you're between the age of 14-79. Also covered annually if you are younger than 13 and older than 72 with risk factors for HIV infection. For pregnant patients, it is covered up to 3 times per pregnancy. Immunizations:  Immunization Recommendations   Influenza Vaccine Annual influenza vaccination during flu season is recommended for all persons aged >= 6 months who do not have contraindications   Pneumococcal Vaccine   * Pneumococcal conjugate vaccine = PCV13 (Prevnar 13), PCV15 (Vaxneuvance), PCV20 (Prevnar 20)  * Pneumococcal polysaccharide vaccine = PPSV23 (Pneumovax) Adults 20-63 years old: 1-3 doses may be recommended based on certain risk factors  Adults 72 years old: 1-2 doses may be recommended based off what pneumonia vaccine you previously received   Hepatitis B Vaccine 3 dose series if at intermediate or high risk (ex: diabetes, end stage renal disease, liver disease)   Tetanus (Td) Vaccine - COST NOT COVERED BY MEDICARE PART B Following completion of primary series, a booster dose should be given every 10 years to maintain immunity against tetanus. Td may also be given as tetanus wound prophylaxis. Tdap Vaccine - COST NOT COVERED BY MEDICARE PART B Recommended at least once for all adults. For pregnant patients, recommended with each pregnancy. Shingles Vaccine (Shingrix) - COST NOT COVERED BY MEDICARE PART B  2 shot series recommended in those aged 48 and above     Health Maintenance Due:      Topic Date Due   • Breast Cancer Screening: Mammogram  02/02/2025   • Colorectal Cancer Screening  06/16/2025   • Cervical Cancer Screening  08/17/2027   • HIV Screening  Completed   • Hepatitis C Screening  Completed     Immunizations Due:      Topic Date Due   • COVID-19 Vaccine (5 - Pfizer series) 10/21/2022   • Influenza Vaccine (1) 09/01/2023     Advance Directives   What are advance directives?   Advance directives are legal documents that state your wishes and plans for medical care. These plans are made ahead of time in case you lose your ability to make decisions for yourself. Advance directives can apply to any medical decision, such as the treatments you want, and if you want to donate organs. What are the types of advance directives? There are many types of advance directives, and each state has rules about how to use them. You may choose a combination of any of the following:  · Living will: This is a written record of the treatment you want. You can also choose which treatments you do not want, which to limit, and which to stop at a certain time. This includes surgery, medicine, IV fluid, and tube feedings. · Durable power of  for healthcare [facility]): This is a written record that states who you want to make healthcare choices for you when you are unable to make them for yourself. This person, called a proxy, is usually a family member or a friend. You may choose more than 1 proxy. · Do not resuscitate (DNR) order:  A DNR order is used in case your heart stops beating or you stop breathing. It is a request not to have certain forms of treatment, such as CPR. A DNR order may be included in other types of advance directives. · Medical directive: This covers the care that you want if you are in a coma, near death, or unable to make decisions for yourself. You can list the treatments you want for each condition. Treatment may include pain medicine, surgery, blood transfusions, dialysis, IV or tube feedings, and a ventilator (breathing machine). · Values history: This document has questions about your views, beliefs, and how you feel and think about life. This information can help others choose the care that you would choose. Why are advance directives important? An advance directive helps you control your care. Although spoken wishes may be used, it is better to have your wishes written down.  Spoken wishes can be misunderstood, or not followed. Treatments may be given even if you do not want them. An advance directive may make it easier for your family to make difficult choices about your care. Urinary Incontinence   Urinary incontinence (UI)  is when you lose control of your bladder. UI develops because your bladder cannot store or empty urine properly. The 3 most common types of UI are stress incontinence, urge incontinence, or both. Medicines:   · May be given to help strengthen your bladder control. Report any side effects of medication to your healthcare provider. Do pelvic muscle exercises often:  Your pelvic muscles help you stop urinating. Squeeze these muscles tight for 5 seconds, then relax for 5 seconds. Gradually work up to squeezing for 10 seconds. Do 3 sets of 15 repetitions a day, or as directed. This will help strengthen your pelvic muscles and improve bladder control. Train your bladder:  Go to the bathroom at set times, such as every 2 hours, even if you do not feel the urge to go. You can also try to hold your urine when you feel the urge to go. For example, hold your urine for 5 minutes when you feel the urge to go. As that becomes easier, hold your urine for 10 minutes. Self-care:   · Keep a UI record. Write down how often you leak urine and how much you leak. Make a note of what you were doing when you leaked urine. · Drink liquids as directed. You may need to limit the amount of liquid you drink to help control your urine leakage. Do not drink any liquid right before you go to bed. Limit or do not have drinks that contain caffeine or alcohol. · Prevent constipation. Eat a variety of high-fiber foods. Good examples are high-fiber cereals, beans, vegetables, and whole-grain breads. Walking is the best way to trigger your intestines to have a bowel movement. · Exercise regularly and maintain a healthy weight. Weight loss and exercise will decrease pressure on your bladder and help you control your leakage. · Use a catheter as directed  to help empty your bladder. A catheter is a tiny, plastic tube that is put into your bladder to drain your urine. · Go to behavior therapy as directed. Behavior therapy may be used to help you learn to control your urge to urinate. Weight Management   Why it is important to manage your weight:  Being overweight increases your risk of health conditions such as heart disease, high blood pressure, type 2 diabetes, and certain types of cancer. It can also increase your risk for osteoarthritis, sleep apnea, and other respiratory problems. Aim for a slow, steady weight loss. Even a small amount of weight loss can lower your risk of health problems. How to lose weight safely:  A safe and healthy way to lose weight is to eat fewer calories and get regular exercise. You can lose up about 1 pound a week by decreasing the number of calories you eat by 500 calories each day. Healthy meal plan for weight management:  A healthy meal plan includes a variety of foods, contains fewer calories, and helps you stay healthy. A healthy meal plan includes the following:  · Eat whole-grain foods more often. A healthy meal plan should contain fiber. Fiber is the part of grains, fruits, and vegetables that is not broken down by your body. Whole-grain foods are healthy and provide extra fiber in your diet. Some examples of whole-grain foods are whole-wheat breads and pastas, oatmeal, brown rice, and bulgur. · Eat a variety of vegetables every day. Include dark, leafy greens such as spinach, kale, elena greens, and mustard greens. Eat yellow and orange vegetables such as carrots, sweet potatoes, and winter squash. · Eat a variety of fruits every day. Choose fresh or canned fruit (canned in its own juice or light syrup) instead of juice. Fruit juice has very little or no fiber. · Eat low-fat dairy foods. Drink fat-free (skim) milk or 1% milk. Eat fat-free yogurt and low-fat cottage cheese.  Try low-fat cheeses such as mozzarella and other reduced-fat cheeses. · Choose meat and other protein foods that are low in fat. Choose beans or other legumes such as split peas or lentils. Choose fish, skinless poultry (chicken or turkey), or lean cuts of red meat (beef or pork). Before you cook meat or poultry, cut off any visible fat. · Use less fat and oil. Try baking foods instead of frying them. Add less fat, such as margarine, sour cream, regular salad dressing and mayonnaise to foods. Eat fewer high-fat foods. Some examples of high-fat foods include french fries, doughnuts, ice cream, and cakes. · Eat fewer sweets. Limit foods and drinks that are high in sugar. This includes candy, cookies, regular soda, and sweetened drinks. Exercise:  Exercise at least 30 minutes per day on most days of the week. Some examples of exercise include walking, biking, dancing, and swimming. You can also fit in more physical activity by taking the stairs instead of the elevator or parking farther away from stores. Ask your healthcare provider about the best exercise plan for you. © Copyright [employer] 2018 Information is for End User's use only and may not be sold, redistributed or otherwise used for commercial purposes. All illustrations and images included in CareNotes® are the copyrighted property of [email or web address] or [facility] Preventive Visit Patient Instructions  Thank you for completing your Welcome to Medicare Visit or Medicare Annual Wellness Visit today. Your next wellness visit will be due in one year (9/13/2024). The screening/preventive services that you may require over the next 5-10 years are detailed below. Some tests may not apply to you based off risk factors and/or age. Screening tests ordered at today's visit but not completed yet may show as past due. Also, please note that scanned in results may not display below.   Preventive Screenings:  Service Recommendations Previous Testing/Comments   Colorectal Cancer Screening  * Colonoscopy    * Fecal Occult Blood Test (FOBT)/Fecal Immunochemical Test (FIT)  * Fecal DNA/Cologuard Test  * Flexible Sigmoidoscopy Age: 43-73 years old   Colonoscopy: every 10 years (may be performed more frequently if at higher risk)  OR  FOBT/FIT: every 1 year  OR  Cologuard: every 3 years  OR  Sigmoidoscopy: every 5 years  Screening may be recommended earlier than age 39 if at higher risk for colorectal cancer. Also, an individualized decision between you and your healthcare provider will decide whether screening between the ages of 77-80 would be appropriate. Colonoscopy: 06/16/2022  FOBT/FIT: Not on file  Cologuard: 06/16/2022  Sigmoidoscopy: Not on file    Screening Current     Breast Cancer Screening Age: 35 years old  Frequency: every 1-2 years  Not required if history of left and right mastectomy Mammogram: 02/02/2023    Screening Current   Cervical Cancer Screening Between the ages of 21-29, pap smear recommended once every 3 years. Between the ages of 32-69, can perform pap smear with HPV co-testing every 5 years. Recommendations may differ for women with a history of total hysterectomy, cervical cancer, or abnormal pap smears in past. Pap Smear: 08/23/2023    Screening Current   Hepatitis C Screening Once for adults born between 1945 and 1965  More frequently in patients at high risk for Hepatitis C Hep C Antibody: 01/25/2019    Screening Current   Diabetes Screening 1-2 times per year if you're at risk for diabetes or have pre-diabetes Fasting glucose: 96 mg/dL (6/14/2023)  A1C: No results in last 5 years (No results in last 5 years)  Screening Current   Cholesterol Screening Once every 5 years if you don't have a lipid disorder. May order more often based on risk factors.  Lipid panel: 11/22/2022    Screening Not Indicated  History Lipid Disorder     Other Preventive Screenings Covered by Medicare:  16. Abdominal Aortic Aneurysm (AAA) Screening: covered once if your at risk. You're considered to be at risk if you have a family history of AAA. 17. Lung Cancer Screening: covers low dose CT scan once per year if you meet all of the following conditions: (1) Age 48-67; (2) No signs or symptoms of lung cancer; (3) Current smoker or have quit smoking within the last 15 years; (4) You have a tobacco smoking history of at least 20 pack years (packs per day multiplied by number of years you smoked); (5) You get a written order from a healthcare provider. 25. Glaucoma Screening: covered annually if you're considered high risk: (1) You have diabetes OR (2) Family history of glaucoma OR (3)  aged 48 and older OR (3)  American aged 72 and older  23. Osteoporosis Screening: covered every 2 years if you meet one of the following conditions: (1) You're estrogen deficient and at risk for osteoporosis based off medical history and other findings; (2) Have a vertebral abnormality; (3) On glucocorticoid therapy for more than 3 months; (4) Have primary hyperparathyroidism; (5) On osteoporosis medications and need to assess response to drug therapy. · Last bone density test (DXA Scan): 11/08/2022.  20. HIV Screening: covered annually if you're between the age of 15-65. Also covered annually if you are younger than 13 and older than 72 with risk factors for HIV infection. For pregnant patients, it is covered up to 3 times per pregnancy.     Immunizations:  Immunization Recommendations   Influenza Vaccine Annual influenza vaccination during flu season is recommended for all persons aged >= 6 months who do not have contraindications   Pneumococcal Vaccine   * Pneumococcal conjugate vaccine = PCV13 (Prevnar 13), PCV15 (Vaxneuvance), PCV20 (Prevnar 20)  * Pneumococcal polysaccharide vaccine = PPSV23 (Pneumovax) Adults 20-63 years old: 1-3 doses may be recommended based on certain risk factors  Adults 72 years old: 1-2 doses may be recommended based off what pneumonia vaccine you previously received   Hepatitis B Vaccine 3 dose series if at intermediate or high risk (ex: diabetes, end stage renal disease, liver disease)   Tetanus (Td) Vaccine - COST NOT COVERED BY MEDICARE PART B Following completion of primary series, a booster dose should be given every 10 years to maintain immunity against tetanus. Td may also be given as tetanus wound prophylaxis. Tdap Vaccine - COST NOT COVERED BY MEDICARE PART B Recommended at least once for all adults. For pregnant patients, recommended with each pregnancy. Shingles Vaccine (Shingrix) - COST NOT COVERED BY MEDICARE PART B  2 shot series recommended in those aged 48 and above     Health Maintenance Due:      Topic Date Due   • Breast Cancer Screening: Mammogram  02/02/2025   • Colorectal Cancer Screening  06/16/2025   • Cervical Cancer Screening  08/17/2027   • HIV Screening  Completed   • Hepatitis C Screening  Completed     Immunizations Due:      Topic Date Due   • COVID-19 Vaccine (5 - Pfizer series) 10/21/2022   • Influenza Vaccine (1) 09/01/2023     Advance Directives   What are advance directives? Advance directives are legal documents that state your wishes and plans for medical care. These plans are made ahead of time in case you lose your ability to make decisions for yourself. Advance directives can apply to any medical decision, such as the treatments you want, and if you want to donate organs. What are the types of advance directives? There are many types of advance directives, and each state has rules about how to use them. You may choose a combination of any of the following:  · Living will: This is a written record of the treatment you want. You can also choose which treatments you do not want, which to limit, and which to stop at a certain time. This includes surgery, medicine, IV fluid, and tube feedings. · Durable power of  for healthcare [facility]):   This is a written record that states who you want to make healthcare choices for you when you are unable to make them for yourself. This person, called a proxy, is usually a family member or a friend. You may choose more than 1 proxy. · Do not resuscitate (DNR) order:  A DNR order is used in case your heart stops beating or you stop breathing. It is a request not to have certain forms of treatment, such as CPR. A DNR order may be included in other types of advance directives. · Medical directive: This covers the care that you want if you are in a coma, near death, or unable to make decisions for yourself. You can list the treatments you want for each condition. Treatment may include pain medicine, surgery, blood transfusions, dialysis, IV or tube feedings, and a ventilator (breathing machine). · Values history: This document has questions about your views, beliefs, and how you feel and think about life. This information can help others choose the care that you would choose. Why are advance directives important? An advance directive helps you control your care. Although spoken wishes may be used, it is better to have your wishes written down. Spoken wishes can be misunderstood, or not followed. Treatments may be given even if you do not want them. An advance directive may make it easier for your family to make difficult choices about your care. Urinary Incontinence   Urinary incontinence (UI)  is when you lose control of your bladder. UI develops because your bladder cannot store or empty urine properly. The 3 most common types of UI are stress incontinence, urge incontinence, or both. Medicines:   · May be given to help strengthen your bladder control. Report any side effects of medication to your healthcare provider. Do pelvic muscle exercises often:  Your pelvic muscles help you stop urinating. Squeeze these muscles tight for 5 seconds, then relax for 5 seconds. Gradually work up to squeezing for 10 seconds.  Do 3 sets of 15 repetitions a day, or as directed. This will help strengthen your pelvic muscles and improve bladder control. Train your bladder:  Go to the bathroom at set times, such as every 2 hours, even if you do not feel the urge to go. You can also try to hold your urine when you feel the urge to go. For example, hold your urine for 5 minutes when you feel the urge to go. As that becomes easier, hold your urine for 10 minutes. Self-care:   · Keep a UI record. Write down how often you leak urine and how much you leak. Make a note of what you were doing when you leaked urine. · Drink liquids as directed. You may need to limit the amount of liquid you drink to help control your urine leakage. Do not drink any liquid right before you go to bed. Limit or do not have drinks that contain caffeine or alcohol. · Prevent constipation. Eat a variety of high-fiber foods. Good examples are high-fiber cereals, beans, vegetables, and whole-grain breads. Walking is the best way to trigger your intestines to have a bowel movement. · Exercise regularly and maintain a healthy weight. Weight loss and exercise will decrease pressure on your bladder and help you control your leakage. · Use a catheter as directed  to help empty your bladder. A catheter is a tiny, plastic tube that is put into your bladder to drain your urine. · Go to behavior therapy as directed. Behavior therapy may be used to help you learn to control your urge to urinate. Weight Management   Why it is important to manage your weight:  Being overweight increases your risk of health conditions such as heart disease, high blood pressure, type 2 diabetes, and certain types of cancer. It can also increase your risk for osteoarthritis, sleep apnea, and other respiratory problems. Aim for a slow, steady weight loss. Even a small amount of weight loss can lower your risk of health problems.   How to lose weight safely:  A safe and healthy way to lose weight is to eat fewer calories and get regular exercise. You can lose up about 1 pound a week by decreasing the number of calories you eat by 500 calories each day. Healthy meal plan for weight management:  A healthy meal plan includes a variety of foods, contains fewer calories, and helps you stay healthy. A healthy meal plan includes the following:  · Eat whole-grain foods more often. A healthy meal plan should contain fiber. Fiber is the part of grains, fruits, and vegetables that is not broken down by your body. Whole-grain foods are healthy and provide extra fiber in your diet. Some examples of whole-grain foods are whole-wheat breads and pastas, oatmeal, brown rice, and bulgur. · Eat a variety of vegetables every day. Include dark, leafy greens such as spinach, kale, elena greens, and mustard greens. Eat yellow and orange vegetables such as carrots, sweet potatoes, and winter squash. · Eat a variety of fruits every day. Choose fresh or canned fruit (canned in its own juice or light syrup) instead of juice. Fruit juice has very little or no fiber. · Eat low-fat dairy foods. Drink fat-free (skim) milk or 1% milk. Eat fat-free yogurt and low-fat cottage cheese. Try low-fat cheeses such as mozzarella and other reduced-fat cheeses. · Choose meat and other protein foods that are low in fat. Choose beans or other legumes such as split peas or lentils. Choose fish, skinless poultry (chicken or turkey), or lean cuts of red meat (beef or pork). Before you cook meat or poultry, cut off any visible fat. · Use less fat and oil. Try baking foods instead of frying them. Add less fat, such as margarine, sour cream, regular salad dressing and mayonnaise to foods. Eat fewer high-fat foods. Some examples of high-fat foods include french fries, doughnuts, ice cream, and cakes. · Eat fewer sweets. Limit foods and drinks that are high in sugar. This includes candy, cookies, regular soda, and sweetened drinks.   Exercise:  Exercise at least 30 minutes per day on most days of the week. Some examples of exercise include walking, biking, dancing, and swimming. You can also fit in more physical activity by taking the stairs instead of the elevator or parking farther away from stores. Ask your healthcare provider about the best exercise plan for you. © Copyright [employer] 2018 Information is for End User's use only and may not be sold, redistributed or otherwise used for commercial purposes. All illustrations and images included in CareNotes® are the copyrighted property of [employer] or [facility] Preventive Visit Patient Instructions  Thank you for completing your Welcome to Medicare Visit or Medicare Annual Wellness Visit today. Your next wellness visit will be due in one year (9/13/2024). The screening/preventive services that you may require over the next 5-10 years are detailed below. Some tests may not apply to you based off risk factors and/or age. Screening tests ordered at today's visit but not completed yet may show as past due. Also, please note that scanned in results may not display below. Preventive Screenings:  Service Recommendations Previous Testing/Comments   Colorectal Cancer Screening  * Colonoscopy    * Fecal Occult Blood Test (FOBT)/Fecal Immunochemical Test (FIT)  * Fecal DNA/Cologuard Test  * Flexible Sigmoidoscopy Age: 43-73 years old   Colonoscopy: every 10 years (may be performed more frequently if at higher risk)  OR  FOBT/FIT: every 1 year  OR  Cologuard: every 3 years  OR  Sigmoidoscopy: every 5 years  Screening may be recommended earlier than age 39 if at higher risk for colorectal cancer. Also, an individualized decision between you and your healthcare provider will decide whether screening between the ages of 77-80 would be appropriate.  Colonoscopy: 06/16/2022  FOBT/FIT: Not on file  Cologuard: 06/16/2022  Sigmoidoscopy: Not on file    Screening Current     Breast Cancer Screening Age: 36+ years old  Frequency: every 1-2 years  Not required if history of left and right mastectomy Mammogram: 02/02/2023    Screening Current   Cervical Cancer Screening Between the ages of 21-29, pap smear recommended once every 3 years. Between the ages of 32-69, can perform pap smear with HPV co-testing every 5 years. Recommendations may differ for women with a history of total hysterectomy, cervical cancer, or abnormal pap smears in past. Pap Smear: 08/23/2023    Screening Current   Hepatitis C Screening Once for adults born between 1945 and 1965  More frequently in patients at high risk for Hepatitis C Hep C Antibody: 01/25/2019    Screening Current   Diabetes Screening 1-2 times per year if you're at risk for diabetes or have pre-diabetes Fasting glucose: 96 mg/dL (6/14/2023)  A1C: No results in last 5 years (No results in last 5 years)  Screening Current   Cholesterol Screening Once every 5 years if you don't have a lipid disorder. May order more often based on risk factors. Lipid panel: 11/22/2022    Screening Not Indicated  History Lipid Disorder     Other Preventive Screenings Covered by Medicare:  21. Abdominal Aortic Aneurysm (AAA) Screening: covered once if your at risk. You're considered to be at risk if you have a family history of AAA. 22. Lung Cancer Screening: covers low dose CT scan once per year if you meet all of the following conditions: (1) Age 48-67; (2) No signs or symptoms of lung cancer; (3) Current smoker or have quit smoking within the last 15 years; (4) You have a tobacco smoking history of at least 20 pack years (packs per day multiplied by number of years you smoked); (5) You get a written order from a healthcare provider. 21. Glaucoma Screening: covered annually if you're considered high risk: (1) You have diabetes OR (2) Family history of glaucoma OR (3)  aged 48 and older OR (3)  American aged 72 and older  25.  Osteoporosis Screening: covered every 2 years if you meet one of the following conditions: (1) You're estrogen deficient and at risk for osteoporosis based off medical history and other findings; (2) Have a vertebral abnormality; (3) On glucocorticoid therapy for more than 3 months; (4) Have primary hyperparathyroidism; (5) On osteoporosis medications and need to assess response to drug therapy. · Last bone density test (DXA Scan): 11/08/2022.  25. HIV Screening: covered annually if you're between the age of 15-65. Also covered annually if you are younger than 13 and older than 72 with risk factors for HIV infection. For pregnant patients, it is covered up to 3 times per pregnancy. Immunizations:  Immunization Recommendations   Influenza Vaccine Annual influenza vaccination during flu season is recommended for all persons aged >= 6 months who do not have contraindications   Pneumococcal Vaccine   * Pneumococcal conjugate vaccine = PCV13 (Prevnar 13), PCV15 (Vaxneuvance), PCV20 (Prevnar 20)  * Pneumococcal polysaccharide vaccine = PPSV23 (Pneumovax) Adults 20-63 years old: 1-3 doses may be recommended based on certain risk factors  Adults 72 years old: 1-2 doses may be recommended based off what pneumonia vaccine you previously received   Hepatitis B Vaccine 3 dose series if at intermediate or high risk (ex: diabetes, end stage renal disease, liver disease)   Tetanus (Td) Vaccine - COST NOT COVERED BY MEDICARE PART B Following completion of primary series, a booster dose should be given every 10 years to maintain immunity against tetanus. Td may also be given as tetanus wound prophylaxis. Tdap Vaccine - COST NOT COVERED BY MEDICARE PART B Recommended at least once for all adults. For pregnant patients, recommended with each pregnancy.    Shingles Vaccine (Shingrix) - COST NOT COVERED BY MEDICARE PART B  2 shot series recommended in those aged 48 and above     Health Maintenance Due:      Topic Date Due   • Breast Cancer Screening: Mammogram  02/02/2025   • Colorectal Cancer Screening  06/16/2025   • Cervical Cancer Screening  08/17/2027   • HIV Screening  Completed   • Hepatitis C Screening  Completed     Immunizations Due:      Topic Date Due   • COVID-19 Vaccine (5 - Pfizer series) 10/21/2022   • Influenza Vaccine (1) 09/01/2023     Advance Directives   What are advance directives? Advance directives are legal documents that state your wishes and plans for medical care. These plans are made ahead of time in case you lose your ability to make decisions for yourself. Advance directives can apply to any medical decision, such as the treatments you want, and if you want to donate organs. What are the types of advance directives? There are many types of advance directives, and each state has rules about how to use them. You may choose a combination of any of the following:  · Living will: This is a written record of the treatment you want. You can also choose which treatments you do not want, which to limit, and which to stop at a certain time. This includes surgery, medicine, IV fluid, and tube feedings. · Durable power of  for healthcare [facility]): This is a written record that states who you want to make healthcare choices for you when you are unable to make them for yourself. This person, called a proxy, is usually a family member or a friend. You may choose more than 1 proxy. · Do not resuscitate (DNR) order:  A DNR order is used in case your heart stops beating or you stop breathing. It is a request not to have certain forms of treatment, such as CPR. A DNR order may be included in other types of advance directives. · Medical directive: This covers the care that you want if you are in a coma, near death, or unable to make decisions for yourself. You can list the treatments you want for each condition. Treatment may include pain medicine, surgery, blood transfusions, dialysis, IV or tube feedings, and a ventilator (breathing machine).   · Values history: This document has questions about your views, beliefs, and how you feel and think about life. This information can help others choose the care that you would choose. Why are advance directives important? An advance directive helps you control your care. Although spoken wishes may be used, it is better to have your wishes written down. Spoken wishes can be misunderstood, or not followed. Treatments may be given even if you do not want them. An advance directive may make it easier for your family to make difficult choices about your care. Urinary Incontinence   Urinary incontinence (UI)  is when you lose control of your bladder. UI develops because your bladder cannot store or empty urine properly. The 3 most common types of UI are stress incontinence, urge incontinence, or both. Medicines:   · May be given to help strengthen your bladder control. Report any side effects of medication to your healthcare provider. Do pelvic muscle exercises often:  Your pelvic muscles help you stop urinating. Squeeze these muscles tight for 5 seconds, then relax for 5 seconds. Gradually work up to squeezing for 10 seconds. Do 3 sets of 15 repetitions a day, or as directed. This will help strengthen your pelvic muscles and improve bladder control. Train your bladder:  Go to the bathroom at set times, such as every 2 hours, even if you do not feel the urge to go. You can also try to hold your urine when you feel the urge to go. For example, hold your urine for 5 minutes when you feel the urge to go. As that becomes easier, hold your urine for 10 minutes. Self-care:   · Keep a UI record. Write down how often you leak urine and how much you leak. Make a note of what you were doing when you leaked urine. · Drink liquids as directed. You may need to limit the amount of liquid you drink to help control your urine leakage. Do not drink any liquid right before you go to bed.  Limit or do not have drinks that contain caffeine or alcohol. · Prevent constipation. Eat a variety of high-fiber foods. Good examples are high-fiber cereals, beans, vegetables, and whole-grain breads. Walking is the best way to trigger your intestines to have a bowel movement. · Exercise regularly and maintain a healthy weight. Weight loss and exercise will decrease pressure on your bladder and help you control your leakage. · Use a catheter as directed  to help empty your bladder. A catheter is a tiny, plastic tube that is put into your bladder to drain your urine. · Go to behavior therapy as directed. Behavior therapy may be used to help you learn to control your urge to urinate. Weight Management   Why it is important to manage your weight:  Being overweight increases your risk of health conditions such as heart disease, high blood pressure, type 2 diabetes, and certain types of cancer. It can also increase your risk for osteoarthritis, sleep apnea, and other respiratory problems. Aim for a slow, steady weight loss. Even a small amount of weight loss can lower your risk of health problems. How to lose weight safely:  A safe and healthy way to lose weight is to eat fewer calories and get regular exercise. You can lose up about 1 pound a week by decreasing the number of calories you eat by 500 calories each day. Healthy meal plan for weight management:  A healthy meal plan includes a variety of foods, contains fewer calories, and helps you stay healthy. A healthy meal plan includes the following:  · Eat whole-grain foods more often. A healthy meal plan should contain fiber. Fiber is the part of grains, fruits, and vegetables that is not broken down by your body. Whole-grain foods are healthy and provide extra fiber in your diet. Some examples of whole-grain foods are whole-wheat breads and pastas, oatmeal, brown rice, and bulgur. · Eat a variety of vegetables every day.   Include dark, leafy greens such as spinach, kale, elena greens, and mustard greens. Eat yellow and orange vegetables such as carrots, sweet potatoes, and winter squash. · Eat a variety of fruits every day. Choose fresh or canned fruit (canned in its own juice or light syrup) instead of juice. Fruit juice has very little or no fiber. · Eat low-fat dairy foods. Drink fat-free (skim) milk or 1% milk. Eat fat-free yogurt and low-fat cottage cheese. Try low-fat cheeses such as mozzarella and other reduced-fat cheeses. · Choose meat and other protein foods that are low in fat. Choose beans or other legumes such as split peas or lentils. Choose fish, skinless poultry (chicken or turkey), or lean cuts of red meat (beef or pork). Before you cook meat or poultry, cut off any visible fat. · Use less fat and oil. Try baking foods instead of frying them. Add less fat, such as margarine, sour cream, regular salad dressing and mayonnaise to foods. Eat fewer high-fat foods. Some examples of high-fat foods include french fries, doughnuts, ice cream, and cakes. · Eat fewer sweets. Limit foods and drinks that are high in sugar. This includes candy, cookies, regular soda, and sweetened drinks. Exercise:  Exercise at least 30 minutes per day on most days of the week. Some examples of exercise include walking, biking, dancing, and swimming. You can also fit in more physical activity by taking the stairs instead of the elevator or parking farther away from stores. Ask your healthcare provider about the best exercise plan for you. © Copyright [employer] 2018 Information is for End User's use only and may not be sold, redistributed or otherwise used for commercial purposes. All illustrations and images included in CareNotes® are the copyrighted property of [employer] or [facility] Preventive Visit Patient Instructions  Thank you for completing your Welcome to Medicare Visit or Medicare Annual Wellness Visit today.  Your next wellness visit will be due in one year (9/13/2024). The screening/preventive services that you may require over the next 5-10 years are detailed below. Some tests may not apply to you based off risk factors and/or age. Screening tests ordered at today's visit but not completed yet may show as past due. Also, please note that scanned in results may not display below. Preventive Screenings:  Service Recommendations Previous Testing/Comments   Colorectal Cancer Screening  * Colonoscopy    * Fecal Occult Blood Test (FOBT)/Fecal Immunochemical Test (FIT)  * Fecal DNA/Cologuard Test  * Flexible Sigmoidoscopy Age: 43-73 years old   Colonoscopy: every 10 years (may be performed more frequently if at higher risk)  OR  FOBT/FIT: every 1 year  OR  Cologuard: every 3 years  OR  Sigmoidoscopy: every 5 years  Screening may be recommended earlier than age 39 if at higher risk for colorectal cancer. Also, an individualized decision between you and your healthcare provider will decide whether screening between the ages of 77-80 would be appropriate. Colonoscopy: 06/16/2022  FOBT/FIT: Not on file  Cologuard: 06/16/2022  Sigmoidoscopy: Not on file    Screening Current     Breast Cancer Screening Age: 36 years old  Frequency: every 1-2 years  Not required if history of left and right mastectomy Mammogram: 02/02/2023    Screening Current   Cervical Cancer Screening Between the ages of 21-29, pap smear recommended once every 3 years. Between the ages of 32-69, can perform pap smear with HPV co-testing every 5 years.    Recommendations may differ for women with a history of total hysterectomy, cervical cancer, or abnormal pap smears in past. Pap Smear: 08/23/2023    Screening Current   Hepatitis C Screening Once for adults born between 1945 and 1965  More frequently in patients at high risk for Hepatitis C Hep C Antibody: 01/25/2019    Screening Current   Diabetes Screening 1-2 times per year if you're at risk for diabetes or have pre-diabetes Fasting glucose: 96 mg/dL (6/14/2023)  A1C: No results in last 5 years (No results in last 5 years)  Screening Current   Cholesterol Screening Once every 5 years if you don't have a lipid disorder. May order more often based on risk factors. Lipid panel: 11/22/2022    Screening Not Indicated  History Lipid Disorder     Other Preventive Screenings Covered by Medicare:  26. Abdominal Aortic Aneurysm (AAA) Screening: covered once if your at risk. You're considered to be at risk if you have a family history of AAA. 32. Lung Cancer Screening: covers low dose CT scan once per year if you meet all of the following conditions: (1) Age 48-67; (2) No signs or symptoms of lung cancer; (3) Current smoker or have quit smoking within the last 15 years; (4) You have a tobacco smoking history of at least 20 pack years (packs per day multiplied by number of years you smoked); (5) You get a written order from a healthcare provider. 29. Glaucoma Screening: covered annually if you're considered high risk: (1) You have diabetes OR (2) Family history of glaucoma OR (3)  aged 48 and older OR (3)  American aged 72 and older  34. Osteoporosis Screening: covered every 2 years if you meet one of the following conditions: (1) You're estrogen deficient and at risk for osteoporosis based off medical history and other findings; (2) Have a vertebral abnormality; (3) On glucocorticoid therapy for more than 3 months; (4) Have primary hyperparathyroidism; (5) On osteoporosis medications and need to assess response to drug therapy. · Last bone density test (DXA Scan): 11/08/2022.  30. HIV Screening: covered annually if you're between the age of 15-65. Also covered annually if you are younger than 13 and older than 72 with risk factors for HIV infection. For pregnant patients, it is covered up to 3 times per pregnancy.     Immunizations:  Immunization Recommendations   Influenza Vaccine Annual influenza vaccination during flu season is recommended for all persons aged >= 6 months who do not have contraindications   Pneumococcal Vaccine   * Pneumococcal conjugate vaccine = PCV13 (Prevnar 13), PCV15 (Vaxneuvance), PCV20 (Prevnar 20)  * Pneumococcal polysaccharide vaccine = PPSV23 (Pneumovax) Adults 2364 years old: 1-3 doses may be recommended based on certain risk factors  Adults 72 years old: 1-2 doses may be recommended based off what pneumonia vaccine you previously received   Hepatitis B Vaccine 3 dose series if at intermediate or high risk (ex: diabetes, end stage renal disease, liver disease)   Tetanus (Td) Vaccine - COST NOT COVERED BY MEDICARE PART B Following completion of primary series, a booster dose should be given every 10 years to maintain immunity against tetanus. Td may also be given as tetanus wound prophylaxis. Tdap Vaccine - COST NOT COVERED BY MEDICARE PART B Recommended at least once for all adults. For pregnant patients, recommended with each pregnancy. Shingles Vaccine (Shingrix) - COST NOT COVERED BY MEDICARE PART B  2 shot series recommended in those aged 48 and above     Health Maintenance Due:      Topic Date Due   • Breast Cancer Screening: Mammogram  02/02/2025   • Colorectal Cancer Screening  06/16/2025   • Cervical Cancer Screening  08/17/2027   • HIV Screening  Completed   • Hepatitis C Screening  Completed     Immunizations Due:      Topic Date Due   • COVID-19 Vaccine (5 - Pfizer series) 10/21/2022   • Influenza Vaccine (1) 09/01/2023     Advance Directives   What are advance directives? Advance directives are legal documents that state your wishes and plans for medical care. These plans are made ahead of time in case you lose your ability to make decisions for yourself. Advance directives can apply to any medical decision, such as the treatments you want, and if you want to donate organs. What are the types of advance directives? There are many types of advance directives, and each state has rules about how to use them. You may choose a combination of any of the following:  · Living will: This is a written record of the treatment you want. You can also choose which treatments you do not want, which to limit, and which to stop at a certain time. This includes surgery, medicine, IV fluid, and tube feedings. · Durable power of  for healthcare [facility]): This is a written record that states who you want to make healthcare choices for you when you are unable to make them for yourself. This person, called a proxy, is usually a family member or a friend. You may choose more than 1 proxy. · Do not resuscitate (DNR) order:  A DNR order is used in case your heart stops beating or you stop breathing. It is a request not to have certain forms of treatment, such as CPR. A DNR order may be included in other types of advance directives. · Medical directive: This covers the care that you want if you are in a coma, near death, or unable to make decisions for yourself. You can list the treatments you want for each condition. Treatment may include pain medicine, surgery, blood transfusions, dialysis, IV or tube feedings, and a ventilator (breathing machine). · Values history: This document has questions about your views, beliefs, and how you feel and think about life. This information can help others choose the care that you would choose. Why are advance directives important? An advance directive helps you control your care. Although spoken wishes may be used, it is better to have your wishes written down. Spoken wishes can be misunderstood, or not followed. Treatments may be given even if you do not want them. An advance directive may make it easier for your family to make difficult choices about your care. Urinary Incontinence   Urinary incontinence (UI)  is when you lose control of your bladder. UI develops because your bladder cannot store or empty urine properly.  The 3 most common types of UI are stress incontinence, urge incontinence, or both. Medicines:   · May be given to help strengthen your bladder control. Report any side effects of medication to your healthcare provider. Do pelvic muscle exercises often:  Your pelvic muscles help you stop urinating. Squeeze these muscles tight for 5 seconds, then relax for 5 seconds. Gradually work up to squeezing for 10 seconds. Do 3 sets of 15 repetitions a day, or as directed. This will help strengthen your pelvic muscles and improve bladder control. Train your bladder:  Go to the bathroom at set times, such as every 2 hours, even if you do not feel the urge to go. You can also try to hold your urine when you feel the urge to go. For example, hold your urine for 5 minutes when you feel the urge to go. As that becomes easier, hold your urine for 10 minutes. Self-care:   · Keep a UI record. Write down how often you leak urine and how much you leak. Make a note of what you were doing when you leaked urine. · Drink liquids as directed. You may need to limit the amount of liquid you drink to help control your urine leakage. Do not drink any liquid right before you go to bed. Limit or do not have drinks that contain caffeine or alcohol. · Prevent constipation. Eat a variety of high-fiber foods. Good examples are high-fiber cereals, beans, vegetables, and whole-grain breads. Walking is the best way to trigger your intestines to have a bowel movement. · Exercise regularly and maintain a healthy weight. Weight loss and exercise will decrease pressure on your bladder and help you control your leakage. · Use a catheter as directed  to help empty your bladder. A catheter is a tiny, plastic tube that is put into your bladder to drain your urine. · Go to behavior therapy as directed. Behavior therapy may be used to help you learn to control your urge to urinate.     Weight Management   Why it is important to manage your weight:  Being overweight increases your risk of health conditions such as heart disease, high blood pressure, type 2 diabetes, and certain types of cancer. It can also increase your risk for osteoarthritis, sleep apnea, and other respiratory problems. Aim for a slow, steady weight loss. Even a small amount of weight loss can lower your risk of health problems. How to lose weight safely:  A safe and healthy way to lose weight is to eat fewer calories and get regular exercise. You can lose up about 1 pound a week by decreasing the number of calories you eat by 500 calories each day. Healthy meal plan for weight management:  A healthy meal plan includes a variety of foods, contains fewer calories, and helps you stay healthy. A healthy meal plan includes the following:  · Eat whole-grain foods more often. A healthy meal plan should contain fiber. Fiber is the part of grains, fruits, and vegetables that is not broken down by your body. Whole-grain foods are healthy and provide extra fiber in your diet. Some examples of whole-grain foods are whole-wheat breads and pastas, oatmeal, brown rice, and bulgur. · Eat a variety of vegetables every day. Include dark, leafy greens such as spinach, kale, elena greens, and mustard greens. Eat yellow and orange vegetables such as carrots, sweet potatoes, and winter squash. · Eat a variety of fruits every day. Choose fresh or canned fruit (canned in its own juice or light syrup) instead of juice. Fruit juice has very little or no fiber. · Eat low-fat dairy foods. Drink fat-free (skim) milk or 1% milk. Eat fat-free yogurt and low-fat cottage cheese. Try low-fat cheeses such as mozzarella and other reduced-fat cheeses. · Choose meat and other protein foods that are low in fat. Choose beans or other legumes such as split peas or lentils. Choose fish, skinless poultry (chicken or turkey), or lean cuts of red meat (beef or pork). Before you cook meat or poultry, cut off any visible fat. · Use less fat and oil.   Try baking foods instead of frying them. Add less fat, such as margarine, sour cream, regular salad dressing and mayonnaise to foods. Eat fewer high-fat foods. Some examples of high-fat foods include french fries, doughnuts, ice cream, and cakes. · Eat fewer sweets. Limit foods and drinks that are high in sugar. This includes candy, cookies, regular soda, and sweetened drinks. Exercise:  Exercise at least 30 minutes per day on most days of the week. Some examples of exercise include walking, biking, dancing, and swimming. You can also fit in more physical activity by taking the stairs instead of the elevator or parking farther away from stores. Ask your healthcare provider about the best exercise plan for you. © Copyright [employer] 2018 Information is for End User's use only and may not be sold, redistributed or otherwise used for commercial purposes. All illustrations and images included in CareNotes® are the copyrighted property of [employer]. or [facility] Preventive Visit Patient Instructions  Thank you for completing your Welcome to Medicare Visit or Medicare Annual Wellness Visit today. Your next wellness visit will be due in one year (9/13/2024). The screening/preventive services that you may require over the next 5-10 years are detailed below. Some tests may not apply to you based off risk factors and/or age. Screening tests ordered at today's visit but not completed yet may show as past due. Also, please note that scanned in results may not display below.   Preventive Screenings:  Service Recommendations Previous Testing/Comments   Colorectal Cancer Screening  * Colonoscopy    * Fecal Occult Blood Test (FOBT)/Fecal Immunochemical Test (FIT)  * Fecal DNA/Cologuard Test  * Flexible Sigmoidoscopy Age: 43-73 years old   Colonoscopy: every 10 years (may be performed more frequently if at higher risk)  OR  FOBT/FIT: every 1 year  OR  Cologuard: every 3 years  OR  Sigmoidoscopy: every 5 years  Screening may be recommended earlier than age 39 if at higher risk for colorectal cancer. Also, an individualized decision between you and your healthcare provider will decide whether screening between the ages of 77-80 would be appropriate. Colonoscopy: 06/16/2022  FOBT/FIT: Not on file  Cologuard: 06/16/2022  Sigmoidoscopy: Not on file    Screening Current     Breast Cancer Screening Age: 36 years old  Frequency: every 1-2 years  Not required if history of left and right mastectomy Mammogram: 02/02/2023    Screening Current   Cervical Cancer Screening Between the ages of 21-29, pap smear recommended once every 3 years. Between the ages of 32-69, can perform pap smear with HPV co-testing every 5 years. Recommendations may differ for women with a history of total hysterectomy, cervical cancer, or abnormal pap smears in past. Pap Smear: 08/23/2023    Screening Current   Hepatitis C Screening Once for adults born between 1945 and 1965  More frequently in patients at high risk for Hepatitis C Hep C Antibody: 01/25/2019    Screening Current   Diabetes Screening 1-2 times per year if you're at risk for diabetes or have pre-diabetes Fasting glucose: 96 mg/dL (6/14/2023)  A1C: No results in last 5 years (No results in last 5 years)  Screening Current   Cholesterol Screening Once every 5 years if you don't have a lipid disorder. May order more often based on risk factors. Lipid panel: 11/22/2022    Screening Not Indicated  History Lipid Disorder     Other Preventive Screenings Covered by Medicare:  32. Abdominal Aortic Aneurysm (AAA) Screening: covered once if your at risk. You're considered to be at risk if you have a family history of AAA.   32. Lung Cancer Screening: covers low dose CT scan once per year if you meet all of the following conditions: (1) Age 48-67; (2) No signs or symptoms of lung cancer; (3) Current smoker or have quit smoking within the last 15 years; (4) You have a tobacco smoking history of at least 20 pack years (packs per day multiplied by number of years you smoked); (5) You get a written order from a healthcare provider. 35. Glaucoma Screening: covered annually if you're considered high risk: (1) You have diabetes OR (2) Family history of glaucoma OR (3)  aged 48 and older OR (3)  American aged 72 and older  29. Osteoporosis Screening: covered every 2 years if you meet one of the following conditions: (1) You're estrogen deficient and at risk for osteoporosis based off medical history and other findings; (2) Have a vertebral abnormality; (3) On glucocorticoid therapy for more than 3 months; (4) Have primary hyperparathyroidism; (5) On osteoporosis medications and need to assess response to drug therapy. · Last bone density test (DXA Scan): 11/08/2022.  35. HIV Screening: covered annually if you're between the age of 15-65. Also covered annually if you are younger than 13 and older than 72 with risk factors for HIV infection. For pregnant patients, it is covered up to 3 times per pregnancy. Immunizations:  Immunization Recommendations   Influenza Vaccine Annual influenza vaccination during flu season is recommended for all persons aged >= 6 months who do not have contraindications   Pneumococcal Vaccine   * Pneumococcal conjugate vaccine = PCV13 (Prevnar 13), PCV15 (Vaxneuvance), PCV20 (Prevnar 20)  * Pneumococcal polysaccharide vaccine = PPSV23 (Pneumovax) Adults 20-63 years old: 1-3 doses may be recommended based on certain risk factors  Adults 72 years old: 1-2 doses may be recommended based off what pneumonia vaccine you previously received   Hepatitis B Vaccine 3 dose series if at intermediate or high risk (ex: diabetes, end stage renal disease, liver disease)   Tetanus (Td) Vaccine - COST NOT COVERED BY MEDICARE PART B Following completion of primary series, a booster dose should be given every 10 years to maintain immunity against tetanus.  Td may also be given as tetanus wound prophylaxis. Tdap Vaccine - COST NOT COVERED BY MEDICARE PART B Recommended at least once for all adults. For pregnant patients, recommended with each pregnancy. Shingles Vaccine (Shingrix) - COST NOT COVERED BY MEDICARE PART B  2 shot series recommended in those aged 48 and above     Health Maintenance Due:      Topic Date Due   • Breast Cancer Screening: Mammogram  02/02/2025   • Colorectal Cancer Screening  06/16/2025   • Cervical Cancer Screening  08/17/2027   • HIV Screening  Completed   • Hepatitis C Screening  Completed     Immunizations Due:      Topic Date Due   • COVID-19 Vaccine (5 - Pfizer series) 10/21/2022   • Influenza Vaccine (1) 09/01/2023     Advance Directives   What are advance directives? Advance directives are legal documents that state your wishes and plans for medical care. These plans are made ahead of time in case you lose your ability to make decisions for yourself. Advance directives can apply to any medical decision, such as the treatments you want, and if you want to donate organs. What are the types of advance directives? There are many types of advance directives, and each state has rules about how to use them. You may choose a combination of any of the following:  · Living will: This is a written record of the treatment you want. You can also choose which treatments you do not want, which to limit, and which to stop at a certain time. This includes surgery, medicine, IV fluid, and tube feedings. · Durable power of  for healthcare [facility]): This is a written record that states who you want to make healthcare choices for you when you are unable to make them for yourself. This person, called a proxy, is usually a family member or a friend. You may choose more than 1 proxy. · Do not resuscitate (DNR) order:  A DNR order is used in case your heart stops beating or you stop breathing. It is a request not to have certain forms of treatment, such as CPR.  A DNR order may be included in other types of advance directives. · Medical directive: This covers the care that you want if you are in a coma, near death, or unable to make decisions for yourself. You can list the treatments you want for each condition. Treatment may include pain medicine, surgery, blood transfusions, dialysis, IV or tube feedings, and a ventilator (breathing machine). · Values history: This document has questions about your views, beliefs, and how you feel and think about life. This information can help others choose the care that you would choose. Why are advance directives important? An advance directive helps you control your care. Although spoken wishes may be used, it is better to have your wishes written down. Spoken wishes can be misunderstood, or not followed. Treatments may be given even if you do not want them. An advance directive may make it easier for your family to make difficult choices about your care. Urinary Incontinence   Urinary incontinence (UI)  is when you lose control of your bladder. UI develops because your bladder cannot store or empty urine properly. The 3 most common types of UI are stress incontinence, urge incontinence, or both. Medicines:   · May be given to help strengthen your bladder control. Report any side effects of medication to your healthcare provider. Do pelvic muscle exercises often:  Your pelvic muscles help you stop urinating. Squeeze these muscles tight for 5 seconds, then relax for 5 seconds. Gradually work up to squeezing for 10 seconds. Do 3 sets of 15 repetitions a day, or as directed. This will help strengthen your pelvic muscles and improve bladder control. Train your bladder:  Go to the bathroom at set times, such as every 2 hours, even if you do not feel the urge to go. You can also try to hold your urine when you feel the urge to go. For example, hold your urine for 5 minutes when you feel the urge to go.  As that becomes easier, hold your urine for 10 minutes. Self-care:   · Keep a UI record. Write down how often you leak urine and how much you leak. Make a note of what you were doing when you leaked urine. · Drink liquids as directed. You may need to limit the amount of liquid you drink to help control your urine leakage. Do not drink any liquid right before you go to bed. Limit or do not have drinks that contain caffeine or alcohol. · Prevent constipation. Eat a variety of high-fiber foods. Good examples are high-fiber cereals, beans, vegetables, and whole-grain breads. Walking is the best way to trigger your intestines to have a bowel movement. · Exercise regularly and maintain a healthy weight. Weight loss and exercise will decrease pressure on your bladder and help you control your leakage. · Use a catheter as directed  to help empty your bladder. A catheter is a tiny, plastic tube that is put into your bladder to drain your urine. · Go to behavior therapy as directed. Behavior therapy may be used to help you learn to control your urge to urinate. Weight Management   Why it is important to manage your weight:  Being overweight increases your risk of health conditions such as heart disease, high blood pressure, type 2 diabetes, and certain types of cancer. It can also increase your risk for osteoarthritis, sleep apnea, and other respiratory problems. Aim for a slow, steady weight loss. Even a small amount of weight loss can lower your risk of health problems. How to lose weight safely:  A safe and healthy way to lose weight is to eat fewer calories and get regular exercise. You can lose up about 1 pound a week by decreasing the number of calories you eat by 500 calories each day. Healthy meal plan for weight management:  A healthy meal plan includes a variety of foods, contains fewer calories, and helps you stay healthy. A healthy meal plan includes the following:  · Eat whole-grain foods more often.   A healthy meal plan should contain fiber. Fiber is the part of grains, fruits, and vegetables that is not broken down by your body. Whole-grain foods are healthy and provide extra fiber in your diet. Some examples of whole-grain foods are whole-wheat breads and pastas, oatmeal, brown rice, and bulgur. · Eat a variety of vegetables every day. Include dark, leafy greens such as spinach, kale, elena greens, and mustard greens. Eat yellow and orange vegetables such as carrots, sweet potatoes, and winter squash. · Eat a variety of fruits every day. Choose fresh or canned fruit (canned in its own juice or light syrup) instead of juice. Fruit juice has very little or no fiber. · Eat low-fat dairy foods. Drink fat-free (skim) milk or 1% milk. Eat fat-free yogurt and low-fat cottage cheese. Try low-fat cheeses such as mozzarella and other reduced-fat cheeses. · Choose meat and other protein foods that are low in fat. Choose beans or other legumes such as split peas or lentils. Choose fish, skinless poultry (chicken or turkey), or lean cuts of red meat (beef or pork). Before you cook meat or poultry, cut off any visible fat. · Use less fat and oil. Try baking foods instead of frying them. Add less fat, such as margarine, sour cream, regular salad dressing and mayonnaise to foods. Eat fewer high-fat foods. Some examples of high-fat foods include french fries, doughnuts, ice cream, and cakes. · Eat fewer sweets. Limit foods and drinks that are high in sugar. This includes candy, cookies, regular soda, and sweetened drinks. Exercise:  Exercise at least 30 minutes per day on most days of the week. Some examples of exercise include walking, biking, dancing, and swimming. You can also fit in more physical activity by taking the stairs instead of the elevator or parking farther away from stores. Ask your healthcare provider about the best exercise plan for you.       © Copyright [employer] 2018 Information is for End User's use only and may not be sold, redistributed or otherwise used for commercial purposes. All illustrations and images included in CareNotes® are the copyrighted property of [employer] or [facility] Preventive Visit Patient Instructions  Thank you for completing your Welcome to Medicare Visit or Medicare Annual Wellness Visit today. Your next wellness visit will be due in one year (9/13/2024). The screening/preventive services that you may require over the next 5-10 years are detailed below. Some tests may not apply to you based off risk factors and/or age. Screening tests ordered at today's visit but not completed yet may show as past due. Also, please note that scanned in results may not display below. Preventive Screenings:  Service Recommendations Previous Testing/Comments   Colorectal Cancer Screening  * Colonoscopy    * Fecal Occult Blood Test (FOBT)/Fecal Immunochemical Test (FIT)  * Fecal DNA/Cologuard Test  * Flexible Sigmoidoscopy Age: 43-73 years old   Colonoscopy: every 10 years (may be performed more frequently if at higher risk)  OR  FOBT/FIT: every 1 year  OR  Cologuard: every 3 years  OR  Sigmoidoscopy: every 5 years  Screening may be recommended earlier than age 39 if at higher risk for colorectal cancer. Also, an individualized decision between you and your healthcare provider will decide whether screening between the ages of 77-80 would be appropriate. Colonoscopy: 06/16/2022  FOBT/FIT: Not on file  Cologuard: 06/16/2022  Sigmoidoscopy: Not on file    Screening Current     Breast Cancer Screening Age: 35 years old  Frequency: every 1-2 years  Not required if history of left and right mastectomy Mammogram: 02/02/2023    Screening Current   Cervical Cancer Screening Between the ages of 21-29, pap smear recommended once every 3 years. Between the ages of 32-69, can perform pap smear with HPV co-testing every 5 years.    Recommendations may differ for women with a history of total hysterectomy, cervical cancer, or abnormal pap smears in past. Pap Smear: 08/23/2023    Screening Current   Hepatitis C Screening Once for adults born between 1945 and 1965  More frequently in patients at high risk for Hepatitis C Hep C Antibody: 01/25/2019    Screening Current   Diabetes Screening 1-2 times per year if you're at risk for diabetes or have pre-diabetes Fasting glucose: 96 mg/dL (6/14/2023)  A1C: No results in last 5 years (No results in last 5 years)  Screening Current   Cholesterol Screening Once every 5 years if you don't have a lipid disorder. May order more often based on risk factors. Lipid panel: 11/22/2022    Screening Not Indicated  History Lipid Disorder     Other Preventive Screenings Covered by Medicare:  36. Abdominal Aortic Aneurysm (AAA) Screening: covered once if your at risk. You're considered to be at risk if you have a family history of AAA. 40. Lung Cancer Screening: covers low dose CT scan once per year if you meet all of the following conditions: (1) Age 48-67; (2) No signs or symptoms of lung cancer; (3) Current smoker or have quit smoking within the last 15 years; (4) You have a tobacco smoking history of at least 20 pack years (packs per day multiplied by number of years you smoked); (5) You get a written order from a healthcare provider. 45. Glaucoma Screening: covered annually if you're considered high risk: (1) You have diabetes OR (2) Family history of glaucoma OR (3)  aged 48 and older OR (3)  American aged 72 and older  44. Osteoporosis Screening: covered every 2 years if you meet one of the following conditions: (1) You're estrogen deficient and at risk for osteoporosis based off medical history and other findings; (2) Have a vertebral abnormality; (3) On glucocorticoid therapy for more than 3 months; (4) Have primary hyperparathyroidism; (5) On osteoporosis medications and need to assess response to drug therapy.    · Last bone density test (DXA Scan): 11/08/2022. 35. HIV Screening: covered annually if you're between the age of 14-79. Also covered annually if you are younger than 13 and older than 72 with risk factors for HIV infection. For pregnant patients, it is covered up to 3 times per pregnancy. Immunizations:  Immunization Recommendations   Influenza Vaccine Annual influenza vaccination during flu season is recommended for all persons aged >= 6 months who do not have contraindications   Pneumococcal Vaccine   * Pneumococcal conjugate vaccine = PCV13 (Prevnar 13), PCV15 (Vaxneuvance), PCV20 (Prevnar 20)  * Pneumococcal polysaccharide vaccine = PPSV23 (Pneumovax) Adults 20-63 years old: 1-3 doses may be recommended based on certain risk factors  Adults 72 years old: 1-2 doses may be recommended based off what pneumonia vaccine you previously received   Hepatitis B Vaccine 3 dose series if at intermediate or high risk (ex: diabetes, end stage renal disease, liver disease)   Tetanus (Td) Vaccine - COST NOT COVERED BY MEDICARE PART B Following completion of primary series, a booster dose should be given every 10 years to maintain immunity against tetanus. Td may also be given as tetanus wound prophylaxis. Tdap Vaccine - COST NOT COVERED BY MEDICARE PART B Recommended at least once for all adults. For pregnant patients, recommended with each pregnancy. Shingles Vaccine (Shingrix) - COST NOT COVERED BY MEDICARE PART B  2 shot series recommended in those aged 48 and above     Health Maintenance Due:      Topic Date Due   • Breast Cancer Screening: Mammogram  02/02/2025   • Colorectal Cancer Screening  06/16/2025   • Cervical Cancer Screening  08/17/2027   • HIV Screening  Completed   • Hepatitis C Screening  Completed     Immunizations Due:      Topic Date Due   • COVID-19 Vaccine (5 - Pfizer series) 10/21/2022   • Influenza Vaccine (1) 09/01/2023     Advance Directives   What are advance directives?   Advance directives are legal documents that state your wishes and plans for medical care. These plans are made ahead of time in case you lose your ability to make decisions for yourself. Advance directives can apply to any medical decision, such as the treatments you want, and if you want to donate organs. What are the types of advance directives? There are many types of advance directives, and each state has rules about how to use them. You may choose a combination of any of the following:  · Living will: This is a written record of the treatment you want. You can also choose which treatments you do not want, which to limit, and which to stop at a certain time. This includes surgery, medicine, IV fluid, and tube feedings. · Durable power of  for healthcare [facility]): This is a written record that states who you want to make healthcare choices for you when you are unable to make them for yourself. This person, called a proxy, is usually a family member or a friend. You may choose more than 1 proxy. · Do not resuscitate (DNR) order:  A DNR order is used in case your heart stops beating or you stop breathing. It is a request not to have certain forms of treatment, such as CPR. A DNR order may be included in other types of advance directives. · Medical directive: This covers the care that you want if you are in a coma, near death, or unable to make decisions for yourself. You can list the treatments you want for each condition. Treatment may include pain medicine, surgery, blood transfusions, dialysis, IV or tube feedings, and a ventilator (breathing machine). · Values history: This document has questions about your views, beliefs, and how you feel and think about life. This information can help others choose the care that you would choose. Why are advance directives important? An advance directive helps you control your care. Although spoken wishes may be used, it is better to have your wishes written down.  Spoken wishes can be misunderstood, or not low-fat cheeses such as mozzarella and other reduced-fat cheeses. · Choose meat and other protein foods that are low in fat. Choose beans or other legumes such as split peas or lentils. Choose fish, skinless poultry (chicken or turkey), or lean cuts of red meat (beef or pork). Before you cook meat or poultry, cut off any visible fat. · Use less fat and oil. Try baking foods instead of frying them. Add less fat, such as margarine, sour cream, regular salad dressing and mayonnaise to foods. Eat fewer high-fat foods. Some examples of high-fat foods include french fries, doughnuts, ice cream, and cakes. · Eat fewer sweets. Limit foods and drinks that are high in sugar. This includes candy, cookies, regular soda, and sweetened drinks. Exercise:  Exercise at least 30 minutes per day on most days of the week. Some examples of exercise include walking, biking, dancing, and swimming. You can also fit in more physical activity by taking the stairs instead of the elevator or parking farther away from stores. Ask your healthcare provider about the best exercise plan for you. © Copyright [employer] 2018 Information is for End User's use only and may not be sold, redistributed or otherwise used for commercial purposes.  All illustrations and images included in CareNotes® are the copyrighted property of [employer] Inc. or [address] [place] [address]

## 2023-09-13 NOTE — ASSESSMENT & PLAN NOTE
Plan:   -lipid panel, CMP  -may switch simvastatin to alterative (atorvastatin, rosuvastatin); will wait on lab results  -balanced diet & exercise   -BMI Counseling: There is no height or weight on file to calculate BMI. The BMI is above normal. Nutrition recommendations include moderation in carbohydrate intake.

## 2023-09-13 NOTE — PROGRESS NOTES
Assessment and Plan:     Problem List Items Addressed This Visit    None  Visit Diagnoses     Medicare annual wellness visit, subsequent    -  Primary          Depression Screening and Follow-up Plan: Patient was screened for depression during today's encounter. They screened negative with a PHQ-2 score of 0. Preventive health issues were discussed with patient, and age appropriate screening tests were ordered as noted in patient's After Visit Summary. Personalized health advice and appropriate referrals for health education or preventive services given if needed, as noted in patient's After Visit Summary. History of Present Illness:     Patient presents for a Medicare Wellness Visit    Jorge Neri is a 56YO F w/PMHx of comes in with caretaker Ezequiel Mendoza. Pt tested positive for COVID on 9/3/2023. Pt initially had congestion and cough. Had a fever on 9/3. However, since then have been afebrile. Lives in an long-term facility; Patient Care Team:  Beth Bright MD as PCP - General (Family Medicine)     Review of Systems:     Review of Systems   Constitutional: Negative for chills, fatigue and fever. HENT: Negative for congestion, hearing loss, rhinorrhea and sore throat. Eyes: Negative for visual disturbance. Respiratory: Negative for cough and shortness of breath. Cardiovascular: Negative for chest pain and palpitations. Gastrointestinal: Negative for abdominal pain, blood in stool, constipation, diarrhea, nausea and vomiting. Genitourinary: Negative for dysuria, hematuria and menstrual problem. Skin: Negative for rash. Neurological: Negative for dizziness, light-headedness, numbness and headaches.         Problem List:     Patient Active Problem List   Diagnosis   • Women's annual routine gynecological examination   • Wheelchair dependence   • Benign essential hypertension   • Esophageal reflux   • Hyperlipidemia   • Insomnia   • Osteoporosis   • Spastic quadriplegic cerebral palsy ([address])   • Tonic-clonic epileptic seizures ([address])   • Encounter for hepatitis C screening test for low risk patient   • Healthcare maintenance   • Breast cancer screening   • Continuous leakage of urine   • Bowel incontinence   • Constipation   • Severe intellectual disability   • Vitamin D deficiency   • Encounter for preoperative dental examination   • Close exposure to COVID-19 virus   • Elevated TSH   • COVID-19   • Shingles of eyelid   • Herpes zoster without complication      Past Medical and Surgical History:     Past Medical History:   Diagnosis Date   • Bowel incontinence    • Constipation     Last Assessed: 02Aug2013   • Generalized convulsive seizure ([address])    • Hyperlipidemia    • Osteoporosis    • Spastic quadriplegic cerebral palsy ([address])    • Urinary incontinence    • Vitamin D deficiency     Last Assessed: [id number]     Past Surgical History:   Procedure Laterality Date   • ABSCESS DRAINAGE      Incision and Drainage of skin abscess back   • COLONOSCOPY      Fiberoptic; Last Assessed: [id number]   • EYE SURGERY Left     Strabismus Left Eye      Family History:     Family History   Problem Relation Age of Onset   • Lymphoma Mother         Bone Marrow   • Coronary artery disease Mother    • Seizures Mother         Epilepsy and recurrent seizures   • Other Mother         Mitral Stenosis   • Prostate cancer Father 76   • Skin cancer Father    • No Known Problems Sister    • No Known Problems Sister    • Heart disease Maternal Grandmother    • Kidney cancer Maternal Grandmother    • Heart disease Maternal Grandfather    • Heart disease Paternal Grandmother    • Breast cancer Maternal Aunt 79      Social History:     Social History     Socioeconomic History   • Marital status: Single     Spouse name: None   • Number of children: None   • Years of education: None   • Highest education level: High school graduate   Occupational History   • Occupation: none   Tobacco Use   • Smoking status: Never   • Smokeless tobacco: Never   Vaping Use   • Vaping Use: Never used   Substance and Sexual Activity   • Alcohol use: No   • Drug use: No   • Sexual activity: Never   Other Topics Concern   • None   Social History Narrative    Caffeine use    Wheelchair dependent     Social Determinants of Health     Financial Resource Strain: Low Risk  (9/12/2023)    Overall Financial Resource Strain (CARDIA)    • Difficulty of Paying Living Expenses: Not hard at all   Food Insecurity: No Food Insecurity (9/12/2023)    Hunger Vital Sign    • Worried About Running Out of Food in the Last Year: Never true    • Ran Out of Food in the Last Year: Never true   Transportation Needs: No Transportation Needs (9/12/2023)    PRAPARE - Transportation    • Lack of Transportation (Medical): No    • Lack of Transportation (Non-Medical):  No   Physical Activity: Inactive (2/12/2020)    Exercise Vital Sign    • Days of Exercise per Week: 0 days    • Minutes of Exercise per Session: 0 min   Stress: No Stress Concern Present (9/13/2023)    [address] [facility]    • Feeling of Stress : Not at all   Social Connections: Unknown (2/12/2020)    Social Connection and Isolation Panel [NHANES]    • Frequency of Communication with Friends and Family: Patient refused    • Frequency of Social Gatherings with Friends and Family: Patient refused    • Attends Baptism Services: Patient refused    • Active Member of Clubs or Organizations: Patient refused    • Attends Club or Organization Meetings: Patient refused    • Marital Status: Patient refused   Intimate Partner Violence: Not At Risk (9/13/2023)    Humiliation, Afraid, Rape, and Kick questionnaire    • Fear of Current or Ex-Partner: No    • Emotionally Abused: No    • Physically Abused: No    • Sexually Abused: No   Housing Stability: Low Risk  (9/13/2023)    Housing Stability Vital Sign    • Unable to Pay for Housing in the Last Year: No    • Number of Places Lived in the Last Year: 1    • Unstable Housing in the Last Year: No      Medications and Allergies:     Current Outpatient Medications   Medication Sig Dispense Refill   • baclofen 10 mg tablet Take 1 tablet (10 mg total) by mouth 2 (two) times a day 60 tablet 11   • benzonatate (TESSALON PERLES) 100 mg capsule Take 1 capsule by mouth every 8 hours as needed for dry cough 20 capsule 0   • Calcium Citrate 200 MG TABS Take by mouth 3 TABS TWICE A DAY      • carbamide peroxide (DEBROX) 6.5 % otic solution Instill 5 drops in affected ear twice daily for 4 days as needed for ear wax 15 mL 0   • cholecalciferol (VITAMIN D3) 1,000 units tablet Take 1 capsule by mouth daily     • clotrimazole-betamethasone (LOTRISONE) 1-0.05 % cream APPLY TOPICALLY TO AFFECTED AREA OF SKIN TWICE A DAY AS NEEDED FOR IRRITATION FROM DIAPER 45 g 11   • denosumab (Prolia) 60 mg/mL Inject 1 mL (60 mg total) under the skin every 6 (six) months 1 mL 1   • famotidine (PEPCID) 20 mg tablet Take 1 tablet (20 mg total) by mouth daily at bedtime  0   • fluticasone (FLONASE) 50 mcg/act nasal spray USE 1 SPRAY IN EACH NOSTRIL TWICE A DAY (RHINITIS) 16 g 11   • GNP 8 Hour Arthritis Relief 650 MG CR tablet TAKE 1 TABLET BY MOUTH EVERY 6 HOURS AS NEEDED FOR MILD PAIN OR TEMPERATURE  >100.4 8 tablet 11   • hydrochlorothiazide (HYDRODIURIL) 25 mg tablet Take 0.5 tablets (12.5 mg total) by mouth daily 30 tablet 5   • ibuprofen (MOTRIN) 600 mg tablet Take by mouth every 6 (six) hours as needed for fever Fever greater than 101.4F     • ipratropium (ATROVENT) 0.06 % nasal spray 2 sprays into each nostril 3 (three) times a day as needed for rhinitis 15 mL 0   • loratadine (CLARITIN) 10 mg tablet TAKE 1 TABLET BY MOUTH DAILY (ALLERGIC RHINITIS) 28 tablet 10   • losartan (COZAAR) 50 mg tablet Take 1 tablet (50 mg total) by mouth daily 30 tablet 5   • metoprolol succinate (TOPROL-XL) 100 mg 24 hr tablet Take 1 tablet by mouth daily  0   • Mucus Relief 600 MG 12 hr tablet TAKE 1 TABLET BY MOUTH EVERY 12 HOURS AS NEEDED FOR CONGESTION **DO NOT CRUSH** 5 tablet 11   • nystatin-triamcinolone (MYCOLOG-II) ointment Apply topically 2 (two) times a day as needed (As needed for rash) 30 g 5   • olopatadine HCl (PATADAY) 0.2 % opth drops Instill 1 drop into affected eye(s) daily PRN irritation  0   • PHENobarbital 32.4 mg tablet Take 3 tablets by mouth at 4 pm 90 tablet 5   • polyethylene glycol (GLYCOLAX) 17 GM/SCOOP powder 1 cap dissolve in 8 ox of fluid po daily (hold for loose stools). Constipation. 255 g 10   • polyethylene glycol (GLYCOLAX) 17 GM/SCOOP powder 1 tsp in 4 oz of fluid po PRN every 3 days if no BM. Constipation. 255 g 5   • selenium sulfide (SELSUN) 1 % Apply topically 2 (two) times a week Apply twice weekly as needed when with scalp irritation. 420 mL 5   • simvastatin (ZOCOR) 20 mg tablet Take 1 tablet by mouth daily  0   • Vitamins A & D (vitamin A & D) ointment Apply 1 application topically     • zolpidem (AMBIEN) 5 mg tablet Take 0.5 tablets (2.5 mg total) by mouth daily 15 tablet 5   • acetaminophen (TYLENOL) 650 mg CR tablet Take 650 mg by mouth every 8 (eight) hours as needed (Patient not taking: Reported on 8/23/2023)     • LORazepam (ATIVAN) 0.5 mg tablet Take 1 tablet (0.5 mg total) by mouth once for 1 dose 1 tablet 0   • losartan (COZAAR) 100 MG tablet  (Patient not taking: Reported on 8/14/2023)     • valACYclovir (VALTREX) 1,000 mg tablet Take 1 tablet (1,000 mg total) by mouth 3 (three) times a day for 7 days 21 tablet 0     No current facility-administered medications for this visit.      Allergies   Allergen Reactions   • Other      Seasonal Allergies      Immunizations:     Immunization History   Administered Date(s) Administered   • COVID-19 PFIZER VACCINE 0.3 ML IM 01/22/2021, 02/12/2021, 11/16/2021, 08/26/2022   • Hep B, adult 02/24/2015, 04/09/2015, 09/11/2015   • Influenza Quadrivalent Preservative Free 3 years and older IM 10/31/2014, 11/06/2015   • Influenza Quadrivalent, 6-35 Months IM 11/08/2016   • Influenza, injectable, quadrivalent, preservative free 0.5 mL 11/01/2018   • Influenza, recombinant, quadrivalent,injectable, preservative free 10/29/2019, 10/09/2020, 11/30/2021, 12/06/2022   • Influenza, seasonal, injectable 11/14/2012, 11/18/2013   • Tdap 12/06/2011, 07/02/2021   • Tuberculin Skin Test-PPD Intradermal 02/13/2013, 12/02/2014, 08/11/2016, 07/23/2018, 06/24/2020, 05/26/2022   • Zoster Vaccine Recombinant 07/14/2020, 03/16/2021      Health Maintenance:         Topic Date Due   • Breast Cancer Screening: Mammogram  02/02/2025   • Colorectal Cancer Screening  06/16/2025   • Cervical Cancer Screening  08/17/2027   • HIV Screening  Completed   • Hepatitis C Screening  Completed         Topic Date Due   • COVID-19 Vaccine (5 - Pfizer series) 10/21/2022   • Influenza Vaccine (1) 09/01/2023      Medicare Screening Tests and Risk Assessments:     Annual Wellness Visit  No results found.      Physical Exam:     /96   Pulse 78   Temp 97.7 °F (36.5 °C)   Resp 20   LMP 02/28/2010 (Approximate)     Physical Exam     Antonio Osorio DO

## 2023-09-13 NOTE — ASSESSMENT & PLAN NOTE
Pt is conversational/verbal. However, would not be able to express symptoms clearly.      Plan:   -CBC, TSH

## 2023-09-20 NOTE — PROGRESS NOTES
Virtual Regular Visit      Assessment/Plan:    Problem List Items Addressed This Visit        Cardiovascular and Mediastinum    Benign essential hypertension - Primary     Stable  · bp today 112/77  · At goal <140/90  · Continue current regimen  · Limit salt intake  · Monitor for symptoms of hypotension            Nervous and Auditory    Tonic-clonic epileptic seizures ([address] )     Stable  · No seizures since 1/17/2021  · Continue regular neurology follow up  · Continue phenobarbital            Musculoskeletal and Integument    Osteoporosis     Stable  · Scheduled for prolia injection in June  · Patient has BMP already ordered            Other    Insomnia     Stable  · Sleeping well with 2 5mg zolpidem qhs  · Continue medication regimen         Constipation     Stable  · Patient having regular formed bm's daily  · Has miralax daily and prn if no bm for 3 days                      Reason for visit is   Chief Complaint   Patient presents with    Virtual Regular Visit        Encounter provider Omega Wilkins DO    Provider located at [address] [place] [id number] [phone]      Recent Visits  No visits were found meeting these conditions  Showing recent visits within past 7 days and meeting all other requirements     Today's Visits  Date Type Provider Dept   05/18/21 Telemedicine Jamey Dunlap [address], [address] today's visits and meeting all other requirements     Future Appointments  No visits were found meeting these conditions  Showing future appointments within next 150 days and meeting all other requirements        The patient was identified by name and date of birth  Funmi Hinojosa was informed that this is a telemedicine visit and that the visit is being conducted through [employer] and patient was informed that this is a secure, HIPAA-compliant platform  She agrees to proceed     My office door was closed  No one else was in the room  She acknowledged consent and understanding of privacy and security of the video platform  The patient has agreed to participate and understands they can discontinue the visit at any time  Patient is aware this is a billable service  Subjective  Radha Ring is a 63 y o  female presents today for follow up  Per care giver, Tammi Redding has been doing very well  She has no concerns today  Patient is eating and drinking without issue  No recent illnesses but her allergic rhinitis are bothering her  She is using claritin  She received both covid vaccines 2/12  Patient is getting prolia in June and has blood work  Her sleep is stable with her half tablet of zolpidem qhs         HPI     Past Medical History:   Diagnosis Date    Bowel incontinence     Constipation     Last Assessed: [id number]    Generalized convulsive seizure ([facility] [address] )     Hyperlipidemia     Osteoporosis     Spastic quadriplegic cerebral palsy ([facility] [address] )     Urinary incontinence     Vitamin D deficiency     Last Assessed: [id number]       Past Surgical History:   Procedure Laterality Date    ABSCESS DRAINAGE      Incision and Drainage of skin abscess back    COLONOSCOPY      Fiberoptic; Last Assessed: [id number]    EYE SURGERY Left     Strabismus Left Eye       Current Outpatient Medications   Medication Sig Dispense Refill    acetaminophen (TYLENOL 8 HOUR) 650 mg CR tablet Take 1 tablet by mouth every 6 hours as needed for mild pain or temp greater than 100 4 30 tablet 0    baclofen 10 mg tablet Take 1 tablet (10 mg total) by mouth 2 (two) times a day 60 tablet 11    benzonatate (TESSALON PERLES) 100 mg capsule Take 1 capsule by mouth every 8 hours as needed for dry cough 20 capsule 0    Calcium Citrate 200 MG TABS Take by mouth 3 TABS TWICE A DAY       carbamide peroxide (DEBROX) 6 5 % otic solution Instill 5 drops in affected ear twice daily for 4 days as needed for ear wax 15 mL 0    cholecalciferol (VITAMIN D3) 1,000 units tablet Take 1 capsule by mouth daily      Cholecalciferol (Vitamin D3) 25 MCG (1000 UT) CAPS       clotrimazole-betamethasone (LOTRISONE) 1-0 05 % cream Apply twice daily as needed for skin irritation from diaper 30 g 0    famotidine (PEPCID) 20 mg tablet Take 1 tablet (20 mg total) by mouth daily at bedtime  0    fluticasone (FLONASE) 50 mcg/act nasal spray USE 1 SPRAY IN EACH NOSTRIL TWICE A DAY (RHINITIS) 16 g 10    guaiFENesin (MUCINEX) 600 mg 12 hr tablet Take 1 tablet by mouth every 12 (twelve) hours as needed      hydrochlorothiazide (HYDRODIURIL) 25 mg tablet Take 0 5 tablets (12 5 mg total) by mouth daily 30 tablet 5    ibuprofen (MOTRIN) 600 mg tablet Take by mouth every 6 (six) hours as needed for fever Fever greater than 101 4F      ipratropium (ATROVENT) 0 06 % nasal spray 2 sprays into each nostril 3 (three) times a day as needed for rhinitis 15 mL 0    loratadine (CLARITIN) 10 mg tablet TAKE 1 TABLET BY MOUTH DAILY (ALLERGIC RHINITIS) 28 tablet 10    LORazepam (ATIVAN) 0 5 mg tablet Take 1 tablet (0 5 mg total) by mouth once for 1 dose May repeat in 30 min   2 tablet 0    losartan (COZAAR) 100 MG tablet       losartan (COZAAR) 50 mg tablet Take 1 tablet (50 mg total) by mouth daily 30 tablet 5    metoprolol succinate (TOPROL-XL) 100 mg 24 hr tablet Take 1 tablet by mouth daily  0    olopatadine HCl (PATADAY) 0 2 % opth drops Instill 1 drop into affected eye(s) daily PRN irritation  0    PHENobarbital 32 4 mg tablet Take 3 tablets by mouth at 4 PM daily 90 tablet 5    polyethylene glycol (MIRALAX) powder Take 17 g by mouth as needed for constipation In 4oz of fluid if no BM for 3 days      Polyethylene Glycol 3350 (MIRALAX PO) Take 1 packet by mouth daily In 8 ounces of fluid      povidone-iodine (BETADINE) 10 % ointment Apply topically 2 (two) times a day as needed      Prolia 60 MG/ML INJECT 1ML (60MG) UNDER THE SKIN ONCE FOR 1 DOSE 1 mL 0  simvastatin (ZOCOR) 20 mg tablet Take 1 tablet by mouth daily  0    Vitamins A & D (VITAMIN A & D) ointment Apply topically      zolpidem (AMBIEN) 5 mg tablet Take 0 5 tablets (2 5 mg total) by mouth daily 15 tablet 5     No current facility-administered medications for this visit  Allergies   Allergen Reactions    Other      Seasonal Allergies       Review of Systems   Constitutional: Negative for activity change, chills, diaphoresis and fever  HENT: Positive for rhinorrhea and sneezing  Negative for ear pain, hearing loss, postnasal drip, sinus pressure, sinus pain and sore throat  Respiratory: Negative for cough, chest tightness, shortness of breath and wheezing  Cardiovascular: Negative for chest pain, palpitations and leg swelling  Gastrointestinal: Negative for abdominal pain, blood in stool, constipation, diarrhea, nausea and vomiting  Musculoskeletal: Negative for arthralgias and myalgias  Neurological: Negative for dizziness, syncope, weakness, light-headedness, numbness and headaches  Psychiatric/Behavioral: Negative for agitation and behavioral problems  Video Exam    Vitals:    05/18/21 1303   BP: 112/77   Pulse: 84   Resp: 16   Temp: 97 7 °F (36 5 °C)   SpO2: 97%       Physical Exam  Vitals signs reviewed  Constitutional:       General: She is not in acute distress  Appearance: She is not ill-appearing or toxic-appearing  HENT:      Head: Normocephalic and atraumatic  Right Ear: Tympanic membrane, ear canal and external ear normal       Left Ear: Tympanic membrane, ear canal and external ear normal       Nose: Congestion present  No rhinorrhea  Mouth/Throat:      Mouth: Mucous membranes are moist       Pharynx: Oropharynx is clear  Pulmonary:      Effort: No respiratory distress  Abdominal:      General: Bowel sounds are normal  There is no distension  Palpations: Abdomen is soft  Neurological:      Mental Status: She is alert            I spent 28 minutes directly with the patient during this visit      [facility] acknowledges that she has consented to an online visit or consultation  She understands that the online visit is based solely on information provided by her, and that, in the absence of a face-to-face physical evaluation by the physician, the diagnosis she receives is both limited and provisional in terms of accuracy and completeness  This is not intended to replace a full medical face-to-face evaluation by the physician  Lit Grayson understands and accepts these terms  Topical Sulfur Applications Pregnancy And Lactation Text: This medication is considered safe during pregnancy and breast feeding secondary to limited systemic absorption.

## 2023-11-07 DIAGNOSIS — G40.409 TONIC-CLONIC EPILEPTIC SEIZURES (HCC): ICD-10-CM

## 2023-11-08 RX ORDER — PHENOBARBITAL 32.4 MG/1
TABLET ORAL
Qty: 90 TABLET | Refills: 5 | Status: SHIPPED | OUTPATIENT
Start: 2023-11-08

## 2023-11-12 PROBLEM — H61.20 CERUMEN IMPACTION: Status: RESOLVED | Noted: 2023-09-13 | Resolved: 2023-11-12

## 2023-11-12 PROBLEM — H61.23 IMPACTED CERUMEN OF BOTH EARS: Status: RESOLVED | Noted: 2021-11-29 | Resolved: 2023-11-12

## 2023-12-04 ENCOUNTER — OFFICE VISIT (OUTPATIENT)
Dept: FAMILY MEDICINE CLINIC | Facility: CLINIC | Age: 63
End: 2023-12-04

## 2023-12-04 VITALS
HEART RATE: 90 BPM | BODY MASS INDEX: 32.79 KG/M2 | RESPIRATION RATE: 18 BRPM | OXYGEN SATURATION: 98 % | TEMPERATURE: 98.7 F | SYSTOLIC BLOOD PRESSURE: 138 MMHG | WEIGHT: 167 LBS | DIASTOLIC BLOOD PRESSURE: 83 MMHG | HEIGHT: 60 IN

## 2023-12-04 DIAGNOSIS — Z23 ENCOUNTER FOR IMMUNIZATION: ICD-10-CM

## 2023-12-04 DIAGNOSIS — M81.0 OSTEOPOROSIS, UNSPECIFIED OSTEOPOROSIS TYPE, UNSPECIFIED PATHOLOGICAL FRACTURE PRESENCE: ICD-10-CM

## 2023-12-04 DIAGNOSIS — I10 BENIGN ESSENTIAL HYPERTENSION: Primary | ICD-10-CM

## 2023-12-04 PROCEDURE — 99213 OFFICE O/P EST LOW 20 MIN: CPT | Performed by: FAMILY MEDICINE

## 2023-12-04 PROCEDURE — 90686 IIV4 VACC NO PRSV 0.5 ML IM: CPT | Performed by: FAMILY MEDICINE

## 2023-12-04 PROCEDURE — G0008 ADMIN INFLUENZA VIRUS VAC: HCPCS | Performed by: FAMILY MEDICINE

## 2023-12-04 RX ORDER — DENOSUMAB 60 MG/ML
60 INJECTION SUBCUTANEOUS
Qty: 1 ML | Refills: 0 | Status: SHIPPED | OUTPATIENT
Start: 2023-12-04

## 2023-12-04 NOTE — ASSESSMENT & PLAN NOTE
Patient presenting for regular 3 month follow up, blood pressure well controlled today at 138/83. Review of Home BP log, one isolated hypotensive measurement recorded at 86/65 with MAP 65, all other pressures within acceptable ranges. Will maintain current blood pressure medications. Continue Hydrochlorothiazide 12.5, Losartan 50mg, Metoprolol 100mg.

## 2023-12-04 NOTE — PROGRESS NOTES
Name: Nelia Scott      : 1960      MRN: [id number]  Encounter Provider: Carmelo Yang MD  Encounter Date: 2023   Encounter department: [address]     1. Benign essential hypertension  Assessment & Plan:  Patient presenting for regular 3 month follow up, blood pressure well controlled today at 138/83. Review of Home BP log, one isolated hypotensive measurement recorded at 86/65 with MAP 65, all other pressures within acceptable ranges. Will maintain current blood pressure medications. Continue Hydrochlorothiazide 12.5, Losartan 50mg, Metoprolol 100mg. 2. Encounter for immunization  -     influenza vaccine, quadrivalent, 0.5 mL, preservative-free, for adult and pediatric patients 6 mos+ (AFLURIA, FLUARIX, FLULAVAL, FLUZONE)    3. Osteoporosis, unspecified osteoporosis type, unspecified pathological fracture presence  -     denosumab (Prolia) 60 mg/mL; Inject 1 mL (60 mg total) under the skin every 6 (six) months           Subjective      Patient presents for 12 week follow up from [facility] No acute complaints today, no acute concerns. Caregiver is present with patient today and notes no new changes since previous visit. Patient has been evaluated by her psychologist since previous visit, who finds her current care situation appropriate and her level of functioning consistent with prior. She has recently been evaluated by her eye doctor who has noted she is stable for her findings of hyperopia, nystagmus and cataracts with right sided hyper exotropia. Her caregiver is requesting her flu shot today as well as a refill for her regular prolia injections. She endorses that she still has the labwork that was ordered at patients' annual physical and will be filling it this month.        Review of Systems   Unable to perform ROS: Other (Cerebral Palsy, Low verbal functioning)       Current Outpatient Medications on File Prior to Visit   Medication Sig    baclofen 10 mg tablet Take 1 tablet (10 mg total) by mouth 2 (two) times a day    benzonatate (TESSALON PERLES) 100 mg capsule Take 1 capsule by mouth every 8 hours as needed for dry cough    Calcium Citrate 200 MG TABS Take by mouth 3 TABS TWICE A DAY     carbamide peroxide (DEBROX) 6.5 % otic solution Instill 5 drops in affected ear twice daily for 4 days as needed for ear wax    cholecalciferol (VITAMIN D3) 1,000 units tablet Take 1 capsule by mouth daily    clotrimazole-betamethasone (LOTRISONE) 1-0.05 % cream APPLY TOPICALLY TO AFFECTED AREA OF SKIN TWICE A DAY AS NEEDED FOR IRRITATION FROM DIAPER    famotidine (PEPCID) 20 mg tablet Take 1 tablet (20 mg total) by mouth daily at bedtime    fluticasone (FLONASE) 50 mcg/act nasal spray USE 1 SPRAY IN EACH NOSTRIL TWICE A DAY (RHINITIS)    GNP 8 Hour Arthritis Relief 650 MG CR tablet TAKE 1 TABLET BY MOUTH EVERY 6 HOURS AS NEEDED FOR MILD PAIN OR TEMPERATURE  >100.4    hydrochlorothiazide (HYDRODIURIL) 25 mg tablet Take 0.5 tablets (12.5 mg total) by mouth daily    ibuprofen (MOTRIN) 600 mg tablet Take by mouth every 6 (six) hours as needed for fever Fever greater than 101.4F    ipratropium (ATROVENT) 0.06 % nasal spray 2 sprays into each nostril 3 (three) times a day as needed for rhinitis    loratadine (CLARITIN) 10 mg tablet TAKE 1 TABLET BY MOUTH DAILY (ALLERGIC RHINITIS)    losartan (COZAAR) 50 mg tablet Take 1 tablet (50 mg total) by mouth daily    metoprolol succinate (TOPROL-XL) 100 mg 24 hr tablet Take 1 tablet by mouth daily    Mucus Relief 600 MG 12 hr tablet TAKE 1 TABLET BY MOUTH EVERY 12 HOURS AS NEEDED FOR CONGESTION **DO NOT CRUSH**    nystatin-triamcinolone (MYCOLOG-II) ointment Apply topically 2 (two) times a day as needed (As needed for rash)    olopatadine HCl (PATADAY) 0.2 % opth drops Instill 1 drop into affected eye(s) daily PRN irritation    PHENobarbital 32.4 mg tablet Take 3 tablets by mouth at 4 pm polyethylene glycol (GLYCOLAX) 17 GM/SCOOP powder 1 cap dissolve in 8 ox of fluid po daily (hold for loose stools). Constipation. polyethylene glycol (GLYCOLAX) 17 GM/SCOOP powder 1 tsp in 4 oz of fluid po PRN every 3 days if no BM. Constipation. selenium sulfide (SELSUN) 1 % Apply topically 2 (two) times a week Apply twice weekly as needed when with scalp irritation. simvastatin (ZOCOR) 20 mg tablet Take 1 tablet by mouth daily    Vitamins A & D (vitamin A & D) ointment Apply 1 application topically    zolpidem (AMBIEN) 5 mg tablet Take 0.5 tablets (2.5 mg total) by mouth daily    [DISCONTINUED] denosumab (Prolia) 60 mg/mL Inject 1 mL (60 mg total) under the skin every 6 (six) months    acetaminophen (TYLENOL) 650 mg CR tablet Take 650 mg by mouth every 8 (eight) hours as needed (Patient not taking: Reported on 12/4/2023)    LORazepam (ATIVAN) 0.5 mg tablet Take 1 tablet (0.5 mg total) by mouth once for 1 dose    losartan (COZAAR) 100 MG tablet  (Patient not taking: Reported on 8/14/2023)    valACYclovir (VALTREX) 1,000 mg tablet Take 1 tablet (1,000 mg total) by mouth 3 (three) times a day for 7 days       Objective     /83 (BP Location: Left arm, Patient Position: Sitting, Cuff Size: Standard)   Pulse 90   Temp 98.7 °F (37.1 °C) (Temporal)   Resp 18   Ht 5' (1.524 m)   Wt 75.8 kg (167 lb)   LMP 02/28/2010 (Approximate)   SpO2 98%   BMI 32.61 kg/m²     Physical Exam  Constitutional:       General: She is not in acute distress. Appearance: She is obese. She is not ill-appearing. HENT:      Head: Normocephalic and atraumatic. Nose: Nose normal.   Eyes:      Conjunctiva/sclera: Conjunctivae normal.   Cardiovascular:      Rate and Rhythm: Regular rhythm. Heart sounds: Normal heart sounds. No murmur heard. Pulmonary:      Effort: No respiratory distress. Breath sounds: Normal breath sounds. Abdominal:      General: Bowel sounds are normal. There is no distension. Palpations: Abdomen is soft. Musculoskeletal:         General: Normal range of motion. Cervical back: Normal range of motion. Skin:     General: Skin is warm and dry. Neurological:      General: No focal deficit present. Mental Status: She is alert. Mental status is at baseline.    Psychiatric:         Mood and Affect: Mood normal.         Behavior: Behavior normal.       Lata Shields MD

## 2023-12-08 DIAGNOSIS — F51.01 PRIMARY INSOMNIA: ICD-10-CM

## 2023-12-12 RX ORDER — ZOLPIDEM TARTRATE 5 MG/1
TABLET ORAL
Qty: 15 TABLET | Refills: 5 | Status: SHIPPED | OUTPATIENT
Start: 2023-12-12

## 2023-12-15 ENCOUNTER — APPOINTMENT (OUTPATIENT)
Dept: LAB | Facility: CLINIC | Age: 63
End: 2023-12-15
Payer: MEDICARE

## 2023-12-15 DIAGNOSIS — E78.5 HYPERLIPIDEMIA, UNSPECIFIED HYPERLIPIDEMIA TYPE: ICD-10-CM

## 2023-12-15 DIAGNOSIS — Z00.00 MEDICARE ANNUAL WELLNESS VISIT, SUBSEQUENT: ICD-10-CM

## 2023-12-15 LAB
ALBUMIN SERPL BCP-MCNC: 4 G/DL (ref 3.5–5)
ALP SERPL-CCNC: 77 U/L (ref 34–104)
ALT SERPL W P-5'-P-CCNC: 13 U/L (ref 7–52)
ANION GAP SERPL CALCULATED.3IONS-SCNC: 7 MMOL/L
AST SERPL W P-5'-P-CCNC: 18 U/L (ref 13–39)
BILIRUB SERPL-MCNC: 0.33 MG/DL (ref 0.2–1)
BUN SERPL-MCNC: 19 MG/DL (ref 5–25)
CALCIUM SERPL-MCNC: 8.9 MG/DL (ref 8.4–10.2)
CHLORIDE SERPL-SCNC: 102 MMOL/L (ref 96–108)
CHOLEST SERPL-MCNC: 173 MG/DL
CO2 SERPL-SCNC: 29 MMOL/L (ref 21–32)
CREAT SERPL-MCNC: 0.55 MG/DL (ref 0.6–1.3)
ERYTHROCYTE [DISTWIDTH] IN BLOOD BY AUTOMATED COUNT: 13 % (ref 11.6–15.1)
GFR SERPL CREATININE-BSD FRML MDRD: 99 ML/MIN/1.73SQ M
GLUCOSE P FAST SERPL-MCNC: 98 MG/DL (ref 65–99)
HCT VFR BLD AUTO: 43.7 % (ref 34.8–46.1)
HDLC SERPL-MCNC: 64 MG/DL
HGB BLD-MCNC: 14.8 G/DL (ref 11.5–15.4)
LDLC SERPL CALC-MCNC: 97 MG/DL (ref 0–100)
MCH RBC QN AUTO: 33.3 PG (ref 26.8–34.3)
MCHC RBC AUTO-ENTMCNC: 33.9 G/DL (ref 31.4–37.4)
MCV RBC AUTO: 98 FL (ref 82–98)
PHENOBARB SERPL-MCNC: 19.5 UG/ML (ref 10–40)
PLATELET # BLD AUTO: 232 THOUSANDS/UL (ref 149–390)
PMV BLD AUTO: 10 FL (ref 8.9–12.7)
POTASSIUM SERPL-SCNC: 4 MMOL/L (ref 3.5–5.3)
PROT SERPL-MCNC: 7 G/DL (ref 6.4–8.4)
RBC # BLD AUTO: 4.44 MILLION/UL (ref 3.81–5.12)
SODIUM SERPL-SCNC: 138 MMOL/L (ref 135–147)
TRIGL SERPL-MCNC: 62 MG/DL
TSH SERPL DL<=0.05 MIU/L-ACNC: 2.75 UIU/ML (ref 0.45–4.5)
WBC # BLD AUTO: 7.7 THOUSAND/UL (ref 4.31–10.16)

## 2023-12-15 PROCEDURE — 80053 COMPREHEN METABOLIC PANEL: CPT

## 2023-12-15 PROCEDURE — 84443 ASSAY THYROID STIM HORMONE: CPT

## 2023-12-15 PROCEDURE — 85027 COMPLETE CBC AUTOMATED: CPT

## 2023-12-15 PROCEDURE — 80061 LIPID PANEL: CPT

## 2023-12-15 PROCEDURE — 36415 COLL VENOUS BLD VENIPUNCTURE: CPT

## 2023-12-16 ENCOUNTER — TELEPHONE (OUTPATIENT)
Dept: FAMILY MEDICINE CLINIC | Facility: CLINIC | Age: 63
End: 2023-12-16

## 2023-12-17 NOTE — TELEPHONE ENCOUNTER
Called pt's group home. Night nurse answered. Stated pt's labs were wnl (listed out each lab test). Night nurse noted for day nurse. They may see lab results via mychart or fax.

## 2023-12-22 ENCOUNTER — CLINICAL SUPPORT (OUTPATIENT)
Dept: FAMILY MEDICINE CLINIC | Facility: CLINIC | Age: 63
End: 2023-12-22

## 2023-12-22 ENCOUNTER — TELEPHONE (OUTPATIENT)
Dept: FAMILY MEDICINE CLINIC | Facility: CLINIC | Age: 63
End: 2023-12-22

## 2023-12-22 DIAGNOSIS — M81.0 AGE-RELATED OSTEOPOROSIS WITHOUT CURRENT PATHOLOGICAL FRACTURE: Primary | ICD-10-CM

## 2023-12-22 PROCEDURE — 96372 THER/PROPH/DIAG INJ SC/IM: CPT

## 2023-12-22 NOTE — TELEPHONE ENCOUNTER
Patient presented to office for a nurse visit accompanied by [facility] group home nurse. Patient presented home brought Prolia. LOT #[id number]. EXP: 3/31/2026. Verified patient's calcium and GFR levels, within normal limits. Patient's last injection was given 6/20/23, patient is eligible to receive injection today. Prolia injected subcutaneously in the right arm. Patient tolerated well. Advised Dawn that patient should return in 6 months for next injection. Provided a copy of the post prolia labs that should be completed in 2 weeks. No questions at this time.

## 2023-12-22 NOTE — PROGRESS NOTES
Patient presented to office for a nurse visit accompanied by [facility] group home nurse. Patient presented home brought Prolia. LOT #[id number]. EXP: 3/31/2026. Verified patient's calcium and GFR levels, within normal limits. Patient's last injection was given 6/20/23, patient is eligible to receive injection today. Prolia injected subcutaneously in the right arm. Patient tolerated well. Advised Dawn that patient should return in 6 months for next injection. Provided a copy of the post prolia labs that should be completed in 2 weeks. No questions at this time.

## 2024-01-04 ENCOUNTER — APPOINTMENT (OUTPATIENT)
Dept: LAB | Facility: CLINIC | Age: 64
End: 2024-01-04
Payer: MEDICARE

## 2024-01-04 DIAGNOSIS — M81.0 AGE-RELATED OSTEOPOROSIS WITHOUT CURRENT PATHOLOGICAL FRACTURE: ICD-10-CM

## 2024-01-04 LAB
ANION GAP SERPL CALCULATED.3IONS-SCNC: 7 MMOL/L
BUN SERPL-MCNC: 15 MG/DL (ref 5–25)
CALCIUM SERPL-MCNC: 8.7 MG/DL (ref 8.4–10.2)
CHLORIDE SERPL-SCNC: 102 MMOL/L (ref 96–108)
CO2 SERPL-SCNC: 29 MMOL/L (ref 21–32)
CREAT SERPL-MCNC: 0.42 MG/DL (ref 0.6–1.3)
GFR SERPL CREATININE-BSD FRML MDRD: 108 ML/MIN/1.73SQ M
GLUCOSE P FAST SERPL-MCNC: 89 MG/DL (ref 65–99)
POTASSIUM SERPL-SCNC: 3.8 MMOL/L (ref 3.5–5.3)
SODIUM SERPL-SCNC: 138 MMOL/L (ref 135–147)

## 2024-01-04 PROCEDURE — 80048 BASIC METABOLIC PNL TOTAL CA: CPT

## 2024-01-04 PROCEDURE — 36415 COLL VENOUS BLD VENIPUNCTURE: CPT

## 2024-01-12 ENCOUNTER — TELEPHONE (OUTPATIENT)
Dept: FAMILY MEDICINE CLINIC | Facility: CLINIC | Age: 64
End: 2024-01-12

## 2024-01-12 NOTE — TELEPHONE ENCOUNTER
Folder (to be completed by): Dr. Velez    Name of Form: [employer] CMN #[id number]    Color Folder: Sherin    Form to be faxed to:  [phone]

## 2024-01-31 ENCOUNTER — CLINICAL SUPPORT (OUTPATIENT)
Dept: FAMILY MEDICINE CLINIC | Facility: CLINIC | Age: 64
End: 2024-01-31

## 2024-01-31 DIAGNOSIS — Z23 NEED FOR COVID-19 VACCINE: Primary | ICD-10-CM

## 2024-01-31 PROCEDURE — 90480 ADMN SARSCOV2 VAC 1/ONLY CMP: CPT

## 2024-01-31 PROCEDURE — 91320 SARSCV2 VAC 30MCG TRS-SUC IM: CPT

## 2024-02-05 ENCOUNTER — HOSPITAL ENCOUNTER (OUTPATIENT)
Dept: RADIOLOGY | Age: 64
Discharge: HOME | End: 2024-02-05
Payer: MEDICARE

## 2024-02-05 VITALS — BODY MASS INDEX: 32.79 KG/M2 | WEIGHT: 167 LBS | HEIGHT: 60 IN

## 2024-02-05 DIAGNOSIS — Z12.31 VISIT FOR SCREENING MAMMOGRAM: ICD-10-CM

## 2024-02-05 PROCEDURE — 77063 BREAST TOMOSYNTHESIS BI: CPT

## 2024-02-05 PROCEDURE — 77067 SCR MAMMO BI INCL CAD: CPT

## 2024-02-21 PROBLEM — Z00.00 HEALTHCARE MAINTENANCE: Status: RESOLVED | Noted: 2018-04-23 | Resolved: 2024-02-21

## 2024-02-21 PROBLEM — Z01.419 WOMEN'S ANNUAL ROUTINE GYNECOLOGICAL EXAMINATION: Status: RESOLVED | Noted: 2018-01-31 | Resolved: 2024-02-21

## 2024-02-21 PROBLEM — Z12.39 BREAST CANCER SCREENING: Status: RESOLVED | Noted: 2018-12-03 | Resolved: 2024-02-21

## 2024-02-26 ENCOUNTER — OFFICE VISIT (OUTPATIENT)
Dept: FAMILY MEDICINE CLINIC | Facility: CLINIC | Age: 64
End: 2024-02-26

## 2024-02-26 VITALS
BODY MASS INDEX: 32.22 KG/M2 | OXYGEN SATURATION: 96 % | HEART RATE: 102 BPM | TEMPERATURE: 97.6 F | SYSTOLIC BLOOD PRESSURE: 168 MMHG | WEIGHT: 165 LBS | DIASTOLIC BLOOD PRESSURE: 106 MMHG

## 2024-02-26 DIAGNOSIS — Z11.1 SCREENING FOR TUBERCULOSIS: ICD-10-CM

## 2024-02-26 DIAGNOSIS — I10 BENIGN ESSENTIAL HYPERTENSION: Primary | ICD-10-CM

## 2024-02-26 DIAGNOSIS — E55.9 VITAMIN D DEFICIENCY: ICD-10-CM

## 2024-02-26 PROBLEM — B02.9 HERPES ZOSTER WITHOUT COMPLICATION: Status: RESOLVED | Noted: 2023-04-24 | Resolved: 2024-02-26

## 2024-02-26 PROBLEM — R79.89 ELEVATED TSH: Status: RESOLVED | Noted: 2021-03-02 | Resolved: 2024-02-26

## 2024-02-26 PROBLEM — B02.39 SHINGLES OF EYELID: Status: RESOLVED | Noted: 2022-12-27 | Resolved: 2024-02-26

## 2024-02-26 PROBLEM — Z01.818 ENCOUNTER FOR PREOPERATIVE DENTAL EXAMINATION: Status: RESOLVED | Noted: 2020-11-10 | Resolved: 2024-02-26

## 2024-02-26 PROBLEM — Z20.822 CLOSE EXPOSURE TO COVID-19 VIRUS: Status: RESOLVED | Noted: 2021-01-11 | Resolved: 2024-02-26

## 2024-02-26 PROBLEM — Z11.59 ENCOUNTER FOR HEPATITIS C SCREENING TEST FOR LOW RISK PATIENT: Status: RESOLVED | Noted: 2018-04-23 | Resolved: 2024-02-26

## 2024-02-26 PROBLEM — U07.1 COVID-19: Status: RESOLVED | Noted: 2022-11-01 | Resolved: 2024-02-26

## 2024-02-26 PROCEDURE — 86580 TB INTRADERMAL TEST: CPT | Performed by: FAMILY MEDICINE

## 2024-02-26 PROCEDURE — 99213 OFFICE O/P EST LOW 20 MIN: CPT | Performed by: FAMILY MEDICINE

## 2024-02-26 NOTE — ASSESSMENT & PLAN NOTE
Reviewed patient lab work, patient continues on Vitamin D supplementation. Last evaluated in July 2022, should be do for repeat bloodwork at annual physical.

## 2024-02-26 NOTE — PROGRESS NOTES
Name: Katerin London      : 1960      MRN: [id number]  Encounter Provider: Lauro Rendon MD  Encounter Date: 2024   Encounter department: [facility]    Assessment & Plan     1. Benign essential hypertension  Assessment & Plan:  Patient presenting for 12w follow up on hypertension. Today, BP is Blood Pressure: (!) 168/106. Patient notable tensing during examination. Reviewed home blood pressure logs, they are more consistent with a well controlled blood pressure with regular pressures between 110-30s over 70-90d. Continue with current blood pressure medications.   Continue Hydrochlorothiazide 12.5mg, Losartan 50mg, Metoprolol 100mg.       2. Screening for tuberculosis  -     TB Skin Test    3. Vitamin D deficiency  Assessment & Plan:  Reviewed patient lab work, patient continues on Vitamin D supplementation. Last evaluated in 2022, should be do for repeat bloodwork at annual physical.              Subjective      Patient presents for 12 week follow up on hypertension. At her previous visit it was noted she had a hypotensive episode on her current medication regimen, however since that time blood pressures have been well controlled, no concerns from caregiver. Patient's behavior has been at her baseline and she has been well behaved at home. Patient is due for tuberculosis screening by time of next physical and caregiver requesting one at this time. Otherwise, no acute concerns.       Review of Systems   Unable to perform ROS: Other       Current Outpatient Medications on File Prior to Visit   Medication Sig    acetaminophen (TYLENOL) 650 mg CR tablet Take 650 mg by mouth every 8 (eight) hours as needed    baclofen 10 mg tablet Take 1 tablet (10 mg total) by mouth 2 (two) times a day    benzonatate (TESSALON PERLES) 100 mg capsule Take 1 capsule by mouth every 8 hours as needed for dry cough    Calcium Citrate 200 MG TABS Take by mouth 3 TABS TWICE A  DAY     carbamide peroxide (DEBROX) 6.5 % otic solution Instill 5 drops in affected ear twice daily for 4 days as needed for ear wax    cholecalciferol (VITAMIN D3) 1,000 units tablet Take 1 capsule by mouth daily    clotrimazole-betamethasone (LOTRISONE) 1-0.05 % cream APPLY TOPICALLY TO AFFECTED AREA OF SKIN TWICE A DAY AS NEEDED FOR IRRITATION FROM DIAPER    denosumab (Prolia) 60 mg/mL Inject 1 mL (60 mg total) under the skin every 6 (six) months    famotidine (PEPCID) 20 mg tablet Take 1 tablet (20 mg total) by mouth daily at bedtime    fluticasone (FLONASE) 50 mcg/act nasal spray USE 1 SPRAY IN EACH NOSTRIL TWICE A DAY (RHINITIS)    GNP 8 Hour Arthritis Relief 650 MG CR tablet TAKE 1 TABLET BY MOUTH EVERY 6 HOURS AS NEEDED FOR MILD PAIN OR TEMPERATURE  >100.4    hydrochlorothiazide (HYDRODIURIL) 25 mg tablet Take 0.5 tablets (12.5 mg total) by mouth daily    ibuprofen (MOTRIN) 600 mg tablet Take by mouth every 6 (six) hours as needed for fever Fever greater than 101.4F    ipratropium (ATROVENT) 0.06 % nasal spray 2 sprays into each nostril 3 (three) times a day as needed for rhinitis    loratadine (CLARITIN) 10 mg tablet TAKE 1 TABLET BY MOUTH DAILY (ALLERGIC RHINITIS)    losartan (COZAAR) 100 MG tablet     losartan (COZAAR) 50 mg tablet Take 1 tablet (50 mg total) by mouth daily    metoprolol succinate (TOPROL-XL) 100 mg 24 hr tablet Take 1 tablet by mouth daily    Mucus Relief 600 MG 12 hr tablet TAKE 1 TABLET BY MOUTH EVERY 12 HOURS AS NEEDED FOR CONGESTION **DO NOT CRUSH**    nystatin-triamcinolone (MYCOLOG-II) ointment Apply topically 2 (two) times a day as needed (As needed for rash)    olopatadine HCl (PATADAY) 0.2 % opth drops Instill 1 drop into affected eye(s) daily PRN irritation    PHENobarbital 32.4 mg tablet Take 3 tablets by mouth at 4 pm    polyethylene glycol (GLYCOLAX) 17 GM/SCOOP powder 1 cap dissolve in 8 ox of fluid po daily (hold for loose stools). Constipation.    polyethylene glycol  (GLYCOLAX) 17 GM/SCOOP powder 1 tsp in 4 oz of fluid po PRN every 3 days if no BM. Constipation.    selenium sulfide (SELSUN) 1 % Apply topically 2 (two) times a week Apply twice weekly as needed when with scalp irritation.    simvastatin (ZOCOR) 20 mg tablet Take 1 tablet by mouth daily    Vitamins A & D (vitamin A & D) ointment Apply 1 application topically    zolpidem (AMBIEN) 5 mg tablet TAKE 1/2 TABLET BY MOUTH (2.5MG) DAILY *PLEASE RE-ORDER* INSOMNIA    LORazepam (ATIVAN) 0.5 mg tablet Take 1 tablet (0.5 mg total) by mouth once for 1 dose    [DISCONTINUED] valACYclovir (VALTREX) 1,000 mg tablet Take 1 tablet (1,000 mg total) by mouth 3 (three) times a day for 7 days       Objective     BP (!) 168/106 (BP Location: Left arm, Patient Position: Sitting, Cuff Size: Standard)   Pulse 102   Temp 97.6 °F (36.4 °C) (Temporal)   Wt 74.8 kg (165 lb) Comment: Provided by caregiver  LMP 02/28/2010 (Approximate)   SpO2 96%   BMI 32.22 kg/m²     Physical Exam  Constitutional:       General: She is not in acute distress.     Appearance: She is obese. She is not ill-appearing.   HENT:      Head: Normocephalic and atraumatic.      Nose: Nose normal.   Eyes:      Conjunctiva/sclera: Conjunctivae normal.   Cardiovascular:      Rate and Rhythm: Regular rhythm.      Heart sounds: Normal heart sounds. No murmur heard.  Pulmonary:      Effort: No respiratory distress.      Breath sounds: Normal breath sounds.   Abdominal:      General: Bowel sounds are normal. There is no distension.      Palpations: Abdomen is soft.   Musculoskeletal:         General: Normal range of motion.      Cervical back: Normal range of motion.      Comments: Contractures of upper extremities   Skin:     General: Skin is warm and dry.   Neurological:      General: No focal deficit present.      Mental Status: She is alert. Mental status is at baseline.   Psychiatric:         Mood and Affect: Mood normal.         Behavior: Behavior normal.       Lauro  Anjum Rendon MD

## 2024-02-26 NOTE — ASSESSMENT & PLAN NOTE
Patient presenting for 12w follow up on hypertension. Today, BP is Blood Pressure: (!) 168/106. Patient notable tensing during examination. Reviewed home blood pressure logs, they are more consistent with a well controlled blood pressure with regular pressures between 110-30s over 70-90d. Continue with current blood pressure medications.   Continue Hydrochlorothiazide 12.5mg, Losartan 50mg, Metoprolol 100mg.

## 2024-03-04 ENCOUNTER — OFFICE VISIT (OUTPATIENT)
Dept: FAMILY MEDICINE CLINIC | Facility: CLINIC | Age: 64
End: 2024-03-04

## 2024-03-04 ENCOUNTER — TELEPHONE (OUTPATIENT)
Dept: FAMILY MEDICINE CLINIC | Facility: CLINIC | Age: 64
End: 2024-03-04

## 2024-03-04 VITALS
SYSTOLIC BLOOD PRESSURE: 157 MMHG | HEART RATE: 100 BPM | OXYGEN SATURATION: 98 % | TEMPERATURE: 98.5 F | RESPIRATION RATE: 18 BRPM | BODY MASS INDEX: 32 KG/M2 | HEIGHT: 60 IN | DIASTOLIC BLOOD PRESSURE: 100 MMHG | WEIGHT: 163 LBS

## 2024-03-04 DIAGNOSIS — R06.2 WHEEZING: Primary | ICD-10-CM

## 2024-03-04 PROCEDURE — 99213 OFFICE O/P EST LOW 20 MIN: CPT | Performed by: FAMILY MEDICINE

## 2024-03-04 PROCEDURE — 87636 SARSCOV2 & INF A&B AMP PRB: CPT | Performed by: FAMILY MEDICINE

## 2024-03-04 RX ORDER — ALBUTEROL SULFATE 2.5 MG/3ML
2.5 SOLUTION RESPIRATORY (INHALATION) EVERY 6 HOURS PRN
Status: SHIPPED | OUTPATIENT
Start: 2024-03-04

## 2024-03-04 RX ORDER — ALBUTEROL SULFATE 2.5 MG/3ML
2.5 SOLUTION RESPIRATORY (INHALATION) EVERY 6 HOURS PRN
Qty: 25 ML | Refills: 0 | Status: SHIPPED | OUTPATIENT
Start: 2024-03-04 | End: 2024-03-14

## 2024-03-04 RX ADMIN — ALBUTEROL SULFATE 2.5 MG: 2.5 SOLUTION RESPIRATORY (INHALATION) at 12:09

## 2024-03-04 NOTE — PROGRESS NOTES
Name: Katerin London      : 1960      MRN: [id number]  Encounter Provider: Valeri Samayoa MD  Encounter Date: 3/4/2024   [facility]    Assessment & Plan     1. Wheezing  Assessment & Plan:  Wheezing associated with viral URI  Onset of 3 days  Will provide albuterol neb in office   Encourage increase in po hydration  Continue albuterol neb Q6 prn  Continue  use of tylenol, mucinex  RTC precautions discussed with caregiver  Consider antibiotic treatment with Azrithro if sx not improved in 3 days  Will r/o Flu and COVID        Orders:  -     albuterol (2.5 mg/3 mL) 0.083 % nebulizer solution; Take 3 mL (2.5 mg total) by nebulization every 6 (six) hours as needed for wheezing or shortness of breath  -     Nebulizer Supplies  -     Covid/Flu- Office Collect Normal; Future  -     Nebulizer  -     albuterol inhalation solution 2.5 mg  -     Covid/Flu- Office Collect Normal           Subjective     HPI  Patient here with caregiver reports not feeling well.  Per caregiver, patient has not been well since Friday. Started with cough and elevated temp. T max of 100.4 recorded yesterday evening. Also noted to be wheezing. They tried mucinex and tessalon perles with very minimal improvement in sx. Otherwise denies vomint, changes to bladder and bowel habits. Eating and drinking OK.  Per caregiver, there is positive sick contact at the home.  Caregiver reports patient has hx of similar episodes in the past. No hypoxia noted. COVID test was negative on Saturday.      Review of Systems   Constitutional:  Positive for fever. Negative for appetite change.   Respiratory:  Positive for cough. Negative for shortness of breath.    Gastrointestinal:  Negative for diarrhea and vomiting.       Past Medical History:   Diagnosis Date    Bowel incontinence     Close exposure to COVID-19 virus     Constipation     Last Assessed: 2013    COVID-19     Elevated TSH      Encounter for hepatitis C screening test for low risk patient     Encounter for preoperative dental examination     Generalized convulsive seizure (HCC)     Herpes zoster without complication     Hyperlipidemia     Obesity (BMI 30.0-34.9) 09/13/2023    Osteoporosis     Shingles of eyelid 12/27/2022    Spastic quadriplegic cerebral palsy (HCC)     Urinary incontinence     Vitamin D deficiency     Last Assessed: [id number]     Past Surgical History:   Procedure Laterality Date    ABSCESS DRAINAGE      Incision and Drainage of skin abscess back    COLONOSCOPY      Fiberoptic; Last Assessed: [id number]    EYE SURGERY Left     Strabismus Left Eye     Family History   Problem Relation Age of Onset    Lymphoma Mother         Bone Marrow    Coronary artery disease Mother     Seizures Mother         Epilepsy and recurrent seizures    Other Mother         Mitral Stenosis    Prostate cancer Father 75    Skin cancer Father     No Known Problems Sister     No Known Problems Sister     Heart disease Maternal Grandmother     Kidney cancer Maternal Grandmother     Heart disease Maternal Grandfather     Heart disease Paternal Grandmother     Breast cancer Maternal Aunt 70     Social History     Socioeconomic History    Marital status: Single     Spouse name: None    Number of children: None    Years of education: None    Highest education level: High school graduate   Occupational History    Occupation: none   Tobacco Use    Smoking status: Never    Smokeless tobacco: Never   Vaping Use    Vaping status: Never Used   Substance and Sexual Activity    Alcohol use: No    Drug use: No    Sexual activity: Never   Other Topics Concern    None   Social History Narrative    Caffeine use    Wheelchair dependent     Social Determinants of Health     Financial Resource Strain: Low Risk  (3/4/2024)    Overall Financial Resource Strain (CARDIA)     Difficulty of Paying Living Expenses: Not hard at all   Food Insecurity: No Food Insecurity  (3/4/2024)    Hunger Vital Sign     Worried About Running Out of Food in the Last Year: Never true     Ran Out of Food in the Last Year: Never true   Transportation Needs: No Transportation Needs (3/4/2024)    PRAPARE - Transportation     Lack of Transportation (Medical): No     Lack of Transportation (Non-Medical): No   Physical Activity: Inactive (2/12/2020)    Exercise Vital Sign     Days of Exercise per Week: 0 days     Minutes of Exercise per Session: 0 min   Stress: No Stress Concern Present (3/4/2024)    [employer] - Occupational Stress Questionnaire     Feeling of Stress : Not at all   Social Connections: Unknown (2/12/2020)    Social Connection and Isolation Panel [NHANES]     Frequency of Communication with Friends and Family: Patient declined     Frequency of Social Gatherings with Friends and Family: Patient declined     Attends Church Services: Patient declined     Active Member of Clubs or Organizations: Patient declined     Attends Club or Organization Meetings: Patient declined     Marital Status: Patient declined   Intimate Partner Violence: Not At Risk (3/4/2024)    Humiliation, Afraid, Rape, and Kick questionnaire     Fear of Current or Ex-Partner: No     Emotionally Abused: No     Physically Abused: No     Sexually Abused: No   Housing Stability: Low Risk  (3/4/2024)    Housing Stability Vital Sign     Unable to Pay for Housing in the Last Year: No     Number of Places Lived in the Last Year: 1     Unstable Housing in the Last Year: No     Current Outpatient Medications on File Prior to Visit   Medication Sig    acetaminophen (TYLENOL) 650 mg CR tablet Take 650 mg by mouth every 8 (eight) hours as needed    baclofen 10 mg tablet Take 1 tablet (10 mg total) by mouth 2 (two) times a day    benzonatate (TESSALON PERLES) 100 mg capsule Take 1 capsule by mouth every 8 hours as needed for dry cough    Calcium Citrate 200 MG TABS Take by mouth 3 TABS TWICE A DAY      carbamide peroxide (DEBROX) 6.5 % otic solution Instill 5 drops in affected ear twice daily for 4 days as needed for ear wax    cholecalciferol (VITAMIN D3) 1,000 units tablet Take 1 capsule by mouth daily    clotrimazole-betamethasone (LOTRISONE) 1-0.05 % cream APPLY TOPICALLY TO AFFECTED AREA OF SKIN TWICE A DAY AS NEEDED FOR IRRITATION FROM DIAPER    denosumab (Prolia) 60 mg/mL Inject 1 mL (60 mg total) under the skin every 6 (six) months    famotidine (PEPCID) 20 mg tablet Take 1 tablet (20 mg total) by mouth daily at bedtime    fluticasone (FLONASE) 50 mcg/act nasal spray USE 1 SPRAY IN EACH NOSTRIL TWICE A DAY (RHINITIS)    GNP 8 Hour Arthritis Relief 650 MG CR tablet TAKE 1 TABLET BY MOUTH EVERY 6 HOURS AS NEEDED FOR MILD PAIN OR TEMPERATURE  >100.4    hydrochlorothiazide (HYDRODIURIL) 25 mg tablet Take 0.5 tablets (12.5 mg total) by mouth daily    ibuprofen (MOTRIN) 600 mg tablet Take by mouth every 6 (six) hours as needed for fever Fever greater than 101.4F    ipratropium (ATROVENT) 0.06 % nasal spray 2 sprays into each nostril 3 (three) times a day as needed for rhinitis    loratadine (CLARITIN) 10 mg tablet TAKE 1 TABLET BY MOUTH DAILY (ALLERGIC RHINITIS)    losartan (COZAAR) 100 MG tablet     losartan (COZAAR) 50 mg tablet Take 1 tablet (50 mg total) by mouth daily    metoprolol succinate (TOPROL-XL) 100 mg 24 hr tablet Take 1 tablet by mouth daily    Mucus Relief 600 MG 12 hr tablet TAKE 1 TABLET BY MOUTH EVERY 12 HOURS AS NEEDED FOR CONGESTION **DO NOT CRUSH**    nystatin-triamcinolone (MYCOLOG-II) ointment Apply topically 2 (two) times a day as needed (As needed for rash)    olopatadine HCl (PATADAY) 0.2 % opth drops Instill 1 drop into affected eye(s) daily PRN irritation    PHENobarbital 32.4 mg tablet Take 3 tablets by mouth at 4 pm    polyethylene glycol (GLYCOLAX) 17 GM/SCOOP powder 1 cap dissolve in 8 ox of fluid po daily (hold for loose stools). Constipation.    polyethylene glycol (GLYCOLAX)  17 GM/SCOOP powder 1 tsp in 4 oz of fluid po PRN every 3 days if no BM. Constipation.    selenium sulfide (SELSUN) 1 % Apply topically 2 (two) times a week Apply twice weekly as needed when with scalp irritation.    simvastatin (ZOCOR) 20 mg tablet Take 1 tablet by mouth daily    Vitamins A & D (vitamin A & D) ointment Apply 1 application topically    zolpidem (AMBIEN) 5 mg tablet TAKE 1/2 TABLET BY MOUTH (2.5MG) DAILY *PLEASE RE-ORDER* INSOMNIA    LORazepam (ATIVAN) 0.5 mg tablet Take 1 tablet (0.5 mg total) by mouth once for 1 dose     Allergies   Allergen Reactions    Other      Seasonal Allergies     Immunization History   Administered Date(s) Administered    COVID-19 PFIZER VACCINE 0.3 ML IM 01/22/2021, 02/12/2021, 11/16/2021, 08/26/2022, 01/31/2024    Hep B, adult 02/24/2015, 04/09/2015, 09/11/2015    Influenza Quadrivalent Preservative Free 3 years and older IM 10/31/2014, 11/06/2015    Influenza Quadrivalent, 6-35 Months IM 11/08/2016    Influenza, injectable, quadrivalent, preservative free 0.5 mL 11/01/2018, 12/04/2023    Influenza, recombinant, quadrivalent,injectable, preservative free 10/29/2019, 10/09/2020, 11/30/2021, 12/06/2022    Influenza, seasonal, injectable 11/14/2012, 11/18/2013    Tdap 12/06/2011, 07/02/2021    Tuberculin Skin Test-PPD Intradermal 02/13/2013, 12/02/2014, 08/11/2016, 07/23/2018, 06/24/2020, 05/26/2022, 02/26/2024    Zoster Vaccine Recombinant 07/14/2020, 03/16/2021       Objective     /100 (BP Location: Left arm, Patient Position: Sitting, Cuff Size: Standard)   Pulse 100   Temp 98.5 °F (36.9 °C) (Temporal)   Resp 18   Ht 5' (1.524 m)   Wt 73.9 kg (163 lb)   LMP 02/28/2010 (Approximate)   SpO2 98%   BMI 31.83 kg/m²     Physical Exam  Constitutional:       Appearance: She is obese.   HENT:      Right Ear: There is impacted cerumen.      Left Ear: There is impacted cerumen.      Mouth/Throat:      Mouth: Mucous membranes are moist.      Pharynx: Oropharynx is clear.  No oropharyngeal exudate or posterior oropharyngeal erythema.   Cardiovascular:      Rate and Rhythm: Normal rate.   Pulmonary:      Effort: Pulmonary effort is normal.      Breath sounds: Wheezing (diffuse with interspearsed rhonchi) present.      Comments: Patient having bouts of coughing  Lymphadenopathy:      Cervical: No cervical adenopathy.       Valeri Samayoa MD

## 2024-03-04 NOTE — ASSESSMENT & PLAN NOTE
Wheezing associated with viral URI  Onset of 3 days  Will provide albuterol neb in office   Encourage increase in po hydration  Continue albuterol neb Q6 prn  Continue  use of tylenol, mucinex  RTC precautions discussed with caregiver  Consider antibiotic treatment with Azrithro if sx not improved in 3 days  Will r/o Flu and COVID

## 2024-03-05 LAB
FLUAV RNA RESP QL NAA+PROBE: NEGATIVE
FLUBV RNA RESP QL NAA+PROBE: NEGATIVE
SARS-COV-2 RNA RESP QL NAA+PROBE: NEGATIVE

## 2024-03-06 ENCOUNTER — TELEPHONE (OUTPATIENT)
Dept: FAMILY MEDICINE CLINIC | Facility: CLINIC | Age: 64
End: 2024-03-06

## 2024-03-06 NOTE — TELEPHONE ENCOUNTER
Caregiver is returning a call with a follow up.     Please call back at your earliest convenience.

## 2024-03-06 NOTE — TELEPHONE ENCOUNTER
Called Dawn, she was following up from phone conversation with you yesterday. She is reporting patient still coughing, they are using nebulizer every 6 hours, no wheezing today. Her Rhinorrhea improving, using Atrovent. Temp coming down, today 99.4. They are using Mucinex and Tesslon Perles as ordered but she is concerned because patient had coughing episode yesterday that was very difficult for patient. Dawn is wondering if you can call her back.

## 2024-03-14 ENCOUNTER — APPOINTMENT (EMERGENCY)
Dept: RADIOLOGY | Facility: HOSPITAL | Age: 64
End: 2024-03-14
Payer: MEDICARE

## 2024-03-14 ENCOUNTER — HOSPITAL ENCOUNTER (EMERGENCY)
Facility: HOSPITAL | Age: 64
Discharge: HOME | End: 2024-03-14
Attending: EMERGENCY MEDICINE
Payer: MEDICARE

## 2024-03-14 VITALS
DIASTOLIC BLOOD PRESSURE: 75 MMHG | RESPIRATION RATE: 16 BRPM | HEART RATE: 104 BPM | SYSTOLIC BLOOD PRESSURE: 146 MMHG | TEMPERATURE: 99.2 F | OXYGEN SATURATION: 95 %

## 2024-03-14 DIAGNOSIS — R05.9 COUGH: ICD-10-CM

## 2024-03-14 DIAGNOSIS — J06.9 VIRAL URI WITH COUGH: Primary | ICD-10-CM

## 2024-03-14 DIAGNOSIS — R06.2 WHEEZING: ICD-10-CM

## 2024-03-14 DIAGNOSIS — J18.9 PNEUMONIA: ICD-10-CM

## 2024-03-14 LAB
BACTERIA UR QL AUTO: NORMAL /HPF
BILIRUB UR QL STRIP: NEGATIVE
CLARITY UR: CLEAR
COLOR UR: ABNORMAL
FLUAV RNA RESP QL NAA+PROBE: NEGATIVE
FLUBV RNA RESP QL NAA+PROBE: NEGATIVE
GLUCOSE UR STRIP-MCNC: NEGATIVE MG/DL
HGB UR QL STRIP.AUTO: ABNORMAL
KETONES UR STRIP-MCNC: NEGATIVE MG/DL
LEUKOCYTE ESTERASE UR QL STRIP: NEGATIVE
NITRITE UR QL STRIP: NEGATIVE
NON-SQ EPI CELLS URNS QL MICRO: NORMAL /HPF
PH UR STRIP.AUTO: 6 [PH]
PROT UR STRIP-MCNC: NEGATIVE MG/DL
RBC #/AREA URNS AUTO: NORMAL /HPF
RSV RNA RESP QL NAA+PROBE: NEGATIVE
SARS-COV-2 RNA RESP QL NAA+PROBE: NEGATIVE
SP GR UR STRIP.AUTO: 1.01 (ref 1–1.03)
UROBILINOGEN UR STRIP-ACNC: <2 MG/DL
WBC #/AREA URNS AUTO: NORMAL /HPF

## 2024-03-14 PROCEDURE — 81001 URINALYSIS AUTO W/SCOPE: CPT

## 2024-03-14 PROCEDURE — 99284 EMERGENCY DEPT VISIT MOD MDM: CPT

## 2024-03-14 PROCEDURE — 99284 EMERGENCY DEPT VISIT MOD MDM: CPT | Performed by: EMERGENCY MEDICINE

## 2024-03-14 PROCEDURE — 0241U HB NFCT DS VIR RESP RNA 4 TRGT: CPT

## 2024-03-14 PROCEDURE — NC001 PR NO CHARGE: Performed by: STUDENT IN AN ORGANIZED HEALTH CARE EDUCATION/TRAINING PROGRAM

## 2024-03-14 PROCEDURE — 71045 X-RAY EXAM CHEST 1 VIEW: CPT

## 2024-03-14 PROCEDURE — 94640 AIRWAY INHALATION TREATMENT: CPT

## 2024-03-14 RX ORDER — ALBUTEROL SULFATE 2.5 MG/3ML
2.5 SOLUTION RESPIRATORY (INHALATION) EVERY 6 HOURS PRN
Qty: 25 ML | Refills: 0 | Status: SHIPPED | OUTPATIENT
Start: 2024-03-14

## 2024-03-14 RX ORDER — DIPHENHYDRAMINE HCL 25 MG
25 TABLET ORAL EVERY 8 HOURS PRN
Qty: 20 TABLET | Refills: 0 | Status: SHIPPED | OUTPATIENT
Start: 2024-03-14

## 2024-03-14 RX ORDER — DIPHENHYDRAMINE HCL 25 MG
25 TABLET ORAL EVERY 8 HOURS PRN
Status: DISCONTINUED | OUTPATIENT
Start: 2024-03-14 | End: 2024-03-14

## 2024-03-14 RX ORDER — IPRATROPIUM BROMIDE 42 UG/1
2 SPRAY, METERED NASAL 3 TIMES DAILY PRN
Qty: 15 ML | Refills: 0 | Status: SHIPPED | OUTPATIENT
Start: 2024-03-14

## 2024-03-14 RX ORDER — IPRATROPIUM BROMIDE AND ALBUTEROL SULFATE 2.5; .5 MG/3ML; MG/3ML
3 SOLUTION RESPIRATORY (INHALATION)
Status: DISCONTINUED | OUTPATIENT
Start: 2024-03-14 | End: 2024-03-14

## 2024-03-14 RX ADMIN — IPRATROPIUM BROMIDE AND ALBUTEROL SULFATE 3 ML: .5; 3 SOLUTION RESPIRATORY (INHALATION) at 16:49

## 2024-03-14 NOTE — ED ATTENDING ATTESTATION
3/14/2024  I, Benjamin Farmer MD, saw and evaluated the patient. I have discussed the patient with the resident/non-physician practitioner and agree with the resident's/non-physician practitioner's findings, Plan of Care, and MDM as documented in the resident's/non-physician practitioner's note, except where noted. All available labs and Radiology studies were reviewed.  I was present for key portions of any procedure(s) performed by the resident/non-physician practitioner and I was immediately available to provide assistance.       At this point I agree with the current assessment done in the Emergency Department.  I have conducted an independent evaluation of this patient a history and physical is as follows:    63-year-old female with history of cerebral palsy brought by group home caretaker for evaluation after developing fever today.  She has had a cough for the last 1 to 2 weeks and had been improving up until about 2 days ago.  Cough then worsened and fever developed today.  History of UTI as well but no specific symptoms presently.  Caretaker would like her to be tested for UTI, will need straight cath.  Will check viral swab, chest x-ray, reevaluate.    ED Course  ED Course as of 03/17/24 2012   Thu Mar 14, 2024   1752 SARS-COV-2: Negative   1752 INFLU A PCR: Negative   1752 INFLU B PCR: Negative   1752 RSV PCR: Negative   1752 CXR within normal limits   1844 WBC, UA: 1-2   1844 Bacteria, UA: Occasional   1935 Plan discharge back to [facility].  Likely viral URI.  No evidence of bacterial pneumonia.  No evidence of UTI.         Critical Care Time  Procedures

## 2024-03-14 NOTE — ED PROVIDER NOTES
History  Chief Complaint   Patient presents with    Flu Symptoms     As per caregiver, pt started with cough/fever on 3/4, was treated and felt better, today symptoms came back developed fever of 100.4, cough persistent      The patient is a 63 year old female who presents to ED with her caretaker for evaluation of cough. PMHx cerebral palsy, quadriplegia, HTN. History limited from patient due to cerebral palsy and all history is provided by care taker. Caretaker reports the pt has had cough and congestion 2 weeks ago, but had been improving until about 2 days ago. Caretaker reports over the past 2 days pt has been coughing more and having elevated temperatures up to 100.2 today. Caretaker states the pt has been taking mucinex but had no relief of cough. She also notes the pt has had 2 episodes of explosive diarrhea and she is concerned the pt may have a UTI because of the increased temperatures and diarrhea. Denies vomiting, hematuria        Prior to Admission Medications   Prescriptions Last Dose Informant Patient Reported? Taking?   Calcium Citrate 200 MG TABS  Care Giver Yes No   Sig: Take by mouth 3 TABS TWICE A DAY    GNP 8 Hour Arthritis Relief 650 MG CR tablet  Care Giver No No   Sig: TAKE 1 TABLET BY MOUTH EVERY 6 HOURS AS NEEDED FOR MILD PAIN OR TEMPERATURE  >100.4   LORazepam (ATIVAN) 0.5 mg tablet   No No   Sig: Take 1 tablet (0.5 mg total) by mouth once for 1 dose   Mucus Relief 600 MG 12 hr tablet  Care Giver No No   Sig: TAKE 1 TABLET BY MOUTH EVERY 12 HOURS AS NEEDED FOR CONGESTION **DO NOT CRUSH**   PHENobarbital 32.4 mg tablet   No No   Sig: Take 3 tablets by mouth at 4 pm   Vitamins A & D (vitamin A & D) ointment  Care Giver Yes No   Sig: Apply 1 application topically   acetaminophen (TYLENOL) 650 mg CR tablet  Care Giver Yes No   Sig: Take 650 mg by mouth every 8 (eight) hours as needed   albuterol (2.5 mg/3 mL) 0.083 % nebulizer solution   No No   Sig: Take 3 mL (2.5 mg total) by nebulization  every 6 (six) hours as needed for wheezing or shortness of breath   albuterol (2.5 mg/3 mL) 0.083 % nebulizer solution   No Yes   Sig: Take 3 mL (2.5 mg total) by nebulization every 6 (six) hours as needed for wheezing or shortness of breath (coughing)   baclofen 10 mg tablet  Care Giver No No   Sig: Take 1 tablet (10 mg total) by mouth 2 (two) times a day   benzonatate (TESSALON PERLES) 100 mg capsule  Care Giver No No   Sig: Take 1 capsule by mouth every 8 hours as needed for dry cough   carbamide peroxide (DEBROX) 6.5 % otic solution  Care Giver No No   Sig: Instill 5 drops in affected ear twice daily for 4 days as needed for ear wax   cholecalciferol (VITAMIN D3) 1,000 units tablet  Care Giver Yes No   Sig: Take 1 capsule by mouth daily   clotrimazole-betamethasone (LOTRISONE) 1-0.05 % cream  Care Giver No No   Sig: APPLY TOPICALLY TO AFFECTED AREA OF SKIN TWICE A DAY AS NEEDED FOR IRRITATION FROM DIAPER   denosumab (Prolia) 60 mg/mL   No No   Sig: Inject 1 mL (60 mg total) under the skin every 6 (six) months   famotidine (PEPCID) 20 mg tablet  Care Giver No No   Sig: Take 1 tablet (20 mg total) by mouth daily at bedtime   fluticasone (FLONASE) 50 mcg/act nasal spray  Care Giver No No   Sig: USE 1 SPRAY IN EACH NOSTRIL TWICE A DAY (RHINITIS)   hydrochlorothiazide (HYDRODIURIL) 25 mg tablet  Care Giver No No   Sig: Take 0.5 tablets (12.5 mg total) by mouth daily   ibuprofen (MOTRIN) 600 mg tablet  Care Giver Yes No   Sig: Take by mouth every 6 (six) hours as needed for fever Fever greater than 101.4F   ipratropium (ATROVENT) 0.06 % nasal spray  Care Giver No No   Si sprays into each nostril 3 (three) times a day as needed for rhinitis   ipratropium (ATROVENT) 0.06 % nasal spray   No Yes   Si sprays into each nostril 3 (three) times a day as needed for rhinitis (congestion, post nasal drip causing cough)   loratadine (CLARITIN) 10 mg tablet  Care Giver No No   Sig: TAKE 1 TABLET BY MOUTH DAILY (ALLERGIC  RHINITIS)   losartan (COZAAR) 100 MG tablet  Care Giver Yes No   losartan (COZAAR) 50 mg tablet  Care Giver No No   Sig: Take 1 tablet (50 mg total) by mouth daily   metoprolol succinate (TOPROL-XL) 100 mg 24 hr tablet  Care Giver No No   Sig: Take 1 tablet by mouth daily   nystatin-triamcinolone (MYCOLOG-II) ointment  Care Giver No No   Sig: Apply topically 2 (two) times a day as needed (As needed for rash)   olopatadine HCl (PATADAY) 0.2 % opth drops  Care Giver No No   Sig: Instill 1 drop into affected eye(s) daily PRN irritation   polyethylene glycol (GLYCOLAX) 17 GM/SCOOP powder   No No   Si cap dissolve in 8 ox of fluid po daily (hold for loose stools). Constipation.   polyethylene glycol (GLYCOLAX) 17 GM/SCOOP powder   No No   Si tsp in 4 oz of fluid po PRN every 3 days if no BM. Constipation.   selenium sulfide (SELSUN) 1 %  Care Giver No No   Sig: Apply topically 2 (two) times a week Apply twice weekly as needed when with scalp irritation.   simvastatin (ZOCOR) 20 mg tablet  Care Giver No No   Sig: Take 1 tablet by mouth daily   zolpidem (AMBIEN) 5 mg tablet   No No   Sig: TAKE 1/2 TABLET BY MOUTH (2.5MG) DAILY *PLEASE RE-ORDER* INSOMNIA      Facility-Administered Medications Last Administration Doses Remaining   albuterol inhalation solution 2.5 mg 3/4/2024 12:09 PM           Past Medical History:   Diagnosis Date    Bowel incontinence     Close exposure to COVID-19 virus     Constipation     Last Assessed: 2013    COVID-19     Elevated TSH     Encounter for hepatitis C screening test for low risk patient     Encounter for preoperative dental examination     Generalized convulsive seizure (HCC)     Herpes zoster without complication     Hyperlipidemia     Obesity (BMI 30.0-34.9) 2023    Osteoporosis     Shingles of eyelid 2022    Spastic quadriplegic cerebral palsy (HCC)     Urinary incontinence     Vitamin D deficiency     Last Assessed: 2013       Past Surgical History:    Procedure Laterality Date    ABSCESS DRAINAGE      Incision and Drainage of skin abscess back    COLONOSCOPY      Fiberoptic; Last Assessed: [id number]    EYE SURGERY Left     Strabismus Left Eye       Family History   Problem Relation Age of Onset    Lymphoma Mother         Bone Marrow    Coronary artery disease Mother     Seizures Mother         Epilepsy and recurrent seizures    Other Mother         Mitral Stenosis    Prostate cancer Father 75    Skin cancer Father     No Known Problems Sister     No Known Problems Sister     Heart disease Maternal Grandmother     Kidney cancer Maternal Grandmother     Heart disease Maternal Grandfather     Heart disease Paternal Grandmother     Breast cancer Maternal Aunt 70     I have reviewed and agree with the history as documented.    E-Cigarette/Vaping    E-Cigarette Use Never User      E-Cigarette/Vaping Substances    Nicotine No     THC No     CBD No     Flavoring No     Other No     Unknown No      Social History     Tobacco Use    Smoking status: Never    Smokeless tobacco: Never   Vaping Use    Vaping status: Never Used   Substance Use Topics    Alcohol use: No    Drug use: No        Review of Systems   Unable to perform ROS: Patient nonverbal   Constitutional:  Positive for fever. Negative for activity change and appetite change.   HENT:  Positive for congestion and postnasal drip.    Respiratory:  Positive for cough.    Gastrointestinal:  Positive for diarrhea. Negative for blood in stool.   Genitourinary:  Negative for decreased urine volume and hematuria.   Skin:  Negative for rash and wound.       Physical Exam  ED Triage Vitals [03/14/24 1442]   Temperature Pulse Respirations Blood Pressure SpO2   99.2 °F (37.3 °C) 104 16 146/75 95 %      Temp Source Heart Rate Source Patient Position - Orthostatic VS BP Location FiO2 (%)   Axillary Monitor Sitting Left arm --      Pain Score       --             Orthostatic Vital Signs  Vitals:    03/14/24 1442   BP: 146/75    Pulse: 104   Patient Position - Orthostatic VS: Sitting       Physical Exam  Vitals and nursing note reviewed.   Constitutional:       Appearance: She is not toxic-appearing or diaphoretic.   HENT:      Head: Normocephalic and atraumatic.      Mouth/Throat:      Mouth: Mucous membranes are moist.   Cardiovascular:      Rate and Rhythm: Normal rate and regular rhythm.      Pulses: Normal pulses.      Heart sounds: Normal heart sounds.   Pulmonary:      Effort: Pulmonary effort is normal.      Breath sounds: Examination of the right-lower field reveals wheezing. Wheezing present.      Comments: Patient actively coughing during exam  Abdominal:      General: Abdomen is flat. Bowel sounds are normal.      Palpations: Abdomen is soft.      Tenderness: There is no abdominal tenderness.   Musculoskeletal:         General: No tenderness or signs of injury.      Comments: Contractures to the RUE and bilateral LE   Skin:     General: Skin is warm and dry.   Neurological:      Mental Status: She is alert. Mental status is at baseline.         ED Medications  Medications - No data to display      Diagnostic Studies  Results Reviewed       Procedure Component Value Units Date/Time    Urine Microscopic [[id number]]  (Normal) Collected: 03/14/24 1822    Lab Status: Final result Specimen: Urine, Straight Cath Updated: 03/14/24 1841     RBC, UA 1-2 /hpf      WBC, UA 1-2 /hpf      Epithelial Cells None Seen /hpf      Bacteria, UA Occasional /hpf     UA w Reflex to Microscopic w Reflex to Culture [[id number]]  (Abnormal) Collected: 03/14/24 1822    Lab Status: Final result Specimen: Urine, Straight Cath Updated: 03/14/24 1840     Color, UA Light Yellow     Clarity, UA Clear     Specific Gravity, UA 1.009     pH, UA 6.0     Leukocytes, UA Negative     Nitrite, UA Negative     Protein, UA Negative mg/dl      Glucose, UA Negative mg/dl      Ketones, UA Negative mg/dl      Urobilinogen, UA <2.0 mg/dl      Bilirubin, UA Negative     Occult  Blood, UA Small    FLU/RSV/COVID - if FLU/RSV clinically relevant [[id number]]  (Normal) Collected: 03/14/24 1651    Lab Status: Final result Specimen: Nares from Nose Updated: 03/14/24 1739     SARS-CoV-2 Negative     INFLUENZA A PCR Negative     INFLUENZA B PCR Negative     RSV PCR Negative    Narrative:      FOR PEDIATRIC PATIENTS - copy/paste COVID Guidelines URL to browser: [email or web address]    SARS-CoV-2 assay is a Nucleic Acid Amplification assay intended for the  qualitative detection of nucleic acid from SARS-CoV-2 in nasopharyngeal  swabs. Results are for the presumptive identification of SARS-CoV-2 RNA.    Positive results are indicative of infection with SARS-CoV-2, the virus  causing COVID-19, but do not rule out bacterial infection or co-infection  with other viruses. Laboratories within the [place] and its  territories are required to report all positive results to the appropriate  public health authorities. Negative results do not preclude SARS-CoV-2  infection and should not be used as the sole basis for treatment or other  patient management decisions. Negative results must be combined with  clinical observations, patient history, and epidemiological information.  This test has not been FDA cleared or approved.    This test has been authorized by FDA under an Emergency Use Authorization  (EUA). This test is only authorized for the duration of time the  declaration that circumstances exist justifying the authorization of the  emergency use of an in vitro diagnostic tests for detection of SARS-CoV-2  virus and/or diagnosis of COVID-19 infection under section 564(b)(1) of  the Act, 21 U.S.C. 360bbb-3(b)(1), unless the authorization is terminated  or revoked sooner. The test has been validated but independent review by FDA  and CLIA is pending.    Test performed using [employer]: This RT-PCR assay targets N2,  a region unique to  SARS-CoV-2. A conserved region in the E-gene was chosen  for pan-Sarbecovirus detection which includes SARS-CoV-2.    According to CMS-2020-01-R, this platform meets the definition of high-throughput technology.                   XR chest 1 view portable   ED Interpretation by Sulema Lackey MD (03/14 1802)   NO acute disease process            Procedures  Procedures      ED Course  ED Course as of 03/14/24 2054   Thu Mar 14, 2024   1741 SARS-COV-2: Negative   1741 INFLU A PCR: Negative   1741 INFLU B PCR: Negative   1741 RSV PCR: Negative   1757 Recheck of pt, she is still having coughing. Discussed with caretaker that this is likely a viral URI and treatment is symptomatic. Caretaker is very concerned about possible UTI following 2 episodes of diarrhea and reported elevated temps this morning. Agreed to order UA straight cath.   1802 XR chest 1 view portable  NO acute disease process   1842 UA w Reflex to Microscopic w Reflex to Culture(!)  Negative for signs of UTI                                       Medical Decision Making  Ddx: Flu, covid, rsv, viral URI, UTI    Negtive viral swab  UA negative for signs of infection.  CXR negative for PNA, PTX  Afebrile in ED. Vital sins stable.  Advised pt may use albuterol neb for cough and take mucinex or benadryl    Amount and/or Complexity of Data Reviewed  Independent Historian: caregiver  Labs:  Decision-making details documented in ED Course.  Radiology: ordered and independent interpretation performed. Decision-making details documented in ED Course.    Risk  OTC drugs.  Prescription drug management.          Disposition  Final diagnoses:   Viral URI with cough     Time reflects when diagnosis was documented in both MDM as applicable and the Disposition within this note       Time User Action Codes Description Comment    3/14/2024  6:42 PM Sulema Lackey Add [J06.9] Viral URI     3/14/2024  6:42 PM Sulema Lackey Remove [J06.9] Viral URI     3/14/2024   6:42 PM Sulema Lackey Add [J06.9] Viral URI with cough     3/14/2024  7:31 PM Sulema Lackey Add [R06.2] Wheezing     3/14/2024  7:32 PM Sulema Lackey Add [R05.9] Cough           ED Disposition       ED Disposition   Discharge    Condition   Stable    Date/Time   Thu Mar 14, 2024  6:42 PM    Comment   Katerin London discharge to home/self care.                   Follow-up Information    None         Discharge Medication List as of 3/14/2024  7:38 PM        START taking these medications    Details   diphenhydrAMINE (BENADRYL) 25 mg tablet Take 1 tablet (25 mg total) by mouth every 8 (eight) hours as needed for itching, allergies or sleep (Coughing), Starting Thu 3/14/2024, Normal           CONTINUE these medications which have CHANGED    Details   albuterol (2.5 mg/3 mL) 0.083 % nebulizer solution Take 3 mL (2.5 mg total) by nebulization every 6 (six) hours as needed for wheezing or shortness of breath (coughing), Starting Th 3/14/2024, Normal      ipratropium (ATROVENT) 0.06 % nasal spray 2 sprays into each nostril 3 (three) times a day as needed for rhinitis (congestion, post nasal drip causing cough), Starting Thu 3/14/2024, Normal           CONTINUE these medications which have NOT CHANGED    Details   !! acetaminophen (TYLENOL) 650 mg CR tablet Take 650 mg by mouth every 8 (eight) hours as needed, Historical Med      baclofen 10 mg tablet Take 1 tablet (10 mg total) by mouth 2 (two) times a day, Starting Thu 6/16/2022, Normal      benzonatate (TESSALON PERLES) 100 mg capsule Take 1 capsule by mouth every 8 hours as needed for dry cough, No Print      Calcium Citrate 200 MG TABS Take by mouth 3 TABS TWICE A DAY , Historical Med      carbamide peroxide (DEBROX) 6.5 % otic solution Instill 5 drops in affected ear twice daily for 4 days as needed for ear wax, No Print      cholecalciferol (VITAMIN D3) 1,000 units tablet Take 1 capsule by mouth daily, Starting Wed 2/13/2013, Historical Med       clotrimazole-betamethasone (LOTRISONE) 1-0.05 % cream APPLY TOPICALLY TO AFFECTED AREA OF SKIN TWICE A DAY AS NEEDED FOR IRRITATION FROM DIAPER, Normal      denosumab (Prolia) 60 mg/mL Inject 1 mL (60 mg total) under the skin every 6 (six) months, Starting Mon 12/4/2023, Normal      famotidine (PEPCID) 20 mg tablet Take 1 tablet (20 mg total) by mouth daily at bedtime, Starting u 10/18/2018, No Print      fluticasone (FLONASE) 50 mcg/act nasal spray USE 1 SPRAY IN EACH NOSTRIL TWICE A DAY (RHINITIS), Normal      !! GNP 8 Hour Arthritis Relief 650 MG CR tablet TAKE 1 TABLET BY MOUTH EVERY 6 HOURS AS NEEDED FOR MILD PAIN OR TEMPERATURE  >100.4, Normal      hydrochlorothiazide (HYDRODIURIL) 25 mg tablet Take 0.5 tablets (12.5 mg total) by mouth daily, Starting Wed 10/30/2019, Normal      ibuprofen (MOTRIN) 600 mg tablet Take by mouth every 6 (six) hours as needed for fever Fever greater than 101.4F, Starting Sat 12/23/2017, Historical Med      loratadine (CLARITIN) 10 mg tablet TAKE 1 TABLET BY MOUTH DAILY (ALLERGIC RHINITIS), Normal      LORazepam (ATIVAN) 0.5 mg tablet Take 1 tablet (0.5 mg total) by mouth once for 1 dose, Starting Mon 8/14/2023, Normal      !! losartan (COZAAR) 100 MG tablet Starting Wed 5/11/2022, Historical Med      !! losartan (COZAAR) 50 mg tablet Take 1 tablet (50 mg total) by mouth daily, Starting Wed 10/30/2019, Normal      metoprolol succinate (TOPROL-XL) 100 mg 24 hr tablet Take 1 tablet by mouth daily, No Print      Mucus Relief 600 MG 12 hr tablet TAKE 1 TABLET BY MOUTH EVERY 12 HOURS AS NEEDED FOR CONGESTION **DO NOT CRUSH**, Normal      nystatin-triamcinolone (MYCOLOG-II) ointment Apply topically 2 (two) times a day as needed (As needed for rash), Starting Mon 9/12/2022, Normal      olopatadine HCl (PATADAY) 0.2 % opth drops Instill 1 drop into affected eye(s) daily PRN irritation, No Print      PHENobarbital 32.4 mg tablet Take 3 tablets by mouth at 4 pm, Normal      !!  polyethylene glycol (GLYCOLAX) 17 GM/SCOOP powder 1 cap dissolve in 8 ox of fluid po daily (hold for loose stools). Constipation., Normal      !! polyethylene glycol (GLYCOLAX) 17 GM/SCOOP powder 1 tsp in 4 oz of fluid po PRN every 3 days if no BM. Constipation., Normal      selenium sulfide (SELSUN) 1 % Apply topically 2 (two) times a week Apply twice weekly as needed when with scalp irritation., Starting Mon 9/12/2022, Normal      simvastatin (ZOCOR) 20 mg tablet Take 1 tablet by mouth daily, No Print      Vitamins A & D (vitamin A & D) ointment Apply 1 application topically, Historical Med      zolpidem (AMBIEN) 5 mg tablet TAKE 1/2 TABLET BY MOUTH (2.5MG) DAILY *PLEASE RE-ORDER* INSOMNIA, Normal       !! - Potential duplicate medications found. Please discuss with provider.        No discharge procedures on file.    PDMP Review         Value Time User    PDMP Reviewed  Yes 12/12/2023  1:46 PM Geneva Vargas MD             ED Provider  Attending physically available and evaluated Katerin London. I managed the patient along with the ED Attending.    Electronically Signed by           Sulema Lackey MD  03/14/24 2055

## 2024-03-15 RX ORDER — AMOXICILLIN AND CLAVULANATE POTASSIUM 875; 125 MG/1; MG/1
1 TABLET, FILM COATED ORAL EVERY 12 HOURS
Qty: 10 TABLET | Refills: 0 | Status: SHIPPED | OUTPATIENT
Start: 2024-03-15 | End: 2024-03-20

## 2024-03-15 RX ORDER — AZITHROMYCIN 250 MG/1
TABLET, FILM COATED ORAL
Qty: 6 TABLET | Refills: 0 | Status: SHIPPED | OUTPATIENT
Start: 2024-03-15 | End: 2024-03-19

## 2024-03-15 NOTE — ED PROVIDER NOTES
Notified via [employer] regarding radiology variance.  Chart reviewed.  Patient seen for cough and low grade fever.  Spoke to the patient's caretaker, Dawn Cindy, at listed phone number, [phone].  Discussed the radiology variance.  Will send prescriptions for Augmentin and azithromycin to the preferred pharmacy.  She expresses understanding.  Provided strict return to ED precautions, will be taking the patient for follow up with primary care.     Anjel Colon, DO  03/15/24 [id number]

## 2024-03-22 ENCOUNTER — OFFICE VISIT (OUTPATIENT)
Dept: FAMILY MEDICINE CLINIC | Facility: CLINIC | Age: 64
End: 2024-03-22

## 2024-03-22 VITALS
TEMPERATURE: 97.7 F | RESPIRATION RATE: 18 BRPM | DIASTOLIC BLOOD PRESSURE: 85 MMHG | SYSTOLIC BLOOD PRESSURE: 127 MMHG | HEART RATE: 92 BPM | OXYGEN SATURATION: 97 %

## 2024-03-22 DIAGNOSIS — J18.9 PNEUMONIA OF LEFT LOWER LOBE DUE TO INFECTIOUS ORGANISM: Primary | ICD-10-CM

## 2024-03-22 PROCEDURE — G2211 COMPLEX E/M VISIT ADD ON: HCPCS | Performed by: FAMILY MEDICINE

## 2024-03-22 PROCEDURE — 99213 OFFICE O/P EST LOW 20 MIN: CPT | Performed by: FAMILY MEDICINE

## 2024-03-22 NOTE — ASSESSMENT & PLAN NOTE
Presenting as ER follow up after treatment for LLL PNA. S/P 5x Days Abx with Augmentin/Azithromycin, called in for patient after ED visit. Today, patient is returned to baseline, VSS, no cough or fever. No appreciable wheeze or adventitious lung sounds on PE. Continues to have increased stooling but likely 2.2 Abx. Will continue to follow as needed, ED return precautions reviewed with Caregiver.

## 2024-03-22 NOTE — PROGRESS NOTES
Name: Katerin London      : 1960      MRN: [id number]  Encounter Provider: Lauro Rendon MD  Encounter Date: 3/22/2024   Encounter department: [facility]    Assessment & Plan     1. Pneumonia of left lower lobe due to infectious organism  Assessment & Plan:  Presenting as ER follow up after treatment for LLL PNA. S/P 5x Days Abx with Augmentin/Azithromycin, called in for patient after ED visit. Today, patient is returned to baseline, VSS, no cough or fever. No appreciable wheeze or adventitious lung sounds on PE. Continues to have increased stooling but likely 2.2 Abx. Will continue to follow as needed, ED return precautions reviewed with Caregiver.              Subjective      Ms. London presents with her caregiver Dawn for follow up from recent visit to the emergency department, following up on cough, fever, diarrhea and flu like symptoms. She was evaluated as viral URI however upon imaging review her CXR was suspicious for LLL PNA and she was prescribed Augmentin and Azithromycin to cover for bacterial infection. Since discharge from the emergency department, patient has taken her antibiotics as prescribed and finished them on Tuesday 3/19/2024. Her caretaker endorses that she continued to have symptoms leading up until yesterday, when her cough and fever spontaneously resolved. Her vital signs returned to normal and she has not had any recursion of cough since yesterday. She has not had any new urinary symptoms, but continues to have large bowel movements which are loose but formed. No blood in stool.       Review of Systems   Constitutional:  Negative for fatigue and fever.   Eyes:  Negative for visual disturbance.   Respiratory:  Negative for cough, chest tightness and shortness of breath.    Cardiovascular:  Negative for chest pain and palpitations.   Gastrointestinal:  Positive for diarrhea. Negative for abdominal pain, blood in stool and vomiting.    Genitourinary:  Negative for difficulty urinating and urgency.   Musculoskeletal:  Negative for back pain and neck pain.   Skin:  Negative for rash and wound.   Neurological:  Negative for weakness, light-headedness and headaches.       Current Outpatient Medications on File Prior to Visit   Medication Sig    acetaminophen (TYLENOL) 650 mg CR tablet Take 650 mg by mouth every 8 (eight) hours as needed    albuterol (2.5 mg/3 mL) 0.083 % nebulizer solution Take 3 mL (2.5 mg total) by nebulization every 6 (six) hours as needed for wheezing or shortness of breath (coughing)    baclofen 10 mg tablet Take 1 tablet (10 mg total) by mouth 2 (two) times a day    benzonatate (TESSALON PERLES) 100 mg capsule Take 1 capsule by mouth every 8 hours as needed for dry cough    Calcium Citrate 200 MG TABS Take by mouth 3 TABS TWICE A DAY     carbamide peroxide (DEBROX) 6.5 % otic solution Instill 5 drops in affected ear twice daily for 4 days as needed for ear wax    cholecalciferol (VITAMIN D3) 1,000 units tablet Take 1 capsule by mouth daily    clotrimazole-betamethasone (LOTRISONE) 1-0.05 % cream APPLY TOPICALLY TO AFFECTED AREA OF SKIN TWICE A DAY AS NEEDED FOR IRRITATION FROM DIAPER    denosumab (Prolia) 60 mg/mL Inject 1 mL (60 mg total) under the skin every 6 (six) months    diphenhydrAMINE (BENADRYL) 25 mg tablet Take 1 tablet (25 mg total) by mouth every 8 (eight) hours as needed for itching, allergies or sleep (Coughing)    famotidine (PEPCID) 20 mg tablet Take 1 tablet (20 mg total) by mouth daily at bedtime    fluticasone (FLONASE) 50 mcg/act nasal spray USE 1 SPRAY IN EACH NOSTRIL TWICE A DAY (RHINITIS)    GNP 8 Hour Arthritis Relief 650 MG CR tablet TAKE 1 TABLET BY MOUTH EVERY 6 HOURS AS NEEDED FOR MILD PAIN OR TEMPERATURE  >100.4    hydrochlorothiazide (HYDRODIURIL) 25 mg tablet Take 0.5 tablets (12.5 mg total) by mouth daily    ibuprofen (MOTRIN) 600 mg tablet Take by mouth every 6 (six) hours as needed for fever  Fever greater than 101.4F    ipratropium (ATROVENT) 0.06 % nasal spray 2 sprays into each nostril 3 (three) times a day as needed for rhinitis (congestion, post nasal drip causing cough)    loratadine (CLARITIN) 10 mg tablet TAKE 1 TABLET BY MOUTH DAILY (ALLERGIC RHINITIS)    losartan (COZAAR) 100 MG tablet     losartan (COZAAR) 50 mg tablet Take 1 tablet (50 mg total) by mouth daily    metoprolol succinate (TOPROL-XL) 100 mg 24 hr tablet Take 1 tablet by mouth daily    Mucus Relief 600 MG 12 hr tablet TAKE 1 TABLET BY MOUTH EVERY 12 HOURS AS NEEDED FOR CONGESTION **DO NOT CRUSH**    nystatin-triamcinolone (MYCOLOG-II) ointment Apply topically 2 (two) times a day as needed (As needed for rash)    olopatadine HCl (PATADAY) 0.2 % opth drops Instill 1 drop into affected eye(s) daily PRN irritation    PHENobarbital 32.4 mg tablet Take 3 tablets by mouth at 4 pm    polyethylene glycol (GLYCOLAX) 17 GM/SCOOP powder 1 cap dissolve in 8 ox of fluid po daily (hold for loose stools). Constipation.    polyethylene glycol (GLYCOLAX) 17 GM/SCOOP powder 1 tsp in 4 oz of fluid po PRN every 3 days if no BM. Constipation.    selenium sulfide (SELSUN) 1 % Apply topically 2 (two) times a week Apply twice weekly as needed when with scalp irritation.    simvastatin (ZOCOR) 20 mg tablet Take 1 tablet by mouth daily    Vitamins A & D (vitamin A & D) ointment Apply 1 application topically    zolpidem (AMBIEN) 5 mg tablet TAKE 1/2 TABLET BY MOUTH (2.5MG) DAILY *PLEASE RE-ORDER* INSOMNIA    LORazepam (ATIVAN) 0.5 mg tablet Take 1 tablet (0.5 mg total) by mouth once for 1 dose       Objective     /85 (BP Location: Left arm, Patient Position: Sitting, Cuff Size: Large)   Pulse 92   Temp 97.7 °F (36.5 °C) (Temporal)   Resp 18   LMP 02/28/2010 (Approximate)   SpO2 97%     Physical Exam  Constitutional:       General: She is not in acute distress.     Appearance: She is obese. She is not ill-appearing.   HENT:      Head: Normocephalic  and atraumatic.   Cardiovascular:      Rate and Rhythm: Normal rate and regular rhythm.      Heart sounds: Normal heart sounds. No murmur heard.  Pulmonary:      Effort: No respiratory distress.      Breath sounds: Normal breath sounds. No wheezing, rhonchi or rales.   Abdominal:      General: There is no distension.      Palpations: Abdomen is soft.   Musculoskeletal:      Comments: Contractures of upper extremities   Skin:     General: Skin is warm and dry.   Neurological:      General: No focal deficit present.      Mental Status: She is alert. Mental status is at baseline.   Psychiatric:         Mood and Affect: Mood normal.         Behavior: Behavior normal.       Lauro Rendon MD

## 2024-03-25 DIAGNOSIS — G40.409 TONIC-CLONIC EPILEPTIC SEIZURES (HCC): ICD-10-CM

## 2024-03-26 RX ORDER — BACLOFEN 10 MG/1
TABLET ORAL
Qty: 56 TABLET | Refills: 1 | Status: SHIPPED | OUTPATIENT
Start: 2024-03-26

## 2024-04-04 ENCOUNTER — HOSPITAL ENCOUNTER (OUTPATIENT)
Dept: ULTRASOUND IMAGING | Facility: CLINIC | Age: 64
Discharge: HOME | End: 2024-04-04
Payer: MEDICARE

## 2024-04-04 ENCOUNTER — HOSPITAL ENCOUNTER (OUTPATIENT)
Dept: MAMMOGRAPHY | Facility: CLINIC | Age: 64
Discharge: HOME | End: 2024-04-04
Payer: MEDICARE

## 2024-04-04 VITALS — BODY MASS INDEX: 31.83 KG/M2 | WEIGHT: 163 LBS

## 2024-04-04 DIAGNOSIS — R92.8 ABNORMAL MAMMOGRAM: ICD-10-CM

## 2024-04-04 PROCEDURE — 77065 DX MAMMO INCL CAD UNI: CPT

## 2024-04-04 PROCEDURE — 76642 ULTRASOUND BREAST LIMITED: CPT

## 2024-04-04 PROCEDURE — G0279 TOMOSYNTHESIS, MAMMO: HCPCS

## 2024-04-10 ENCOUNTER — TELEPHONE (OUTPATIENT)
Dept: FAMILY MEDICINE CLINIC | Facility: CLINIC | Age: 64
End: 2024-04-10

## 2024-04-10 ENCOUNTER — TELEPHONE (OUTPATIENT)
Dept: MAMMOGRAPHY | Facility: CLINIC | Age: 64
End: 2024-04-10

## 2024-04-10 NOTE — TELEPHONE ENCOUNTER
4/9/24 1557 - Yes, hi, this is Dawn calling on behalf of Niru Niru London. I'm calling to do her six month follow up diagnostic mammogram and diagnostic ultrasound. They said the diagnosis code is R 92.8 The best number here is [id number]. Again, it's for Katerin London and her date of birth is [birth year]. Thank you. My name is Dawn. darvin Maldonado.    Outreach made to Dawn, no answer, renay left asking for cb to schedule pt diagnostic imaging, last mammo done on 4/4/24, due for 6 month f/u as of 10/4/24, left cb number for contact

## 2024-04-11 ENCOUNTER — OFFICE VISIT (OUTPATIENT)
Dept: NEUROLOGY | Facility: CLINIC | Age: 64
End: 2024-04-11
Payer: MEDICARE

## 2024-04-11 VITALS
HEIGHT: 60 IN | SYSTOLIC BLOOD PRESSURE: 187 MMHG | DIASTOLIC BLOOD PRESSURE: 84 MMHG | TEMPERATURE: 98.4 F | BODY MASS INDEX: 32 KG/M2 | WEIGHT: 163 LBS | HEART RATE: 80 BPM

## 2024-04-11 DIAGNOSIS — G80.0 SPASTIC QUADRIPLEGIC CEREBRAL PALSY (HCC): ICD-10-CM

## 2024-04-11 DIAGNOSIS — G40.409 TONIC-CLONIC EPILEPTIC SEIZURES (HCC): Primary | ICD-10-CM

## 2024-04-11 DIAGNOSIS — T50.905A DRUG-INDUCED OSTEOPOROSIS: ICD-10-CM

## 2024-04-11 DIAGNOSIS — M81.8 DRUG-INDUCED OSTEOPOROSIS: ICD-10-CM

## 2024-04-11 PROCEDURE — G2211 COMPLEX E/M VISIT ADD ON: HCPCS | Performed by: STUDENT IN AN ORGANIZED HEALTH CARE EDUCATION/TRAINING PROGRAM

## 2024-04-11 PROCEDURE — 99214 OFFICE O/P EST MOD 30 MIN: CPT | Performed by: STUDENT IN AN ORGANIZED HEALTH CARE EDUCATION/TRAINING PROGRAM

## 2024-04-11 NOTE — PROGRESS NOTES
[facility] -   Follow up appointment    Impression/Plan    Ms. 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 Her neurological exam is stable. No changes at this time. Plan outlined below:     #Symptomatic epilepsy  - Continue with Phenobarbital 97.2 mg qhs  - Phenobarbital level can be done in December     #Osteoporosis  - Continue with prolia and Vitamin D  - Defer other treatment to PCP     - Follow up in 1 year    We discussed the pathophysiology of epilepsy/seizure and seizure safety/precautions following seizures. We discussed factors that can lower seizure threshold and the side effects of antiepileptic medications.     Diagnoses and all orders for this visit:    Tonic-clonic epileptic seizures (HCC)    Drug-induced osteoporosis    Spastic quadriplegic cerebral palsy (HCC)        Subjective    Katerin London is a 63 y.o.  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     For review:     The patient was seen virtually via telemedicine on 5/14/2020. Plan at that time was to continue phenobarbital 32.4 3 tabs qhs. DEXA was recommended every two years to f/u on risk of osteoporosis associated w/ phenobarb consumption. Attempts in the past to wean off of phenobarbital lead to breakthrough seizures.     She was seen in the office by Dr Paul on 5/25/2021. At that time, she was seizure free on phenobarbital monotherapy. She was to continue with current AED regimen. For osteoporosis she was to have her DXA as scheduled in 2022, continue with prolia. Recommended 1 year follow up     The patient was seen in clinic by Shelby on 6/16/2022. She continued to be  seizure free. No changes were made at that time.    She also followed up with me on 4/14/2023. Again, she was seizure free with no issues other than DEXA scan showing osteoporosis. She was to follow up in 1 year.     Interval history:    Since last seen, she had a ED visit on 3/14/2024 for cough and flu-like symptoms.    There have been no seizures since the last appointment. The patients AEDs were being tolerated well with no side effects. There is no concern for medication non-adherence.     She continues to take the prolia and Vit D supplementation.    History Reviewed:   The following were reviewed and updated as appropriate: allergies, current medications, past family history, past medical history, past social history, past surgical history, and problem list    ROS:  Constitutional:  Negative for appetite change, fatigue and fever.   HENT: Negative.  Negative for hearing loss, tinnitus, trouble swallowing and voice change.    Eyes: Negative.  Negative for photophobia, pain and visual disturbance.   Respiratory: Negative.  Negative for shortness of breath.    Cardiovascular: Negative.  Negative for palpitations.   Gastrointestinal: Negative.  Negative for nausea and vomiting.   Endocrine: Negative.  Negative for cold intolerance.   Genitourinary: Negative.  Negative for dysuria, frequency and urgency.   Musculoskeletal:  Negative for back pain, gait problem, myalgias, neck pain and neck stiffness.   Skin: Negative.  Negative for rash.   Allergic/Immunologic: Negative.    Neurological: Negative.  Negative for dizziness, tremors, seizures, syncope, facial asymmetry, speech difficulty, weakness, light-headedness, numbness and headaches.   Hematological: Negative.  Does not bruise/bleed easily.   Psychiatric/Behavioral: Negative.  Negative for confusion, hallucinations and sleep disturbance.    All other systems reviewed and are negative.      Objective    BP (!) 187/84 (BP Location: Left arm, Patient Position:  Sitting, Cuff Size: Adult)   Pulse 80   Temp 98.4 °F (36.9 °C) (Temporal)   Ht 5' (1.524 m)   Wt 73.9 kg (163 lb)   LMP 02/28/2010 (Approximate)   BMI 31.83 kg/m²      General Exam  No apparent distress.   Appears well nourished.   Mood appropriate for situation     Neurologic Exam  Mental status- awake and alert. Significant intellectual disability. Limited speech.      Cranial Nerves- PERRL, blinks to visual threat, EOMS normal, facial muscles symmetric, hearing intact to voice, dysarthria     Motor- Diffuse spasticity, lower extremity paralysis.     Sensory-  Intact distally in all extremities to light touch.      DTRs- diffuse hyperreflexia     Gait- wheelchair bound, deferred     Coordination- deferred given spasticity      Oits Paul MD   [facility]  Diplomate, ABPN Neurology and Clinical Neurophysiology

## 2024-05-01 ENCOUNTER — TELEPHONE (OUTPATIENT)
Dept: FAMILY MEDICINE CLINIC | Facility: CLINIC | Age: 64
End: 2024-05-01

## 2024-05-01 PROBLEM — J18.9 PNEUMONIA OF LEFT LOWER LOBE DUE TO INFECTIOUS ORGANISM: Status: RESOLVED | Noted: 2024-03-22 | Resolved: 2024-05-01

## 2024-05-08 ENCOUNTER — TELEPHONE (OUTPATIENT)
Dept: FAMILY MEDICINE CLINIC | Facility: CLINIC | Age: 64
End: 2024-05-08

## 2024-05-08 NOTE — TELEPHONE ENCOUNTER
Folder (To be completed by) -  Dr. Granda     Name of Form - [employer]   Repair to Manual Wheelchair    Color folder ([place])    Form to be Faxed (Fax #), [phone]    Patient was made aware of the 7-10 business day form policy.

## 2024-05-13 ENCOUNTER — OFFICE VISIT (OUTPATIENT)
Dept: FAMILY MEDICINE CLINIC | Facility: CLINIC | Age: 64
End: 2024-05-13

## 2024-05-13 VITALS
SYSTOLIC BLOOD PRESSURE: 147 MMHG | HEART RATE: 101 BPM | TEMPERATURE: 98.1 F | RESPIRATION RATE: 18 BRPM | DIASTOLIC BLOOD PRESSURE: 90 MMHG

## 2024-05-13 DIAGNOSIS — I10 BENIGN ESSENTIAL HYPERTENSION: Primary | ICD-10-CM

## 2024-05-13 DIAGNOSIS — M81.0 OSTEOPOROSIS, UNSPECIFIED OSTEOPOROSIS TYPE, UNSPECIFIED PATHOLOGICAL FRACTURE PRESENCE: ICD-10-CM

## 2024-05-13 DIAGNOSIS — M81.0 AGE-RELATED OSTEOPOROSIS WITHOUT CURRENT PATHOLOGICAL FRACTURE: ICD-10-CM

## 2024-05-13 PROCEDURE — G2211 COMPLEX E/M VISIT ADD ON: HCPCS | Performed by: FAMILY MEDICINE

## 2024-05-13 PROCEDURE — 99213 OFFICE O/P EST LOW 20 MIN: CPT | Performed by: FAMILY MEDICINE

## 2024-05-13 RX ORDER — DENOSUMAB 60 MG/ML
60 INJECTION SUBCUTANEOUS
Qty: 1 ML | Refills: 0 | Status: SHIPPED | OUTPATIENT
Start: 2024-05-13

## 2024-05-13 NOTE — PROGRESS NOTES
Name: Katerin London      : 1960      MRN: [id number]  Encounter Provider: Sandra Sparrow DO  Encounter Date: 2024   Encounter department: [facility]    Assessment & Plan     1. Benign essential hypertension  Assessment & Plan:  Patient presenting for 12w follow up for hypertension. Today, BP is Blood Pressure: 147/90 (left forearm). Patient notable tensing during examination. Reviewed home blood pressure logs, they are more consistent with a well controlled blood pressure with regular pressures between 120-30s over 80s. Continue with current blood pressure medications.   Continue Hydrochlorothiazide 12.5mg, Losartan 50mg, Metoprolol 100mg.   Continue to check home Bps  Filled out general paperwork and copies scanned into chart as well as home BP logs       2. Age-related osteoporosis without current pathological fracture  -     DXA bone density spine hip and pelvis; Future; Expected date: 2024    3. Osteoporosis, unspecified osteoporosis type, unspecified pathological fracture presence  -     denosumab (Prolia) 60 mg/mL; Inject 1 mL (60 mg total) under the skin every 6 (six) months  -     Comprehensive metabolic panel; Future  -     Comprehensive metabolic panel; Future           Subjective      63-year-old female with multiple comorbidities presents today for general follow-up.  Per her caretaker: Everything has been going very well at home.  She had a URI followed by diplopia in March, but she has recovered from this well.  They have no questions or concerns at this time.  Her home blood pressures have been in the 120s to 130s systolic over 80s diastolic.  Request for paperwork being filled out today for 12-week follow-up.      Review of Systems   Constitutional:  Negative for fatigue and fever.   Eyes:  Negative for visual disturbance.   Respiratory:  Negative for cough, chest tightness and shortness of breath.    Cardiovascular:  Negative for chest pain  and palpitations.   Gastrointestinal:  Positive for diarrhea. Negative for abdominal pain, blood in stool and vomiting.   Genitourinary:  Negative for difficulty urinating and urgency.   Musculoskeletal:  Positive for gait problem. Negative for back pain and neck pain.   Skin:  Negative for rash and wound.   Neurological:  Negative for weakness, light-headedness and headaches.       Current Outpatient Medications on File Prior to Visit   Medication Sig    acetaminophen (TYLENOL) 650 mg CR tablet Take 650 mg by mouth every 8 (eight) hours as needed    albuterol (2.5 mg/3 mL) 0.083 % nebulizer solution Take 3 mL (2.5 mg total) by nebulization every 6 (six) hours as needed for wheezing or shortness of breath (coughing)    baclofen 10 mg tablet TAKE 1 TABLET BY MOUTH TWICE A DAY (SPASTICITY)    benzonatate (TESSALON PERLES) 100 mg capsule Take 1 capsule by mouth every 8 hours as needed for dry cough    Calcium Citrate 200 MG TABS Take by mouth 3 TABS TWICE A DAY     carbamide peroxide (DEBROX) 6.5 % otic solution Instill 5 drops in affected ear twice daily for 4 days as needed for ear wax    cholecalciferol (VITAMIN D3) 1,000 units tablet Take 1 capsule by mouth daily    clotrimazole-betamethasone (LOTRISONE) 1-0.05 % cream APPLY TOPICALLY TO AFFECTED AREA OF SKIN TWICE A DAY AS NEEDED FOR IRRITATION FROM DIAPER    diphenhydrAMINE (BENADRYL) 25 mg tablet Take 1 tablet (25 mg total) by mouth every 8 (eight) hours as needed for itching, allergies or sleep (Coughing)    famotidine (PEPCID) 20 mg tablet Take 1 tablet (20 mg total) by mouth daily at bedtime    fluticasone (FLONASE) 50 mcg/act nasal spray USE 1 SPRAY IN EACH NOSTRIL TWICE A DAY (RHINITIS)    GNP 8 Hour Arthritis Relief 650 MG CR tablet TAKE 1 TABLET BY MOUTH EVERY 6 HOURS AS NEEDED FOR MILD PAIN OR TEMPERATURE  >100.4    hydrochlorothiazide (HYDRODIURIL) 25 mg tablet Take 0.5 tablets (12.5 mg total) by mouth daily    ibuprofen (MOTRIN) 600 mg tablet Take by  mouth every 6 (six) hours as needed for fever Fever greater than 101.4F    ipratropium (ATROVENT) 0.06 % nasal spray 2 sprays into each nostril 3 (three) times a day as needed for rhinitis (congestion, post nasal drip causing cough)    loratadine (CLARITIN) 10 mg tablet TAKE 1 TABLET BY MOUTH DAILY (ALLERGIC RHINITIS)    LORazepam (ATIVAN) 0.5 mg tablet Take 1 tablet (0.5 mg total) by mouth once for 1 dose    losartan (COZAAR) 50 mg tablet Take 1 tablet (50 mg total) by mouth daily    metoprolol succinate (TOPROL-XL) 100 mg 24 hr tablet Take 1 tablet by mouth daily    Mucus Relief 600 MG 12 hr tablet TAKE 1 TABLET BY MOUTH EVERY 12 HOURS AS NEEDED FOR CONGESTION **DO NOT CRUSH**    nystatin-triamcinolone (MYCOLOG-II) ointment Apply topically 2 (two) times a day as needed (As needed for rash)    olopatadine HCl (PATADAY) 0.2 % opth drops Instill 1 drop into affected eye(s) daily PRN irritation    PHENobarbital 32.4 mg tablet Take 3 tablets by mouth at 4 pm    polyethylene glycol (GLYCOLAX) 17 GM/SCOOP powder 1 cap dissolve in 8 ox of fluid po daily (hold for loose stools). Constipation.    polyethylene glycol (GLYCOLAX) 17 GM/SCOOP powder 1 tsp in 4 oz of fluid po PRN every 3 days if no BM. Constipation.    selenium sulfide (SELSUN) 1 % Apply topically 2 (two) times a week Apply twice weekly as needed when with scalp irritation.    simvastatin (ZOCOR) 20 mg tablet Take 1 tablet by mouth daily    Vitamins A & D (vitamin A & D) ointment Apply 1 application topically    zolpidem (AMBIEN) 5 mg tablet TAKE 1/2 TABLET BY MOUTH (2.5MG) DAILY *PLEASE RE-ORDER* INSOMNIA    [DISCONTINUED] denosumab (Prolia) 60 mg/mL Inject 1 mL (60 mg total) under the skin every 6 (six) months    losartan (COZAAR) 100 MG tablet  (Patient not taking: Reported on 4/11/2024)       Objective     /90 Comment: left forearm  Pulse 101   Temp 98.1 °F (36.7 °C) (Temporal)   Resp 18   LMP 02/28/2010 (Approximate)     Physical  Exam  Constitutional:       General: She is not in acute distress.     Appearance: She is not ill-appearing.   HENT:      Head: Normocephalic and atraumatic.   Neck:      Comments: Forward head carry at baseline   Cardiovascular:      Rate and Rhythm: Normal rate and regular rhythm.      Heart sounds: Normal heart sounds. No murmur heard.  Pulmonary:      Effort: No respiratory distress.      Breath sounds: Normal breath sounds. No wheezing, rhonchi or rales.   Abdominal:      General: There is no distension.      Palpations: Abdomen is soft.   Musculoskeletal:      Right lower leg: No edema.      Left lower leg: No edema.      Comments: Contractures of upper extremities, slouched posture    Skin:     General: Skin is warm and dry.   Neurological:      General: No focal deficit present.      Mental Status: She is alert. Mental status is at baseline.   Psychiatric:         Mood and Affect: Mood normal.         Behavior: Behavior normal.       Sandra Sparrow,

## 2024-05-13 NOTE — ASSESSMENT & PLAN NOTE
Patient presenting for 12w follow up for hypertension. Today, BP is Blood Pressure: 147/90 (left forearm). Patient notable tensing during examination. Reviewed home blood pressure logs, they are more consistent with a well controlled blood pressure with regular pressures between 120-30s over 80s. Continue with current blood pressure medications.   Continue Hydrochlorothiazide 12.5mg, Losartan 50mg, Metoprolol 100mg.   Continue to check home Bps  Filled out general paperwork and copies scanned into chart as well as home BP logs

## 2024-05-26 ENCOUNTER — OFFICE VISIT (OUTPATIENT)
Dept: URGENT CARE | Age: 64
End: 2024-05-26
Payer: MEDICARE

## 2024-05-26 VITALS
HEART RATE: 88 BPM | TEMPERATURE: 98 F | OXYGEN SATURATION: 97 % | RESPIRATION RATE: 20 BRPM | SYSTOLIC BLOOD PRESSURE: 158 MMHG | DIASTOLIC BLOOD PRESSURE: 76 MMHG

## 2024-05-26 DIAGNOSIS — B30.9 ACUTE VIRAL CONJUNCTIVITIS OF RIGHT EYE: Primary | ICD-10-CM

## 2024-05-26 PROCEDURE — 99214 OFFICE O/P EST MOD 30 MIN: CPT | Performed by: NURSE PRACTITIONER

## 2024-05-26 RX ORDER — OFLOXACIN 3 MG/ML
1 SOLUTION/ DROPS OPHTHALMIC 4 TIMES DAILY
Qty: 5 ML | Refills: 0 | Status: SHIPPED | OUTPATIENT
Start: 2024-05-26 | End: 2024-06-02

## 2024-05-26 NOTE — LETTER
May 26, 2024     Patient: Katerin London   YOB: 1960   Date of Visit: 5/26/2024       To Whom it May Concern:    Katerin London was seen in my clinic on 5/26/2024. She may return to day program on Tuesday 5/28/2024.    If you have any questions or concerns, please don't hesitate to call.         Sincerely,          MARIELLA Durbin        CC: No Recipients

## 2024-05-26 NOTE — PROGRESS NOTES
[facility]        NAME: Katerin London is a 63 y.o. female  : 1960    MRN: [id number]  DATE: May 26, 2024  TIME: 12:19 PM    Assessment and Plan   Acute viral conjunctivitis of right eye [B30.9]  1. Acute viral conjunctivitis of right eye  ofloxacin (OCUFLOX) 0.3 % ophthalmic solution            Patient Instructions       Follow up with PCP in 3-5 days.  Proceed to  ER if symptoms worsen.    If tests have been performed at [facility], our office will contact you with results if changes need to be made to the care plan discussed with you at the visit.  [facility]    You are to apply the eye antibiotic drops as prescribed.  Follow up with the eye doctor this week.  Sooner if any problems.  Apply warm compresses to the right eye.  Wash her hands frequently.   Follow up with your PCP in 3-5 days  Go to the ED if symptoms worsen         Chief Complaint     Chief Complaint   Patient presents with    Eye Problem     Started with irritation past 10 days. . Patient reported by RN is rubbing eyes . Discharge from right eye  started last night . No fevers reported. Intermittent nasal congestion due to allergies. Denies coughing . Eating and drinking . Using otc  ( padaday)drops . Eye lip and and below eye is red, and irritated.          History of Present Illness       This is a 63 year old female who resides at a group home and comes to [facility] with c/o right eye discharge last night.  She has had right eye irritation x 10 days and has been seen rubbing the eye.  Care taker states that she has had no fevers. She has allergies and they have been using pataday drops w/o much relief.  PMH is listed.     Eye Problem   Associated symptoms include an eye discharge and eye redness. Pertinent negatives include no itching or photophobia.       Review of Systems   Review of Systems   Constitutional: Negative.    HENT:  Positive for congestion.    Eyes:  Positive for  discharge and redness. Negative for photophobia, pain, itching and visual disturbance.   Respiratory: Negative.     Cardiovascular: Negative.    Gastrointestinal: Negative.    Endocrine: Negative.    Genitourinary: Negative.    Musculoskeletal: Negative.    Skin: Negative.    Allergic/Immunologic: Negative.    Neurological: Negative.    Hematological: Negative.    Psychiatric/Behavioral: Negative.           Current Medications       Current Outpatient Medications:     acetaminophen (TYLENOL) 650 mg CR tablet, Take 650 mg by mouth every 8 (eight) hours as needed, Disp: , Rfl:     albuterol (2.5 mg/3 mL) 0.083 % nebulizer solution, Take 3 mL (2.5 mg total) by nebulization every 6 (six) hours as needed for wheezing or shortness of breath (coughing), Disp: 25 mL, Rfl: 0    baclofen 10 mg tablet, TAKE 1 TABLET BY MOUTH TWICE A DAY (SPASTICITY), Disp: 56 tablet, Rfl: 1    benzonatate (TESSALON PERLES) 100 mg capsule, Take 1 capsule by mouth every 8 hours as needed for dry cough, Disp: 20 capsule, Rfl: 0    Calcium Citrate 200 MG TABS, Take by mouth 3 TABS TWICE A DAY , Disp: , Rfl:     carbamide peroxide (DEBROX) 6.5 % otic solution, Instill 5 drops in affected ear twice daily for 4 days as needed for ear wax, Disp: 15 mL, Rfl: 0    cholecalciferol (VITAMIN D3) 1,000 units tablet, Take 1 capsule by mouth daily, Disp: , Rfl:     clotrimazole-betamethasone (LOTRISONE) 1-0.05 % cream, APPLY TOPICALLY TO AFFECTED AREA OF SKIN TWICE A DAY AS NEEDED FOR IRRITATION FROM DIAPER, Disp: 45 g, Rfl: 11    denosumab (Prolia) 60 mg/mL, Inject 1 mL (60 mg total) under the skin every 6 (six) months, Disp: 1 mL, Rfl: 0    diphenhydrAMINE (BENADRYL) 25 mg tablet, Take 1 tablet (25 mg total) by mouth every 8 (eight) hours as needed for itching, allergies or sleep (Coughing), Disp: 20 tablet, Rfl: 0    famotidine (PEPCID) 20 mg tablet, Take 1 tablet (20 mg total) by mouth daily at bedtime, Disp: , Rfl: 0    fluticasone (FLONASE) 50 mcg/act  nasal spray, USE 1 SPRAY IN EACH NOSTRIL TWICE A DAY (RHINITIS), Disp: 16 g, Rfl: 11    GNP 8 Hour Arthritis Relief 650 MG CR tablet, TAKE 1 TABLET BY MOUTH EVERY 6 HOURS AS NEEDED FOR MILD PAIN OR TEMPERATURE  >100.4, Disp: 8 tablet, Rfl: 11    hydrochlorothiazide (HYDRODIURIL) 25 mg tablet, Take 0.5 tablets (12.5 mg total) by mouth daily, Disp: 30 tablet, Rfl: 5    ibuprofen (MOTRIN) 600 mg tablet, Take by mouth every 6 (six) hours as needed for fever Fever greater than 101.4F, Disp: , Rfl:     ipratropium (ATROVENT) 0.06 % nasal spray, 2 sprays into each nostril 3 (three) times a day as needed for rhinitis (congestion, post nasal drip causing cough), Disp: 15 mL, Rfl: 0    loratadine (CLARITIN) 10 mg tablet, TAKE 1 TABLET BY MOUTH DAILY (ALLERGIC RHINITIS), Disp: 28 tablet, Rfl: 10    losartan (COZAAR) 100 MG tablet, , Disp: , Rfl:     losartan (COZAAR) 50 mg tablet, Take 1 tablet (50 mg total) by mouth daily, Disp: 30 tablet, Rfl: 5    metoprolol succinate (TOPROL-XL) 100 mg 24 hr tablet, Take 1 tablet by mouth daily, Disp: , Rfl: 0    Mucus Relief 600 MG 12 hr tablet, TAKE 1 TABLET BY MOUTH EVERY 12 HOURS AS NEEDED FOR CONGESTION **DO NOT CRUSH**, Disp: 5 tablet, Rfl: 11    nystatin-triamcinolone (MYCOLOG-II) ointment, Apply topically 2 (two) times a day as needed (As needed for rash), Disp: 30 g, Rfl: 5    ofloxacin (OCUFLOX) 0.3 % ophthalmic solution, Administer 1 drop to the right eye 4 (four) times a day for 7 days, Disp: 5 mL, Rfl: 0    olopatadine HCl (PATADAY) 0.2 % opth drops, Instill 1 drop into affected eye(s) daily PRN irritation, Disp: , Rfl: 0    PHENobarbital 32.4 mg tablet, Take 3 tablets by mouth at 4 pm, Disp: 90 tablet, Rfl: 5    polyethylene glycol (GLYCOLAX) 17 GM/SCOOP powder, 1 cap dissolve in 8 ox of fluid po daily (hold for loose stools). Constipation., Disp: 255 g, Rfl: 10    polyethylene glycol (GLYCOLAX) 17 GM/SCOOP powder, 1 tsp in 4 oz of fluid po PRN every 3 days if no BM.  Constipation., Disp: 255 g, Rfl: 5    selenium sulfide (SELSUN) 1 %, Apply topically 2 (two) times a week Apply twice weekly as needed when with scalp irritation., Disp: 420 mL, Rfl: 5    simvastatin (ZOCOR) 20 mg tablet, Take 1 tablet by mouth daily, Disp: , Rfl: 0    Vitamins A & D (vitamin A & D) ointment, Apply 1 application topically, Disp: , Rfl:     zolpidem (AMBIEN) 5 mg tablet, TAKE 1/2 TABLET BY MOUTH (2.5MG) DAILY *PLEASE RE-ORDER* INSOMNIA, Disp: 15 tablet, Rfl: 5    LORazepam (ATIVAN) 0.5 mg tablet, Take 1 tablet (0.5 mg total) by mouth once for 1 dose, Disp: 1 tablet, Rfl: 0    Current Facility-Administered Medications:     albuterol inhalation solution 2.5 mg, 2.5 mg, Nebulization, Q6H PRN, Valeri Samayoa MD, 2.5 mg at 03/04/24 1209    Current Allergies     Allergies as of 05/26/2024 - Reviewed 05/26/2024   Allergen Reaction Noted    Other  08/24/2018            The following portions of the patient's history were reviewed and updated as appropriate: allergies, current medications, past family history, past medical history, past social history, past surgical history and problem list.     Past Medical History:   Diagnosis Date    Bowel incontinence     Close exposure to COVID-19 virus     Constipation     Last Assessed: 02Aug2013    COVID-19     Elevated TSH     Encounter for hepatitis C screening test for low risk patient     Encounter for preoperative dental examination     Generalized convulsive seizure (HCC)     Herpes zoster without complication     Hyperlipidemia     Obesity (BMI 30.0-34.9) 09/13/2023    Osteoporosis     Pneumonia of left lower lobe due to infectious organism 3/22/2024    Shingles of eyelid 12/27/2022    Spastic quadriplegic cerebral palsy (HCC)     Urinary incontinence     Vitamin D deficiency     Last Assessed: 13Feb2013       Past Surgical History:   Procedure Laterality Date    ABSCESS DRAINAGE      Incision and Drainage of skin abscess back    COLONOSCOPY      Fiberoptic;  Last Assessed: [id number]    EYE SURGERY Left     Strabismus Left Eye       Family History   Problem Relation Age of Onset    Lymphoma Mother         Bone Marrow    Coronary artery disease Mother     Seizures Mother         Epilepsy and recurrent seizures    Other Mother         Mitral Stenosis    Prostate cancer Father 75    Skin cancer Father     No Known Problems Sister     No Known Problems Sister     Heart disease Maternal Grandmother     Kidney cancer Maternal Grandmother     Heart disease Maternal Grandfather     Heart disease Paternal Grandmother     Breast cancer Maternal Aunt 70         Medications have been verified.        Objective   /76   Pulse 88   Temp 98 °F (36.7 °C) (Temporal)   Resp 20   LMP 02/28/2010 (Approximate)   SpO2 97%   Patient's last menstrual period was 02/28/2010 (approximate).       Physical Exam     Physical Exam  Vitals and nursing note reviewed.   Constitutional:       General: She is not in acute distress.     Appearance: Normal appearance. She is obese. She is not ill-appearing, toxic-appearing or diaphoretic.      Comments: Wheel chair bound    HENT:      Head: Normocephalic and atraumatic.   Eyes:      Comments: Pt not cooperative with eye assessment. Closes eyes   Difficulty with full exam.    There is conjunctiva redness and scleral redness.    Cardiovascular:      Rate and Rhythm: Normal rate and regular rhythm.      Pulses: Normal pulses.      Heart sounds: Normal heart sounds. No murmur heard.  Pulmonary:      Effort: Pulmonary effort is normal.      Breath sounds: Normal breath sounds.   Musculoskeletal:      Cervical back: Normal range of motion.   Skin:     General: Skin is warm and dry.      Capillary Refill: Capillary refill takes less than 2 seconds.   Neurological:      General: No focal deficit present.      Mental Status: She is alert and oriented to person, place, and time.   Psychiatric:         Mood and Affect: Mood normal.         Behavior: Behavior  normal.         Thought Content: Thought content normal.         Judgment: Judgment normal.

## 2024-05-26 NOTE — PATIENT INSTRUCTIONS
You are to apply the eye antibiotic drops as prescribed.  Follow up with the eye doctor this week.  Sooner if any problems.  Apply warm compresses to the right eye.  Wash her hands frequently.   Follow up with your PCP in 3-5 days  Go to the ED if symptoms worsen

## 2024-05-29 ENCOUNTER — OFFICE VISIT (OUTPATIENT)
Dept: FAMILY MEDICINE CLINIC | Facility: CLINIC | Age: 64
End: 2024-05-29

## 2024-05-29 VITALS
DIASTOLIC BLOOD PRESSURE: 89 MMHG | SYSTOLIC BLOOD PRESSURE: 151 MMHG | OXYGEN SATURATION: 94 % | RESPIRATION RATE: 18 BRPM | TEMPERATURE: 98.2 F | HEART RATE: 102 BPM

## 2024-05-29 DIAGNOSIS — H10.9 CONJUNCTIVITIS OF RIGHT EYE, UNSPECIFIED CONJUNCTIVITIS TYPE: Primary | ICD-10-CM

## 2024-05-29 PROCEDURE — 99213 OFFICE O/P EST LOW 20 MIN: CPT | Performed by: FAMILY MEDICINE

## 2024-05-29 PROCEDURE — G2211 COMPLEX E/M VISIT ADD ON: HCPCS | Performed by: FAMILY MEDICINE

## 2024-05-29 NOTE — ASSESSMENT & PLAN NOTE
Resolving  Patient went to the urgent care 3 days ago after developing erythematous R weye with crust and discharge. Has been improving since starting oflaxacin eye drops. Patient instructed to follow up if symptoms linger or if the patient develops visual symptoms. Otherwise the patient can follow up at her next regularly scheduled follow up.

## 2024-05-29 NOTE — PROGRESS NOTES
Ambulatory Visit  Name: Katerin London      : 1960      MRN: [id number]  Encounter Provider: Howard Lujna MD  Encounter Date: 2024   Encounter department: [facility]    Assessment & Plan   1. Conjunctivitis of right eye, unspecified conjunctivitis type  Assessment & Plan:  Resolving  Patient went to the urgent care 3 days ago after developing erythematous R weye with crust and discharge. Has been improving since starting oflaxacin eye drops. Patient instructed to follow up if symptoms linger or if the patient develops visual symptoms. Otherwise the patient can follow up at her next regularly scheduled follow up.    BMI Counseling: There is no height or weight on file to calculate BMI. The BMI is above normal. Nutrition recommendations include decreasing portion sizes, decreasing fast food intake, consuming healthier snacks, increasing intake of lean protein, reducing intake of saturated and trans fat and reducing intake of cholesterol. Exercise recommendations include vigorous physical activity 75 minutes/week and exercising 3-5 times per week. No pharmacotherapy was ordered. Rationale for BMI follow-up plan is due to patient being overweight or obese.       History of Present Illness     HPI  Patient had Redness and itching of the  eye which is now improved. Her allergies had been acting up despite using padaday. She had been rubbing and scratching at the eye. Overnight, Saturday night, she developed worsening redness and crusting of the R eye. The next day it looked like she had developed Pink eye.    If symptoms continue to resolve, patient is okay with following up with her eye doctor at her regularly scheduled time in October and does not need to schedule a sooner appointment. No further follow up needed as long as the patient continues to improve per nursing staff    Review of Systems   Constitutional:  Negative for chills and fever.   HENT:  Negative for  ear pain and sore throat.    Eyes:  Negative for pain, discharge, itching and visual disturbance.   Respiratory:  Negative for cough and shortness of breath.    Cardiovascular:  Negative for chest pain and palpitations.   Gastrointestinal:  Negative for abdominal pain and vomiting.   Genitourinary:  Negative for dysuria and hematuria.   Musculoskeletal:  Negative for arthralgias and back pain.   Skin:  Negative for color change and rash.   Neurological:  Negative for seizures and syncope.   All other systems reviewed and are negative.      Objective     /89 (BP Location: Left arm, Patient Position: Sitting, Cuff Size: Standard)   Pulse 102   Temp 98.2 °F (36.8 °C) (Temporal)   Resp 18   LMP 02/28/2010 (Approximate)   SpO2 94%     Physical Exam  Vitals and nursing note reviewed.   Constitutional:       General: She is not in acute distress.     Appearance: She is well-developed.      Comments: Patient is in wheelchair with poor gross motor control of neck and torso.   HENT:      Head: Normocephalic and atraumatic.   Eyes:      Conjunctiva/sclera: Conjunctivae normal.   Cardiovascular:      Rate and Rhythm: Normal rate and regular rhythm.      Heart sounds: No murmur heard.  Pulmonary:      Effort: Pulmonary effort is normal. No respiratory distress.      Breath sounds: Normal breath sounds.   Abdominal:      Palpations: Abdomen is soft.      Tenderness: There is no abdominal tenderness.   Musculoskeletal:         General: No swelling.      Cervical back: Neck supple.   Skin:     General: Skin is warm and dry.      Capillary Refill: Capillary refill takes less than 2 seconds.   Neurological:      Mental Status: She is alert.   Psychiatric:         Mood and Affect: Mood normal.       Administrative Statements

## 2024-06-17 ENCOUNTER — APPOINTMENT (OUTPATIENT)
Dept: LAB | Facility: CLINIC | Age: 64
End: 2024-06-17
Payer: MEDICARE

## 2024-06-17 DIAGNOSIS — M81.0 OSTEOPOROSIS, UNSPECIFIED OSTEOPOROSIS TYPE, UNSPECIFIED PATHOLOGICAL FRACTURE PRESENCE: ICD-10-CM

## 2024-06-17 LAB
ALBUMIN SERPL BCP-MCNC: 3.9 G/DL (ref 3.5–5)
ALP SERPL-CCNC: 81 U/L (ref 34–104)
ALT SERPL W P-5'-P-CCNC: 15 U/L (ref 7–52)
ANION GAP SERPL CALCULATED.3IONS-SCNC: 11 MMOL/L (ref 4–13)
AST SERPL W P-5'-P-CCNC: 14 U/L (ref 13–39)
BILIRUB SERPL-MCNC: 0.36 MG/DL (ref 0.2–1)
BUN SERPL-MCNC: 14 MG/DL (ref 5–25)
CALCIUM SERPL-MCNC: 8.8 MG/DL (ref 8.4–10.2)
CHLORIDE SERPL-SCNC: 101 MMOL/L (ref 96–108)
CO2 SERPL-SCNC: 27 MMOL/L (ref 21–32)
CREAT SERPL-MCNC: 0.47 MG/DL (ref 0.6–1.3)
GFR SERPL CREATININE-BSD FRML MDRD: 104 ML/MIN/1.73SQ M
GLUCOSE P FAST SERPL-MCNC: 100 MG/DL (ref 65–99)
POTASSIUM SERPL-SCNC: 4.1 MMOL/L (ref 3.5–5.3)
PROT SERPL-MCNC: 7.1 G/DL (ref 6.4–8.4)
SODIUM SERPL-SCNC: 139 MMOL/L (ref 135–147)

## 2024-06-17 PROCEDURE — 36415 COLL VENOUS BLD VENIPUNCTURE: CPT

## 2024-06-17 PROCEDURE — 80053 COMPREHEN METABOLIC PANEL: CPT

## 2024-06-17 NOTE — TELEPHONE ENCOUNTER
- Follow up with your doctor within 2-3 days.  - Bring any results with you to the appointment.  - Return to the ED for any new or worsening symptoms.  - Take Tylenol (Acetaminophen) 650mg or Motrin (Ibuprofen/Advil) 600mg every 6 hours as needed for pain.    Refill Zolpidem 5 mg 1/2 tab at night

## 2024-06-21 DIAGNOSIS — G40.409 TONIC-CLONIC EPILEPTIC SEIZURES (HCC): ICD-10-CM

## 2024-06-21 RX ORDER — PHENOBARBITAL 32.4 MG/1
TABLET ORAL
Qty: 84 TABLET | Refills: 5 | Status: SHIPPED | OUTPATIENT
Start: 2024-06-21

## 2024-06-25 ENCOUNTER — CLINICAL SUPPORT (OUTPATIENT)
Dept: FAMILY MEDICINE CLINIC | Facility: CLINIC | Age: 64
End: 2024-06-25

## 2024-06-25 DIAGNOSIS — M81.0 AGE-RELATED OSTEOPOROSIS WITHOUT CURRENT PATHOLOGICAL FRACTURE: Primary | ICD-10-CM

## 2024-06-25 PROCEDURE — 96372 THER/PROPH/DIAG INJ SC/IM: CPT

## 2024-06-28 PROBLEM — H10.9 CONJUNCTIVITIS OF RIGHT EYE: Status: RESOLVED | Noted: 2024-05-29 | Resolved: 2024-06-28

## 2024-07-03 ENCOUNTER — TELEPHONE (OUTPATIENT)
Dept: FAMILY MEDICINE CLINIC | Facility: CLINIC | Age: 64
End: 2024-07-03

## 2024-07-09 ENCOUNTER — APPOINTMENT (OUTPATIENT)
Dept: LAB | Facility: CLINIC | Age: 64
End: 2024-07-09
Payer: MEDICARE

## 2024-07-09 ENCOUNTER — APPOINTMENT (OUTPATIENT)
Dept: LAB | Age: 64
End: 2024-07-09
Payer: MEDICARE

## 2024-07-09 DIAGNOSIS — M81.0 SENILE OSTEOPOROSIS: ICD-10-CM

## 2024-07-09 DIAGNOSIS — M81.0 OSTEOPOROSIS, UNSPECIFIED OSTEOPOROSIS TYPE, UNSPECIFIED PATHOLOGICAL FRACTURE PRESENCE: ICD-10-CM

## 2024-07-09 LAB
ALBUMIN SERPL BCG-MCNC: 3.9 G/DL (ref 3.5–5)
ALP SERPL-CCNC: 81 U/L (ref 34–104)
ALT SERPL W P-5'-P-CCNC: 13 U/L (ref 7–52)
ANION GAP SERPL CALCULATED.3IONS-SCNC: 8 MMOL/L (ref 4–13)
AST SERPL W P-5'-P-CCNC: 15 U/L (ref 13–39)
BILIRUB SERPL-MCNC: 0.24 MG/DL (ref 0.2–1)
BUN SERPL-MCNC: 18 MG/DL (ref 5–25)
CALCIUM SERPL-MCNC: 9.3 MG/DL (ref 8.4–10.2)
CHLORIDE SERPL-SCNC: 102 MMOL/L (ref 96–108)
CO2 SERPL-SCNC: 29 MMOL/L (ref 21–32)
CREAT SERPL-MCNC: 0.42 MG/DL (ref 0.6–1.3)
GFR SERPL CREATININE-BSD FRML MDRD: 108 ML/MIN/1.73SQ M
GLUCOSE SERPL-MCNC: 84 MG/DL (ref 65–140)
POTASSIUM SERPL-SCNC: 3.7 MMOL/L (ref 3.5–5.3)
PROT SERPL-MCNC: 7.1 G/DL (ref 6.4–8.4)
SODIUM SERPL-SCNC: 139 MMOL/L (ref 135–147)

## 2024-07-09 PROCEDURE — 36415 COLL VENOUS BLD VENIPUNCTURE: CPT

## 2024-07-09 PROCEDURE — 80053 COMPREHEN METABOLIC PANEL: CPT

## 2024-07-26 ENCOUNTER — RA CDI HCC (OUTPATIENT)
Dept: OTHER | Facility: HOSPITAL | Age: 64
End: 2024-07-26

## 2024-08-05 ENCOUNTER — TELEPHONE (OUTPATIENT)
Dept: FAMILY MEDICINE CLINIC | Facility: CLINIC | Age: 64
End: 2024-08-05

## 2024-08-05 ENCOUNTER — OFFICE VISIT (OUTPATIENT)
Dept: FAMILY MEDICINE CLINIC | Facility: CLINIC | Age: 64
End: 2024-08-05

## 2024-08-05 VITALS
WEIGHT: 163 LBS | DIASTOLIC BLOOD PRESSURE: 91 MMHG | SYSTOLIC BLOOD PRESSURE: 149 MMHG | HEIGHT: 60 IN | OXYGEN SATURATION: 98 % | BODY MASS INDEX: 32 KG/M2 | RESPIRATION RATE: 18 BRPM | HEART RATE: 91 BPM | TEMPERATURE: 99.2 F

## 2024-08-05 DIAGNOSIS — I10 BENIGN ESSENTIAL HYPERTENSION: Primary | ICD-10-CM

## 2024-08-05 PROCEDURE — 99213 OFFICE O/P EST LOW 20 MIN: CPT | Performed by: FAMILY MEDICINE

## 2024-08-05 PROCEDURE — G2211 COMPLEX E/M VISIT ADD ON: HCPCS | Performed by: FAMILY MEDICINE

## 2024-08-05 NOTE — ASSESSMENT & PLAN NOTE
BP today 149/91, recheck deferred for patient comfort. Home logs reviewed, average s/d between 120-130s/80-90d. No concern for elevated BP in home environment. Continue with current Blood pressure medications  HTCZ 12.5mg, Losartan 50mg, Metoprolol succinate 100mg

## 2024-08-05 NOTE — PROGRESS NOTES
Ambulatory Visit  Name: Katerin London      : 1960      MRN: [id number]  Encounter Provider: Lauro Rendon MD  Encounter Date: 2024   Encounter department: [facility]    Assessment & Plan   1. Benign essential hypertension  Assessment & Plan:  BP today 149/91, recheck deferred for patient comfort. Home logs reviewed, average s/d between 120-130s/80-90d. No concern for elevated BP in home environment. Continue with current Blood pressure medications  HTCZ 12.5mg, Losartan 50mg, Metoprolol succinate 100mg       History of Present Illness     Patient presents for regular 12 week follow up, no immediate concerns. Per caregiver patient has been doing well, recently received prolia injection and associated follow up labwork, next due for injection in December. Due for medicare annual wellness in one month.         Review of Systems   Unable to perform ROS: Patient nonverbal     Pertinent Medical History       Objective     /91 (BP Location: Left arm, Patient Position: Sitting, Cuff Size: Standard)   Pulse 91   Temp 99.2 °F (37.3 °C) (Temporal)   Resp 18   Ht 5' (1.524 m)   Wt 73.9 kg (163 lb)   LMP 2010 (Approximate)   SpO2 98%   BMI 31.83 kg/m²     Physical Exam  Vitals and nursing note reviewed.   Constitutional:       General: She is not in acute distress.     Appearance: She is well-developed.      Comments: Patient is in wheelchair with poor gross motor control of neck and torso.   HENT:      Head: Normocephalic and atraumatic.   Eyes:      Conjunctiva/sclera: Conjunctivae normal.   Cardiovascular:      Rate and Rhythm: Normal rate and regular rhythm.      Heart sounds: No murmur heard.  Pulmonary:      Effort: Pulmonary effort is normal. No respiratory distress.      Breath sounds: Normal breath sounds.   Abdominal:      Palpations: Abdomen is soft.      Tenderness: There is no abdominal tenderness.   Musculoskeletal:         General:  No swelling.      Cervical back: Neck supple.   Skin:     General: Skin is warm and dry.      Capillary Refill: Capillary refill takes less than 2 seconds.   Neurological:      Mental Status: She is alert.   Psychiatric:         Mood and Affect: Mood normal.       Administrative Statements

## 2024-08-27 DIAGNOSIS — F51.01 PRIMARY INSOMNIA: ICD-10-CM

## 2024-08-27 RX ORDER — ZOLPIDEM TARTRATE 5 MG/1
2.5 TABLET ORAL
Qty: 15 TABLET | Refills: 5 | Status: SHIPPED | OUTPATIENT
Start: 2024-08-27

## 2024-08-28 ENCOUNTER — ANNUAL EXAM (OUTPATIENT)
Dept: OBGYN CLINIC | Facility: CLINIC | Age: 64
End: 2024-08-28

## 2024-08-28 VITALS
HEIGHT: 60 IN | WEIGHT: 166 LBS | SYSTOLIC BLOOD PRESSURE: 150 MMHG | DIASTOLIC BLOOD PRESSURE: 79 MMHG | HEART RATE: 107 BPM | BODY MASS INDEX: 32.59 KG/M2

## 2024-08-28 DIAGNOSIS — Z01.419 WOMEN'S ANNUAL ROUTINE GYNECOLOGICAL EXAMINATION: Primary | ICD-10-CM

## 2024-08-28 PROCEDURE — G0101 CA SCREEN;PELVIC/BREAST EXAM: HCPCS | Performed by: NURSE PRACTITIONER

## 2024-08-28 NOTE — PROGRESS NOTES
ANNUAL GYNECOLOGICAL EXAMINATION    Katerin London is a 64 y.o. female who presents today for annual GYN exam. 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 Patient also has severe intellectual disability. She presents today with her caregiver, history was provided by caretaker. Her last pap smear was performed 2022 and result was NILM, HR-HPV negative.  She has no history of abnormal pap smears in her past.  Her last mammogram was performed 2024 and result was BIRADS 3, she is scheduled for follow up imaging in October.  She had colon cancer screening performed with [employer] in  which was negative. She has a history of osteoporosis, she is following with PCP for treatment and management.  She had HIV screening performed  and it was negative.  She has been menopausal since around the age of 49.  Patient's last menstrual period was 2010 (approximate).  Her general medical history has been reviewed and she reports it as follows:    Past Medical History:   Diagnosis Date    Bowel incontinence     Close exposure to COVID-19 virus     Constipation     Last Assessed: 2013    COVID-19     Elevated TSH     Encounter for hepatitis C screening test for low risk patient     Encounter for preoperative dental examination     Generalized convulsive seizure (HCC)     Herpes zoster without complication     Hyperlipidemia     Obesity (BMI 30.0-34.9) 2023    Osteoporosis     Pneumonia of left lower lobe due to infectious organism 3/22/2024    Shingles of eyelid 2022    Spastic quadriplegic cerebral palsy (HCC)     Urinary incontinence     Vitamin D deficiency     Last Assessed: 2013     Past Surgical History:   Procedure Laterality Date    ABSCESS DRAINAGE      Incision and Drainage of skin abscess back    COLONOSCOPY      Fiberoptic; Last Assessed: 2013    EYE SURGERY Left     Strabismus Left Eye     OB History          0    Para   0    Term    0       0    AB   0    Living   0         SAB   0    IAB   0    Ectopic   0    Multiple   0    Live Births   0               Social History     Tobacco Use    Smoking status: Never    Smokeless tobacco: Never   Vaping Use    Vaping status: Never Used   Substance Use Topics    Alcohol use: No    Drug use: No     Social History     Substance and Sexual Activity   Sexual Activity Never     Cancer-related family history includes Breast cancer (age of onset: 70) in her maternal aunt; Kidney cancer in her maternal grandmother; Lymphoma in her mother; Prostate cancer (age of onset: 75) in her father; Skin cancer in her father.    Current Outpatient Medications   Medication Instructions    acetaminophen (TYLENOL) 650 mg, Oral, Every 8 hours PRN    albuterol 2.5 mg, Nebulization, Every 6 hours PRN    baclofen 10 mg tablet TAKE 1 TABLET BY MOUTH TWICE A DAY (SPASTICITY)    benzonatate (TESSALON PERLES) 100 mg capsule Take 1 capsule by mouth every 8 hours as needed for dry cough    Calcium Citrate 200 MG TABS Oral, 3 TABS TWICE A DAY    carbamide peroxide (DEBROX) 6.5 % otic solution Instill 5 drops in affected ear twice daily for 4 days as needed for ear wax    cholecalciferol (VITAMIN D3) 1,000 units tablet 1 capsule, Oral, Daily    clotrimazole-betamethasone (LOTRISONE) 1-0.05 % cream APPLY TOPICALLY TO AFFECTED AREA OF SKIN TWICE A DAY AS NEEDED FOR IRRITATION FROM DIAPER    diphenhydrAMINE (BENADRYL) 25 mg, Oral, Every 8 hours PRN    famotidine (PEPCID) 20 mg, Oral, Daily, at bedtime    fluticasone (FLONASE) 50 mcg/act nasal spray USE 1 SPRAY IN EACH NOSTRIL TWICE A DAY (RHINITIS)    GNP 8 Hour Arthritis Relief 650 MG CR tablet TAKE 1 TABLET BY MOUTH EVERY 6 HOURS AS NEEDED FOR MILD PAIN OR TEMPERATURE  >100.4    hydroCHLOROthiazide 12.5 mg, Oral, Daily    ibuprofen (MOTRIN) 600 mg tablet Oral, Every 6 hours PRN, Fever greater than 101.4F    ipratropium (ATROVENT) 0.06 % nasal spray 2 sprays, Nasal, 3 times daily  PRN    loratadine (CLARITIN) 10 mg tablet TAKE 1 TABLET BY MOUTH DAILY (ALLERGIC RHINITIS)    LORazepam (ATIVAN) 0.5 mg, Oral, Once    losartan (COZAAR) 100 MG tablet No dose, route, or frequency recorded.    losartan (COZAAR) 50 mg, Oral, Daily    metoprolol succinate (TOPROL-XL) 100 mg 24 hr tablet Take 1 tablet by mouth daily    Mucus Relief 600 MG 12 hr tablet TAKE 1 TABLET BY MOUTH EVERY 12 HOURS AS NEEDED FOR CONGESTION **DO NOT CRUSH**    nystatin-triamcinolone (MYCOLOG-II) ointment Topical, 2 times daily PRN    olopatadine HCl (PATADAY) 0.2 % opth drops Instill 1 drop into affected eye(s) daily PRN irritation    PHENobarbital 32.4 mg tablet TAKE 3 TABLETS BY MOUTH (97.2MG) AT 4PM FOR SEIZURES    polyethylene glycol (GLYCOLAX) 17 GM/SCOOP powder 1 cap dissolve in 8 ox of fluid po daily (hold for loose stools). Constipation.    polyethylene glycol (GLYCOLAX) 17 GM/SCOOP powder 1 tsp in 4 oz of fluid po PRN every 3 days if no BM. Constipation.    Prolia 60 mg, Subcutaneous, Every 6 months    selenium sulfide (SELSUN) 1 % Topical, 2 times weekly, Apply twice weekly as needed when with scalp irritation.    simvastatin (ZOCOR) 20 mg tablet Take 1 tablet by mouth daily    Vitamins A & D (vitamin A & D) ointment 1 application., Apply externally    zolpidem (AMBIEN) 2.5 mg, Oral, Daily at bedtime PRN       Review of Systems:  Review of Systems   Constitutional: Negative.    Gastrointestinal: Negative.    Genitourinary: Negative.    Skin: Negative.        Physical Exam:  Ht 5' (1.524 m)   Wt 75.3 kg (166 lb)   LMP 02/28/2010 (Approximate)   BMI 32.42 kg/m²   Physical Exam  Constitutional:       General: She is not in acute distress.     Appearance: Normal appearance.   HENT:      Mouth/Throat:      Mouth: Mucous membranes are moist.   Pulmonary:      Effort: Pulmonary effort is normal.      Breath sounds: Normal breath sounds.   Abdominal:      General: Abdomen is flat.      Palpations: Abdomen is soft.       Tenderness: There is no abdominal tenderness.   Musculoskeletal:      Cervical back: Neck supple.   Neurological:      Mental Status: She is alert.   Skin:     General: Skin is warm and dry.   Psychiatric:         Behavior: Behavior is cooperative.   Vitals reviewed.       Assessment/Plan:   1. Normal well-woman GYN exam.  2. Cervical cancer screening:  Reviewed ASCCP guidelines. Patient is 64 with a lifelong history of normal pap smears, pap smears to be discontinued at this time. Pelvic exam deferred for patient comfort.    3. Breast cancer screening:  Normal breast exam.  Scheduled in October for bilateral screening mammogram.  Reviewed breast self-awareness.   4. Colon cancer screening:  Up to date.   5. Depression Screening: Patient's depression screening was assessed with a PHQ-2 score of 0. Their PHQ-9 score was 0. Clinically patient does not have depression. No treatment is required.  Depression screening was completed with Caretaker and Patient, caretaker reports patients mood is pleasant and she shows no signs of depression.    6. BMI Counseling: Body mass index is 32.42 kg/m². Discussed the patient's BMI with her. The BMI is above normal. No BMI follow-up plan is appropriate. Patient is 64+ years old and weight reduction/weight gain would further complicate their underlying physical disability.   7. Return to office in one year for annual exam.    Review[facility] with patient [facility], but that they will not necessarily be reviewed by me immediately.  Explained that I will review results at my earliest opportunity and contact patient appropriately.

## 2024-09-11 ENCOUNTER — OFFICE VISIT (OUTPATIENT)
Dept: PODIATRY | Facility: CLINIC | Age: 64
End: 2024-09-11
Payer: MEDICARE

## 2024-09-11 DIAGNOSIS — B35.1 ONYCHOMYCOSIS: ICD-10-CM

## 2024-09-11 DIAGNOSIS — F72 SEVERE INTELLECTUAL DISABILITY: ICD-10-CM

## 2024-09-11 DIAGNOSIS — G80.0 SPASTIC QUADRIPLEGIC CEREBRAL PALSY (HCC): Primary | ICD-10-CM

## 2024-09-11 DIAGNOSIS — I87.2 VENOUS INSUFFICIENCY: ICD-10-CM

## 2024-09-11 DIAGNOSIS — R60.1 GENERALIZED EDEMA: ICD-10-CM

## 2024-09-11 PROCEDURE — 99212 OFFICE O/P EST SF 10 MIN: CPT | Performed by: PODIATRIST

## 2024-09-11 NOTE — PROGRESS NOTES
Ambulatory Visit  Name: Katerin London      : 1960      MRN: [id number]  Encounter Provider: Rip Chen DPM  Encounter Date: 2024   Encounter department: [facility] [place]    Assessment & Plan  Spastic quadriplegic cerebral palsy (HCC)         Onychomycosis       Debride mycotic nails and thin the nail plates with the use of a nail nipper manually.  Venous insufficiency         Generalized edema       Patient is to continue wearing some form of compression to help with the edema on both feet  Severe intellectual disability           History of Present Illness     Katerin London is a 64 y.o. female who presents with chief complaint of painful thick nails on both feet she was seen in her wheelchair with her staff present in the room.    History obtained from : patient's Legal Guardian  Review of Systems  Medical History Reviewed by provider this encounter:       Current Outpatient Medications on File Prior to Visit   Medication Sig Dispense Refill    acetaminophen (TYLENOL) 650 mg CR tablet Take 650 mg by mouth every 8 (eight) hours as needed      albuterol (2.5 mg/3 mL) 0.083 % nebulizer solution Take 3 mL (2.5 mg total) by nebulization every 6 (six) hours as needed for wheezing or shortness of breath (coughing) 25 mL 0    baclofen 10 mg tablet TAKE 1 TABLET BY MOUTH TWICE A DAY (SPASTICITY) 56 tablet 1    benzonatate (TESSALON PERLES) 100 mg capsule Take 1 capsule by mouth every 8 hours as needed for dry cough 20 capsule 0    Calcium Citrate 200 MG TABS Take by mouth 3 TABS TWICE A DAY       carbamide peroxide (DEBROX) 6.5 % otic solution Instill 5 drops in affected ear twice daily for 4 days as needed for ear wax 15 mL 0    cholecalciferol (VITAMIN D3) 1,000 units tablet Take 1 capsule by mouth daily      clotrimazole-betamethasone (LOTRISONE) 1-0.05 % cream APPLY TOPICALLY TO AFFECTED AREA OF SKIN TWICE A DAY AS NEEDED FOR IRRITATION FROM DIAPER 45 g 11    denosumab (Prolia)  60 mg/mL Inject 1 mL (60 mg total) under the skin every 6 (six) months 1 mL 0    diphenhydrAMINE (BENADRYL) 25 mg tablet Take 1 tablet (25 mg total) by mouth every 8 (eight) hours as needed for itching, allergies or sleep (Coughing) 20 tablet 0    famotidine (PEPCID) 20 mg tablet Take 1 tablet (20 mg total) by mouth daily at bedtime  0    fluticasone (FLONASE) 50 mcg/act nasal spray USE 1 SPRAY IN EACH NOSTRIL TWICE A DAY (RHINITIS) 16 g 11    GNP 8 Hour Arthritis Relief 650 MG CR tablet TAKE 1 TABLET BY MOUTH EVERY 6 HOURS AS NEEDED FOR MILD PAIN OR TEMPERATURE  >100.4 8 tablet 11    hydrochlorothiazide (HYDRODIURIL) 25 mg tablet Take 0.5 tablets (12.5 mg total) by mouth daily 30 tablet 5    ibuprofen (MOTRIN) 600 mg tablet Take by mouth every 6 (six) hours as needed for fever Fever greater than 101.4F      ipratropium (ATROVENT) 0.06 % nasal spray 2 sprays into each nostril 3 (three) times a day as needed for rhinitis (congestion, post nasal drip causing cough) 15 mL 0    loratadine (CLARITIN) 10 mg tablet TAKE 1 TABLET BY MOUTH DAILY (ALLERGIC RHINITIS) 28 tablet 10    LORazepam (ATIVAN) 0.5 mg tablet Take 1 tablet (0.5 mg total) by mouth once for 1 dose 1 tablet 0    losartan (COZAAR) 100 MG tablet  (Patient not taking: Reported on 8/28/2024)      losartan (COZAAR) 50 mg tablet Take 1 tablet (50 mg total) by mouth daily 30 tablet 5    metoprolol succinate (TOPROL-XL) 100 mg 24 hr tablet Take 1 tablet by mouth daily  0    Mucus Relief 600 MG 12 hr tablet TAKE 1 TABLET BY MOUTH EVERY 12 HOURS AS NEEDED FOR CONGESTION **DO NOT CRUSH** 5 tablet 11    nystatin-triamcinolone (MYCOLOG-II) ointment Apply topically 2 (two) times a day as needed (As needed for rash) 30 g 5    olopatadine HCl (PATADAY) 0.2 % opth drops Instill 1 drop into affected eye(s) daily PRN irritation  0    PHENobarbital 32.4 mg tablet TAKE 3 TABLETS BY MOUTH (97.2MG) AT 4PM FOR SEIZURES 84 tablet 5    polyethylene glycol (GLYCOLAX) 17 GM/SCOOP  powder 1 cap dissolve in 8 ox of fluid po daily (hold for loose stools). Constipation. 255 g 10    polyethylene glycol (GLYCOLAX) 17 GM/SCOOP powder 1 tsp in 4 oz of fluid po PRN every 3 days if no BM. Constipation. 255 g 5    selenium sulfide (SELSUN) 1 % Apply topically 2 (two) times a week Apply twice weekly as needed when with scalp irritation. 420 mL 5    simvastatin (ZOCOR) 20 mg tablet Take 1 tablet by mouth daily  0    Vitamins A & D (vitamin A & D) ointment Apply 1 application topically      zolpidem (AMBIEN) 5 mg tablet Take 0.5 tablets (2.5 mg total) by mouth daily at bedtime as needed for sleep 15 tablet 5     Current Facility-Administered Medications on File Prior to Visit   Medication Dose Route Frequency Provider Last Rate Last Admin    albuterol inhalation solution 2.5 mg  2.5 mg Nebulization Q6H PRN Valeri Samayoa MD   2.5 mg at 03/04/24 1209          Objective     LMP 02/28/2010 (Approximate)     Physical Exam  Vascular status is a faint 1/4 DP PT negative digital hair delayed capillary refill with pitting edema present bilaterally.  The edema is +3 in nature.    Derm nails are brittle elongated hypertrophic white discoloration with subungual debris.  There is an increased length of the nails of approximately 1 mm and there is an increased thickness in the nail of approximately 2 mm.  There is dependent rubor present bilaterally.    Ortho hammertoe deformities are present digits 2 through 5 bilaterally.    Neuro light touch and sharp dull were performed on the patient the 0.5 monofilament test was not performed today    Administrative Statements   I have spent a total time of 15 minutes in caring for this patient on the day of the visit/encounter including Instructions for management, Counseling / Coordination of care, Documenting in the medical record, Reviewing / ordering tests, medicine, procedures  , and Obtaining or reviewing history  .

## 2024-09-16 ENCOUNTER — OFFICE VISIT (OUTPATIENT)
Dept: FAMILY MEDICINE CLINIC | Facility: CLINIC | Age: 64
End: 2024-09-16

## 2024-09-16 VITALS
DIASTOLIC BLOOD PRESSURE: 92 MMHG | OXYGEN SATURATION: 98 % | RESPIRATION RATE: 20 BRPM | SYSTOLIC BLOOD PRESSURE: 147 MMHG | HEART RATE: 93 BPM | TEMPERATURE: 98 F

## 2024-09-16 DIAGNOSIS — E78.5 HYPERLIPIDEMIA, UNSPECIFIED HYPERLIPIDEMIA TYPE: ICD-10-CM

## 2024-09-16 DIAGNOSIS — E55.9 VITAMIN D DEFICIENCY: ICD-10-CM

## 2024-09-16 DIAGNOSIS — Z00.00 MEDICARE ANNUAL WELLNESS VISIT, SUBSEQUENT: Primary | ICD-10-CM

## 2024-09-16 DIAGNOSIS — F72 SEVERE INTELLECTUAL DISABILITY: ICD-10-CM

## 2024-09-16 DIAGNOSIS — F51.01 PRIMARY INSOMNIA: ICD-10-CM

## 2024-09-16 DIAGNOSIS — I10 BENIGN ESSENTIAL HYPERTENSION: ICD-10-CM

## 2024-09-16 PROCEDURE — G0439 PPPS, SUBSEQ VISIT: HCPCS | Performed by: FAMILY MEDICINE

## 2024-09-16 RX ORDER — ZOLPIDEM TARTRATE 5 MG/1
2.5 TABLET ORAL
Qty: 30 TABLET | Refills: 0 | Status: CANCELLED | OUTPATIENT
Start: 2024-09-16 | End: 2024-11-15

## 2024-09-16 RX ORDER — ZOLPIDEM TARTRATE 5 MG/1
2.5 TABLET ORAL
Qty: 30 TABLET | Refills: 1 | Status: SHIPPED | OUTPATIENT
Start: 2024-09-16 | End: 2024-09-26 | Stop reason: SDUPTHER

## 2024-09-16 NOTE — PATIENT INSTRUCTIONS
Medicare Preventive Visit Patient Instructions  Thank you for completing your Welcome to Medicare Visit or Medicare Annual Wellness Visit today. Your next wellness visit will be due in one year (9/17/2025).  The screening/preventive services that you may require over the next 5-10 years are detailed below. Some tests may not apply to you based off risk factors and/or age. Screening tests ordered at today's visit but not completed yet may show as past due. Also, please note that scanned in results may not display below.  Preventive Screenings:  Service Recommendations Previous Testing/Comments   Colorectal Cancer Screening  * Colonoscopy    * Fecal Occult Blood Test (FOBT)/Fecal Immunochemical Test (FIT)  * Fecal DNA/Cologuard Test  * Flexible Sigmoidoscopy Age: 45-75 years old   Colonoscopy: every 10 years (may be performed more frequently if at higher risk)  OR  FOBT/FIT: every 1 year  OR  Cologuard: every 3 years  OR  Sigmoidoscopy: every 5 years  Screening may be recommended earlier than age 45 if at higher risk for colorectal cancer. Also, an individualized decision between you and your healthcare provider will decide whether screening between the ages of 76-85 would be appropriate. Colonoscopy: 04/18/2012  FOBT/FIT: Not on file  Cologuard: 06/16/2022  Sigmoidoscopy: Not on file          Breast Cancer Screening Age: 40+ years old  Frequency: every 1-2 years  Not required if history of left and right mastectomy Mammogram: 02/05/2024        Cervical Cancer Screening Between the ages of 21-29, pap smear recommended once every 3 years.   Between the ages of 30-65, can perform pap smear with HPV co-testing every 5 years.   Recommendations may differ for women with a history of total hysterectomy, cervical cancer, or abnormal pap smears in past. Pap Smear: 08/28/2024        Hepatitis C Screening Once for adults born between 1945 and 1965  More frequently in patients at high risk for Hepatitis C Hep C Antibody:  01/25/2019        Diabetes Screening 1-2 times per year if you're at risk for diabetes or have pre-diabetes Fasting glucose: 100 mg/dL (6/17/2024)  A1C: No results in last 5 years (No results in last 5 years)      Cholesterol Screening Once every 5 years if you don't have a lipid disorder. May order more often based on risk factors. Lipid panel: 12/15/2023          Other Preventive Screenings Covered by Medicare:  Abdominal Aortic Aneurysm (AAA) Screening: covered once if your at risk. You're considered to be at risk if you have a family history of AAA.  Lung Cancer Screening: covers low dose CT scan once per year if you meet all of the following conditions: (1) Age 55-77; (2) No signs or symptoms of lung cancer; (3) Current smoker or have quit smoking within the last 15 years; (4) You have a tobacco smoking history of at least 20 pack years (packs per day multiplied by number of years you smoked); (5) You get a written order from a healthcare provider.  Glaucoma Screening: covered annually if you're considered high risk: (1) You have diabetes OR (2) Family history of glaucoma OR (3)  aged 50 and older OR (4)  American aged 65 and older  Osteoporosis Screening: covered every 2 years if you meet one of the following conditions: (1) You're estrogen deficient and at risk for osteoporosis based off medical history and other findings; (2) Have a vertebral abnormality; (3) On glucocorticoid therapy for more than 3 months; (4) Have primary hyperparathyroidism; (5) On osteoporosis medications and need to assess response to drug therapy.   Last bone density test (DXA Scan): 11/08/2022.  HIV Screening: covered annually if you're between the age of 15-65. Also covered annually if you are younger than 15 and older than 65 with risk factors for HIV infection. For pregnant patients, it is covered up to 3 times per pregnancy.    Immunizations:  Immunization Recommendations   Influenza Vaccine Annual  influenza vaccination during flu season is recommended for all persons aged >= 6 months who do not have contraindications   Pneumococcal Vaccine   * Pneumococcal conjugate vaccine = PCV13 (Prevnar 13), PCV15 (Vaxneuvance), PCV20 (Prevnar 20)  * Pneumococcal polysaccharide vaccine = PPSV23 (Pneumovax) Adults 19-63 yo with certain risk factors or if 65+ yo  If never received any pneumonia vaccine: recommend Prevnar 20 (PCV20)  Give PCV20 if previously received 1 dose of PCV13 or PPSV23   Hepatitis B Vaccine 3 dose series if at intermediate or high risk (ex: diabetes, end stage renal disease, liver disease)   Respiratory syncytial virus (RSV) Vaccine - COVERED BY MEDICARE PART D  * RSVPreF3 (Arexvy) CDC recommends that adults 60 years of age and older may receive a single dose of RSV vaccine using shared clinical decision-making (SCDM)   Tetanus (Td) Vaccine - COST NOT COVERED BY MEDICARE PART B Following completion of primary series, a booster dose should be given every 10 years to maintain immunity against tetanus. Td may also be given as tetanus wound prophylaxis.   Tdap Vaccine - COST NOT COVERED BY MEDICARE PART B Recommended at least once for all adults. For pregnant patients, recommended with each pregnancy.   Shingles Vaccine (Shingrix) - COST NOT COVERED BY MEDICARE PART B  2 shot series recommended in those 19 years and older who have or will have weakened immune systems or those 50 years and older     Health Maintenance Due:      Topic Date Due   • Colorectal Cancer Screening  06/16/2025   • Breast Cancer Screening: Mammogram  02/05/2026   • HIV Screening  Completed   • Hepatitis C Screening  Completed   • Cervical Cancer Screening  Discontinued     Immunizations Due:      Topic Date Due   • Pneumococcal Vaccine: 65+ Years (1 of 1 - PCV) Never done   • COVID-19 Vaccine (6 - 2023-24 season) 09/01/2024   • Influenza Vaccine (1) 09/01/2024     Advance Directives   What are advance directives?  Advance  directives are legal documents that state your wishes and plans for medical care. These plans are made ahead of time in case you lose your ability to make decisions for yourself. Advance directives can apply to any medical decision, such as the treatments you want, and if you want to donate organs.   What are the types of advance directives?  There are many types of advance directives, and each state has rules about how to use them. You may choose a combination of any of the following:  Living will:  This is a written record of the treatment you want. You can also choose which treatments you do not want, which to limit, and which to stop at a certain time. This includes surgery, medicine, IV fluid, and tube feedings.   Durable power of  for healthcare (DPAHC):  This is a written record that states who you want to make healthcare choices for you when you are unable to make them for yourself. This person, called a proxy, is usually a family member or a friend. You may choose more than 1 proxy.  Do not resuscitate (DNR) order:  A DNR order is used in case your heart stops beating or you stop breathing. It is a request not to have certain forms of treatment, such as CPR. A DNR order may be included in other types of advance directives.  Medical directive:  This covers the care that you want if you are in a coma, near death, or unable to make decisions for yourself. You can list the treatments you want for each condition. Treatment may include pain medicine, surgery, blood transfusions, dialysis, IV or tube feedings, and a ventilator (breathing machine).  Values history:  This document has questions about your views, beliefs, and how you feel and think about life. This information can help others choose the care that you would choose.  Why are advance directives important?  An advance directive helps you control your care. Although spoken wishes may be used, it is better to have your wishes written down. Spoken  wishes can be misunderstood, or not followed. Treatments may be given even if you do not want them. An advance directive may make it easier for your family to make difficult choices about your care.   Weight Management   Why it is important to manage your weight:  Being overweight increases your risk of health conditions such as heart disease, high blood pressure, type 2 diabetes, and certain types of cancer. It can also increase your risk for osteoarthritis, sleep apnea, and other respiratory problems. Aim for a slow, steady weight loss. Even a small amount of weight loss can lower your risk of health problems.  How to lose weight safely:  A safe and healthy way to lose weight is to eat fewer calories and get regular exercise. You can lose up about 1 pound a week by decreasing the number of calories you eat by 500 calories each day.   Healthy meal plan for weight management:  A healthy meal plan includes a variety of foods, contains fewer calories, and helps you stay healthy. A healthy meal plan includes the following:  Eat whole-grain foods more often.  A healthy meal plan should contain fiber. Fiber is the part of grains, fruits, and vegetables that is not broken down by your body. Whole-grain foods are healthy and provide extra fiber in your diet. Some examples of whole-grain foods are whole-wheat breads and pastas, oatmeal, brown rice, and bulgur.  Eat a variety of vegetables every day.  Include dark, leafy greens such as spinach, kale, elena greens, and mustard greens. Eat yellow and orange vegetables such as carrots, sweet potatoes, and winter squash.   Eat a variety of fruits every day.  Choose fresh or canned fruit (canned in its own juice or light syrup) instead of juice. Fruit juice has very little or no fiber.  Eat low-fat dairy foods.  Drink fat-free (skim) milk or 1% milk. Eat fat-free yogurt and low-fat cottage cheese. Try low-fat cheeses such as mozzarella and other reduced-fat cheeses.  Choose  meat and other protein foods that are low in fat.  Choose beans or other legumes such as split peas or lentils. Choose fish, skinless poultry (chicken or turkey), or lean cuts of red meat (beef or pork). Before you cook meat or poultry, cut off any visible fat.   Use less fat and oil.  Try baking foods instead of frying them. Add less fat, such as margarine, sour cream, regular salad dressing and mayonnaise to foods. Eat fewer high-fat foods. Some examples of high-fat foods include french fries, doughnuts, ice cream, and cakes.  Eat fewer sweets.  Limit foods and drinks that are high in sugar. This includes candy, cookies, regular soda, and sweetened drinks.  Exercise:  Exercise at least 30 minutes per day on most days of the week. Some examples of exercise include walking, biking, dancing, and swimming. You can also fit in more physical activity by taking the stairs instead of the elevator or parking farther away from stores. Ask your healthcare provider about the best exercise plan for you.      © Copyright [employer] 2018 Information is for End User's use only and may not be sold, redistributed or otherwise used for commercial purposes. All illustrations and images included in CareNotes® are the copyrighted property of [employer].A[employer]M., Inc. or [employer]

## 2024-09-16 NOTE — PROGRESS NOTES
Ambulatory Visit  Name: Katerin London      : 1960      MRN: [id number]  Encounter Provider: Lauro Rendon MD  Encounter Date: 2024   Encounter department: [facility]    Assessment & Plan  Medicare annual wellness visit, subsequent         Vitamin D deficiency    Orders:    Vitamin D 25 hydroxy; Future    Hyperlipidemia, unspecified hyperlipidemia type    Orders:    Lipid Panel With Direct LDL; Future    Severe intellectual disability    Orders:    Phenobarbital level; Future    Benign essential hypertension    Orders:    CBC and Platelet; Future    Basic metabolic panel; Future    TSH + Free T4; Future    Primary insomnia            Preventive health issues were discussed with patient, and age appropriate screening tests were ordered as noted in patient's After Visit Summary. Personalized health advice and appropriate referrals for health education or preventive services given if needed, as noted in patient's After Visit Summary.    History of Present Illness     Presents for regular medicare annual wellness today. No specific concerns today.        Patient Care Team:  Lauro Rendon MD as PCP - General (Family Medicine)    Review of Systems  Medical History Reviewed by provider this encounter:       Annual Wellness Visit Questionnaire   Katerin is here for her Subsequent Wellness visit. Last Medicare Wellness visit information reviewed, patient interviewed, no change since last AWV.     Historian  Patient cannot answer questions due to cognitive impairment, intelluctual disability, or expressive limitations. Information provided by: caregiver.    Health Risk Assessment:   Patient rates overall health as good. Patient feels that their physical health rating is same. Patient is very satisfied with their life. Eyesight was rated as same. Hearing was rated as same. Patient feels that their emotional and mental health rating is same. Patients states  they are never, rarely angry. Patient states they are never, rarely unusually tired/fatigued. Pain experienced in the last 7 days has been none. Patient states that she has experienced no weight loss or gain in last 6 months.     Fall Risk Screening:   In the past year, patient has experienced: no history of falling in past year      Urinary Incontinence Screening:   Patient has leaked urine accidently in the last six months.     Home Safety:  Patient does not have trouble with stairs inside or outside of their home. Patient has working smoke alarms and has working carbon monoxide detector. Home safety hazards include: none.     Nutrition:   Current diet is Low Saturated Fat and No Added Salt. 1500 calorie    Medications:   Patient is not currently taking any over-the-counter supplements. Patient is not able to manage medications. lives in [facility]    Activities of Daily Living (ADLs)/Instrumental Activities of Daily Living (IADLs):   Walk and transfer into and out of bed and chair?: No  Dress and groom yourself?: No    Bathe or shower yourself?: No    Feed yourself? Yes  Do your laundry/housekeeping?: No  Manage your money, pay your bills and track your expenses?: No  Make your own meals?: No    Do your own shopping?: No    ADL comments: Lives in facility    Durable Medical Equipment Suppliers  [employer]    Previous Hospitalizations:   Any hospitalizations or ED visits within the last 12 months?: Yes    How many hospitalizations have you had in the last year?: 1-2    Advance Care Planning:   Living will: No    Durable POA for healthcare: Yes    Advanced directive: No      PREVENTIVE SCREENINGS      Cardiovascular Screening:    General: Screening Not Indicated and History Lipid Disorder      Diabetes Screening:     General: Screening Current      Colorectal Cancer Screening:     General: Screening Current      Breast Cancer Screening:     General: Screening Current      Cervical Cancer Screening:    General:  Screening Not Indicated      Osteoporosis Screening:    General: Screening Not Indicated and History Osteoporosis      Lung Cancer Screening:     General: Screening Not Indicated      Hepatitis C Screening:    General: Screening Current    Screening, Brief Intervention, and Referral to Treatment (SBIRT)    Screening  Typical number of drinks in a day: 0  Typical number of drinks in a week: 0  Interpretation: Low risk drinking behavior.    AUDIT-C Screenin) How often did you have a drink containing alcohol in the past year? never  2) How many drinks did you have on a typical day when you were drinking in the past year? 0  3) How often did you have 6 or more drinks on one occasion in the past year? never    AUDIT-C Score: 0  Interpretation: Score 0-2 (female): Negative screen for alcohol misuse    Single Item Drug Screening:  How often have you used an illegal drug (including marijuana) or a prescription medication for non-medical reasons in the past year? never    Single Item Drug Screen Score: 0  Interpretation: Negative screen for possible drug use disorder    Social Determinants of Health     Financial Resource Strain: Low Risk  (2024)    Overall Financial Resource Strain (CARDIA)     Difficulty of Paying Living Expenses: Not hard at all   Food Insecurity: No Food Insecurity (2024)    Hunger Vital Sign     Worried About Running Out of Food in the Last Year: Never true     Ran Out of Food in the Last Year: Never true   Transportation Needs: No Transportation Needs (2024)    PRAPARE - Transportation     Lack of Transportation (Medical): No     Lack of Transportation (Non-Medical): No   Housing Stability: Unknown (2024)    Housing Stability Vital Sign     Unable to Pay for Housing in the Last Year: Patient declined     Number of Times Moved in the Last Year: 0     Homeless in the Last Year: No   Utilities: Not At Risk (2024)    [facility] Utilities     Threatened with loss of utilities: No      No results found.    Objective     /92   Pulse 93   Temp 98 °F (36.7 °C)   Resp 20   LMP 02/28/2010 (Approximate)   SpO2 98%     Physical Exam  Vitals and nursing note reviewed.   Constitutional:       General: She is not in acute distress.     Appearance: She is well-developed.      Comments: Patient is in wheelchair with poor gross motor control of neck and torso.   HENT:      Head: Normocephalic and atraumatic.   Eyes:      Conjunctiva/sclera: Conjunctivae normal.   Cardiovascular:      Rate and Rhythm: Normal rate and regular rhythm.      Heart sounds: No murmur heard.  Pulmonary:      Effort: Pulmonary effort is normal. No respiratory distress.      Breath sounds: Normal breath sounds.   Abdominal:      Palpations: Abdomen is soft.      Tenderness: There is no abdominal tenderness.   Musculoskeletal:         General: No swelling.      Cervical back: Neck supple.   Skin:     General: Skin is warm and dry.      Capillary Refill: Capillary refill takes less than 2 seconds.   Neurological:      Mental Status: She is alert.   Psychiatric:         Mood and Affect: Mood normal.

## 2024-09-25 ENCOUNTER — TELEPHONE (OUTPATIENT)
Dept: FAMILY MEDICINE CLINIC | Facility: CLINIC | Age: 64
End: 2024-09-25

## 2024-09-25 DIAGNOSIS — F51.01 PRIMARY INSOMNIA: ICD-10-CM

## 2024-09-25 NOTE — TELEPHONE ENCOUNTER
"Received urgent call from Dawn/caregiver, noting that instructions for patient's script for Ambien from 9/16/24 changed from \"daily\", to \"as needed\".     Pharmacy requires change in instructions back to \"daily\" for insomnia, and asks for change to be made today 9/25  "

## 2024-09-26 RX ORDER — ZOLPIDEM TARTRATE 5 MG/1
2.5 TABLET ORAL
Qty: 30 TABLET | Refills: 1 | Status: SHIPPED | OUTPATIENT
Start: 2024-09-26

## 2024-09-26 NOTE — TELEPHONE ENCOUNTER
It will have to be a provider with a  JUNAID, I will discuss with Dr. Lima as she signed the most recent prescription

## 2024-10-18 ENCOUNTER — HOSPITAL ENCOUNTER (OUTPATIENT)
Dept: ULTRASOUND IMAGING | Facility: CLINIC | Age: 64
Discharge: HOME | End: 2024-10-18
Payer: MEDICARE

## 2024-10-18 ENCOUNTER — HOSPITAL ENCOUNTER (OUTPATIENT)
Dept: MAMMOGRAPHY | Facility: CLINIC | Age: 64
Discharge: HOME | End: 2024-10-18
Payer: MEDICARE

## 2024-10-18 VITALS — WEIGHT: 166 LBS | HEIGHT: 65 IN | BODY MASS INDEX: 27.66 KG/M2

## 2024-10-18 DIAGNOSIS — R92.8 ABNORMAL MAMMOGRAM: ICD-10-CM

## 2024-10-18 PROCEDURE — 77065 DX MAMMO INCL CAD UNI: CPT

## 2024-10-18 PROCEDURE — 76642 ULTRASOUND BREAST LIMITED: CPT

## 2024-10-18 PROCEDURE — G0279 TOMOSYNTHESIS, MAMMO: HCPCS

## 2024-11-19 DIAGNOSIS — K59.00 CONSTIPATION, UNSPECIFIED CONSTIPATION TYPE: ICD-10-CM

## 2024-11-20 ENCOUNTER — OFFICE VISIT (OUTPATIENT)
Dept: PODIATRY | Facility: CLINIC | Age: 64
End: 2024-11-20
Payer: MEDICARE

## 2024-11-20 VITALS
DIASTOLIC BLOOD PRESSURE: 83 MMHG | HEART RATE: 101 BPM | BODY MASS INDEX: 27.66 KG/M2 | HEIGHT: 65 IN | SYSTOLIC BLOOD PRESSURE: 142 MMHG | WEIGHT: 166 LBS

## 2024-11-20 DIAGNOSIS — G80.0 SPASTIC QUADRIPLEGIC CEREBRAL PALSY (HCC): ICD-10-CM

## 2024-11-20 DIAGNOSIS — B35.1 ONYCHOMYCOSIS: Primary | ICD-10-CM

## 2024-11-20 DIAGNOSIS — R60.1 GENERALIZED EDEMA: ICD-10-CM

## 2024-11-20 DIAGNOSIS — I87.2 VENOUS INSUFFICIENCY: ICD-10-CM

## 2024-11-20 PROCEDURE — 11720 DEBRIDE NAIL 1-5: CPT | Performed by: PODIATRIST

## 2024-11-20 PROCEDURE — RECHECK: Performed by: PODIATRIST

## 2024-11-20 NOTE — PROGRESS NOTES
Name: Katerin London      : 1960      MRN: [id number]  Encounter Provider: Rip Chen DPM  Encounter Date: 2024   Encounter department: [facility] PODIAT[facility] [place]  :  Assessment & Plan  Onychomycosis       Debride mycotic nails and thin the nail plates x 2 with the use of a nail nipper manually and an electric Dremel bur was used to reduce the thickness of the nail beds and smoothed the distal aspect of the nails.   Venous insufficiency         Spastic quadriplegic cerebral palsy (HCC)         Generalized edema         Discussed proper shoe gear, daily inspections of feet, and general foot health with patient. Patient has Q8  findings and is recommended for at risk foot care every 9-10 weeks.    Return in about 10 weeks (around 2025).     History of Present Illness     HPI  Katerin London is a 64 y.o. female who presents with chief complaint of painful thick nails on both feet.  She is seen in her wheelchair with staff nearby.  Patient presents for at-risk foot care.  Patient has no acute concerns today.  Patient has significant lower extremity risk due to neuropathy, parasthesia, edema, and trophic skin changes to the lower extremity.   History obtained from: patient's Legal Guardian    Review of Systems  Medical History Reviewed by provider this encounter:     .  Current Outpatient Medications on File Prior to Visit   Medication Sig Dispense Refill    acetaminophen (TYLENOL) 650 mg CR tablet Take 650 mg by mouth every 8 (eight) hours as needed      albuterol (2.5 mg/3 mL) 0.083 % nebulizer solution Take 3 mL (2.5 mg total) by nebulization every 6 (six) hours as needed for wheezing or shortness of breath (coughing) 25 mL 0    baclofen 10 mg tablet TAKE 1 TABLET BY MOUTH TWICE A DAY (SPASTICITY) 56 tablet 1    benzonatate (TESSALON PERLES) 100 mg capsule Take 1 capsule by mouth every 8 hours as needed for dry cough 20 capsule 0    Calcium Citrate 200 MG TABS Take by mouth 3 TABS  TWICE A DAY       carbamide peroxide (DEBROX) 6.5 % otic solution Instill 5 drops in affected ear twice daily for 4 days as needed for ear wax 15 mL 0    cholecalciferol (VITAMIN D3) 1,000 units tablet Take 1 capsule by mouth daily      clotrimazole-betamethasone (LOTRISONE) 1-0.05 % cream APPLY TOPICALLY TO AFFECTED AREA OF SKIN TWICE A DAY AS NEEDED FOR IRRITATION FROM DIAPER 45 g 11    denosumab (Prolia) 60 mg/mL Inject 1 mL (60 mg total) under the skin every 6 (six) months 1 mL 0    diphenhydrAMINE (BENADRYL) 25 mg tablet Take 1 tablet (25 mg total) by mouth every 8 (eight) hours as needed for itching, allergies or sleep (Coughing) 20 tablet 0    famotidine (PEPCID) 20 mg tablet Take 1 tablet (20 mg total) by mouth daily at bedtime  0    fluticasone (FLONASE) 50 mcg/act nasal spray USE 1 SPRAY IN EACH NOSTRIL TWICE A DAY (RHINITIS) 16 g 11    GNP 8 Hour Arthritis Relief 650 MG CR tablet TAKE 1 TABLET BY MOUTH EVERY 6 HOURS AS NEEDED FOR MILD PAIN OR TEMPERATURE  >100.4 8 tablet 11    hydrochlorothiazide (HYDRODIURIL) 25 mg tablet Take 0.5 tablets (12.5 mg total) by mouth daily 30 tablet 5    ibuprofen (MOTRIN) 600 mg tablet Take by mouth every 6 (six) hours as needed for fever Fever greater than 101.4F      ipratropium (ATROVENT) 0.06 % nasal spray 2 sprays into each nostril 3 (three) times a day as needed for rhinitis (congestion, post nasal drip causing cough) 15 mL 0    loratadine (CLARITIN) 10 mg tablet TAKE 1 TABLET BY MOUTH DAILY (ALLERGIC RHINITIS) 28 tablet 10    losartan (COZAAR) 50 mg tablet Take 1 tablet (50 mg total) by mouth daily 30 tablet 5    metoprolol succinate (TOPROL-XL) 100 mg 24 hr tablet Take 1 tablet by mouth daily  0    Mucus Relief 600 MG 12 hr tablet TAKE 1 TABLET BY MOUTH EVERY 12 HOURS AS NEEDED FOR CONGESTION **DO NOT CRUSH** 5 tablet 11    nystatin-triamcinolone (MYCOLOG-II) ointment Apply topically 2 (two) times a day as needed (As needed for rash) 30 g 5    olopatadine HCl  "(PATADAY) 0.2 % opth drops Instill 1 drop into affected eye(s) daily PRN irritation  0    PHENobarbital 32.4 mg tablet TAKE 3 TABLETS BY MOUTH (97.2MG) AT 4PM FOR SEIZURES 84 tablet 5    polyethylene glycol (GLYCOLAX) 17 GM/SCOOP powder 1 cap dissolve in 8 ox of fluid po daily (hold for loose stools). Constipation. 255 g 10    polyethylene glycol (GLYCOLAX) 17 GM/SCOOP powder 1 tsp in 4 oz of fluid po PRN every 3 days if no BM. Constipation. 255 g 5    selenium sulfide (SELSUN) 1 % Apply topically 2 (two) times a week Apply twice weekly as needed when with scalp irritation. 420 mL 5    simvastatin (ZOCOR) 20 mg tablet Take 1 tablet by mouth daily  0    Vitamins A & D (vitamin A & D) ointment Apply 1 application topically      zolpidem (AMBIEN) 5 mg tablet Take 0.5 tablets (2.5 mg total) by mouth daily at bedtime 30 tablet 1    LORazepam (ATIVAN) 0.5 mg tablet Take 1 tablet (0.5 mg total) by mouth once for 1 dose 1 tablet 0    losartan (COZAAR) 100 MG tablet  (Patient not taking: Reported on 11/19/2024)       Current Facility-Administered Medications on File Prior to Visit   Medication Dose Route Frequency Provider Last Rate Last Admin    albuterol inhalation solution 2.5 mg  2.5 mg Nebulization Q6H PRN Valeri Samayoa MD   2.5 mg at 03/04/24 1209         Objective   /83 (BP Location: Right arm, Patient Position: Sitting, Cuff Size: Adult)   Pulse 101   Ht 5' 5\" (1.651 m) Comment: verbal  Wt 75.3 kg (166 lb) Comment: verbal  LMP 02/28/2010 (Approximate)   BMI 27.62 kg/m²      Physical Exam  Vascular status is a faint 1/4 DP PT negative digital hair delayed capillary refill pitting edema present bilaterally.  Capillary refill is approximately 3 seconds and the pitting is +3-4.    Derm nails are brittle elongated hypertrophic white-yellow discoloration with subungual debris x 2.  There is an increased thickness in the nails of approximately 2 mm.  There is dependent rubor present bilaterally and coolness to " the extremity bilaterally.    Neuro and Ortho are unchanged from her last visit 9/11/2024.  Administrative Statements   I have spent a total time of 15 minutes in caring for this patient on the day of the visit/encounter including Risks and benefits of tx options, Instructions for management, Patient and family education, Importance of tx compliance, Risk factor reductions, Counseling / Coordination of care, and Documenting in the medical record.

## 2024-11-21 RX ORDER — POLYETHYLENE GLYCOL 3350 17 G/17G
POWDER, FOR SOLUTION ORAL
Qty: 255 G | Refills: 5 | Status: SHIPPED | OUTPATIENT
Start: 2024-11-21

## 2024-11-21 RX ORDER — POLYETHYLENE GLYCOL 3350 17 G/17G
POWDER, FOR SOLUTION ORAL
Qty: 255 G | Refills: 10 | Status: SHIPPED | OUTPATIENT
Start: 2024-11-21

## 2024-11-29 ENCOUNTER — TELEPHONE (OUTPATIENT)
Dept: FAMILY MEDICINE CLINIC | Facility: CLINIC | Age: 64
End: 2024-11-29

## 2024-12-04 DIAGNOSIS — G40.409 TONIC-CLONIC EPILEPTIC SEIZURES (HCC): ICD-10-CM

## 2024-12-05 ENCOUNTER — APPOINTMENT (OUTPATIENT)
Dept: LAB | Facility: CLINIC | Age: 64
End: 2024-12-05
Payer: MEDICARE

## 2024-12-05 DIAGNOSIS — E55.9 VITAMIN D DEFICIENCY: ICD-10-CM

## 2024-12-05 DIAGNOSIS — I10 BENIGN ESSENTIAL HYPERTENSION: ICD-10-CM

## 2024-12-05 DIAGNOSIS — F72 SEVERE INTELLECTUAL DISABILITY: ICD-10-CM

## 2024-12-05 DIAGNOSIS — E78.5 HYPERLIPIDEMIA, UNSPECIFIED HYPERLIPIDEMIA TYPE: ICD-10-CM

## 2024-12-05 LAB
25(OH)D3 SERPL-MCNC: 41.2 NG/ML (ref 30–100)
ANION GAP SERPL CALCULATED.3IONS-SCNC: 7 MMOL/L (ref 4–13)
BUN SERPL-MCNC: 17 MG/DL (ref 5–25)
CALCIUM SERPL-MCNC: 8.6 MG/DL (ref 8.4–10.2)
CHLORIDE SERPL-SCNC: 102 MMOL/L (ref 96–108)
CHOLEST SERPL-MCNC: 163 MG/DL (ref ?–200)
CO2 SERPL-SCNC: 30 MMOL/L (ref 21–32)
CREAT SERPL-MCNC: 0.48 MG/DL (ref 0.6–1.3)
ERYTHROCYTE [DISTWIDTH] IN BLOOD BY AUTOMATED COUNT: 12.8 % (ref 11.6–15.1)
GFR SERPL CREATININE-BSD FRML MDRD: 103 ML/MIN/1.73SQ M
GLUCOSE P FAST SERPL-MCNC: 100 MG/DL (ref 65–99)
HCT VFR BLD AUTO: 42.8 % (ref 34.8–46.1)
HDLC SERPL-MCNC: 66 MG/DL
HGB BLD-MCNC: 13.9 G/DL (ref 11.5–15.4)
LDLC SERPL CALC-MCNC: 86 MG/DL (ref 0–100)
LDLC SERPL DIRECT ASSAY-MCNC: 85 MG/DL (ref 0–100)
MCH RBC QN AUTO: 32.3 PG (ref 26.8–34.3)
MCHC RBC AUTO-ENTMCNC: 32.5 G/DL (ref 31.4–37.4)
MCV RBC AUTO: 99 FL (ref 82–98)
NONHDLC SERPL-MCNC: 97 MG/DL
PHENOBARB SERPL-MCNC: 17.8 UG/ML (ref 10–40)
PLATELET # BLD AUTO: 235 THOUSANDS/UL (ref 149–390)
PMV BLD AUTO: 10.3 FL (ref 8.9–12.7)
POTASSIUM SERPL-SCNC: 4.4 MMOL/L (ref 3.5–5.3)
RBC # BLD AUTO: 4.31 MILLION/UL (ref 3.81–5.12)
SODIUM SERPL-SCNC: 139 MMOL/L (ref 135–147)
T4 FREE SERPL-MCNC: 1.17 NG/DL (ref 0.61–1.12)
TRIGL SERPL-MCNC: 54 MG/DL (ref ?–150)
TSH SERPL DL<=0.05 MIU/L-ACNC: 2.38 UIU/ML (ref 0.45–4.5)
WBC # BLD AUTO: 7.28 THOUSAND/UL (ref 4.31–10.16)

## 2024-12-05 PROCEDURE — 36415 COLL VENOUS BLD VENIPUNCTURE: CPT

## 2024-12-05 PROCEDURE — 82306 VITAMIN D 25 HYDROXY: CPT

## 2024-12-05 PROCEDURE — 83721 ASSAY OF BLOOD LIPOPROTEIN: CPT

## 2024-12-05 PROCEDURE — 85027 COMPLETE CBC AUTOMATED: CPT

## 2024-12-05 PROCEDURE — 84439 ASSAY OF FREE THYROXINE: CPT

## 2024-12-05 PROCEDURE — 80048 BASIC METABOLIC PNL TOTAL CA: CPT

## 2024-12-05 PROCEDURE — 80061 LIPID PANEL: CPT

## 2024-12-05 PROCEDURE — 80184 ASSAY OF PHENOBARBITAL: CPT

## 2024-12-05 PROCEDURE — 84443 ASSAY THYROID STIM HORMONE: CPT

## 2024-12-05 NOTE — TELEPHONE ENCOUNTER
11/20/2024 06/21/2024 PHENobarbital (Tablet) 84.0 28 32.4 MG NA LINDA MORENO [employer] Medicare 5 / 99 PA   1 [id number] 11/17/2024 09/26/2024 Zolpidem Tartrate (Tablet) 15.0 30 5 MG NA YANETH SNEED [employer] Medicare 2 / 99 PA   1 [id number] 10/23/2024 06/21/2024 PHENobarbital (Tablet) 84.0 28 32.4 MG NA LINDA MORENO [facility]

## 2024-12-06 RX ORDER — PHENOBARBITAL 32.4 MG/1
TABLET ORAL
Qty: 90 TABLET | Refills: 4 | Status: SHIPPED | OUTPATIENT
Start: 2024-12-06

## 2024-12-09 ENCOUNTER — OFFICE VISIT (OUTPATIENT)
Dept: FAMILY MEDICINE CLINIC | Facility: CLINIC | Age: 64
End: 2024-12-09

## 2024-12-09 VITALS
WEIGHT: 164.8 LBS | SYSTOLIC BLOOD PRESSURE: 130 MMHG | TEMPERATURE: 98.2 F | RESPIRATION RATE: 18 BRPM | HEART RATE: 78 BPM | DIASTOLIC BLOOD PRESSURE: 86 MMHG | OXYGEN SATURATION: 99 % | BODY MASS INDEX: 27.46 KG/M2 | HEIGHT: 65 IN

## 2024-12-09 DIAGNOSIS — M81.0 OSTEOPOROSIS, UNSPECIFIED OSTEOPOROSIS TYPE, UNSPECIFIED PATHOLOGICAL FRACTURE PRESENCE: Primary | ICD-10-CM

## 2024-12-09 DIAGNOSIS — Z23 ENCOUNTER FOR IMMUNIZATION: ICD-10-CM

## 2024-12-09 DIAGNOSIS — I10 BENIGN ESSENTIAL HYPERTENSION: ICD-10-CM

## 2024-12-09 PROCEDURE — 99213 OFFICE O/P EST LOW 20 MIN: CPT | Performed by: FAMILY MEDICINE

## 2024-12-09 PROCEDURE — G2211 COMPLEX E/M VISIT ADD ON: HCPCS | Performed by: FAMILY MEDICINE

## 2024-12-09 PROCEDURE — 90662 IIV NO PRSV INCREASED AG IM: CPT

## 2024-12-09 PROCEDURE — 90471 IMMUNIZATION ADMIN: CPT

## 2024-12-09 RX ORDER — DENOSUMAB 60 MG/ML
60 INJECTION SUBCUTANEOUS
Qty: 1 ML | Refills: 0 | Status: SHIPPED | OUTPATIENT
Start: 2024-12-09

## 2024-12-09 NOTE — ASSESSMENT & PLAN NOTE
Reviewed Pre-Prolia labs. Order placed for repeat labwork, patient due to have next prolia injection in late December. Notably T4 mildly elevated with normal TSH. Will repeat labwork with repeat BMP to monitor for resolution and assess as needed.   Orders:    Basic metabolic panel; Future    denosumab (Prolia) 60 mg/mL; Inject 1 mL (60 mg total) under the skin every 6 (six) months    TSH + Free T4; Future

## 2024-12-09 NOTE — ASSESSMENT & PLAN NOTE
Home Blood pressure log reviewed and WNL. Blood pressure in clinic today well controlled at 130/86. Continue with Losartan 50mg QD

## 2024-12-09 NOTE — PROGRESS NOTES
"Name: Katerin London      : 1960      MRN: [id number]  Encounter Provider: Lauro Rendon MD  Encounter Date: 2024   Encounter department: [facility] [place]  :  Assessment & Plan  Osteoporosis, unspecified osteoporosis type, unspecified pathological fracture presence  Reviewed Pre-Prolia labs. Order placed for repeat labwork, patient due to have next prolia injection in late December. Notably T4 mildly elevated with normal TSH. Will repeat labwork with repeat BMP to monitor for resolution and assess as needed.   Orders:    Basic metabolic panel; Future    denosumab (Prolia) 60 mg/mL; Inject 1 mL (60 mg total) under the skin every 6 (six) months    TSH + Free T4; Future    Encounter for immunization    Orders:    influenza vaccine, high-dose, PF 0.5 mL (Fluzone High Dose)    Benign essential hypertension  Home Blood pressure log reviewed and WNL. Blood pressure in clinic today well controlled at 130/86. Continue with Losartan 50mg QD              History of Present Illness     Presents for regular 12 week check in. Accompanied by caregiver. No acute concerns today. Patient has had roommates who have suffered viral illness recently but patient has been able to dodge most of those without issues. -      Review of Systems   Unable to perform ROS: Patient nonverbal        Objective   /86 (BP Location: Left arm, Patient Position: Sitting, Cuff Size: Standard)   Pulse 78   Temp 98.2 °F (36.8 °C) (Temporal)   Resp 18   Ht 5' 5\" (1.651 m)   Wt 74.8 kg (164 lb 12.8 oz)   LMP 2010 (Approximate)   SpO2 99%   BMI 27.42 kg/m²      Physical Exam  Vitals and nursing note reviewed.   Constitutional:       General: She is not in acute distress.     Appearance: She is well-developed.      Comments: Patient is in wheelchair with poor gross motor control of neck and torso.   HENT:      Head: Normocephalic and atraumatic.   Eyes:      Conjunctiva/sclera: " Conjunctivae normal.   Cardiovascular:      Rate and Rhythm: Normal rate and regular rhythm.      Heart sounds: No murmur heard.  Pulmonary:      Effort: Pulmonary effort is normal. No respiratory distress.      Breath sounds: Normal breath sounds.   Abdominal:      Palpations: Abdomen is soft.      Tenderness: There is no abdominal tenderness.   Musculoskeletal:         General: No swelling.      Cervical back: Neck supple.   Skin:     General: Skin is warm and dry.      Capillary Refill: Capillary refill takes less than 2 seconds.   Neurological:      Mental Status: She is alert.   Psychiatric:         Mood and Affect: Mood normal.

## 2024-12-27 ENCOUNTER — CLINICAL SUPPORT (OUTPATIENT)
Dept: FAMILY MEDICINE CLINIC | Facility: CLINIC | Age: 64
End: 2024-12-27

## 2024-12-27 DIAGNOSIS — M81.0 AGE-RELATED OSTEOPOROSIS WITHOUT CURRENT PATHOLOGICAL FRACTURE: Primary | ICD-10-CM

## 2024-12-27 NOTE — PROGRESS NOTES
Patient presented for Prolia injection, administered after labs reviewed, right arm sub q,    Return on 6/27/25 for next admin

## 2025-01-13 ENCOUNTER — APPOINTMENT (OUTPATIENT)
Dept: LAB | Facility: CLINIC | Age: 65
End: 2025-01-13
Payer: MEDICARE

## 2025-01-13 DIAGNOSIS — M81.0 OSTEOPOROSIS, UNSPECIFIED OSTEOPOROSIS TYPE, UNSPECIFIED PATHOLOGICAL FRACTURE PRESENCE: ICD-10-CM

## 2025-01-13 LAB
ANION GAP SERPL CALCULATED.3IONS-SCNC: 8 MMOL/L (ref 4–13)
BUN SERPL-MCNC: 22 MG/DL (ref 5–25)
CALCIUM SERPL-MCNC: 9.2 MG/DL (ref 8.4–10.2)
CHLORIDE SERPL-SCNC: 101 MMOL/L (ref 96–108)
CO2 SERPL-SCNC: 30 MMOL/L (ref 21–32)
CREAT SERPL-MCNC: 0.42 MG/DL (ref 0.6–1.3)
GFR SERPL CREATININE-BSD FRML MDRD: 107 ML/MIN/1.73SQ M
GLUCOSE SERPL-MCNC: 88 MG/DL (ref 65–140)
POTASSIUM SERPL-SCNC: 4.1 MMOL/L (ref 3.5–5.3)
SODIUM SERPL-SCNC: 139 MMOL/L (ref 135–147)
T4 FREE SERPL-MCNC: 1.13 NG/DL (ref 0.61–1.12)
TSH SERPL DL<=0.05 MIU/L-ACNC: 2.74 UIU/ML (ref 0.45–4.5)

## 2025-01-13 PROCEDURE — 80048 BASIC METABOLIC PNL TOTAL CA: CPT

## 2025-01-13 PROCEDURE — 84443 ASSAY THYROID STIM HORMONE: CPT

## 2025-01-13 PROCEDURE — 84439 ASSAY OF FREE THYROXINE: CPT

## 2025-01-13 PROCEDURE — 36415 COLL VENOUS BLD VENIPUNCTURE: CPT

## 2025-01-23 DIAGNOSIS — F51.01 PRIMARY INSOMNIA: ICD-10-CM

## 2025-01-27 RX ORDER — ZOLPIDEM TARTRATE 5 MG/1
2.5 TABLET ORAL
Qty: 30 TABLET | Refills: 5 | Status: SHIPPED | OUTPATIENT
Start: 2025-01-27

## 2025-01-29 ENCOUNTER — OFFICE VISIT (OUTPATIENT)
Dept: PODIATRY | Facility: CLINIC | Age: 65
End: 2025-01-29
Payer: MEDICARE

## 2025-01-29 DIAGNOSIS — F72 SEVERE INTELLECTUAL DISABILITY: ICD-10-CM

## 2025-01-29 DIAGNOSIS — B35.1 ONYCHOMYCOSIS: ICD-10-CM

## 2025-01-29 DIAGNOSIS — R60.1 GENERALIZED EDEMA: ICD-10-CM

## 2025-01-29 DIAGNOSIS — I87.2 VENOUS INSUFFICIENCY: Primary | ICD-10-CM

## 2025-01-29 DIAGNOSIS — G80.0 SPASTIC QUADRIPLEGIC CEREBRAL PALSY (HCC): ICD-10-CM

## 2025-01-29 PROCEDURE — RECHECK: Performed by: PODIATRIST

## 2025-01-29 PROCEDURE — 11720 DEBRIDE NAIL 1-5: CPT | Performed by: PODIATRIST

## 2025-01-29 NOTE — PROGRESS NOTES
Name: Katerin London      : 1960      MRN: [id number]  Encounter Provider: Rip Chen DPM  Encounter Date: 2025   Encounter department: [facility] PODIATRY [place]  :  Assessment & Plan  Venous insufficiency         Onychomycosis       Debride mycotic nails and thin the nail plates x 2 with the use of a nail nipper manually and an electric Dremel bur was used to reduce the thickness of the nail beds and smoothed the distal aspect of the nails.   Spastic quadriplegic cerebral palsy (HCC)         Generalized edema         Severe intellectual disability         Discussed proper shoe gear, daily inspections of feet, and general foot health with patient. Patient has Q8  findings and is recommended for at risk foot care every 9-10 weeks.    Patients most recent complete clinical foot exam was on: 2024      Return in about 10 weeks (around 2025).     History of Present Illness   HPI  Katerin London is a 64 y.o. female who presents with chief complaint of painful thick nails on both feet.  She was seen in her wheelchair with her feet down.  Her support staff was present in the room for today's visit.Patient presents for at-risk foot care.  Patient has no acute concerns today.  Patient has significant lower extremity risk due to neuropathy, parasthesia, edema, and trophic skin changes to the lower extremity.   History obtained from: patient's Legal Guardian    Review of Systems  Medical History Reviewed by provider this encounter:     .  Current Outpatient Medications on File Prior to Visit   Medication Sig Dispense Refill    acetaminophen (TYLENOL) 650 mg CR tablet Take 650 mg by mouth every 8 (eight) hours as needed      albuterol (2.5 mg/3 mL) 0.083 % nebulizer solution Take 3 mL (2.5 mg total) by nebulization every 6 (six) hours as needed for wheezing or shortness of breath (coughing) 25 mL 0    baclofen 10 mg tablet TAKE 1 TABLET BY MOUTH TWICE A DAY (SPASTICITY) 56 tablet 1     benzonatate (TESSALON PERLES) 100 mg capsule Take 1 capsule by mouth every 8 hours as needed for dry cough 20 capsule 0    Calcium Citrate 200 MG TABS Take by mouth 3 TABS TWICE A DAY       carbamide peroxide (DEBROX) 6.5 % otic solution Instill 5 drops in affected ear twice daily for 4 days as needed for ear wax 15 mL 0    cholecalciferol (VITAMIN D3) 1,000 units tablet Take 1 capsule by mouth daily      clotrimazole-betamethasone (LOTRISONE) 1-0.05 % cream APPLY TOPICALLY TO AFFECTED AREA OF SKIN TWICE A DAY AS NEEDED FOR IRRITATION FROM DIAPER 45 g 11    denosumab (Prolia) 60 mg/mL Inject 1 mL (60 mg total) under the skin every 6 (six) months 1 mL 0    diphenhydrAMINE (BENADRYL) 25 mg tablet Take 1 tablet (25 mg total) by mouth every 8 (eight) hours as needed for itching, allergies or sleep (Coughing) 20 tablet 0    famotidine (PEPCID) 20 mg tablet Take 1 tablet (20 mg total) by mouth daily at bedtime  0    fluticasone (FLONASE) 50 mcg/act nasal spray USE 1 SPRAY IN EACH NOSTRIL TWICE A DAY (RHINITIS) 16 g 11    GNP 8 Hour Arthritis Relief 650 MG CR tablet TAKE 1 TABLET BY MOUTH EVERY 6 HOURS AS NEEDED FOR MILD PAIN OR TEMPERATURE  >100.4 8 tablet 11    hydrochlorothiazide (HYDRODIURIL) 25 mg tablet Take 0.5 tablets (12.5 mg total) by mouth daily 30 tablet 5    ibuprofen (MOTRIN) 600 mg tablet Take by mouth every 6 (six) hours as needed for fever Fever greater than 101.4F      ipratropium (ATROVENT) 0.06 % nasal spray 2 sprays into each nostril 3 (three) times a day as needed for rhinitis (congestion, post nasal drip causing cough) 15 mL 0    loratadine (CLARITIN) 10 mg tablet TAKE 1 TABLET BY MOUTH DAILY (ALLERGIC RHINITIS) 28 tablet 10    LORazepam (ATIVAN) 0.5 mg tablet Take 1 tablet (0.5 mg total) by mouth once for 1 dose 1 tablet 0    losartan (COZAAR) 50 mg tablet Take 1 tablet (50 mg total) by mouth daily 30 tablet 5    metoprolol succinate (TOPROL-XL) 100 mg 24 hr tablet Take 1 tablet by mouth daily  0     Mucus Relief 600 MG 12 hr tablet TAKE 1 TABLET BY MOUTH EVERY 12 HOURS AS NEEDED FOR CONGESTION **DO NOT CRUSH** 5 tablet 11    nystatin-triamcinolone (MYCOLOG-II) ointment Apply topically 2 (two) times a day as needed (As needed for rash) 30 g 5    olopatadine HCl (PATADAY) 0.2 % opth drops Instill 1 drop into affected eye(s) daily PRN irritation  0    PHENobarbital 32.4 mg tablet TAKE 3 TABLETS BY MOUTH (97.2MG) AT 4PM FOR SEIZURES 90 tablet 4    polyethylene glycol (GLYCOLAX) 17 GM/SCOOP powder 1 cap dissolve in 8 ox of fluid po daily (hold for loose stools). Constipation. 255 g 10    polyethylene glycol (GLYCOLAX) 17 GM/SCOOP powder 1 tsp in 4 oz of fluid po PRN every 3 days if no BM. Constipation. 255 g 5    selenium sulfide (SELSUN) 1 % Apply topically 2 (two) times a week Apply twice weekly as needed when with scalp irritation. 420 mL 5    simvastatin (ZOCOR) 20 mg tablet Take 1 tablet by mouth daily  0    Vitamins A & D (vitamin A & D) ointment Apply 1 application topically      zolpidem (AMBIEN) 5 mg tablet Take 0.5 tablets (2.5 mg total) by mouth daily at bedtime 30 tablet 5     Current Facility-Administered Medications on File Prior to Visit   Medication Dose Route Frequency Provider Last Rate Last Admin    albuterol inhalation solution 2.5 mg  2.5 mg Nebulization Q6H PRN Valeri Samayoa MD   2.5 mg at 03/04/24 1209      Social History     Tobacco Use    Smoking status: Never    Smokeless tobacco: Never   Vaping Use    Vaping status: Never Used   Substance and Sexual Activity    Alcohol use: No    Drug use: No    Sexual activity: Never        Objective   LMP 02/28/2010 (Approximate)      Physical Exam  Vascular status is 0/4 DP PT negative digital hair delayed capillary refill with pitting edema present bilaterally.  Capillary refill is approximately 3 to 4 seconds and the pitting edema is +2 to +3 on the lower extremity and foot.    Derm nails are brittle elongated hypertrophic white discoloration with  subungual debris x 2.  There is an increased thickness and the nails are approximately 2 mm.  Dependent rubor is also noted bilaterally.  There is loss of turgor of the skin and thinning of the skin bilaterally.    Ortho hammertoe deformities are present in digits 1 through 5 bilaterally    Neuro is unchanged from her last visit on 11/20/2024.  Administrative Statements   I have spent a total time of 15 minutes in caring for this patient on the day of the visit/encounter including Risks and benefits of tx options, Instructions for management, Patient and family education, Importance of tx compliance, Risk factor reductions, Counseling / Coordination of care, Documenting in the medical record, and Obtaining or reviewing history  .

## 2025-02-18 ENCOUNTER — HOSPITAL ENCOUNTER (OUTPATIENT)
Dept: RADIOLOGY | Age: 65
Discharge: HOME | End: 2025-02-18
Payer: MEDICARE

## 2025-02-18 VITALS — BODY MASS INDEX: 32.2 KG/M2 | HEIGHT: 60 IN | WEIGHT: 164 LBS

## 2025-02-18 DIAGNOSIS — M81.0 AGE-RELATED OSTEOPOROSIS WITHOUT CURRENT PATHOLOGICAL FRACTURE: ICD-10-CM

## 2025-02-18 PROCEDURE — 77080 DXA BONE DENSITY AXIAL: CPT

## 2025-02-23 ENCOUNTER — RESULTS FOLLOW-UP (OUTPATIENT)
Dept: OTHER | Facility: HOSPITAL | Age: 65
End: 2025-02-23

## 2025-02-27 ENCOUNTER — RA CDI HCC (OUTPATIENT)
Dept: OTHER | Facility: HOSPITAL | Age: 65
End: 2025-02-27

## 2025-02-27 NOTE — PROGRESS NOTES
HCC coding opportunities       Chart reviewed, no opportunity found: CHART REVIEWED, NO OPPORTUNITY FOUND        Patients Insurance     Medicare Insurance: [employer]          This is a reminder to assess ((resolve/update/assess)  all HCC (risk adjustment) codes for the year 2025 as patients KOREY scores reset to zero with the New year.    Also, just a reminder to please review and assess all other chronic conditions for 2025.

## 2025-03-03 ENCOUNTER — OFFICE VISIT (OUTPATIENT)
Dept: FAMILY MEDICINE CLINIC | Facility: CLINIC | Age: 65
End: 2025-03-03

## 2025-03-03 VITALS
HEIGHT: 60 IN | TEMPERATURE: 97.6 F | RESPIRATION RATE: 18 BRPM | HEART RATE: 76 BPM | DIASTOLIC BLOOD PRESSURE: 97 MMHG | SYSTOLIC BLOOD PRESSURE: 158 MMHG | OXYGEN SATURATION: 97 % | BODY MASS INDEX: 31.8 KG/M2 | WEIGHT: 162 LBS

## 2025-03-03 DIAGNOSIS — I10 BENIGN ESSENTIAL HYPERTENSION: ICD-10-CM

## 2025-03-03 DIAGNOSIS — M81.0 AGE-RELATED OSTEOPOROSIS WITHOUT CURRENT PATHOLOGICAL FRACTURE: Primary | ICD-10-CM

## 2025-03-03 DIAGNOSIS — G80.0 SPASTIC QUADRIPLEGIC CEREBRAL PALSY (HCC): ICD-10-CM

## 2025-03-03 PROCEDURE — G2211 COMPLEX E/M VISIT ADD ON: HCPCS | Performed by: FAMILY MEDICINE

## 2025-03-03 PROCEDURE — 99213 OFFICE O/P EST LOW 20 MIN: CPT | Performed by: FAMILY MEDICINE

## 2025-03-03 NOTE — ASSESSMENT & PLAN NOTE
Reviewed most recent DXA scan, no significant change since previous in 2022. Reviewed recent labwork. Continue with Prolia injections as currently prescribed.

## 2025-03-03 NOTE — PROGRESS NOTES
Name: Katerin London      : 1960      MRN: [id number]  Encounter Provider: Lauro Rendon MD  Encounter Date: 3/3/2025   Encounter department: [facility] [place]  :  Assessment & Plan  Age-related osteoporosis without current pathological fracture  Reviewed most recent DXA scan, no significant change since previous in . Reviewed recent labwork. Continue with Prolia injections as currently prescribed.        Spastic quadriplegic cerebral palsy (HCC)  Patient presents for follow up on DXA scan, however caregiver Dawn notes multiple concerns with patient wheelchair. Current wheelchair has some cushions which are no longer protecting the patient, has brakes which are poorly functional, loose armrests and poor ability to maintain and optimal position for patient. Is looking to have current wheelchair evaluated and adjusted. Order placed for OT evaluation of wheelchair with established providers at [facility]. Will f/u as needed.   Orders:    Ambulatory Referral to PT/OT Functional Capacity Evaluation; Future    Wheelchair    Benign essential hypertension  Blood pressure elevated at 158/97, highly uncharacteristic for patient. Caregiver has blood pressure logs from December but did not bring  logs. Assures blood pressures remain well controlled at home. Will defer increasing BP medication at this time, encouraged Dawn to fax BP logs to clinic or to bring them at patients' next appointment.               History of Present Illness   Patient presenting for regularly scheduled follow up, no acute concerns. Several of patients house-mates are currently hospitalized, one of whom is in serious conditions on a ventilator, on account of aspiration pneumonia. It appears norovirus is going through the facility. Patient does not have any related symptoms at this time. Main concerns are reviewing labwork and problems that patient is having with her  wheelchair.       Review of Systems   Constitutional:  Negative for fatigue and fever.   HENT:  Negative for congestion and sore throat.    Respiratory:  Negative for chest tightness and shortness of breath.    Cardiovascular:  Negative for chest pain and palpitations.   Gastrointestinal:  Negative for abdominal pain.   Skin:  Negative for rash.   Psychiatric/Behavioral:  Negative for behavioral problems.        Objective   /97 (BP Location: Left arm, Patient Position: Sitting, Cuff Size: Standard)   Pulse 76   Temp 97.6 °F (36.4 °C) (Temporal)   Resp 18   Ht 5' (1.524 m)   Wt 73.5 kg (162 lb)   LMP 02/28/2010 (Approximate)   SpO2 97%   BMI 31.64 kg/m²      Physical Exam  Vitals and nursing note reviewed.   Constitutional:       General: She is not in acute distress.     Appearance: She is well-developed.   HENT:      Head: Normocephalic and atraumatic.   Eyes:      Conjunctiva/sclera: Conjunctivae normal.   Cardiovascular:      Rate and Rhythm: Normal rate and regular rhythm.      Heart sounds: No murmur heard.  Pulmonary:      Effort: Pulmonary effort is normal. No respiratory distress.      Breath sounds: Normal breath sounds.   Abdominal:      Palpations: Abdomen is soft.      Tenderness: There is no abdominal tenderness.   Musculoskeletal:         General: No swelling.      Cervical back: Neck supple.   Skin:     General: Skin is warm and dry.      Capillary Refill: Capillary refill takes less than 2 seconds.   Neurological:      Mental Status: She is alert.   Psychiatric:         Mood and Affect: Mood normal.

## 2025-03-03 NOTE — ASSESSMENT & PLAN NOTE
Blood pressure elevated at 158/97, highly uncharacteristic for patient. Caregiver has blood pressure logs from December but did not bring January, February logs. Assures blood pressures remain well controlled at home. Will defer increasing BP medication at this time, encouraged Dawn to fax BP logs to clinic or to bring them at patients' next appointment.

## 2025-03-03 NOTE — ASSESSMENT & PLAN NOTE
Patient presents for follow up on DXA scan, however caregiver Dawn notes multiple concerns with patient wheelchair. Current wheelchair has some cushions which are no longer protecting the patient, has brakes which are poorly functional, loose armrests and poor ability to maintain and optimal position for patient. Is looking to have current wheelchair evaluated and adjusted. Order placed for OT evaluation of wheelchair with established providers at [facility]. Will f/u as needed.   Orders:    Ambulatory Referral to PT/OT Functional Capacity Evaluation; Future    Wheelchair

## 2025-03-13 DIAGNOSIS — Z99.3 WHEELCHAIR DEPENDENCE: Primary | ICD-10-CM

## 2025-03-13 DIAGNOSIS — G80.0 SPASTIC QUADRIPLEGIC CEREBRAL PALSY (HCC): ICD-10-CM

## 2025-04-03 ENCOUNTER — OFFICE VISIT (OUTPATIENT)
Dept: NEUROLOGY | Facility: CLINIC | Age: 65
End: 2025-04-03
Payer: MEDICARE

## 2025-04-03 VITALS
DIASTOLIC BLOOD PRESSURE: 87 MMHG | SYSTOLIC BLOOD PRESSURE: 179 MMHG | BODY MASS INDEX: 31.64 KG/M2 | HEIGHT: 60 IN | HEART RATE: 92 BPM

## 2025-04-03 DIAGNOSIS — G40.409 TONIC-CLONIC EPILEPTIC SEIZURES (HCC): Primary | ICD-10-CM

## 2025-04-03 DIAGNOSIS — G40.409 TONIC-CLONIC EPILEPTIC SEIZURES (HCC): ICD-10-CM

## 2025-04-03 PROCEDURE — 99214 OFFICE O/P EST MOD 30 MIN: CPT | Performed by: STUDENT IN AN ORGANIZED HEALTH CARE EDUCATION/TRAINING PROGRAM

## 2025-04-03 PROCEDURE — G2211 COMPLEX E/M VISIT ADD ON: HCPCS | Performed by: STUDENT IN AN ORGANIZED HEALTH CARE EDUCATION/TRAINING PROGRAM

## 2025-04-03 RX ORDER — PHENOBARBITAL 32.4 MG/1
TABLET ORAL
Qty: 90 TABLET | Refills: 4 | Status: SHIPPED | OUTPATIENT
Start: 2025-04-03

## 2025-04-03 NOTE — PROGRESS NOTES
[facility] -   Follow up appointment    Impression/Plan    Ms. 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 Her neurological exam is stable. No changes at this time. Plan outlined below:     #Symptomatic epilepsy  - Continue with Phenobarbital 97.2 mg qhs  - Checking Phb level     #Osteoporosis  - Continue with prolia and Vitamin D  - Defer other treatment to PCP     - Follow up in 1 year     We discussed the pathophysiology of epilepsy/seizure and seizure safety/precautions following seizures. We discussed factors that can lower seizure threshold and the side effects of antiepileptic medications.     There are no diagnoses linked to this encounter.    Subjective    Katerin London is a 64 y.o. 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     For review:     The patient was seen virtually via telemedicine on 5/14/2020. Plan at that time was to continue phenobarbital 32.4 3 tabs qhs. DEXA was recommended every two years to f/u on risk of osteoporosis associated w/ phenobarb consumption. Attempts in the past to wean off of phenobarbital lead to breakthrough seizures.     She was seen in the office by Dr Paul on 5/25/2021. At that time, she was seizure free on phenobarbital monotherapy. She was to continue with current AED regimen. For osteoporosis she was to have her DXA as scheduled in 2022, continue with prolia. Recommended 1 year follow up     The patient was seen in clinic by Shelby on 6/16/2022. She continued to be seizure free. No changes were made at that time.     She also followed up with me on 4/14/2023. Again, she was seizure free with no issues  other than DEXA scan showing osteoporosis. She was to follow up in 1 year.     The patient was last seen in clinic on 4/11/24. There was no significant issues since the prior appointment. No changes were made.    Interval history:    Since last seen, the patient has been doing well. There have been no seizures since the last appointment. The patients AEDs were being tolerated well with no side effects. There is no concern for medication non-adherence.     Current AEDs:  Phenobarbital 97.2 mg qhs (level 17.8 12/5/24)    History Reviewed:   The following were reviewed and updated as appropriate: allergies, current medications, past family history, past medical history, past social history, past surgical history, and problem list    ROS:  Constitutional:  Negative for appetite change, fatigue and fever.   HENT: Negative.  Negative for hearing loss, tinnitus, trouble swallowing and voice change.    Eyes: Negative.  Negative for photophobia, pain and visual disturbance.   Respiratory: Negative.  Negative for shortness of breath.    Cardiovascular: Negative.  Negative for palpitations.   Gastrointestinal: Negative.  Negative for nausea and vomiting.   Endocrine: Negative.  Negative for cold intolerance.   Genitourinary: Negative.  Negative for dysuria, frequency and urgency.   Musculoskeletal:  Negative for back pain, gait problem, myalgias, neck pain and neck stiffness.   Skin: Negative.  Negative for rash.   Allergic/Immunologic: Negative.    Neurological:  Negative for dizziness, tremors, seizures, syncope, facial asymmetry, speech difficulty, weakness, light-headedness, numbness and headaches.   Hematological: Negative.  Does not bruise/bleed easily.   Psychiatric/Behavioral: Negative.  Negative for confusion, hallucinations and sleep disturbance.        Objective    BP (S) (!) 179/87 (BP Location: Left arm, Patient Position: Sitting, Cuff Size: Standard)   Pulse 92   Ht 5' (1.524 m)   LMP 02/28/2010 (Approximate)    BMI 31.64 kg/m²     General Exam  No apparent distress.   Appears well nourished.   Mood appropriate for situation     Neurologic Exam  Mental status- awake and alert. Significant intellectual disability. Limited speech.      Cranial Nerves- PERRL, blinks to visual threat, EOMS normal, facial muscles symmetric, hearing intact to voice, dysarthria     Motor- Diffuse spasticity, lower extremity paralysis.     Sensory-  Intact distally in all extremities to light touch.      DTRs- diffuse hyperreflexia     Gait- wheelchair bound, deferred     Coordination- deferred given spasticity         Otis Paul MD   [facility]  Diplomate, ABPN Neurology and Clinical Neurophysiology

## 2025-04-07 RX ORDER — BACLOFEN 10 MG/1
10 TABLET ORAL 2 TIMES DAILY
Qty: 180 TABLET | Refills: 0 | Status: SHIPPED | OUTPATIENT
Start: 2025-04-07

## 2025-04-09 ENCOUNTER — OFFICE VISIT (OUTPATIENT)
Dept: PODIATRY | Facility: CLINIC | Age: 65
End: 2025-04-09
Payer: MEDICARE

## 2025-04-09 DIAGNOSIS — G80.0 SPASTIC QUADRIPLEGIC CEREBRAL PALSY (HCC): ICD-10-CM

## 2025-04-09 DIAGNOSIS — R60.1 GENERALIZED EDEMA: ICD-10-CM

## 2025-04-09 DIAGNOSIS — I87.2 VENOUS INSUFFICIENCY: Primary | ICD-10-CM

## 2025-04-09 DIAGNOSIS — B35.1 ONYCHOMYCOSIS: ICD-10-CM

## 2025-04-09 PROCEDURE — RECHECK: Performed by: PODIATRIST

## 2025-04-09 PROCEDURE — 11720 DEBRIDE NAIL 1-5: CPT | Performed by: PODIATRIST

## 2025-04-09 NOTE — PROGRESS NOTES
Name: Katerin London      : 1960      MRN: [id number]  Encounter Provider: Rip Chen DPM  Encounter Date: 2025   Encounter department: [facility] [place]  :  Assessment & Plan  Venous insufficiency         Spastic quadriplegic cerebral palsy (HCC)         Generalized edema         Onychomycosis       Debride mycotic nails and thin the nail plates x 2 with the use of a nail nipper manually and an electric Dremel bur was used to reduce the thickness of the nail beds and smoothed the distal aspect of the nails.   Discussed proper shoe gear, daily inspections of feet, and general foot health with patient. Patient has Q8  findings and is recommended for at risk foot care every 9-10 weeks.    Patients most recent complete clinical foot exam was on: 2025     Return in about 10 weeks (around 2025).     History of Present Illness   HPI  Katerin London is a 64 y.o. female who presents with chief complaint of painful thick nails on both feet.  According to her support staff there is no change in her medical history or medication.  She was seen with her feet down sitting in her wheelchair.  Patient presents for at-risk foot care.  Patient has no acute concerns today.  Patient has significant lower extremity risk due to neuropathy, parasthesia, edema, and trophic skin changes to the lower extremity.   History obtained from: patient's Legal Guardian and patient's caregiver    Review of Systems  Medical History Reviewed by provider this encounter:     .  Current Outpatient Medications on File Prior to Visit   Medication Sig Dispense Refill    acetaminophen (TYLENOL) 650 mg CR tablet Take 650 mg by mouth every 8 (eight) hours as needed      albuterol (2.5 mg/3 mL) 0.083 % nebulizer solution Take 3 mL (2.5 mg total) by nebulization every 6 (six) hours as needed for wheezing or shortness of breath (coughing) 25 mL 0    baclofen 10 mg tablet Take 1 tablet (10 mg total) by mouth 2 (two)  times a day 180 tablet 0    benzonatate (TESSALON PERLES) 100 mg capsule Take 1 capsule by mouth every 8 hours as needed for dry cough 20 capsule 0    Calcium Citrate 200 MG TABS Take by mouth 3 TABS TWICE A DAY       carbamide peroxide (DEBROX) 6.5 % otic solution Instill 5 drops in affected ear twice daily for 4 days as needed for ear wax 15 mL 0    cholecalciferol (VITAMIN D3) 1,000 units tablet Take 1 capsule by mouth daily      clotrimazole-betamethasone (LOTRISONE) 1-0.05 % cream APPLY TOPICALLY TO AFFECTED AREA OF SKIN TWICE A DAY AS NEEDED FOR IRRITATION FROM DIAPER 45 g 11    denosumab (Prolia) 60 mg/mL Inject 1 mL (60 mg total) under the skin every 6 (six) months 1 mL 0    diphenhydrAMINE (BENADRYL) 25 mg tablet Take 1 tablet (25 mg total) by mouth every 8 (eight) hours as needed for itching, allergies or sleep (Coughing) 20 tablet 0    famotidine (PEPCID) 20 mg tablet Take 1 tablet (20 mg total) by mouth daily at bedtime  0    fluticasone (FLONASE) 50 mcg/act nasal spray USE 1 SPRAY IN EACH NOSTRIL TWICE A DAY (RHINITIS) 16 g 11    GNP 8 Hour Arthritis Relief 650 MG CR tablet TAKE 1 TABLET BY MOUTH EVERY 6 HOURS AS NEEDED FOR MILD PAIN OR TEMPERATURE  >100.4 8 tablet 11    hydrochlorothiazide (HYDRODIURIL) 25 mg tablet Take 0.5 tablets (12.5 mg total) by mouth daily 30 tablet 5    ibuprofen (MOTRIN) 600 mg tablet Take by mouth every 6 (six) hours as needed for fever Fever greater than 101.4F      ipratropium (ATROVENT) 0.06 % nasal spray 2 sprays into each nostril 3 (three) times a day as needed for rhinitis (congestion, post nasal drip causing cough) 15 mL 0    loratadine (CLARITIN) 10 mg tablet TAKE 1 TABLET BY MOUTH DAILY (ALLERGIC RHINITIS) 28 tablet 10    LORazepam (ATIVAN) 0.5 mg tablet Take 1 tablet (0.5 mg total) by mouth once for 1 dose 1 tablet 0    losartan (COZAAR) 50 mg tablet Take 1 tablet (50 mg total) by mouth daily 30 tablet 5    metoprolol succinate (TOPROL-XL) 100 mg 24 hr tablet Take 1  tablet by mouth daily  0    Mucus Relief 600 MG 12 hr tablet TAKE 1 TABLET BY MOUTH EVERY 12 HOURS AS NEEDED FOR CONGESTION **DO NOT CRUSH** 5 tablet 11    nystatin-triamcinolone (MYCOLOG-II) ointment Apply topically 2 (two) times a day as needed (As needed for rash) 30 g 5    olopatadine HCl (PATADAY) 0.2 % opth drops Instill 1 drop into affected eye(s) daily PRN irritation  0    PHENobarbital 32.4 mg tablet TAKE 3 TABLETS BY MOUTH (97.2MG) AT 4PM FOR SEIZURES 90 tablet 4    polyethylene glycol (GLYCOLAX) 17 GM/SCOOP powder 1 cap dissolve in 8 ox of fluid po daily (hold for loose stools). Constipation. 255 g 10    polyethylene glycol (GLYCOLAX) 17 GM/SCOOP powder 1 tsp in 4 oz of fluid po PRN every 3 days if no BM. Constipation. 255 g 5    selenium sulfide (SELSUN) 1 % Apply topically 2 (two) times a week Apply twice weekly as needed when with scalp irritation. 420 mL 5    simvastatin (ZOCOR) 20 mg tablet Take 1 tablet by mouth daily  0    Vitamins A & D (vitamin A & D) ointment Apply 1 application topically      zolpidem (AMBIEN) 5 mg tablet Take 0.5 tablets (2.5 mg total) by mouth daily at bedtime 30 tablet 5     Current Facility-Administered Medications on File Prior to Visit   Medication Dose Route Frequency Provider Last Rate Last Admin    albuterol inhalation solution 2.5 mg  2.5 mg Nebulization Q6H PRN Valeri Samayoa MD   2.5 mg at 03/04/24 1209      Social History     Tobacco Use    Smoking status: Never    Smokeless tobacco: Never   Vaping Use    Vaping status: Never Used   Substance and Sexual Activity    Alcohol use: No    Drug use: No    Sexual activity: Never        Objective   LMP 02/28/2010 (Approximate)      Physical Exam  Vascular status is 0/4 DP PT negative digital hair, normal distal cooling, and delayed capillary refill with pitting edema present bilaterally.  Capillary refill is approximately 3 to 4 seconds and the pitting edema is +2 to +3 on the lower extremity and foot.     Derm nails are  brittle elongated hypertrophic white discoloration with subungual debris x 2.  There is an increased thickness in the nails of approximately 2 mm.  Dependent rubor is also noted bilaterally.  There is loss of turgor of the skin and thinning of the skin bilaterally.     Ortho hammertoe deformities are present in digits 1 through 5 bilaterally    Administrative Statements   I have spent a total time of 15 minutes in caring for this patient on the day of the visit/encounter including Instructions for management, Patient and family education, Importance of tx compliance, Risk factor reductions, Counseling / Coordination of care, Documenting in the medical record, and Obtaining or reviewing history  .

## 2025-05-12 ENCOUNTER — TELEPHONE (OUTPATIENT)
Dept: FAMILY MEDICINE CLINIC | Facility: CLINIC | Age: 65
End: 2025-05-12

## 2025-05-12 ENCOUNTER — OFFICE VISIT (OUTPATIENT)
Dept: FAMILY MEDICINE CLINIC | Facility: CLINIC | Age: 65
End: 2025-05-12

## 2025-05-12 VITALS
SYSTOLIC BLOOD PRESSURE: 156 MMHG | DIASTOLIC BLOOD PRESSURE: 86 MMHG | HEART RATE: 71 BPM | OXYGEN SATURATION: 97 % | TEMPERATURE: 98.3 F

## 2025-05-12 DIAGNOSIS — I10 BENIGN ESSENTIAL HYPERTENSION: ICD-10-CM

## 2025-05-12 DIAGNOSIS — Z12.11 SCREENING FOR COLON CANCER: Primary | ICD-10-CM

## 2025-05-12 PROCEDURE — G2211 COMPLEX E/M VISIT ADD ON: HCPCS | Performed by: FAMILY MEDICINE

## 2025-05-12 PROCEDURE — 99213 OFFICE O/P EST LOW 20 MIN: CPT | Performed by: FAMILY MEDICINE

## 2025-05-12 NOTE — TELEPHONE ENCOUNTER
Physical form for Step by Step  completed by Dr Rendon  original given to caregiver and scanned to chart

## 2025-05-12 NOTE — ASSESSMENT & PLAN NOTE
Patient presenting for medication review, BP noted to be elevated at 156/86. Patient log reviewed from group home with consistent BP between 115s-130s. No plans to change BP medication at this time, will follow up pressure logs at next appointment. Otherwise, medications reviewed and reconciled in chart.

## 2025-05-12 NOTE — PROGRESS NOTES
Name: Katerin London      : 1960      MRN: [id number]  Encounter Provider: Lauro Rendon MD  Encounter Date: 2025   Encounter department: [facility] [place]  :  Assessment & Plan  Benign essential hypertension  Patient presenting for medication review, BP noted to be elevated at 156/86. Patient log reviewed from group home with consistent BP between 115s-130s. No plans to change BP medication at this time, will follow up pressure logs at next appointment. Otherwise, medications reviewed and reconciled in chart.       Screening for colon cancer  Order with planned repeat for .   Orders:    Cologuard           History of Present Illness   Patient presenting for regular 3 month follow up, medication review.       Review of Systems   Unable to perform ROS: Other       Objective   /86 (BP Location: Left arm, Patient Position: Sitting, Cuff Size: Standard)   Pulse 71   Temp 98.3 °F (36.8 °C) (Temporal)   LMP 2010 (Approximate)   SpO2 97%      Physical Exam  Vitals and nursing note reviewed.   Constitutional:       General: She is not in acute distress.     Appearance: She is well-developed.      Comments: Wheelchair bound   HENT:      Head: Normocephalic and atraumatic.   Eyes:      Conjunctiva/sclera: Conjunctivae normal.   Cardiovascular:      Rate and Rhythm: Normal rate and regular rhythm.      Heart sounds: No murmur heard.  Pulmonary:      Effort: Pulmonary effort is normal. No respiratory distress.      Breath sounds: Normal breath sounds.   Abdominal:      Palpations: Abdomen is soft.      Tenderness: There is no abdominal tenderness.   Musculoskeletal:         General: No swelling.      Cervical back: Neck supple.      Comments: Skeletal contractures   Skin:     General: Skin is warm and dry.      Capillary Refill: Capillary refill takes less than 2 seconds.   Neurological:      Mental Status: She is alert.   Psychiatric:         Mood and  Affect: Mood normal.

## 2025-05-21 ENCOUNTER — HOSPITAL ENCOUNTER (OUTPATIENT)
Dept: RADIOLOGY | Age: 65
Discharge: HOME | End: 2025-05-21
Payer: MEDICARE

## 2025-05-21 DIAGNOSIS — Z12.31 VISIT FOR SCREENING MAMMOGRAM: ICD-10-CM

## 2025-05-21 PROCEDURE — 77063 BREAST TOMOSYNTHESIS BI: CPT

## 2025-05-21 PROCEDURE — 77067 SCR MAMMO BI INCL CAD: CPT

## 2025-06-09 ENCOUNTER — DOCUMENTATION (OUTPATIENT)
Dept: ADMINISTRATIVE | Facility: OTHER | Age: 65
End: 2025-06-09

## 2025-06-09 NOTE — PROGRESS NOTES
06/09/25 2:33 PM    CRC outreach is not required, there is an active CRC screening order.    Thank you.  Melinda Gaspar MA  [facility]

## 2025-06-18 ENCOUNTER — PROCEDURE VISIT (OUTPATIENT)
Dept: PODIATRY | Facility: CLINIC | Age: 65
End: 2025-06-18
Payer: MEDICARE

## 2025-06-18 DIAGNOSIS — G80.0 SPASTIC QUADRIPLEGIC CEREBRAL PALSY (HCC): ICD-10-CM

## 2025-06-18 DIAGNOSIS — I87.2 VENOUS INSUFFICIENCY: ICD-10-CM

## 2025-06-18 DIAGNOSIS — R60.1 GENERALIZED EDEMA: ICD-10-CM

## 2025-06-18 DIAGNOSIS — M79.674 PAIN IN TOES OF BOTH FEET: ICD-10-CM

## 2025-06-18 DIAGNOSIS — M79.675 PAIN IN TOES OF BOTH FEET: ICD-10-CM

## 2025-06-18 DIAGNOSIS — B35.1 ONYCHOMYCOSIS: Primary | ICD-10-CM

## 2025-06-18 DIAGNOSIS — F72 SEVERE INTELLECTUAL DISABILITY: ICD-10-CM

## 2025-06-18 PROCEDURE — 11720 DEBRIDE NAIL 1-5: CPT | Performed by: PODIATRIST

## 2025-06-18 NOTE — PROGRESS NOTES
Name: Katerin London      : 1960      MRN: [id number]  Encounter Provider: Rip Chen DPM  Encounter Date: 2025   Encounter department: [facility] [place]  :  Assessment & Plan  Onychomycosis       Debride mycotic nails and thin the nail plates x 2 with the use of a nail nipper manually and an electric Dremel bur was used to reduce the thickness of the nail beds and smoothed the distal aspect of the nails.   Venous insufficiency         Spastic quadriplegic cerebral palsy (HCC)         Generalized edema         Severe intellectual disability         Pain in toes of both feet         Reviewed family practice's note from 3/3/2025.  Discussed proper shoe gear, daily inspections of feet, and general foot health with patient. Patient has Q8  findings and is recommended for at risk foot care every 9-10 weeks.    Patients most recent complete clinical foot exam was on: 2025    Return in about 10 weeks (around 2025).      History of Present Illness   HPI  Katerin London is a 64 y.o. female who presents with chief complaint of painful thick nails on both feet.  She was seen in her wheelchair with her support staff present in the room.  Patient presents for at-risk foot care.  Patient has no acute concerns today.  Patient has significant lower extremity risk due to neuropathy, parasthesia, edema, and trophic skin changes to the lower extremity.   History obtained from: patient's Legal Guardian and patient's caregiver    Review of Systems  Medical History Reviewed by provider this encounter:     .  Medications Ordered Prior to Encounter[1]   Social History[2]     Objective   LMP 2010 (Approximate)      Physical Exam  Vascular status is 0/4 DP PT negative digital hair, normal distal cooling, and delayed capillary refill with pitting edema present bilaterally.  Capillary refill is approximately 3 to 4 seconds and the pitting edema is +2 to +3 on the lower extremity and foot.      Derm nails are brittle elongated hypertrophic white discoloration with subungual debris x 2.  There is an increased thickness in the nails of approximately 2 mm.  Dependent rubor is also noted bilaterally.  There is loss of turgor of the skin and thinning of the skin bilaterally.       [1]   Current Outpatient Medications on File Prior to Visit   Medication Sig Dispense Refill    acetaminophen (TYLENOL) 650 mg CR tablet Take 650 mg by mouth every 8 (eight) hours as needed      albuterol (2.5 mg/3 mL) 0.083 % nebulizer solution Take 3 mL (2.5 mg total) by nebulization every 6 (six) hours as needed for wheezing or shortness of breath (coughing) 25 mL 0    baclofen 10 mg tablet Take 1 tablet (10 mg total) by mouth 2 (two) times a day 180 tablet 0    benzonatate (TESSALON PERLES) 100 mg capsule Take 1 capsule by mouth every 8 hours as needed for dry cough 20 capsule 0    Calcium Citrate 200 MG TABS Take by mouth 3 TABS TWICE A DAY       carbamide peroxide (DEBROX) 6.5 % otic solution Instill 5 drops in affected ear twice daily for 4 days as needed for ear wax 15 mL 0    cholecalciferol (VITAMIN D3) 1,000 units tablet Take 1 capsule by mouth daily      clotrimazole-betamethasone (LOTRISONE) 1-0.05 % cream APPLY TOPICALLY TO AFFECTED AREA OF SKIN TWICE A DAY AS NEEDED FOR IRRITATION FROM DIAPER 45 g 11    denosumab (Prolia) 60 mg/mL Inject 1 mL (60 mg total) under the skin every 6 (six) months 1 mL 0    diphenhydrAMINE (BENADRYL) 25 mg tablet Take 1 tablet (25 mg total) by mouth every 8 (eight) hours as needed for itching, allergies or sleep (Coughing) 20 tablet 0    famotidine (PEPCID) 20 mg tablet Take 1 tablet (20 mg total) by mouth daily at bedtime  0    fluticasone (FLONASE) 50 mcg/act nasal spray USE 1 SPRAY IN EACH NOSTRIL TWICE A DAY (RHINITIS) 16 g 11    GNP 8 Hour Arthritis Relief 650 MG CR tablet TAKE 1 TABLET BY MOUTH EVERY 6 HOURS AS NEEDED FOR MILD PAIN OR TEMPERATURE  >100.4 8 tablet 11     hydrochlorothiazide (HYDRODIURIL) 25 mg tablet Take 0.5 tablets (12.5 mg total) by mouth daily 30 tablet 5    ibuprofen (MOTRIN) 600 mg tablet Take by mouth every 6 (six) hours as needed for fever Fever greater than 101.4F      ipratropium (ATROVENT) 0.06 % nasal spray 2 sprays into each nostril 3 (three) times a day as needed for rhinitis (congestion, post nasal drip causing cough) 15 mL 0    loratadine (CLARITIN) 10 mg tablet TAKE 1 TABLET BY MOUTH DAILY (ALLERGIC RHINITIS) 28 tablet 10    LORazepam (ATIVAN) 0.5 mg tablet Take 1 tablet (0.5 mg total) by mouth once for 1 dose 1 tablet 0    losartan (COZAAR) 50 mg tablet Take 1 tablet (50 mg total) by mouth daily 30 tablet 5    metoprolol succinate (TOPROL-XL) 100 mg 24 hr tablet Take 1 tablet by mouth daily  0    Mucus Relief 600 MG 12 hr tablet TAKE 1 TABLET BY MOUTH EVERY 12 HOURS AS NEEDED FOR CONGESTION **DO NOT CRUSH** 5 tablet 11    nystatin-triamcinolone (MYCOLOG-II) ointment Apply topically 2 (two) times a day as needed (As needed for rash) 30 g 5    olopatadine HCl (PATADAY) 0.2 % opth drops Instill 1 drop into affected eye(s) daily PRN irritation  0    PHENobarbital 32.4 mg tablet TAKE 3 TABLETS BY MOUTH (97.2MG) AT 4PM FOR SEIZURES 90 tablet 4    polyethylene glycol (GLYCOLAX) 17 GM/SCOOP powder 1 cap dissolve in 8 ox of fluid po daily (hold for loose stools). Constipation. 255 g 10    polyethylene glycol (GLYCOLAX) 17 GM/SCOOP powder 1 tsp in 4 oz of fluid po PRN every 3 days if no BM. Constipation. 255 g 5    selenium sulfide (SELSUN) 1 % Apply topically 2 (two) times a week Apply twice weekly as needed when with scalp irritation. 420 mL 5    simvastatin (ZOCOR) 20 mg tablet Take 1 tablet by mouth daily  0    Vitamins A & D (vitamin A & D) ointment Apply 1 application topically      zolpidem (AMBIEN) 5 mg tablet Take 0.5 tablets (2.5 mg total) by mouth daily at bedtime 30 tablet 5     Current Facility-Administered Medications on File Prior to Visit    Medication Dose Route Frequency Provider Last Rate Last Admin    albuterol inhalation solution 2.5 mg  2.5 mg Nebulization Q6H PRN Valeri Samayoa MD   2.5 mg at 03/04/24 1209   [2]   Social History  Tobacco Use    Smoking status: Never    Smokeless tobacco: Never   Vaping Use    Vaping status: Never Used   Substance and Sexual Activity    Alcohol use: No    Drug use: No    Sexual activity: Never

## 2025-06-27 LAB — COLOGUARD RESULT REPORTABLE: NEGATIVE

## 2025-07-01 DIAGNOSIS — M81.0 OSTEOPOROSIS, UNSPECIFIED OSTEOPOROSIS TYPE, UNSPECIFIED PATHOLOGICAL FRACTURE PRESENCE: ICD-10-CM

## 2025-07-01 RX ORDER — DENOSUMAB 60 MG/ML
60 INJECTION SUBCUTANEOUS
Qty: 1 ML | Refills: 0 | Status: SHIPPED | OUTPATIENT
Start: 2025-07-01

## 2025-07-07 ENCOUNTER — TELEPHONE (OUTPATIENT)
Dept: FAMILY MEDICINE CLINIC | Facility: CLINIC | Age: 65
End: 2025-07-07

## 2025-07-07 DIAGNOSIS — M81.0 AGE RELATED OSTEOPOROSIS, UNSPECIFIED PATHOLOGICAL FRACTURE PRESENCE: Primary | ICD-10-CM

## 2025-07-07 NOTE — TELEPHONE ENCOUNTER
Patient's caregiver called and requested script for blood work pre and post for prolia injection to be submitted (usually 2 wks before and 2 wks after injection)

## 2025-07-08 ENCOUNTER — APPOINTMENT (OUTPATIENT)
Dept: LAB | Facility: CLINIC | Age: 65
End: 2025-07-08
Payer: MEDICARE

## 2025-07-08 LAB
ANION GAP SERPL CALCULATED.3IONS-SCNC: 10 MMOL/L (ref 4–13)
BUN SERPL-MCNC: 14 MG/DL (ref 5–25)
CALCIUM SERPL-MCNC: 9.1 MG/DL (ref 8.4–10.2)
CHLORIDE SERPL-SCNC: 101 MMOL/L (ref 96–108)
CO2 SERPL-SCNC: 30 MMOL/L (ref 21–32)
CREAT SERPL-MCNC: 0.45 MG/DL (ref 0.6–1.3)
GFR SERPL CREATININE-BSD FRML MDRD: 105 ML/MIN/1.73SQ M
GLUCOSE P FAST SERPL-MCNC: 90 MG/DL (ref 65–99)
POTASSIUM SERPL-SCNC: 3.9 MMOL/L (ref 3.5–5.3)
SODIUM SERPL-SCNC: 141 MMOL/L (ref 135–147)

## 2025-07-08 PROCEDURE — 36415 COLL VENOUS BLD VENIPUNCTURE: CPT

## 2025-07-08 PROCEDURE — 80048 BASIC METABOLIC PNL TOTAL CA: CPT

## 2025-07-21 DIAGNOSIS — F51.01 PRIMARY INSOMNIA: ICD-10-CM

## 2025-07-21 RX ORDER — ZOLPIDEM TARTRATE 5 MG/1
TABLET ORAL
Qty: 15 TABLET | Refills: 4 | Status: SHIPPED | OUTPATIENT
Start: 2025-07-21

## 2025-07-24 ENCOUNTER — CLINICAL SUPPORT (OUTPATIENT)
Dept: FAMILY MEDICINE CLINIC | Facility: CLINIC | Age: 65
End: 2025-07-24

## 2025-07-24 DIAGNOSIS — M81.0 AGE-RELATED OSTEOPOROSIS WITHOUT CURRENT PATHOLOGICAL FRACTURE: Primary | ICD-10-CM

## 2025-07-24 PROCEDURE — 96372 THER/PROPH/DIAG INJ SC/IM: CPT

## 2025-07-24 NOTE — PROGRESS NOTES
Patient presents in office to for the following injection. Patient was given Prolia Shot on left deltoid. Patient tolerated vaccine well.

## 2025-07-28 ENCOUNTER — TELEPHONE (OUTPATIENT)
Dept: FAMILY MEDICINE CLINIC | Facility: CLINIC | Age: 65
End: 2025-07-28

## 2025-08-04 ENCOUNTER — TELEPHONE (OUTPATIENT)
Dept: FAMILY MEDICINE CLINIC | Facility: CLINIC | Age: 65
End: 2025-08-04

## 2025-08-04 DIAGNOSIS — G40.409 TONIC-CLONIC EPILEPTIC SEIZURES (HCC): ICD-10-CM

## 2025-08-06 RX ORDER — PHENOBARBITAL 32.4 MG/1
TABLET ORAL
Qty: 90 TABLET | Refills: 5 | Status: SHIPPED | OUTPATIENT
Start: 2025-08-06

## 2025-08-08 ENCOUNTER — APPOINTMENT (OUTPATIENT)
Dept: LAB | Facility: CLINIC | Age: 65
End: 2025-08-08
Payer: MEDICARE

## 2025-08-08 ENCOUNTER — OFFICE VISIT (OUTPATIENT)
Dept: FAMILY MEDICINE CLINIC | Facility: CLINIC | Age: 65
End: 2025-08-08

## 2025-08-08 VITALS
SYSTOLIC BLOOD PRESSURE: 154 MMHG | TEMPERATURE: 97.4 F | OXYGEN SATURATION: 95 % | DIASTOLIC BLOOD PRESSURE: 91 MMHG | RESPIRATION RATE: 18 BRPM | HEART RATE: 78 BPM

## 2025-08-08 DIAGNOSIS — I10 PRIMARY HYPERTENSION: ICD-10-CM

## 2025-08-08 DIAGNOSIS — M81.0 AGE RELATED OSTEOPOROSIS, UNSPECIFIED PATHOLOGICAL FRACTURE PRESENCE: Primary | ICD-10-CM

## 2025-08-08 PROCEDURE — G2211 COMPLEX E/M VISIT ADD ON: HCPCS | Performed by: FAMILY MEDICINE

## 2025-08-08 PROCEDURE — 99213 OFFICE O/P EST LOW 20 MIN: CPT | Performed by: FAMILY MEDICINE

## 2025-08-13 ENCOUNTER — TELEPHONE (OUTPATIENT)
Dept: FAMILY MEDICINE CLINIC | Facility: CLINIC | Age: 65
End: 2025-08-13